# Patient Record
Sex: MALE | Race: WHITE | Employment: OTHER | URBAN - METROPOLITAN AREA
[De-identification: names, ages, dates, MRNs, and addresses within clinical notes are randomized per-mention and may not be internally consistent; named-entity substitution may affect disease eponyms.]

---

## 2017-02-08 PROBLEM — Z80.42 FAMILY HX OF PROSTATE CANCER: Status: ACTIVE | Noted: 2017-02-08

## 2017-02-20 ENCOUNTER — OFFICE VISIT (OUTPATIENT)
Dept: ORTHOPEDIC SURGERY | Age: 67
End: 2017-02-20

## 2017-02-20 VITALS
SYSTOLIC BLOOD PRESSURE: 162 MMHG | HEIGHT: 72 IN | TEMPERATURE: 96.5 F | DIASTOLIC BLOOD PRESSURE: 89 MMHG | WEIGHT: 205 LBS | HEART RATE: 88 BPM | BODY MASS INDEX: 27.77 KG/M2

## 2017-02-20 DIAGNOSIS — M17.12 PRIMARY OSTEOARTHRITIS OF LEFT KNEE: Primary | ICD-10-CM

## 2017-02-20 RX ORDER — HYALURONATE SODIUM 10 MG/ML
2 SYRINGE (ML) INTRAARTICULAR ONCE
Qty: 2 ML | Refills: 0
Start: 2017-02-20 | End: 2017-02-20

## 2017-02-20 RX ORDER — HYALURONATE SODIUM 10 MG/ML
2 SYRINGE (ML) INTRAARTICULAR ONCE
Qty: 2 ML | Refills: 0 | Status: CANCELLED
Start: 2017-02-20 | End: 2017-02-20

## 2017-02-20 NOTE — PROGRESS NOTES
Patient: Tip Moe                MRN: [id number]       SSN: [id number]  YOB: 1950        AGE: 77 y.o. SEX: male  There is no height or weight on file to calculate BMI. PCP: Karlos Strange MD  02/20/17    No chief complaint on file. HISTORY:  Tip Moe is a 77 y.o. male who is seen for his first Euflexxa injecton. PROCEDURE: After timeout and under sterile conditions, patients left knee was injected with 2 cc of Euflexxa. [facility] VIEW  OFFICE PROCEDURE PROGRESS NOTE        Chart reviewed for the following:   Sarah Liriano MD, have reviewed the History, Physical and updated the Allergic reactions for [address] performed immediately prior to start of procedure:   Sarah Liriano MD, have performed the following reviews on Idell Amble III prior to the start of the procedure:            * Patient was identified by name and date of birth   * Agreement on procedure being performed was verified  * Risks and Benefits explained to the patient  * Procedure site verified and marked as necessary  * Patient was positioned for comfort  * Consent was signed and verified     Time: 9:50 AM        Date of procedure: 2/20/2017    Procedure performed by: Savanna Bronson MD    Provider assisted by: None     Patient assisted by: self    How tolerated by patient: tolerated the procedure well with no complications    Comments: none    PHYSICAL EXAM: Knees appear normal. No swelling, redness, or increased warmth. Sensation, circulation, ROM, and motor strength are normal and equal for B/L knees. PLAN: Pt has previously x-ray documented arthritis of the left knee I will see him back in one week for his next second Euflexxa injection.       Scribed by Jason Ames, as dictated by Savanna Bronson MD.

## 2017-02-27 ENCOUNTER — OFFICE VISIT (OUTPATIENT)
Dept: ORTHOPEDIC SURGERY | Age: 67
End: 2017-02-27

## 2017-02-27 VITALS
SYSTOLIC BLOOD PRESSURE: 138 MMHG | BODY MASS INDEX: 27.8 KG/M2 | TEMPERATURE: 98 F | WEIGHT: 205 LBS | DIASTOLIC BLOOD PRESSURE: 90 MMHG | HEART RATE: 70 BPM

## 2017-02-27 DIAGNOSIS — M17.12 PRIMARY OSTEOARTHRITIS OF LEFT KNEE: Primary | ICD-10-CM

## 2017-02-27 RX ORDER — HYALURONATE SODIUM 10 MG/ML
2 SYRINGE (ML) INTRAARTICULAR ONCE
Qty: 2 ML | Refills: 0
Start: 2017-02-27 | End: 2017-02-27

## 2017-02-27 NOTE — PROGRESS NOTES
Patient: Mraek Rosas                MRN: [id number]       SSN: [id number]  YOB: 1950        AGE: 77 y.o. SEX: male  There is no height or weight on file to calculate BMI. PCP: Dyana Marinelli MD  02/27/17    No chief complaint on file. HISTORY:  Zeb Kayser III is a 77 y.o. male who is seen for a his second left knee Euflexxa injection. PROCEDURE: After timeout and under sterile conditions, patients left knee was injected with 2 cc of Euflexxa. [facility]  [facility] PROCEDURE PROGRESS NOTE        Chart reviewed for the following:   Shelbi Lawrence MD, have reviewed the History, Physical and updated the Allergic reactions for [address] performed immediately prior to start of procedure:   Shelbi Lawrence MD, have performed the following reviews on Zeb Kayser III prior to the start of the procedure:            * Patient was identified by name and date of birth   * Agreement on procedure being performed was verified  * Risks and Benefits explained to the patient  * Procedure site verified and marked as necessary  * Patient was positioned for comfort  * Consent was signed and verified     Time: 9:44 AM        Date of procedure: 2/27/2017    Procedure performed by: Buzzy Schilder, MD    Provider assisted by: None     Patient assisted by: self    How tolerated by patient: tolerated the procedure well with no complications    Comments: none    PHYSICAL EXAM: Knees appear normal. No swelling, redness, or increased warmth. Sensation, circulation, ROM, and motor strength are normal and equal for B/L knees. PLAN: Pt has previously x-ray documented arthritis of the left knee. I will see him back in one week for his next third Euflexxa injection.       Scribed by Viktoriya Sharma, as dictated by Buzzy Schilder, MD.

## 2017-03-06 ENCOUNTER — OFFICE VISIT (OUTPATIENT)
Dept: ORTHOPEDIC SURGERY | Age: 67
End: 2017-03-06

## 2017-03-06 VITALS
HEIGHT: 72 IN | WEIGHT: 205 LBS | SYSTOLIC BLOOD PRESSURE: 144 MMHG | HEART RATE: 100 BPM | TEMPERATURE: 97.8 F | DIASTOLIC BLOOD PRESSURE: 93 MMHG | BODY MASS INDEX: 27.77 KG/M2

## 2017-03-06 DIAGNOSIS — M17.12 PRIMARY OSTEOARTHRITIS OF LEFT KNEE: Primary | ICD-10-CM

## 2017-03-06 RX ORDER — HYALURONATE SODIUM 10 MG/ML
2 SYRINGE (ML) INTRAARTICULAR ONCE
Qty: 2 ML | Refills: 0
Start: 2017-03-06 | End: 2017-03-06

## 2017-03-06 NOTE — PROGRESS NOTES
Patient: Jenny Douglas                MRN: [id number]       SSN: [id number]  YOB: 1950        AGE: 77 y.o. SEX: male  There is no height or weight on file to calculate BMI. PCP: Callum Winters MD  03/06/17    Chief Complaint   Patient presents with    Knee Pain     Left, 3rd Euflexxa injection       HISTORY:  Jenny Douglas is a 77 y.o. male who is seen for his third and final left knee Euflexxa injection. PROCEDURE: After timeout and under sterile conditions, patients left knee was injected with 2 cc of Euflexxa. [facility] VIEW  OFFICE PROCEDURE PROGRESS NOTE        Chart reviewed for the following:   Mino Toledo MD, have reviewed the History, Physical and updated the Allergic reactions for [address] performed immediately prior to start of procedure:   Mino Toledo MD, have performed the following reviews on [facility] III prior to the start of the procedure:            * Patient was identified by name and date of birth   * Agreement on procedure being performed was verified  * Risks and Benefits explained to the patient  * Procedure site verified and marked as necessary  * Patient was positioned for comfort  * Consent was signed and verified     Time: 9:45 AM        Date of procedure: 3/6/2017    Procedure performed by: Nelda Jones MD    Provider assisted by: None     Patient assisted by: self    How tolerated by patient: tolerated the procedure well with no complications    Comments: none    PHYSICAL EXAM: Knees appear normal. No swelling, redness, or increased warmth. Sensation, circulation, ROM, and motor strength are normal and equal for B/L knees. PLAN: Pt has previously x-ray documented arthritis of the left knee. I will see him back prn.       Scribed by Candy Sierra, as dictated by Nelda Jones MD.

## 2017-03-20 ENCOUNTER — HOSPITAL ENCOUNTER (OUTPATIENT)
Dept: PREADMISSION TESTING | Age: 67
Discharge: HOME | End: 2017-03-20
Payer: MEDICARE

## 2017-03-20 DIAGNOSIS — Z01.818 PRE-OP TESTING: ICD-10-CM

## 2017-03-20 LAB
ANION GAP BLD CALC-SCNC: 6 MMOL/L (ref 3–18)
APPEARANCE UR: CLEAR
APTT PPP: 26.1 SEC (ref 23–36.4)
ATRIAL RATE: 77 BPM
BACTERIA URNS QL MICRO: NEGATIVE /HPF
BILIRUB UR QL: NEGATIVE
BUN SERPL-MCNC: 18 MG/DL (ref 7–18)
BUN/CREAT SERPL: 16 (ref 12–20)
CALCIUM SERPL-MCNC: 9 MG/DL (ref 8.5–10.1)
CALCULATED P AXIS, ECG09: 46 DEGREES
CALCULATED R AXIS, ECG10: 52 DEGREES
CALCULATED T AXIS, ECG11: 47 DEGREES
CHLORIDE SERPL-SCNC: 103 MMOL/L (ref 100–108)
CO2 SERPL-SCNC: 33 MMOL/L (ref 21–32)
COLOR UR: YELLOW
CREAT SERPL-MCNC: 1.16 MG/DL (ref 0.6–1.3)
DIAGNOSIS, 93000: NORMAL
EPITH CASTS URNS QL MICRO: NORMAL /LPF (ref 0–5)
ERYTHROCYTE [DISTWIDTH] IN BLOOD BY AUTOMATED COUNT: 13 % (ref 11.6–14.5)
GLUCOSE SERPL-MCNC: 95 MG/DL (ref 74–99)
GLUCOSE UR STRIP.AUTO-MCNC: NEGATIVE MG/DL
HCT VFR BLD AUTO: 45.7 % (ref 36–48)
HGB BLD-MCNC: 15.4 G/DL (ref 13–16)
HGB UR QL STRIP: NEGATIVE
INR PPP: 1 (ref 0.8–1.2)
KETONES UR QL STRIP.AUTO: NEGATIVE MG/DL
LEUKOCYTE ESTERASE UR QL STRIP.AUTO: ABNORMAL
MCH RBC QN AUTO: 32.3 PG (ref 24–34)
MCHC RBC AUTO-ENTMCNC: 33.7 G/DL (ref 31–37)
MCV RBC AUTO: 95.8 FL (ref 74–97)
NITRITE UR QL STRIP.AUTO: NEGATIVE
P-R INTERVAL, ECG05: 132 MS
PH UR STRIP: 7.5 [PH] (ref 5–8)
PLATELET # BLD AUTO: 184 K/UL (ref 135–420)
PMV BLD AUTO: 10.2 FL (ref 9.2–11.8)
POTASSIUM SERPL-SCNC: 4.5 MMOL/L (ref 3.5–5.5)
PROT UR STRIP-MCNC: NEGATIVE MG/DL
PROTHROMBIN TIME: 13 SEC (ref 11.5–15.2)
Q-T INTERVAL, ECG07: 380 MS
QRS DURATION, ECG06: 84 MS
QTC CALCULATION (BEZET), ECG08: 430 MS
RBC # BLD AUTO: 4.77 M/UL (ref 4.7–5.5)
RBC #/AREA URNS HPF: NORMAL /HPF (ref 0–5)
SODIUM SERPL-SCNC: 142 MMOL/L (ref 136–145)
SP GR UR REFRACTOMETRY: 1.01 (ref 1–1.03)
UROBILINOGEN UR QL STRIP.AUTO: 1 EU/DL (ref 0.2–1)
VENTRICULAR RATE, ECG03: 77 BPM
WBC # BLD AUTO: 5.4 K/UL (ref 4.6–13.2)
WBC URNS QL MICRO: NORMAL /HPF (ref 0–4)

## 2017-03-20 PROCEDURE — 85730 THROMBOPLASTIN TIME PARTIAL: CPT | Performed by: UROLOGY

## 2017-03-20 PROCEDURE — 81001 URINALYSIS AUTO W/SCOPE: CPT | Performed by: UROLOGY

## 2017-03-20 PROCEDURE — 80048 BASIC METABOLIC PNL TOTAL CA: CPT | Performed by: UROLOGY

## 2017-03-20 PROCEDURE — 87086 URINE CULTURE/COLONY COUNT: CPT | Performed by: UROLOGY

## 2017-03-20 PROCEDURE — 93005 ELECTROCARDIOGRAM TRACING: CPT

## 2017-03-20 PROCEDURE — 36415 COLL VENOUS BLD VENIPUNCTURE: CPT | Performed by: UROLOGY

## 2017-03-20 PROCEDURE — 85027 COMPLETE CBC AUTOMATED: CPT | Performed by: UROLOGY

## 2017-03-20 PROCEDURE — 85610 PROTHROMBIN TIME: CPT | Performed by: UROLOGY

## 2017-03-21 LAB
BACTERIA SPEC CULT: NORMAL
SERVICE CMNT-IMP: NORMAL

## 2017-04-05 ENCOUNTER — ANESTHESIA EVENT (OUTPATIENT)
Dept: SURGERY | Age: 67
End: 2017-04-05
Payer: MEDICARE

## 2017-04-06 ENCOUNTER — HOSPITAL ENCOUNTER (OUTPATIENT)
Age: 67
Setting detail: OBSERVATION
Discharge: HOME | End: 2017-04-07
Attending: UROLOGY | Admitting: UROLOGY
Payer: MEDICARE

## 2017-04-06 ENCOUNTER — ANESTHESIA (OUTPATIENT)
Dept: SURGERY | Age: 67
End: 2017-04-06
Payer: MEDICARE

## 2017-04-06 ENCOUNTER — SURGERY (OUTPATIENT)
Age: 67
End: 2017-04-06

## 2017-04-06 PROCEDURE — 74011000258 HC RX REV CODE- 258: Performed by: UROLOGY

## 2017-04-06 PROCEDURE — 77030018832 HC SOL IRR H20 ICUM -A: Performed by: UROLOGY

## 2017-04-06 PROCEDURE — 77030005538 HC CATH URETH FOL44 BARD -B: Performed by: UROLOGY

## 2017-04-06 PROCEDURE — 77030010509 HC AIRWY LMA MSK TELE -A: Performed by: NURSE ANESTHETIST, CERTIFIED REGISTERED

## 2017-04-06 PROCEDURE — 76010000162 HC OR TIME 1.5 TO 2 HR INTENSV-TIER 1: Performed by: UROLOGY

## 2017-04-06 PROCEDURE — 77030005520 HC CATH URETH FOL38 BARD -A: Performed by: UROLOGY

## 2017-04-06 PROCEDURE — 77030012863 HC BG URIN LEG HOLL -A: Performed by: UROLOGY

## 2017-04-06 PROCEDURE — 76060000034 HC ANESTHESIA 1.5 TO 2 HR: Performed by: UROLOGY

## 2017-04-06 PROCEDURE — 77030011942 HC CATH URETH FOL46 BARD -B: Performed by: UROLOGY

## 2017-04-06 PROCEDURE — 77030019927 HC TBNG IRR CYSTO BAXT -A: Performed by: UROLOGY

## 2017-04-06 PROCEDURE — 74011250636 HC RX REV CODE- 250/636

## 2017-04-06 PROCEDURE — 76210000006 HC OR PH I REC 0.5 TO 1 HR: Performed by: UROLOGY

## 2017-04-06 PROCEDURE — 74011250637 HC RX REV CODE- 250/637: Performed by: ANESTHESIOLOGY

## 2017-04-06 PROCEDURE — 74011250636 HC RX REV CODE- 250/636: Performed by: UROLOGY

## 2017-04-06 PROCEDURE — 77030032490 HC SLV COMPR SCD KNE COVD -B: Performed by: UROLOGY

## 2017-04-06 PROCEDURE — 77030012012 HC AIRWY OP SFT TELE -A: Performed by: NURSE ANESTHETIST, CERTIFIED REGISTERED

## 2017-04-06 PROCEDURE — 77030020269 HC MISC IMPL

## 2017-04-06 PROCEDURE — 99218 HC RM OBSERVATION: CPT

## 2017-04-06 PROCEDURE — 77030005537 HC CATH URETH BARD -A: Performed by: UROLOGY

## 2017-04-06 PROCEDURE — 77030018846 HC SOL IRR STRL H20 ICUM -A: Performed by: UROLOGY

## 2017-04-06 PROCEDURE — 77030012961 HC IRR KT CYSTO/TUR ICUM -A: Performed by: UROLOGY

## 2017-04-06 PROCEDURE — 74011250637 HC RX REV CODE- 250/637: Performed by: UROLOGY

## 2017-04-06 PROCEDURE — 77030020782 HC GWN BAIR PAWS FLX 3M -B: Performed by: UROLOGY

## 2017-04-06 PROCEDURE — 76210000028 HC REC RM PH II 4 TO 4.5 HR: Performed by: UROLOGY

## 2017-04-06 PROCEDURE — 74011000250 HC RX REV CODE- 250

## 2017-04-06 PROCEDURE — 74011250636 HC RX REV CODE- 250/636: Performed by: ANESTHESIOLOGY

## 2017-04-06 PROCEDURE — C1769 GUIDE WIRE: HCPCS | Performed by: UROLOGY

## 2017-04-06 PROCEDURE — C2618 PROBE/NEEDLE, CRYO: HCPCS

## 2017-04-06 RX ORDER — CIPROFLOXACIN 250 MG/1
250 TABLET, FILM COATED ORAL 2 TIMES DAILY
Qty: 14 TAB | Refills: 0 | Status: SHIPPED | OUTPATIENT
Start: 2017-04-06 | End: 2017-04-13

## 2017-04-06 RX ORDER — LIDOCAINE HYDROCHLORIDE 20 MG/ML
INJECTION, SOLUTION EPIDURAL; INFILTRATION; INTRACAUDAL; PERINEURAL AS NEEDED
Status: DISCONTINUED | OUTPATIENT
Start: 2017-04-06 | End: 2017-04-06 | Stop reason: HOSPADM

## 2017-04-06 RX ORDER — CIPROFLOXACIN 250 MG/1
250 TABLET, FILM COATED ORAL EVERY 12 HOURS
Status: DISCONTINUED | OUTPATIENT
Start: 2017-04-06 | End: 2017-04-07 | Stop reason: HOSPADM

## 2017-04-06 RX ORDER — FENTANYL CITRATE 50 UG/ML
INJECTION, SOLUTION INTRAMUSCULAR; INTRAVENOUS AS NEEDED
Status: DISCONTINUED | OUTPATIENT
Start: 2017-04-06 | End: 2017-04-06 | Stop reason: HOSPADM

## 2017-04-06 RX ORDER — OXYCODONE AND ACETAMINOPHEN 5; 325 MG/1; MG/1
1 TABLET ORAL
Status: DISCONTINUED | OUTPATIENT
Start: 2017-04-06 | End: 2017-04-06 | Stop reason: SDUPTHER

## 2017-04-06 RX ORDER — TAMSULOSIN HYDROCHLORIDE 0.4 MG/1
0.4 CAPSULE ORAL DAILY
Status: DISCONTINUED | OUTPATIENT
Start: 2017-04-07 | End: 2017-04-07 | Stop reason: HOSPADM

## 2017-04-06 RX ORDER — DEXTROSE MONOHYDRATE AND SODIUM CHLORIDE 5; .45 G/100ML; G/100ML
150 INJECTION, SOLUTION INTRAVENOUS CONTINUOUS
Status: DISCONTINUED | OUTPATIENT
Start: 2017-04-06 | End: 2017-04-07 | Stop reason: HOSPADM

## 2017-04-06 RX ORDER — ONDANSETRON 2 MG/ML
INJECTION INTRAMUSCULAR; INTRAVENOUS AS NEEDED
Status: DISCONTINUED | OUTPATIENT
Start: 2017-04-06 | End: 2017-04-06 | Stop reason: HOSPADM

## 2017-04-06 RX ORDER — DEXAMETHASONE SODIUM PHOSPHATE 4 MG/ML
INJECTION, SOLUTION INTRA-ARTICULAR; INTRALESIONAL; INTRAMUSCULAR; INTRAVENOUS; SOFT TISSUE AS NEEDED
Status: DISCONTINUED | OUTPATIENT
Start: 2017-04-06 | End: 2017-04-06 | Stop reason: HOSPADM

## 2017-04-06 RX ORDER — HYDROCODONE BITARTRATE AND ACETAMINOPHEN 5; 325 MG/1; MG/1
1 TABLET ORAL
Status: DISCONTINUED | OUTPATIENT
Start: 2017-04-06 | End: 2017-04-07 | Stop reason: HOSPADM

## 2017-04-06 RX ORDER — GLYCOPYRROLATE 0.2 MG/ML
INJECTION INTRAMUSCULAR; INTRAVENOUS AS NEEDED
Status: DISCONTINUED | OUTPATIENT
Start: 2017-04-06 | End: 2017-04-06 | Stop reason: HOSPADM

## 2017-04-06 RX ORDER — FENTANYL CITRATE 50 UG/ML
25 INJECTION, SOLUTION INTRAMUSCULAR; INTRAVENOUS
Status: DISCONTINUED | OUTPATIENT
Start: 2017-04-06 | End: 2017-04-07 | Stop reason: HOSPADM

## 2017-04-06 RX ORDER — SODIUM CHLORIDE, SODIUM LACTATE, POTASSIUM CHLORIDE, CALCIUM CHLORIDE 600; 310; 30; 20 MG/100ML; MG/100ML; MG/100ML; MG/100ML
75 INJECTION, SOLUTION INTRAVENOUS CONTINUOUS
Status: DISCONTINUED | OUTPATIENT
Start: 2017-04-06 | End: 2017-04-06 | Stop reason: HOSPADM

## 2017-04-06 RX ORDER — SODIUM CHLORIDE 0.9 % (FLUSH) 0.9 %
5-10 SYRINGE (ML) INJECTION AS NEEDED
Status: DISCONTINUED | OUTPATIENT
Start: 2017-04-06 | End: 2017-04-07 | Stop reason: HOSPADM

## 2017-04-06 RX ORDER — LIDOCAINE HYDROCHLORIDE 10 MG/ML
0.1 INJECTION, SOLUTION EPIDURAL; INFILTRATION; INTRACAUDAL; PERINEURAL AS NEEDED
Status: DISCONTINUED | OUTPATIENT
Start: 2017-04-06 | End: 2017-04-06 | Stop reason: HOSPADM

## 2017-04-06 RX ORDER — PROPOFOL 10 MG/ML
INJECTION, EMULSION INTRAVENOUS AS NEEDED
Status: DISCONTINUED | OUTPATIENT
Start: 2017-04-06 | End: 2017-04-06 | Stop reason: HOSPADM

## 2017-04-06 RX ORDER — SODIUM CHLORIDE 0.9 % (FLUSH) 0.9 %
5-10 SYRINGE (ML) INJECTION EVERY 8 HOURS
Status: DISCONTINUED | OUTPATIENT
Start: 2017-04-06 | End: 2017-04-06 | Stop reason: HOSPADM

## 2017-04-06 RX ORDER — FAMOTIDINE 20 MG/1
20 TABLET, FILM COATED ORAL ONCE
Status: COMPLETED | OUTPATIENT
Start: 2017-04-06 | End: 2017-04-06

## 2017-04-06 RX ORDER — HYDROCODONE BITARTRATE AND ACETAMINOPHEN 5; 325 MG/1; MG/1
1 TABLET ORAL
Qty: 30 TAB | Refills: 0 | Status: SHIPPED | OUTPATIENT
Start: 2017-04-06 | End: 2018-07-24

## 2017-04-06 RX ORDER — FUROSEMIDE 10 MG/ML
20 INJECTION INTRAMUSCULAR; INTRAVENOUS ONCE
Status: COMPLETED | OUTPATIENT
Start: 2017-04-06 | End: 2017-04-06

## 2017-04-06 RX ORDER — SODIUM CHLORIDE 0.9 % (FLUSH) 0.9 %
5-10 SYRINGE (ML) INJECTION AS NEEDED
Status: DISCONTINUED | OUTPATIENT
Start: 2017-04-06 | End: 2017-04-06 | Stop reason: HOSPADM

## 2017-04-06 RX ORDER — MIDAZOLAM HYDROCHLORIDE 1 MG/ML
INJECTION, SOLUTION INTRAMUSCULAR; INTRAVENOUS AS NEEDED
Status: DISCONTINUED | OUTPATIENT
Start: 2017-04-06 | End: 2017-04-06 | Stop reason: HOSPADM

## 2017-04-06 RX ORDER — LISINOPRIL 5 MG/1
5 TABLET ORAL DAILY
Status: DISCONTINUED | OUTPATIENT
Start: 2017-04-07 | End: 2017-04-07 | Stop reason: HOSPADM

## 2017-04-06 RX ORDER — MELATONIN
2000 DAILY
Status: DISCONTINUED | OUTPATIENT
Start: 2017-04-07 | End: 2017-04-07 | Stop reason: SDUPTHER

## 2017-04-06 RX ORDER — TAMSULOSIN HYDROCHLORIDE 0.4 MG/1
0.4 CAPSULE ORAL
Qty: 30 CAP | Refills: 0 | Status: SHIPPED | OUTPATIENT
Start: 2017-04-06 | End: 2017-10-10

## 2017-04-06 RX ADMIN — FENTANYL CITRATE 50 MCG: 50 INJECTION, SOLUTION INTRAMUSCULAR; INTRAVENOUS at 11:47

## 2017-04-06 RX ADMIN — SODIUM CHLORIDE, SODIUM LACTATE, POTASSIUM CHLORIDE, AND CALCIUM CHLORIDE 75 ML/HR: 600; 310; 30; 20 INJECTION, SOLUTION INTRAVENOUS at 11:10

## 2017-04-06 RX ADMIN — DEXAMETHASONE SODIUM PHOSPHATE 4 MG: 4 INJECTION, SOLUTION INTRA-ARTICULAR; INTRALESIONAL; INTRAMUSCULAR; INTRAVENOUS; SOFT TISSUE at 11:50

## 2017-04-06 RX ADMIN — PROPOFOL 170 MG: 10 INJECTION, EMULSION INTRAVENOUS at 11:47

## 2017-04-06 RX ADMIN — ONDANSETRON 4 MG: 2 INJECTION INTRAMUSCULAR; INTRAVENOUS at 13:02

## 2017-04-06 RX ADMIN — GENTAMICIN SULFATE 180 MG: 40 INJECTION, SOLUTION INTRAMUSCULAR; INTRAVENOUS at 11:43

## 2017-04-06 RX ADMIN — FAMOTIDINE 20 MG: 20 TABLET, FILM COATED ORAL at 10:56

## 2017-04-06 RX ADMIN — CIPROFLOXACIN HYDROCHLORIDE 250 MG: 250 TABLET, FILM COATED ORAL at 22:03

## 2017-04-06 RX ADMIN — GLYCOPYRROLATE 0.2 MG: 0.2 INJECTION INTRAMUSCULAR; INTRAVENOUS at 11:43

## 2017-04-06 RX ADMIN — FUROSEMIDE 20 MG: 10 INJECTION, SOLUTION INTRAVENOUS at 18:14

## 2017-04-06 RX ADMIN — MIDAZOLAM HYDROCHLORIDE 2 MG: 1 INJECTION, SOLUTION INTRAMUSCULAR; INTRAVENOUS at 11:43

## 2017-04-06 RX ADMIN — DEXTROSE MONOHYDRATE AND SODIUM CHLORIDE 150 ML/HR: 5; .45 INJECTION, SOLUTION INTRAVENOUS at 18:14

## 2017-04-06 RX ADMIN — FENTANYL CITRATE 50 MCG: 50 INJECTION, SOLUTION INTRAMUSCULAR; INTRAVENOUS at 12:18

## 2017-04-06 RX ADMIN — LIDOCAINE HYDROCHLORIDE 60 MG: 20 INJECTION, SOLUTION EPIDURAL; INFILTRATION; INTRACAUDAL; PERINEURAL at 11:47

## 2017-04-06 NOTE — ANESTHESIA POSTPROCEDURE EVALUATION
Post-Anesthesia Evaluation and Assessment    Patient: Haim Ortiz MRN: [id number]  SSN: [id number]    YOB: 1950  Age: 77 y.o. Sex: male       Cardiovascular Function/Vital Signs  Visit Vitals    /89    Pulse 76    Temp 36.3 °C (97.4 °F)    Resp 16    Ht 6' (1.829 m)    Wt 93 kg (205 lb)    SpO2 96%    BMI 27.8 kg/m2       Patient is status post general anesthesia for Procedure(s):  CRYOABLATION PROSTATE. Nausea/Vomiting: None    Postoperative hydration reviewed and adequate. Pain:  Pain Scale 1: Numeric (0 - 10) (04/06/17 1430)  Pain Intensity 1: 0 (04/06/17 1430)   Managed    Neurological Status:   Neuro (WDL): Within Defined Limits (04/06/17 1320)   At baseline    Mental Status and Level of Consciousness: Arousable    Pulmonary Status:   O2 Device: Room air (04/06/17 1430)   Adequate oxygenation and airway patent    Complications related to anesthesia: None    Post-anesthesia assessment completed.  No concerns      Signed By: Yodit Lua MD     April 6, 2017

## 2017-04-06 NOTE — PERIOP NOTES
Dr. Marylen Cheney in to see the patient. Manual irrigation of the patient's garvey catheter performed by Dr. Marylen Cheney. Multiple blood clots returned. Irrigation continued until no further clots noted by Dr. Marylen Cheney. Urine red. Order received from Dr. Marylen Cheney to admit patient for 23 hour observation.

## 2017-04-06 NOTE — ANESTHESIA PREPROCEDURE EVALUATION
Anesthetic History   No history of anesthetic complications            Review of Systems / Medical History  Patient summary reviewed and pertinent labs reviewed    Pulmonary  Within defined limits                 Neuro/Psych   Within defined limits           Cardiovascular    Hypertension              Exercise tolerance: >4 METS  Comments: Elevated PSA   GI/Hepatic/Renal                Endo/Other        Arthritis     Other Findings   Comments:   Risk Factors for Postoperative nausea/vomiting:       History of postoperative nausea/vomiting? NO       Female? NO       Motion sickness? NO       Intended opioid administration for postoperative analgesia?   NO           Physical Exam    Airway  Mallampati: III  TM Distance: 4 - 6 cm  Neck ROM: normal range of motion   Mouth opening: Normal     Cardiovascular  Regular rate and rhythm,  S1 and S2 normal,  no murmur, click, rub, or gallop             Dental  No notable dental hx       Pulmonary  Breath sounds clear to auscultation               Abdominal  GI exam deferred       Other Findings            Anesthetic Plan    ASA: 2  Anesthesia type: general          Induction: Intravenous  Anesthetic plan and risks discussed with: Patient

## 2017-04-06 NOTE — IP AVS SNAPSHOT
[address] Patient: Heather Leslie 
MRN: [id number] You are allergic to the following Allergen Reactions Penicillins Other (comments)  
 paralyzation Sulfa (Sulfonamide Antibiotics) Unknown (comments) Recent Documentation Height Weight BMI Smoking Status 1.829 m 93 kg 27.8 kg/m2 Former Smoker Emergency Contacts Name Discharge Info Relation Home Work Mobile Jostin 46 CAREGIVER [3] Daughter [21] [phone] SilvioshangloriaRadha fish DISCHARGE CAREGIVER [3] Life Partner [7] [phone] About your hospitalization You were admitted on:  April 6, 2017 You last received care in the:  [facility] You were discharged on:  April 7, 2017 Unit phone number:  [phone] Why you were hospitalized Your primary diagnosis was:  Not on File Providers Seen During Your Hospitalizations Provider Role Specialty Primary office phone Jean Bañuelos MD Attending Provider Urology [phone] Your Primary Care Physician (PCP) Primary Care Physician Office Phone Office Fax Alex Potts, [address] [place] [address] [phone] Follow-up Information Follow up With Details Comments Contact Info Bubba Cheng MD On 4/13/2017 Appointment at 11:30am [address] [address] 
[phone] Jean Bañuelos MD On 4/14/2017 Appointment at 8:30am [address] [place] [address] [facility] 
[phone] Your Appointments Friday April 14, 2017  8:30 AM EDT Nurse Visit with [facility] NURSE [facility] ([address]) [place] [address] [facility] [address] [address] [facility]  
[phone] Friday April 14, 2017  2:30 PM EDT Any with Jean Bañuelos MD  
[facility] ([address])  [place] 
 [address]  
[phone] Current Discharge Medication List  
  
START taking these medications Dose & Instructions Dispensing Information Comments Morning Noon Evening Bedtime * ciprofloxacin HCl 250 mg tablet Commonly known as:  CIPRO Your last dose was: Your next dose is:    
   
   
 Dose:  250 mg Take 1 Tab by mouth two (2) times a day for 7 days. Quantity:  14 Tab Refills:  0  
     
   
   
   
  
 * ciprofloxacin HCl 250 mg tablet Commonly known as:  CIPRO Your last dose was: Your next dose is:    
   
   
 Dose:  250 mg Take 1 Tab by mouth every twelve (12) hours. Indications: PREVENTION OF BACTERIAL URINARY TRACT INFECTION Quantity:  14 Tab Refills:  0 HYDROcodone-acetaminophen 5-325 mg per tablet Commonly known as:  Tanesha Wheatley Your last dose was: Your next dose is:    
   
   
 Dose:  1 Tab Take 1 Tab by mouth every six (6) hours as needed for Pain. Max Daily Amount: 4 Tabs. Quantity:  30 Tab Refills:  0  
     
   
   
   
  
 * tamsulosin 0.4 mg capsule Commonly known as:  FLOMAX Your last dose was: Your next dose is:    
   
   
 Dose:  0.4 mg Take 1 Cap by mouth daily (after dinner). Quantity:  30 Cap Refills:  0  
     
   
   
   
  
 * tamsulosin 0.4 mg capsule Commonly known as:  FLOMAX Your last dose was: Your next dose is:    
   
   
 Dose:  0.4 mg Take 1 Cap by mouth daily. Quantity:  30 Cap Refills:  0  
     
   
   
   
  
 * Notice: This list has 4 medication(s) that are the same as other medications prescribed for you. Read the directions carefully, and ask your doctor or other care provider to review them with you. CONTINUE these medications which have CHANGED Dose & Instructions Dispensing Information Comments Morning Noon Evening Bedtime * cholecalciferol (vitamin D3) 2,000 unit Tab What changed:  Another medication with the same name was added. Make sure you understand how and when to take each. Your last dose was: Your next dose is: Take  by mouth. Refills:  0  
     
   
   
   
  
 * cholecalciferol 1,000 unit tablet Commonly known as:  VITAMIN D3 What changed: You were already taking a medication with the same name, and this prescription was added. Make sure you understand how and when to take each. Your last dose was: Your next dose is:    
   
   
 Dose:  2000 Units Take 2 Tabs by mouth daily. Quantity:  30 Tab Refills:  0  
     
   
   
   
  
 * Notice: This list has 2 medication(s) that are the same as other medications prescribed for you. Read the directions carefully, and ask your doctor or other care provider to review them with you. CONTINUE these medications which have NOT CHANGED Dose & Instructions Dispensing Information Comments Morning Noon Evening Bedtime  
 ibuprofen 200 mg Cap Your last dose was: Your next dose is: Take  by mouth. Refills:  0  
     
   
   
   
  
 lisinopril 5 mg tablet Commonly known as:  Paulina Bolivar Your last dose was: Your next dose is:    
   
   
  Refills:  0  
     
   
   
   
  
 omeprazole 20 mg capsule Commonly known as:  PRILOSEC Your last dose was: Your next dose is:    
   
   
  Refills:  0 Where to Get Your Medications These medications were sent to [address], [facility] [address] [facility] Phone:  [phone]  
  cholecalciferol 1,000 unit tablet  
 ciprofloxacin HCl 250 mg tablet  
 ciprofloxacin HCl 250 mg tablet  
 tamsulosin 0.4 mg capsule  
 tamsulosin 0.4 mg capsule Information on where to get these meds will be given to you by the nurse or doctor. ! Ask your nurse or doctor about these medications HYDROcodone-acetaminophen 5-325 mg per tablet Discharge Instructions   
  
[email or web address]. [email or web address] Activation Thank you for requesting access to [employer]. Please follow the instructions below to securely access and download your online medical record. [employer] allows you to send messages to your doctor, view your test results, renew your prescriptions, schedule appointments, and more. How Do I Sign Up? 1. In your internet browser, go to [email or web address] 
2. Click on the First Time User? Click Here link in the Sign In box. You will be redirect to the New Member Sign Up page. 3. Enter your [employer] Access Code exactly as it appears below. You will not need to use this code after youve completed the sign-up process. If you do not sign up before the expiration date, you must request a new code. [employer] Access Code: Activation code not generated Current [employer] Status: Active (This is the date your [employer] access code will ) 4. Enter the last four digits of your Social Security Number (xxxx) and Date of Birth (mm/dd/yyyy) as indicated and click Submit. You will be taken to the next sign-up page. 5. Create a [employer] ID. This will be your [employer] login ID and cannot be changed, so think of one that is secure and easy to remember. 6. Create a [employer] password. You can change your password at any time. 7. Enter your Password Reset Question and Answer. This can be used at a later time if you forget your password. 8. Enter your e-mail address. You will receive e-mail notification when new information is available in [address] 9. Click Sign Up. You can now view and download portions of your medical record. 10. Click the Download Summary menu link to download a portable copy of your medical information. Additional Information If you have questions, please visit the Frequently Asked Questions section of the [employer] website at [email or web address] Remember, MyChart is NOT to be used for urgent needs. For medical emergencies, dial 911. Patient armband removed and shredded 
 
call for  appt at  0830 
remove bandage and shower in am 
 
Discharge Orders None Introducing \[facility]\"" & HEALTH SERVICES! Dear Zeny Lees: Thank you for requesting a [employer] account. Our records indicate that you already have an active [employer] account. You can access your account anytime at [email or web address] Did you know that you can access your hospital and ER discharge instructions at any time in [employer]? You can also review all of your test results from your hospital stay or ER visit. Additional Information If you have questions, please visit the Frequently Asked Questions section of the [employer] website at [email or web address] Remember, [employer] is NOT to be used for urgent needs. For medical emergencies, dial 911. Now available from your iPhone and Android! General Information Please provide this summary of care documentation to your next provider. Patient Signature:  ____________________________________________________________ Date:  ____________________________________________________________  
  
Sivan Dudley Provider Signature:  ____________________________________________________________ Date:  ____________________________________________________________

## 2017-04-06 NOTE — OP NOTES
[address]    Name:  Janis Swann  MR#:  [id number]  :  1950  Account #:  [de-identified]  Date of Adm:  2017  Date of Surgery:  2017      PREOPERATIVE DIAGNOSIS: Carcinoma of the prostate. POSTOPERATIVE DIAGNOSIS: Carcinoma of the prostate. PROCEDURES PERFORMED: Cryoablation of the prostate. SURGEON: Marley Recinos MD    ANESTHESIA: General.    ESTIMATED BLOOD LOSS: None. SPECIMENS REMOVED: None. DRAINAGE: A 16-Estonian Hammond catheter. DESCRIPTION OF PROCEDURE: The patient was taken to the  operating room. Under satisfactory general anesthesia, he was  prepped and draped in a lithotomy position. A 16-Estonian Hammond  catheter was placed. The Endocare probe was placed into the rectum. Longitudinal and coronal images showed the gland to be 26 mL in size. The Endocare probe shows 6 probes to be placed. One and 2 were  placed anteromedially, probes 5 and 6 posteromedially and probes 3  and 4 posterolaterally. Appropriate distances were maintained, probe  to probe, probe to capsule, probe to urethra, and probe to rectum. Next, the thermocouples were placed to the external sphincter  and [place]. Following this, the Hammond catheter was removed and  the urethra and bladder inspected and no perforation occurred. A  urethral warming catheter was placed over a stiff Glidewire. He then  underwent 2 freeze-thaw cycles. Probes 1 and 2 were run at  approximately 4 minutes at 100%, at which time they are decreased to  50% and probes 3 and 4 were run at 75%. After several minutes,  probes 1 and 2 were turned off, probes 3 and 4 were decreased to  40%, and probes 5 and 6 were run at 30%. After several minutes,  probes 3 and 4 were turned off, and the gland was seen to completely  be enveloped by the ice ball. A constant monitoring of the ice ball was  done real-time using ultrasound.  After the first complete freeze, he was  then actively thawed and the process repeated in an identical fashion. After the second freeze, he was then actively thawed again for  approximately 10 minutes. Following this, all the probes and  thermocouples were removed. A perineal compressive dressing was  placed with antibiotics. A 16-Turkmen Hammond catheter was placed to  straight drainage with clear urine effluxing. During the entire procedure, he had sequential TEDs for DVT  prophylaxis and he was given perioperative antibiotics. The catheter  was secured. He tolerated the procedure well.         MD RHONDA Frederick / BERENICE  D:  04/06/2017   12:37  T:  04/06/2017   14:54  Job #:  [id number]

## 2017-04-06 NOTE — H&P
Newly Dx T1c N0M0 Prostate Cancer on 10/21/2016. GS 4+3=7 in 6/12 cores. Involving 90 % of the specimen. Presenting PSA 4.21ng/ml. TRUS Volume: 19cc. ECO      Assessment:        Encounter Diagnoses   Name Primary?  Prostate cancer ([facility] Yes    Family hx of prostate cancer           Plan: 1. Reviewed pathology results from 10/21/2016, showing intermediate grade prostate cancer. 2. Will schedule for cryoablation of the prostate   Discussed procedure risks and benefits with Pt            Discussion:   We discussed his diagnosis in detail, reviewing the significance of vincenzo score, PSA, ANNA, and percentage of cores positive. We discussed the various management options for prostate cancer, including active surveillance, open and robotic radical prostatectomy, EBRT, brachytherapy, proton therapy, and cryotherapy. We discussed the generally indolent course of many prostate cancers, and the generally favorable oncologic control offered by all treatment strategies. However, I emphasized that all treatments offer only potential for cure and that in many cases multimodal therapy may ultimately be utilized for long term cancer control. We also discussed the impact of treatment on sexual and urinary function. I emphasized that each treatment has unique quality of life impact and recovery profiles, and we reviewed the possible effects of surgery, radiation, and cryotherapy on quality of life outcomes. All questions were answered.                     Chief Complaint   Patient presents with    Prostate Cancer         History of Present Illness: Karena Conn is a 77 y.o. male who presents today in follow up of his intermediate grade prostate cancer. Pt dx 10/21/2016.      He denies any changes in his recent medical hx. Reports that he has decided to pursue cryoablation of prostate. No new urinary complaints. No weight loss. No new bone or back pain.    Denies any urinary complaints.      Pt denies any ED. Not on any blood thinners. Strong family history of prostate cancer in father dx around age 76        PSA /TESTOSTERONE - BSHSI PSA PSA % Free PSA   Latest Ref Rng & Units 0.00 - 4.00 ng/mL   %   2/26/2016 4.21 (H)   5   12/22/2015   4.0     10/7/2015   4.2     9/29/2015         10/7/2014   3.660     1/9/2014         10/1/2013   3.050           STEVE 2/8/2017   How do you rate your confidence that you could get and keep an erection? 3   When you had erections with sexual stimulation, how often were your erections hard enough for penetration? 4   During sexual intercourse, how often were you able to maintain your erection after you had penetrated (entered) your partner? 4   During sexual intercourse, how easy was it to maintain your erection to completion of intercourse? 4   When you attempted sexual intercourse, how often was it satisfactory for you? 4   STEVE Score 19                  PSA PSA % Free PSA   Latest Ref Rng 0.00-4.00 ng/mL     2/26/2016 4.21 (H) 5   12/22/2015 4.0     10/7/2015 4.2     10/7/2014 3.660     10/1/2013 3.050     9/28/2012 2.240           TRUS Prostate Biopsy Pathology From 10/21/2016  DIAGNOSIS:   A. Right [place] Needle biopsy                  Benign prostatic tissue. B. Right Mid Needle biopsy                  Benign prostatic tissue. C. Right Base Needle biopsy                  ADENOCARCINOMA, OPAL SCORE 3 + 3 = 6 involving 2 % of the specimen (1 of 1 core(s) positive).              Grade Group 1. Ends not involved by the tumor. D. Right Lateral [place] Needle biopsy                  Benign prostatic tissue. E. Right Lateral Mid Needle biopsy                  Benign prostatic tissue. F. Right Lateral Base Needle biopsy                  ATYPICAL SMALL ACINAR PROLIFERATION. G. Left [place] Needle biopsy                  Benign prostatic tissue.    H. Left Mid Needle biopsy                  ADENOCARCINOMA, OPAL SCORE 3 + 4 = 7 involving 70 % of the specimen (1 of 1 core(s) positive).              [place] 4 comprises 40 percent of the cancer. Grade Group 2. Ends not involved by the tumor. I. Left Base Needle biopsy                  ADENOCARCINOMA, KRISTOPHER SCORE 4 + 3 = 7 involving 90 % of the specimen (1 of 1 core(s) positive).              [place] 4 comprises 70 percent of the cancer. Grade Group 3. Tumor involves one end. J. Left Lateral [place] Needle biopsy                  ADENOCARCINOMA, KRISTOPHER SCORE 4 + 3 = 7 involving 3 % of the specimen (1 of 1 core(s) positive).              [place] 4 comprises 70 percent of the cancer. Grade Group 3   K. Left Lateral Mid Needle biopsy                  ADENOCARCINOMA, KRISTOPHER SCORE 4 + 3 = 7 involving 20 % of the specimen (1 of 1 core(s) positive).              [place] 4 comprises 70 percent of the cancer. Grade Group 3. Ends not involved by the tumor. L. Left Lateral Base Needle biopsy                  ADENOCARCINOMA, KRISTOPHER SCORE 4 + 3 = 7 involving 90 % of the specimen (1 of 1 core(s) positive).              [place] 4 comprises 80 percent of the cancer. Grade Group 3. Tumor involves one end.         CT A/P 12/16/2016  IMPRESSION:  1. No evidence for metastatic disease to the chest, abdomen or pelvis. 2. Groundglass densities are seen in the left upper lobe centrally along the  bronchovascular tree. Inflammatory process is a consideration. Recommend 6  months chest CT follow-up to determine stability or resolution. This can be done  without IV contrast.  3 No evidence for local extension of prostate cancer. 4 Scattered diverticulosis of the descending and sigmoid colon without  diverticulitis.   5. Previous hernia repair.     NM Bone Scan 12/16/2016   Impression:  No evidence of osseous metastatic disease.             AUA Assessment Score:  AUA Score: 3;    AUA Bother Rating: Mixed-about equally satisfied        Review of Systems  Constitutional: Fever: No  Skin: Rash: No  HEENT: Hearing difficulty: Yes  Eyes: Blurred vision: No  Cardiovascular: Chest pain: No  Respiratory: Shortness of breath: No  Gastrointestinal: Nausea/vomiting: No  Musculoskeletal: Back pain: No  Neurological: Weakness: No  Psychological: Memory loss: No  Comments/additional findings:   All other systems reviewed and are negative              Past Medical History   Diagnosis Date    Arthritis      Elevated PSA      Hypertension      S/P rotator cuff repair 10/15/2015                Past Surgical History   Procedure Laterality Date    Hx shoulder arthroscopy Right 1/13/14       Dr. Ema Sullivan Hx urological   10/21/2016       Prostate Biopsy with Dr. Marianna Lomas                 Social History    Substance Use Topics     Smoking status: Former Smoker     Smokeless tobacco: Never Used          Comment: quit in 1976     Alcohol use 0.0 oz/week       0 Standard drinks or equivalent per week         Comment: weekly amount not provided                Allergies   Allergen Reactions    Penicillins Other (comments)       paralyzation    Sulfa (Sulfonamide Antibiotics) Unknown (comments)               Family History   Problem Relation Age of Onset    Diabetes Other      Hypertension Other      Heart Disease Other      Arthritis-osteo Other                  Current Outpatient Prescriptions   Medication Sig Dispense Refill    ibuprofen 200 mg cap Take by mouth.        cholecalciferol, vitamin D3, 2,000 unit tab Take by mouth.        lisinopril (PRINIVIL, ZESTRIL) 5 mg tablet     0    omeprazole (PRILOSEC) 20 mg capsule     0            Physical Exam:        Visit Vitals    /80    Ht 6' (1.829 m)    Wt 201 lb (91.2 kg)    BMI 27.26 kg/m2      Constitutional: WDWN, Pleasant and appropriate affect, No acute distress. CV: No peripheral swelling noted  Respiratory: No respiratory distress or difficulties  Abdomen: No abdominal masses or tenderness. No CVA tenderness. No inguinal hernias noted. Skin: No jaundice.  Normal color  Neuro/Psych: Alert and oriented x 3, affect appropriate  Lymphatic: No enlarged inguinal lymph nodes     Review of Labs and Imaging:            Results for orders placed or performed during the hospital encounter of 12/06/16   POC CREATININE   Result Value Ref Range     Creatinine (POC) 1.1 0.6 - 1.3 MG/DL     GFR-AA (POC) >60 >60 ml/min/1.73m2     GFR, non-AA (POC) >60 >60 ml/min/1.73m2         A copy of today's office visit with all pertinent imaging results and labs were sent to: Colby Hernandez MD     I have reviewed the family history and social history with the patient, there are no changes at this time.     Prosper Brown MD on 2/8/2017     Medical Documentation is provided with the assistance of Karina Michael, medical scribe for Prosper Brown MD

## 2017-04-06 NOTE — PERIOP NOTES
Assumed care of patient after report from Margaret Allred RN. Patient returned to PACU to wait for room assignment. Urine in leg bag continues to be red, no clots seen now. 1710 Indwelling catheter irrigated with 60ml normal saline with equal return. Hematuric with few clots. 1800 Dr. Mcfarland Session here. Present catheter removed by Dr. Mcfarland Session. Uro-jet instilled followed by a 3-way catheter inserted by him. 0.9% normal saline  irrigation started. Return hematuric. 1815 IVF changed to D51/2NS at 150 ml/hr for 3 litres. Lasix 20mg IV admin.

## 2017-04-06 NOTE — IP AVS SNAPSHOT
Current Discharge Medication List  
  
START taking these medications Dose & Instructions Dispensing Information Comments Morning Noon Evening Bedtime * ciprofloxacin HCl 250 mg tablet Commonly known as:  CIPRO Your last dose was: Your next dose is:    
   
   
 Dose:  250 mg Take 1 Tab by mouth two (2) times a day for 7 days. Quantity:  14 Tab Refills:  0  
     
   
   
   
  
 * ciprofloxacin HCl 250 mg tablet Commonly known as:  CIPRO Your last dose was: Your next dose is:    
   
   
 Dose:  250 mg Take 1 Tab by mouth every twelve (12) hours. Indications: PREVENTION OF BACTERIAL URINARY TRACT INFECTION Quantity:  14 Tab Refills:  0 HYDROcodone-acetaminophen 5-325 mg per tablet Commonly known as:  Jarome Annalise Your last dose was: Your next dose is:    
   
   
 Dose:  1 Tab Take 1 Tab by mouth every six (6) hours as needed for Pain. Max Daily Amount: 4 Tabs. Quantity:  30 Tab Refills:  0  
     
   
   
   
  
 * tamsulosin 0.4 mg capsule Commonly known as:  FLOMAX Your last dose was: Your next dose is:    
   
   
 Dose:  0.4 mg Take 1 Cap by mouth daily (after dinner). Quantity:  30 Cap Refills:  0  
     
   
   
   
  
 * tamsulosin 0.4 mg capsule Commonly known as:  FLOMAX Your last dose was: Your next dose is:    
   
   
 Dose:  0.4 mg Take 1 Cap by mouth daily. Quantity:  30 Cap Refills:  0  
     
   
   
   
  
 * Notice: This list has 4 medication(s) that are the same as other medications prescribed for you. Read the directions carefully, and ask your doctor or other care provider to review them with you. CONTINUE these medications which have CHANGED Dose & Instructions Dispensing Information Comments Morning Noon Evening Bedtime * cholecalciferol (vitamin D3) 2,000 unit Tab What changed:  Another medication with the same name was added. Make sure you understand how and when to take each. Your last dose was: Your next dose is: Take  by mouth. Refills:  0  
     
   
   
   
  
 * cholecalciferol 1,000 unit tablet Commonly known as:  VITAMIN D3 What changed: You were already taking a medication with the same name, and this prescription was added. Make sure you understand how and when to take each. Your last dose was: Your next dose is:    
   
   
 Dose:  2000 Units Take 2 Tabs by mouth daily. Quantity:  30 Tab Refills:  0  
     
   
   
   
  
 * Notice: This list has 2 medication(s) that are the same as other medications prescribed for you. Read the directions carefully, and ask your doctor or other care provider to review them with you. CONTINUE these medications which have NOT CHANGED Dose & Instructions Dispensing Information Comments Morning Noon Evening Bedtime  
 ibuprofen 200 mg Cap Your last dose was: Your next dose is: Take  by mouth. Refills:  0  
     
   
   
   
  
 lisinopril 5 mg tablet Commonly known as:  Pérez Mash Your last dose was: Your next dose is:    
   
   
  Refills:  0  
     
   
   
   
  
 omeprazole 20 mg capsule Commonly known as:  PRILOSEC Your last dose was: Your next dose is:    
   
   
  Refills:  0 Where to Get Your Medications [facility] [address] [facility] [address] [facility] [address]  [facility] Phone:  [phone]  
  cholecalciferol 1,000 unit tablet  
 ciprofloxacin HCl 250 mg tablet  
 ciprofloxacin HCl 250 mg tablet  
 tamsulosin 0.4 mg capsule  
 tamsulosin 0.4 mg capsule Information on where to get these meds will be given to you by the nurse or doctor. ! Ask your nurse or doctor about these medications HYDROcodone-acetaminophen 5-325 mg per tablet

## 2017-04-06 NOTE — BRIEF OP NOTE
BRIEF OPERATIVE NOTE    Date of Procedure: 4/6/2017   Preoperative Diagnosis: Prostate cancer ([facility] [C61]  Postoperative Diagnosis: Pro[facility] cancer ([facility] Eduard Conteh    Procedure(s):  CRYOABLATION PROSTATE  Surgeon(s) and Role:     * Seymour Valle MD - Primary     * Elsy West MD            Surgical Staff:  Circ-1: Debbie Almanza RN  Circ-Relief: Kayce Peck RN  Scrub Tech-1: Sonya Zhou  Scrub Tech-2: Brenda Turcios  Event Time In   Incision Start 11:59 AM   Incision Close  1:05 PM     Anesthesia: General   Estimated Blood Loss: none  Specimens: * No specimens in log *   Findings: n/c   Complications: none  Implants: * No implants in log *

## 2017-04-07 VITALS
HEART RATE: 73 BPM | RESPIRATION RATE: 16 BRPM | TEMPERATURE: 99 F | SYSTOLIC BLOOD PRESSURE: 158 MMHG | OXYGEN SATURATION: 92 % | WEIGHT: 205 LBS | HEIGHT: 72 IN | BODY MASS INDEX: 27.77 KG/M2 | DIASTOLIC BLOOD PRESSURE: 95 MMHG

## 2017-04-07 PROCEDURE — 99218 HC RM OBSERVATION: CPT

## 2017-04-07 PROCEDURE — 74011000258 HC RX REV CODE- 258: Performed by: UROLOGY

## 2017-04-07 PROCEDURE — 77030005538 HC CATH URETH FOL44 BARD -B

## 2017-04-07 PROCEDURE — 74011250637 HC RX REV CODE- 250/637: Performed by: UROLOGY

## 2017-04-07 RX ORDER — CIPROFLOXACIN 250 MG/1
250 TABLET, FILM COATED ORAL EVERY 12 HOURS
Qty: 14 TAB | Refills: 0 | Status: SHIPPED | OUTPATIENT
Start: 2017-04-07 | End: 2017-04-14

## 2017-04-07 RX ORDER — MELATONIN
2000 DAILY
Status: DISCONTINUED | OUTPATIENT
Start: 2017-04-07 | End: 2017-04-07 | Stop reason: HOSPADM

## 2017-04-07 RX ORDER — MELATONIN
2000 DAILY
Qty: 30 TAB | Refills: 0 | Status: SHIPPED | OUTPATIENT
Start: 2017-04-07 | End: 2018-09-17

## 2017-04-07 RX ORDER — TAMSULOSIN HYDROCHLORIDE 0.4 MG/1
0.4 CAPSULE ORAL DAILY
Qty: 30 CAP | Refills: 0 | Status: SHIPPED | OUTPATIENT
Start: 2017-04-07 | End: 2017-04-14

## 2017-04-07 RX ADMIN — LISINOPRIL 5 MG: 5 TABLET ORAL at 10:32

## 2017-04-07 RX ADMIN — DEXTROSE MONOHYDRATE AND SODIUM CHLORIDE 150 ML/HR: 5; .45 INJECTION, SOLUTION INTRAVENOUS at 00:33

## 2017-04-07 RX ADMIN — CIPROFLOXACIN HYDROCHLORIDE 250 MG: 250 TABLET, FILM COATED ORAL at 08:37

## 2017-04-07 RX ADMIN — TAMSULOSIN HYDROCHLORIDE 0.4 MG: 0.4 CAPSULE ORAL at 10:32

## 2017-04-07 NOTE — PROGRESS NOTES
conducted an initial consultation and Spiritual Assessment for Rupert Galeana ([facility]), who is a 77 y.o.,male. Patients Primary Language is: [place]. According to the patients EMR Protestant Affiliation is: No Confucianism. The reason the Patient came to the hospital is:   Patient Active Problem List    Diagnosis Date Noted    Family hx of prostate cancer 02/08/2017    Prostate cancer ([address] 12/01/2016    S/P rotator cuff repair 10/15/2015    Left knee pain 04/17/2014    Right shoulder pain 04/17/2014        The  provided the following Interventions:  Initiated a relationship of care and support. Explored issues of angus, belief, spirituality and Quaker/ritual needs while hospitalized. Listened empathically as patient shared. Provided chaplaincy education. Provided information about Spiritual Care Services. Offered prayer and assurance of continued prayers on patient's behalf. Chart reviewed. The following outcomes where achieved:  Patient shared limited information about his medical narrative.  confirmed that the patient had angus. Patient expressed gratitude for 's visit. Patient thankful that he is cancer free. Plan:  Chaplains will continue to follow and will provide pastoral care on an as needed/requested basis.  recommends bedside caregivers page  on duty if patient shows signs of acute spiritual or emotional distress.       Dionte Alvarado  [address] [place] [address]  [phone]

## 2017-04-07 NOTE — PROGRESS NOTES
0830  in to see patient Garvey cath discontinued and assisted with removal and 16 fr garvey cath inserted and connected to leg bag, small amt of bloody discharge draining around penis and site care done and 4x4 applied under penis, Dr Yanira Benz is aware. Discharge instructions noted. Discharge teaching of f/c and leg bag done, patient verbalizes uinderstanding.

## 2017-04-07 NOTE — PROGRESS NOTES
Progress Note    Patient: Angela Winn MRN: [id number]  SSN: [id number]    YOB: 1950  Age: 77 y.o. Sex: male      Admit Date: 4/6/2017    LOS: 0 days     Subjective:     s/p  cryo of prostate  urine souleymane    Objective:     Vitals:    04/06/17 1830 04/06/17 1909 04/07/17 0416 04/07/17 0801   BP: 133/88 138/90 114/78 (!) 158/95   Pulse: 78 90 67 73   Resp: 16 15 18 16   Temp:  97.5 °F (36.4 °C) 97.2 °F (36.2 °C) 99 °F (37.2 °C)   SpO2: 99% 95% 97% 92%   Weight:       Height:            Intake and Output:  Current Shift: 04/07 0701 - 04/07 1900  In: 120 [P.O.:120]  Out: 3600 [Urine:3600]  Last three shifts: 04/05 1901 - 04/07 0700  In: 04361.5 [P.O.:720; I.V.:2152.5]  Out: 62023 [Urine:03899]    Current Facility-Administered Medications   Medication Dose Route Frequency    sodium chloride (NS) flush 5-10 mL  5-10 mL IntraVENous PRN    fentaNYL citrate (PF) injection 25 mcg  25 mcg IntraVENous Multiple    ciprofloxacin HCl (CIPRO) tablet 250 mg  250 mg Oral Q12H    tamsulosin (FLOMAX) capsule 0.4 mg  0.4 mg Oral DAILY    HYDROcodone-acetaminophen (NORCO) 5-325 mg per tablet 1 Tab  1 Tab Oral Q6H PRN    cholecalciferol (VITAMIN D3) tablet 2,000 Units  2,000 Units Oral DAILY    lisinopril (PRINIVIL, ZESTRIL) tablet 5 mg  5 mg Oral DAILY    dextrose 5 % - 0.45% NaCl infusion  150 mL/hr IntraVENous CONTINUOUS         Physical Exam:   GENERAL: alert, cooperative, no distress, appears stated age  ABDOMEN: soft, non-tender.  Bowel sounds normal. No masses,  no organomegaly        Lab/Data Review:  BMP: No results found for: NA, K, CL, CO2, AGAP, GLU, BUN, CREA, GFRAA, GFRNA  CMP: No results found for: NA, K, CL, CO2, AGAP, GLU, BUN, CREA, GFRAA, GFRNA, CA, MG, PHOS, ALB, TBIL, TP, ALB, GLOB, AGRAT, SGOT, ALT, GPT  CBC: No results found for: WBC, HGB, HGBEXT, HCT, HCTEXT, PLT, PLTEXT, HGBEXT, HCTEXT, PLTEXT       CT Results:    Results from Hospital Encounter encounter on 12/06/16   CT CHEST ABD PELV W CONT   Narrative CT CHEST,ABDOMEN AND PELVIS WITH CONTRAST    TECHNIQUE: Standard helical images were obtained from the thoracic inlet through  the inferior pubic rami following the intravenous injection of a bolus of 100 cc  of Isovue 300  Images were reviewed on both soft tissue, lung, and bone window  settings. HISTORY: Prostate cancer. Rule out metastasis. COMPARISON: No priors. CT CHEST FINDINGS: Thyroid gland appear normal as visualized. There is no  mediastinal adenopathy or hilar adenopathy. Central airways appear unremarkable. Thoracic aorta, pulmonary arteries, SVC appear unremarkable. Heart is normal in  size. There is no pericardial effusion. Esophagus appear normal. There is a  small hiatal hernia. Chest wall soft tissues appear unremarkable. There is no  axillary adenopathy. Groundglass densities are seen in the left upper lobe along the bronchovascular  tree. There is no bronchiectasis. No pleural effusion was seen. CT ABDOMEN FINDINGS: Liver enhanced in a normal fashion without focal lesions. Gallbladder distended unremarkably. Pancreas has no inflammation. Spleen is  normal in size. Adrenal glands are normal. Kidneys enhance without  hydronephrosis. Stomach was filled with oral contrast and appear unremarkable. Duodenum and remainder of the small bowel appeared unremarkable. Appendix appear  normal. There is scattered diverticulosis of the descending colon without  diverticulitis. IVC appear normal. Abdominal aorta has normal caliber. Visceral arteries appear  patent. There is no retroperitoneal adenopathy. No mesenteric adenopathy was  seen. CT PELVIS FINDINGS: Small bowel appeared unremarkable. Scattered diverticulosis  of the colon was seen without diverticulitis. Bladder distended and normal  fashion. Prostate gland is mildly enlarged. There is no pelvic adenopathy. Surgical fasteners are seen in the right groin from hernia repair.     Bony structures appeared intact without lytic or blastic lesions. Impression IMPRESSION:  1. No evidence for metastatic disease to the chest, abdomen or pelvis. 2. Groundglass densities are seen in the left upper lobe centrally along the  bronchovascular tree. Inflammatory process is a consideration. Recommend 6  months chest CT follow-up to determine stability or resolution. This can be done  without IV contrast.  3 No evidence for local extension of prostate cancer. 4 Scattered diverticulosis of the descending and sigmoid colon without  diverticulitis. 5. Previous hernia repair. US Results:  No results found for this or any previous visit. Assessment:     Active Problems:    * No active hospital problems.  *    prostate cqancer  Plan:     garvey changed to  16  f  rto  1 wk to d/c    Signed By: Luis Toscano MD , FACS    April 7, 2017      PAGER:  [phone]  CELL:  [place]  OFFICE:  [address] [phone]

## 2017-04-07 NOTE — PROGRESS NOTES
0630-Pt CBI infusing briskly to keep urine light cherry color few clots. .  Urine has cleared overnight from dark red with many clots. No pain.

## 2017-04-07 NOTE — DISCHARGE INSTRUCTIONS
[facility].[employer] Activation    Thank you for requesting access to dscout. Please follow the instructions below to securely access and download your online medical record. dscout allows you to send messages to your doctor, view your test results, renew your prescriptions, schedule appointments, and more. How Do I Sign Up? 1. In your internet browser, go to [email or web address].[email or web address]  2. Click on the First Time User? Click Here link in the Sign In box. You will be redirect to the New Member Sign Up page. 3. Enter your dscout Access Code exactly as it appears below. You will not need to use this code after youve completed the sign-up process. If you do not sign up before the expiration date, you must request a new code. dscout Access Code: Activation code not generated  Current dscout Status: Active (This is the date your dscout access code will )    4. Enter the last four digits of your Social Security Number (xxxx) and Date of Birth (mm/dd/yyyy) as indicated and click Submit. You will be taken to the next sign-up page. 5. Create a dscout ID. This will be your dscout login ID and cannot be changed, so think of one that is secure and easy to remember. 6. Create a dscout password. You can change your password at any time. 7. Enter your Password Reset Question and Answer. This can be used at a later time if you forget your password. 8. Enter your e-mail address. You will receive e-mail notification when new information is available in [address] 9. Click Sign Up. You can now view and download portions of your medical record. 10. Click the Download Summary menu link to download a portable copy of your medical information. Additional Information    If you have questions, please visit the Frequently Asked Questions section of the dscout website at [email or web address] Remember, dscout is NOT to be used for urgent needs. For medical emergencies, dial 911.   Patient armband removed and shredded    call for  appt at  0830  remove bandage and shower in am

## 2017-04-07 NOTE — ROUTINE PROCESS
Bedside and Verbal shift change report given to Basilio (oncoming nurse) by Farooq Rowe RN (offgoing nurse). Report included the following information SBAR, Kardex, MAR and Recent Results. SITUATION:    Code Status: Full Code   Reason for Admission: Prostate cancer ([address] day: 0   Problem List:       Hospital Problems  Date Reviewed: 3/6/2017    None          BACKGROUND:    Past Medical History:   Past Medical History:   Diagnosis Date    Arthritis     Elevated PSA     Hypertension     S/P rotator cuff repair 10/15/2015         Patient taking anticoagulants no     ASSESSMENT:    Changes in Assessment Throughout Shift: CBI     Patient has Central Line: no Reasons if yes:    Patient has Hammond Cath: yes Reasons if yes: surg      Last Vitals:     Vitals:    04/06/17 1457 04/06/17 1830 04/06/17 1909 04/07/17 0416   BP: 136/89 133/88 138/90 114/78   Pulse: 76 78 90 67   Resp:  16 15 18   Temp:   97.5 °F (36.4 °C) 97.2 °F (36.2 °C)   SpO2:  99% 95% 97%   Weight:       Height:            IV and DRAINS (will only show if present)   Peripheral IV 04/06/17 Left Arm-Site Assessment: Clean, dry, & intact     WOUND (if present)   Wound Type:  none   Dressing present Dressing Present : No   Wound Concerns/Notes:  none     PAIN    Pain Assessment    Pain Intensity 1: 0 (04/06/17 2329)              Patient Stated Pain Goal: 0  o Interventions for Pain:  See mar  o Intervention effective: yes  o Time of last intervention: see mar   o Reassessment Completed: yes      Last 3 Weights:  Last 3 Recorded Weights in this Encounter    03/24/17 1531 04/06/17 1112   Weight: 93 kg (205 lb) 93 kg (205 lb)     Weight change:      INTAKE/OUPUT    Current Shift: 04/07 0701 - 04/07 1900  In: -   Out: 1600 [Urine:1600]    Last three shifts: 04/05 1901 - 04/07 0700  In: 19506.5 [P.O.:720;  I.V.:2152.5]  Out: 01605 [Urine:48028]     LAB RESULTS   No results for input(s): WBC, HGB, HCT, PLT, HGBEXT, HCTEXT, PLTEXT in the last 72 hours. No results for input(s): NA, K, GLU, BUN, CREA, CA, MG, INR in the last 72 hours. No lab exists for component: PT, PTT, INREXT    RECOMMENDATIONS AND DISCHARGE PLANNING     1. Pending tests/procedures/ Plan of Care or Other Needs: none     2. Discharge plan for patient and Needs/Barriers: home    3. Estimated Discharge Date: today Posted on Whiteboard in [facility]: yes      4. The patient's care plan was reviewed with the oncoming nurse. \"HEALS\" SAFETY CHECK      Fall Risk    Total Score: 3    Safety Measures: Safety Measures: Bed/Chair-Wheels locked, Bed in low position, Call light within reach, Side rails X 3    A safety check occurred in the patient's room between off going nurse and oncoming nurse listed above. The safety check included the below items  Area Items   H  High Alert Medications - Verify all high alert medication drips (heparin, PCA, etc.)   E  Equipment - Suction is set up for ALL patients (with seth)  - Red plugs utilized for all equipment (IV pumps, etc.)  - WOWs wiped down at end of shift.  - Room stocked with oxygen, suction, and other unit-specific supplies   A  Alarms - Bed alarm is set for fall risk patients  - Ensure chair alarm is in place and activated if patient is up in a chair   L  Lines - Check IV for any infiltration  - Hammond bag is empty if patient has a Hammond   - Tubing and IV bags are labeled   S  Safety   - Room is clean, patient is clean, and equipment is clean. - Hallways are clear from equipment besides carts. - Fall bracelet on for fall risk patients  - Ensure room is clear and free of clutter  - Suction is set up for ALL patients (with seth)  - Hallways are clear from equipment besides carts.    - Isolation precautions followed, supplies available outside room, sign posted     Sarah Dotson RN

## 2017-04-07 NOTE — PROGRESS NOTES
Care Management Interventions  PCP Verified by CM: Yes  Mode of Transport at Discharge: BLS  Transition of Care Consult (CM Consult): Discharge Planning  MyChart Signup: No  Discharge Durable Medical Equipment: No  Physical Therapy Consult: No  Occupational Therapy Consult: No  Speech Therapy Consult: No  Current Support Network: Other  Confirm Follow Up Transport: Family  Plan discussed with Pt/Family/Caregiver: Yes  Discharge Location  Discharge Placement: Home     Late Entry:  78 y/o male admitted with prostate cancer. Pt is on Observation Status. He signed the MOON and Observation letter and copy given to pt. Pt states he plans to go back home at discharge.

## 2017-04-08 ENCOUNTER — HOSPITAL ENCOUNTER (EMERGENCY)
Age: 67
Discharge: HOME | End: 2017-04-08
Attending: EMERGENCY MEDICINE
Payer: MEDICARE

## 2017-04-08 VITALS
WEIGHT: 205 LBS | RESPIRATION RATE: 16 BRPM | OXYGEN SATURATION: 96 % | HEART RATE: 88 BPM | HEIGHT: 72 IN | TEMPERATURE: 98 F | SYSTOLIC BLOOD PRESSURE: 118 MMHG | BODY MASS INDEX: 27.77 KG/M2 | DIASTOLIC BLOOD PRESSURE: 73 MMHG

## 2017-04-08 DIAGNOSIS — N13.9 URINARY OBSTRUCTION: Primary | ICD-10-CM

## 2017-04-08 LAB
ALBUMIN SERPL BCP-MCNC: 3.5 G/DL (ref 3.4–5)
ALBUMIN/GLOB SERPL: 1.1 {RATIO} (ref 0.8–1.7)
ALP SERPL-CCNC: 49 U/L (ref 45–117)
ALT SERPL-CCNC: 27 U/L (ref 16–61)
ANION GAP BLD CALC-SCNC: 9 MMOL/L (ref 3–18)
APPEARANCE UR: CLEAR
AST SERPL W P-5'-P-CCNC: 18 U/L (ref 15–37)
BACTERIA URNS QL MICRO: ABNORMAL /HPF
BASOPHILS # BLD AUTO: 0 K/UL (ref 0–0.06)
BASOPHILS # BLD: 0 % (ref 0–2)
BILIRUB SERPL-MCNC: 0.5 MG/DL (ref 0.2–1)
BILIRUB UR QL: NEGATIVE
BUN SERPL-MCNC: 24 MG/DL (ref 7–18)
BUN/CREAT SERPL: 17 (ref 12–20)
CALCIUM SERPL-MCNC: 9 MG/DL (ref 8.5–10.1)
CHLORIDE SERPL-SCNC: 96 MMOL/L (ref 100–108)
CO2 SERPL-SCNC: 27 MMOL/L (ref 21–32)
COLOR UR: YELLOW
CREAT SERPL-MCNC: 1.41 MG/DL (ref 0.6–1.3)
DIFFERENTIAL METHOD BLD: ABNORMAL
EOSINOPHIL # BLD: 0 K/UL (ref 0–0.4)
EOSINOPHIL NFR BLD: 0 % (ref 0–5)
EPITH CASTS URNS QL MICRO: ABNORMAL /LPF (ref 0–5)
ERYTHROCYTE [DISTWIDTH] IN BLOOD BY AUTOMATED COUNT: 12.7 % (ref 11.6–14.5)
GLOBULIN SER CALC-MCNC: 3.2 G/DL (ref 2–4)
GLUCOSE SERPL-MCNC: 128 MG/DL (ref 74–99)
GLUCOSE UR STRIP.AUTO-MCNC: NEGATIVE MG/DL
HCT VFR BLD AUTO: 39.2 % (ref 36–48)
HGB BLD-MCNC: 13.6 G/DL (ref 13–16)
HGB UR QL STRIP: ABNORMAL
KETONES UR QL STRIP.AUTO: NEGATIVE MG/DL
LEUKOCYTE ESTERASE UR QL STRIP.AUTO: ABNORMAL
LYMPHOCYTES # BLD AUTO: 10 % (ref 21–52)
LYMPHOCYTES # BLD: 1.2 K/UL (ref 0.9–3.6)
MCH RBC QN AUTO: 32.3 PG (ref 24–34)
MCHC RBC AUTO-ENTMCNC: 34.7 G/DL (ref 31–37)
MCV RBC AUTO: 93.1 FL (ref 74–97)
MONOCYTES # BLD: 0.8 K/UL (ref 0.05–1.2)
MONOCYTES NFR BLD AUTO: 7 % (ref 3–10)
MUCOUS THREADS URNS QL MICRO: ABNORMAL /LPF
NEUTS SEG # BLD: 9.8 K/UL (ref 1.8–8)
NEUTS SEG NFR BLD AUTO: 83 % (ref 40–73)
NITRITE UR QL STRIP.AUTO: NEGATIVE
PH UR STRIP: 5.5 [PH] (ref 5–8)
PLATELET # BLD AUTO: 142 K/UL (ref 135–420)
PMV BLD AUTO: 9.9 FL (ref 9.2–11.8)
POTASSIUM SERPL-SCNC: 3.9 MMOL/L (ref 3.5–5.5)
PROT SERPL-MCNC: 6.7 G/DL (ref 6.4–8.2)
PROT UR STRIP-MCNC: 100 MG/DL
RBC # BLD AUTO: 4.21 M/UL (ref 4.7–5.5)
RBC #/AREA URNS HPF: ABNORMAL /HPF (ref 0–5)
SODIUM SERPL-SCNC: 132 MMOL/L (ref 136–145)
SP GR UR REFRACTOMETRY: 1.02 (ref 1–1.03)
UROBILINOGEN UR QL STRIP.AUTO: 0.2 EU/DL (ref 0.2–1)
WBC # BLD AUTO: 11.8 K/UL (ref 4.6–13.2)
WBC URNS QL MICRO: ABNORMAL /HPF (ref 0–4)

## 2017-04-08 PROCEDURE — 51700 IRRIGATION OF BLADDER: CPT

## 2017-04-08 PROCEDURE — 77030018846 HC SOL IRR STRL H20 ICUM -A

## 2017-04-08 PROCEDURE — 80053 COMPREHEN METABOLIC PANEL: CPT | Performed by: NURSE PRACTITIONER

## 2017-04-08 PROCEDURE — 81001 URINALYSIS AUTO W/SCOPE: CPT | Performed by: NURSE PRACTITIONER

## 2017-04-08 PROCEDURE — 85025 COMPLETE CBC W/AUTO DIFF WBC: CPT | Performed by: NURSE PRACTITIONER

## 2017-04-08 PROCEDURE — 99283 EMERGENCY DEPT VISIT LOW MDM: CPT

## 2017-04-08 PROCEDURE — 74011250636 HC RX REV CODE- 250/636: Performed by: NURSE PRACTITIONER

## 2017-04-08 PROCEDURE — 96360 HYDRATION IV INFUSION INIT: CPT

## 2017-04-08 RX ORDER — OXYBUTYNIN CHLORIDE 5 MG/5ML
5 SYRUP ORAL
Qty: 75 ML | Refills: 0 | Status: SHIPPED | OUTPATIENT
Start: 2017-04-08 | End: 2018-07-24

## 2017-04-08 RX ADMIN — SODIUM CHLORIDE 1000 ML: 900 INJECTION, SOLUTION INTRAVENOUS at 16:49

## 2017-04-08 NOTE — ED NOTES
I performed a brief evaluation, including history and physical, of the patient here in triage and I have determined that pt will need further treatment and evaluation from the FT provider. I have placed initial orders to help in expediting patients care. April 08, 2017 at 4:21 PM - Lesly Walker. Luis Chester NP        Vital signs being obtained by triage nurse now.

## 2017-04-08 NOTE — ED NOTES
Hammond irrigated with 500ml of sterile water, several small to moderate clots returned with dark herson urine. Patient tolerated well. Hammond bag reattached and draining.

## 2017-04-08 NOTE — DISCHARGE INSTRUCTIONS
[employer] Activation    Thank you for requesting access to [employer]. Please follow the instructions below to securely access and download your online medical record. [employer] allows you to send messages to your doctor, view your test results, renew your prescriptions, schedule appointments, and more. How Do I Sign Up? 1. In your internet browser, go to [email or web address]  2. Click on the First Time User? Click Here link in the Sign In box. You will be redirect to the New Member Sign Up page. 3. Enter your [employer] Access Code exactly as it appears below. You will not need to use this code after youve completed the sign-up process. If you do not sign up before the expiration date, you must request a new code. [employer] Access Code: Activation code not generated  Current [employer] Status: Active (This is the date your [employer] access code will )    4. Enter the last four digits of your Social Security Number (xxxx) and Date of Birth (mm/dd/yyyy) as indicated and click Submit. You will be taken to the next sign-up page. 5. Create a [employer] ID. This will be your [employer] login ID and cannot be changed, so think of one that is secure and easy to remember. 6. Create a [employer] password. You can change your password at any time. 7. Enter your Password Reset Question and Answer. This can be used at a later time if you forget your password. 8. Enter your e-mail address. You will receive e-mail notification when new information is available in [address] 9. Click Sign Up. You can now view and download portions of your medical record. 10. Click the Download Summary menu link to download a portable copy of your medical information. Additional Information    If you have questions, please visit the Frequently Asked Questions section of the [employer] website at [email or web address] Remember, [employer] is NOT to be used for urgent needs. For medical emergencies, dial 911.

## 2017-04-08 NOTE — ED TRIAGE NOTES
Pt, states  He had  Prostate surgery  Thursday release  friday with  Hammond catheter,   States  Catheter  Not draining since 4 AM,  C/o lower abdominal pain

## 2017-04-14 PROBLEM — N52.33 ERECTILE DYSFUNCTION FOLLOWING URETHRAL SURGERY: Status: ACTIVE | Noted: 2017-04-14

## 2017-08-17 ENCOUNTER — OFFICE VISIT (OUTPATIENT)
Dept: ORTHOPEDIC SURGERY | Age: 67
End: 2017-08-17

## 2017-08-17 VITALS
SYSTOLIC BLOOD PRESSURE: 150 MMHG | HEART RATE: 78 BPM | TEMPERATURE: 97.1 F | HEIGHT: 72 IN | DIASTOLIC BLOOD PRESSURE: 98 MMHG | WEIGHT: 207 LBS | BODY MASS INDEX: 28.04 KG/M2

## 2017-08-17 DIAGNOSIS — M25.562 LEFT KNEE PAIN, UNSPECIFIED CHRONICITY: ICD-10-CM

## 2017-08-17 DIAGNOSIS — M17.0 PRIMARY OSTEOARTHRITIS OF BOTH KNEES: ICD-10-CM

## 2017-08-17 DIAGNOSIS — M17.12 PRIMARY OSTEOARTHRITIS OF LEFT KNEE: Primary | ICD-10-CM

## 2017-08-17 RX ORDER — SILDENAFIL CITRATE 100 MG/1
TABLET, FILM COATED ORAL
Refills: 0 | COMMUNITY
Start: 2017-08-05 | End: 2018-09-17

## 2017-08-17 RX ORDER — TRIAMCINOLONE ACETONIDE 40 MG/ML
40 INJECTION, SUSPENSION INTRA-ARTICULAR; INTRAMUSCULAR ONCE
Qty: 1 ML | Refills: 0
Start: 2017-08-17 | End: 2017-08-17

## 2017-08-17 NOTE — PROGRESS NOTES
HISTORY OF PRESENT ILLNESS:  Mr. January Roe returns for followup regarding his bilateral knees. He has been a patient of Dr. Hina Walker over the past several years and has benefitted with numerous viscosupplementation series averaging every three months to help his bilateral knee pain. He has severe osteoarthritis, end-staged, of the medial joint of the left knee, and there are also left knee tricompartmental findings, minimal in severity. He notes his right knee seems to be hurting more today since he is favoring his left knee. He believes that is an overuse syndrome of the right knee. When he sits in a computer chair, he has to flex his left knee for comfort. He has to extend his right knee while sitting in the computer chair for comfort. His pain at rest to the left knee is a 3-4/10 and with activity, his pain increases to upwards of a 7-8/10. The activities which increase the pain are extended standing and walking and climbing stairs. His right knee pain at rest is a 4-5/10 and with activity, again favoring the left knee, his pain increases to upwards of a 7-8/10. Activities to include extended standing and walking, as well as climbing stairs, worsens his right knee discomfort. REVIEW OF SYSTEMS:  No chest pain or shortness of breath. No fevers, chills, or night sweats. No rash and no itching. He is not allergic to Iodine. He does not eat shell fish, and that is a personal choice, he reports. PHYSICAL EXAM:  He is a 59-year-old, , obese male, atraumatic, normocephalic, alert and oriented times three sitting on the table comfortably. Examination to the left knee reveals a noted varus deformity. He has pain along the medial joint line. He stands with a varus deformity. His range of motion with crepitation and pain of the left knee noted at 95-5° today. There is no instability to varus and valgus stressing.   However, medial joint line discomfort does increase on varus stressing. There is no warmth, erythema, edema, ecchymosis, or effusion identified. The right knee reveals no warmth, erythema, edema, ecchymosis, or effusion identified. Right knee active range of motion is 115-0°. There is no instability on varus and valgus stressing. The patella does track midline with slight crepitation. There is no calf tenderness or evidence of DVT to the bilateral lower extremities. Capillary refill is brisk and less than 2 seconds to the lower extremities distally. RADIOGRAPHS:  X-rays today demonstrate severe end-staged osteoarthritis of the medial joint space of the left knee. Other tricompartmental findings are noted minimal in severity. The right knee reveals early osteoarthritic changes findings seen tricompartmentally. IMPRESSION:      1. Left knee end-staged, medial joint space osteoarthritis with further tricompartmental findings seen. 2. Right knee pain secondary to overuse favoring the left knee. 3. Bilateral knee range of motion decreased secondary to above. PROCEDURE:  Today, using sterile technique, after verbal and written consent obtained and appropriate time out performed, 2 cc of Kenalog at 40 mg per mL mixed with 6 mL of Marcaine 0.25% was injected in the bilateral knees using the anterolateral intra-articular approaches. There were no complications. The patient tolerated the procedures well. PLAN:  I am currently recommending a low dose cortisone injection for his bilateral knee pain. The patient is to follow up p.r.n. Consideration for viscosupplementation repeat with ARNOLDO 3.

## 2017-09-25 ENCOUNTER — OFFICE VISIT (OUTPATIENT)
Dept: ORTHOPEDIC SURGERY | Facility: CLINIC | Age: 67
End: 2017-09-25

## 2017-09-25 VITALS
BODY MASS INDEX: 27.2 KG/M2 | OXYGEN SATURATION: 97 % | WEIGHT: 200.8 LBS | RESPIRATION RATE: 18 BRPM | HEIGHT: 72 IN | SYSTOLIC BLOOD PRESSURE: 143 MMHG | TEMPERATURE: 96.4 F | HEART RATE: 104 BPM | DIASTOLIC BLOOD PRESSURE: 96 MMHG

## 2017-09-25 DIAGNOSIS — G89.29 CHRONIC PAIN OF LEFT KNEE: ICD-10-CM

## 2017-09-25 DIAGNOSIS — M25.562 CHRONIC PAIN OF LEFT KNEE: ICD-10-CM

## 2017-09-25 DIAGNOSIS — M17.12 PRIMARY OSTEOARTHRITIS OF LEFT KNEE: Primary | ICD-10-CM

## 2017-09-25 NOTE — PROGRESS NOTES
HISTORY OF PRESENT ILLNESS:  Dorys Tariq returns for initiation of GELSYN 3 to his left knee. He was given a cortisone injection at his last office visit, which worked about four days and then his pain started to return. He has pain when he walks distance, climbs stairs, and stands for an extended period. The left knee today reveals no fracture deformity, lesions, masses, or step-offs. There is no warmth, erythema, edema, ecchymosis, or effusion. He has pain over the medial joint line, which worsens on varus stressing. IMPRESSION:      1. Left knee pain. 2. Left knee osteoarthritis. 3. Decreased range of motion of the left knee secondary to above. PROCEDURE:  Today, using sterile technique, after verbal and written consent obtained and appropriate time out performed, 2 cc of GELSYN 3, lot number [id number] expiration date May 31, 2019, was administered through the anterolateral intra-articular approach. There were no complications. The patient tolerated the procedure well. PLAN:   I am currently recommending initiation of GELSYN 3 today. The patient is going to follow back in one week for continuation of care. All his questions were answered to his satisfaction.

## 2017-10-02 ENCOUNTER — OFFICE VISIT (OUTPATIENT)
Dept: ORTHOPEDIC SURGERY | Facility: CLINIC | Age: 67
End: 2017-10-02

## 2017-10-02 VITALS
SYSTOLIC BLOOD PRESSURE: 148 MMHG | RESPIRATION RATE: 18 BRPM | BODY MASS INDEX: 26.71 KG/M2 | OXYGEN SATURATION: 99 % | HEART RATE: 99 BPM | HEIGHT: 72 IN | WEIGHT: 197.2 LBS | TEMPERATURE: 96.5 F | DIASTOLIC BLOOD PRESSURE: 105 MMHG

## 2017-10-02 DIAGNOSIS — G89.29 CHRONIC PAIN OF LEFT KNEE: Primary | ICD-10-CM

## 2017-10-02 DIAGNOSIS — M25.562 CHRONIC PAIN OF LEFT KNEE: Primary | ICD-10-CM

## 2017-10-02 NOTE — MR AVS SNAPSHOT
Visit Information Date & Time Provider Department Dept. Phone Encounter #  
 10/2/2017 11:00 AM Jyothi Lares, [address] [facility] [phone] Your Appointments 10/9/2017 11:00 AM  
Follow Up with CHANTAL Riley [facility]) Appt Note: Gly-Syn3 1/3; Gly-Syn3 1/3  
 [address] [facility], [address] [facility] [address] [phone]  
  
   
 [address] [facility], [address] [place] [address] Road [facility] Upcoming Health Maintenance Date Due Hepatitis C Screening 1950 DTaP/Tdap/Td series (1 - Tdap) 5/12/1971 FOBT Q 1 YEAR AGE 50-75 5/12/2000 ZOSTER VACCINE AGE 60> 3/12/2010 GLAUCOMA SCREENING Q2Y 5/12/2015 Pneumococcal 65+ High/Highest Risk (1 of 2 - PCV13) 5/12/2015 MEDICARE YEARLY EXAM 5/12/2015 INFLUENZA AGE 9 TO ADULT 8/1/2017 Allergies as of 10/2/2017  Review Complete On: 10/2/2017 By: Semaj Milner Severity Noted Reaction Type Reactions Penicillins  01/16/2014    Other (comments)  
 paralyzation Sulfa (Sulfonamide Antibiotics)  01/16/2014    Unknown (comments) Current Immunizations  Never Reviewed No immunizations on file. Not reviewed this visit Vitals BP Pulse Temp Resp Height(growth percentile) Weight(growth percentile) (!) 148/105 99 96.5 °F (35.8 °C) (Oral) 18 6' (1.829 m) 197 lb 3.2 oz (89.4 kg) SpO2 BMI Smoking Status 99% 26.75 kg/m2 Former Smoker BMI and BSA Data Body Mass Index Body Surface Area  
 26.75 kg/m 2 2.13 m 2 Preferred Pharmacy Pharmacy Name Phone [facility] [address] [facility], [address] [phone] Your Updated Medication List  
  
   
This list is accurate as of: 10/2/17 11:42 AM.  Always use your most recent med list.  
  
  
  
  
 * cholecalciferol (vitamin D3) 2,000 unit Tab Take  by mouth. * cholecalciferol 1,000 unit tablet Commonly known as:  VITAMIN D3 Take 2 Tabs by mouth daily. HYDROcodone-acetaminophen 5-325 mg per tablet Commonly known as:  Lenon Gauze Take 1 Tab by mouth every six (6) hours as needed for Pain. Max Daily Amount: 4 Tabs. ibuprofen 200 mg Cap Take  by mouth.  
  
 lisinopril 5 mg tablet Commonly known as:  PRINIVIL, ZESTRIL  
  
 omeprazole 20 mg capsule Commonly known as:  PRILOSEC  
  
 oxybutynin 5 mg/5 mL syrup Commonly known as:  ZUJJUGYE Take 5 mL by mouth every six (6) hours as needed. sodium hyaluronate (viscosup) 16.8 mg/2 mL Syrg Commonly known as:  GELSYN-3  
2 mL by Intra artICUlar route every seven (7) days. tamsulosin 0.4 mg capsule Commonly known as:  FLOMAX Take 1 Cap by mouth daily (after dinner). VIAGRA 100 mg tablet Generic drug:  sildenafil citrate * Notice: This list has 2 medication(s) that are the same as other medications prescribed for you. Read the directions carefully, and ask your doctor or other care provider to review them with you. Introducing [facility]! Dear Sangeetha Ferraro: Thank you for requesting a [employer] account. Our records indicate that you already have an active [employer] account. You can access your account anytime at [email or web address] Did you know that you can access your hospital and ER discharge instructions at any time in [employer]? You can also review all of your test results from your hospital stay or ER visit. Additional Information If you have questions, please visit the Frequently Asked Questions section of the [employer] website at [email or web address] Remember, [employer] is NOT to be used for urgent needs. For medical emergencies, dial 911. Now available from your iPhone and Android! Please provide this summary of care documentation to your next provider. Your primary care clinician is listed as Mikie Waters. If you have any questions after today's visit, please call [phone].

## 2017-10-02 NOTE — PROGRESS NOTES
HISTORY OF PRESENT ILLNESS:  Leopoldo Jones returns for continuation of GELSYN to the left knee. He had the flu last week and did not really do a lot. He has noted really no appreciable improvement in his left knee pain since receiving. REVIEW OF SYSTEMS:  No chest pain or shortness of breath. No fevers, chills, or night sweats. No rash and no itching. No nausea and no vomiting. PHYSICAL EXAM:  He is a healthy-appearing, 40-year-old,  male, atraumatic, normocephalic, alert and oriented times three, sitting on the table comfortably the left knee reveals no warmth, erythema, edema, ecchymosis, or effusion. Active motion is 105°-5°. Distal sensation is intact fully to the left lower extremity. Capillary refill is brisk and less than 2 seconds to the left lower extremity. DIAGNOSES/IMPRESSION:     1. Left knee pain. 2. Decreased range of motion of the left knee secondary to above. 3. Left knee osteoarthritis. PROCEDURE:  Today, using sterile technique, after verbal and written consent obtained and appropriate time out performed, 2 cc of GELSYN 3, lot number [id number], expiration date May 31, 2019, was administered to left knee using the anterolateral intra-articular approach. There were no complications. The patient tolerated the procedure well. PLAN:   He will return in one week for continuation/completion.

## 2017-10-09 ENCOUNTER — OFFICE VISIT (OUTPATIENT)
Dept: ORTHOPEDIC SURGERY | Facility: CLINIC | Age: 67
End: 2017-10-09

## 2017-10-09 VITALS
SYSTOLIC BLOOD PRESSURE: 150 MMHG | RESPIRATION RATE: 16 BRPM | HEART RATE: 84 BPM | OXYGEN SATURATION: 97 % | DIASTOLIC BLOOD PRESSURE: 84 MMHG | HEIGHT: 72 IN | TEMPERATURE: 96.9 F | BODY MASS INDEX: 26.68 KG/M2 | WEIGHT: 197 LBS

## 2017-10-09 DIAGNOSIS — M17.12 PRIMARY OSTEOARTHRITIS OF LEFT KNEE: Primary | ICD-10-CM

## 2017-10-09 NOTE — PROGRESS NOTES
HISTORY OF PRESENT ILLNESS:  Binu Nicholas returns for continuation/completion of GELSYN 3 to the left knee. He has done fair from his prior two injections. He still has knee pain primarily when he stands for an extended period, walks distance, and climbs and descends stairs. He has suffered from knee pain for a number of years, and his [address] many years ago likely contributed to his current disease state. He is not a surgical candidate at this time. However, his pain is such that his activities of daily living have been significantly impaired. As mentioned, he has trouble standing for an extended period. The ability to safely perform his activities of daily living is important as he ages through is 67th year. With a general conservative failure of all treatments of care to include this ongoing course of viscosupplementation, he ultimately only has one option available and that is of a knee replacement. He is not willing to undergo such as been recommended due to his other health concerns. REVIEW OF SYSTEMS:  No chest pain or shortness of breath other than exertional type. No fevers, chills, or night sweats. No rash and no itching. No nausea and no vomiting. PHYSICAL EXAM:  He is a healthy-appearing, 51-year-old, obese,  male, atraumatic, normocephalic, alert and oriented times three, sitting on the table comfortably. Examination of the left knee reveals no fracture deformity, lesions, masses, or step-offs. His pain throughout range of motion with terminal range tested of 105-10°, crepitation is noted throughout. The patella does track midline. Varus and valgus stressing reveal no instability but do elicit severe pain, particularly associated with varus stressing along the medial joint line. There is no calf tenderness or evidence of DVT. Distal sensation is intact fully to the left lower extremity. IMPRESSION:      1. Left knee pain.   2. Left knee osteoarthritis, severe. 3. Decreased range of motion of the left knee secondary to above. 4. An inability to safely perform his necessary activities of daily living based on his current x-ray and clinical exam findings. PROCEDURE:  Today, using sterile technique, after verbal and written consent obtained and appropriate time out performed, 2 cc of GELSYN 3, lot number [id number], expiration date October 31, 2019, was injected in the left knee using the anteromedial, intra-articular approach. There were no complications. The patient tolerated the procedure well. PLAN:   I am currently recommending completion of his GELSYN today. We are going to plan on seeing him back in about six weeks. Today, all his questions were answered to his satisfaction.

## 2017-10-09 NOTE — MR AVS SNAPSHOT
Visit Information Date & Time Provider Department Dept. Phone Encounter #  
 10/9/2017 11:00 AM Connie Morton, 20 [facility] [id number]  
  
 10/10/2017  2:30 PM  
Any with Avila Demaroc MD  
[facility] ([facility]) Appt Note: RTC in 5 months with PSA done prior [address] [place] [id number]  
[address] [facility] [address] [facility] [address] Upcoming Health Maintenance Date Due Hepatitis C Screening 1950 DTaP/Tdap/Td series (1 - Tdap) 5/12/1971 FOBT Q 1 YEAR AGE 50-75 5/12/2000 ZOSTER VACCINE AGE 60> 3/12/2010 GLAUCOMA SCREENING Q2Y 5/12/2015 Pneumococcal 65+ High/Highest Risk (1 of 2 - PCV13) 5/12/2015 MEDICARE YEARLY EXAM 5/12/2015 INFLUENZA AGE 9 TO ADULT 8/1/2017 Allergies as of 10/9/2017  Review Complete On: 10/9/2017 By: Connie Morton PA-C Severity Noted Reaction Type Reactions Penicillins  01/16/2014    Other (comments)  
 paralyzation Sulfa (Sulfonamide Antibiotics)  01/16/2014    Unknown (comments) Current Immunizations  Never Reviewed No immunizations on file. Not reviewed this visit You Were Diagnosed With   
  
 Codes Comments Primary osteoarthritis of left knee    -  Primary ICD-10-CM: M17.12 
ICD-9-CM: 715.16 Vitals BP Pulse Temp Resp Height(growth percentile) Weight(growth percentile) 150/84 84 96.9 °F (36.1 °C) (Oral) 16 6' (1.829 m) 197 lb (89.4 kg) SpO2 BMI Smoking Status 97% 26.72 kg/m2 Former Smoker Vitals History BMI and BSA Data Body Mass Index Body Surface Area  
 26.72 kg/m 2 2.13 m 2 Preferred Pharmacy Pharmacy Name Phone [facility] [address] [facility], [facility] [phone] Your Updated Medication List  
  
   
 This list is accurate as of: 10/9/17 12:45 PM.  Always use your most recent med list.  
  
  
  
  
 * cholecalciferol (vitamin D3) 2,000 unit Tab Take  by mouth. * cholecalciferol 1,000 unit tablet Commonly known as:  VITAMIN D3 Take 2 Tabs by mouth daily. HYDROcodone-acetaminophen 5-325 mg per tablet Commonly known as:  [address] Take 1 Tab by mouth every six (6) hours as needed for Pain. Max Daily Amount: 4 Tabs. ibuprofen 200 mg Cap Take  by mouth.  
  
 lisinopril 5 mg tablet Commonly known as:  PRINIVIL, ZESTRIL  
  
 omeprazole 20 mg capsule Commonly known as:  PRILOSEC  
  
 oxybutynin 5 mg/5 mL syrup Commonly known as:  WSAZDLYW Take 5 mL by mouth every six (6) hours as needed. * sodium hyaluronate (viscosup) 16.8 mg/2 mL Syrg Commonly known as:  GELSYN-3  
2 mL by Intra artICUlar route every seven (7) days. * sodium hyaluronate (viscosup) 16.8 mg/2 mL Syrg Commonly known as:  GELSYN-3  
2 mL by Intra artICUlar route once for 1 dose. Indications: OSTEOARTHRITIS OF THE KNEE  
  
 tamsulosin 0.4 mg capsule Commonly known as:  FLOMAX Take 1 Cap by mouth daily (after dinner). VIAGRA 100 mg tablet Generic drug:  sildenafil citrate * Notice: This list has 4 medication(s) that are the same as other medications prescribed for you. Read the directions carefully, and ask your doctor or other care provider to review them with you. We Performed the Following DRAIN/INJECT LARGE JOINT/BURSA [id number] CPT(R)] Introducing [facility] [place] [facility]! Dear Elizabeth Guillory: Thank you for requesting a [employer] account. Our records indicate that you already have an active [employer] account. You can access your account anytime at [email or web address] [employer] Did you know that you can access your hospital and ER discharge instructions at any time in [employer]? You can also review all of your test results from your hospital stay or ER visit. Additional Information If you have questions, please visit the Frequently Asked Questions section of the [employer] website at [email or web address] Remember, [email or web address] is NOT to be used for urgent needs. For medical emergencies, dial 911. Now available from your iPhone and Android! Please provide this summary of care documentation to your next provider. Your primary care clinician is listed as Jose Hernández. If you have any questions after today's visit, please call [phone].

## 2018-04-06 ENCOUNTER — HOSPITAL ENCOUNTER (OUTPATIENT)
Dept: GENERAL RADIOLOGY | Age: 68
Discharge: HOME | End: 2018-04-06
Payer: MEDICARE

## 2018-04-06 DIAGNOSIS — R06.02 SOB (SHORTNESS OF BREATH): ICD-10-CM

## 2018-04-06 PROCEDURE — 71046 X-RAY EXAM CHEST 2 VIEWS: CPT

## 2018-05-08 ENCOUNTER — TELEPHONE (OUTPATIENT)
Dept: ORTHOPEDIC SURGERY | Facility: CLINIC | Age: 68
End: 2018-05-08

## 2018-05-08 DIAGNOSIS — M17.12 PRIMARY OSTEOARTHRITIS OF LEFT KNEE: Primary | ICD-10-CM

## 2018-05-08 NOTE — TELEPHONE ENCOUNTER
PATIENT CALLED FOR  FOR [place] [address]. PATIENT IS REQUESTING TO GET THE GELSYN 3 INJECTIONS AGAIN FOR HIS LEFT KNEE. PATIENT SAID THE LAST TIME HE WAS GIVEN THE INJECTIONS WAS ON HIS LAST APPOINTMENT ON 10/09/2017. PATIENT WOULD LIKE TO HAVE THEM DONE AT THE [facility] LOCATION. PATIENT TEL. [phone].

## 2018-05-08 NOTE — TELEPHONE ENCOUNTER
Pt return the call. Informed him that Troy Nett order has been placed and he should receive a call to schedule appointments once the medication has been authorized. Pt thanked writer for call and information.

## 2018-05-08 NOTE — TELEPHONE ENCOUNTER
Attempted to contact pt at his home number. Message left for pt to return call. Please inform him that order has been placed for [facility] authorization.

## 2018-05-29 ENCOUNTER — OFFICE VISIT (OUTPATIENT)
Dept: ORTHOPEDIC SURGERY | Facility: CLINIC | Age: 68
End: 2018-05-29

## 2018-05-29 VITALS
WEIGHT: 206.2 LBS | SYSTOLIC BLOOD PRESSURE: 133 MMHG | HEIGHT: 72 IN | RESPIRATION RATE: 18 BRPM | DIASTOLIC BLOOD PRESSURE: 84 MMHG | TEMPERATURE: 96.2 F | HEART RATE: 106 BPM | BODY MASS INDEX: 27.93 KG/M2 | OXYGEN SATURATION: 96 %

## 2018-05-29 DIAGNOSIS — M17.12 PRIMARY OSTEOARTHRITIS OF LEFT KNEE: Primary | ICD-10-CM

## 2018-05-29 NOTE — PROGRESS NOTES
HISTORY OF PRESENT ILLNESS:  Chelsea Herrera returns for initiation of viscosupplementation in the form of [address] [place] 3 to his left knee. He has a history of end-stage osteoarthritis of the left knee and has benefitted with prior viscosupplementation and cortisone treatments. His pain in the left knee is near constant. It is worse in the overnight hours, and it limits his ability to safely perform his activities of daily living. REVIEW OF SYSTEMS:  No chest pain. No shortness of breath. No fevers, chills, or night sweats. No rash and no itching. No nausea and no vomiting. Pain to the left knee is roughly dull and achy in characteristic with occasional sharp, stabbing sensations near 24/7, again, worse in the overnight hours carrying a pain rating averaging 7-8/10. PHYSICAL EXAM:  He is a healthy-appearing, 75-year-old  male, atraumatic, normocephalic, alert and oriented times three, sitting on the table comfortably. The left knee reveals extending with a valgus deformity. He has pain associated with both anterior medial and lateral joint lines, which worse to the medial side on varus stressing. He has crepitation with ranging. The patella tracks midline. There is no calf tenderness or evidence of DVT. He has a trace effusion. Range of motion is 110-5° today at bedside. IMPRESSION:      1. Left knee pain. 2. Left knee decreased range of motion secondary to above. 3. Left knee osteoarthritis tricompartmental.      PROCEDURE:  Using sterile technique, after informed verbal and written consent were obtained, 2 cc GELSYN 3, lot number [id number], expiration date December 31, 2019, was injected in the left knee using the anterolateral intraarticular approach. There were no complications. The patient tolerated the procedure well. PLAN:   I am currently recommending initiation of GELSYN 3 therapies to his left knee today.   The patient is to return to the office in one week for continuation of GELSYN therapies.

## 2018-06-05 ENCOUNTER — OFFICE VISIT (OUTPATIENT)
Dept: ORTHOPEDIC SURGERY | Facility: CLINIC | Age: 68
End: 2018-06-05

## 2018-06-05 VITALS
HEART RATE: 90 BPM | TEMPERATURE: 95.7 F | OXYGEN SATURATION: 97 % | SYSTOLIC BLOOD PRESSURE: 150 MMHG | RESPIRATION RATE: 18 BRPM | HEIGHT: 72 IN | WEIGHT: 207.8 LBS | DIASTOLIC BLOOD PRESSURE: 88 MMHG | BODY MASS INDEX: 28.15 KG/M2

## 2018-06-05 DIAGNOSIS — M17.12 PRIMARY OSTEOARTHRITIS OF LEFT KNEE: Primary | ICD-10-CM

## 2018-06-05 NOTE — PROGRESS NOTES
HISTORY OF PRESENT ILLNESS:  Debo Gore returns for continuation of viscosupplementation in the form of [address] 3 to his left knee. He has a history of end-stage osteoarthritis of the left knee and has benefitted with prior viscosupplementation and cortisone treatments. His pain in the left knee is near constant. It is worse in the overnight hours, and it limits his ability to safely perform his activities of daily living. REVIEW OF SYSTEMS:  No chest pain. No shortness of breath. No fevers, chills, or night sweats. No rash and no itching. No nausea and no vomiting. Pain to the left knee is roughly dull and achy in characteristic with occasional sharp, stabbing sensations near 24/7, again, worse in the overnight hours carrying a pain rating averaging 7-8/10. PHYSICAL EXAM:  He is a healthy-appearing, 71-year-old  male, atraumatic, normocephalic, alert and oriented times three, sitting on the table comfortably. The left knee reveals extending with a valgus deformity. He has pain associated with both anterior medial and lateral joint lines, which worse to the medial side on varus stressing. He has crepitation with ranging. The patella tracks midline. There is no calf tenderness or evidence of DVT. He has a trace effusion. Range of motion is 110-5° today at bedside. IMPRESSION:      1. Left knee pain. 2. Left knee decreased range of motion secondary to above. 3. Left knee osteoarthritis tricompartmental.      PROCEDURE:  Using sterile technique, after informed verbal and written consent were obtained, 2 cc GELSYN 3, lot number [id number], expiration date 4-, was injected in the left knee using the anterolateral intraarticular approach. There were no complications. The patient tolerated the procedure well. PLAN:   I am currently recommending continuation of GELSYN 3 therapies to his left knee today.   The patient is to return to the office in one week for continuation of GELSYN therapies.

## 2018-06-12 ENCOUNTER — OFFICE VISIT (OUTPATIENT)
Dept: ORTHOPEDIC SURGERY | Facility: CLINIC | Age: 68
End: 2018-06-12

## 2018-06-12 VITALS
WEIGHT: 207.2 LBS | SYSTOLIC BLOOD PRESSURE: 147 MMHG | OXYGEN SATURATION: 99 % | BODY MASS INDEX: 28.06 KG/M2 | DIASTOLIC BLOOD PRESSURE: 90 MMHG | HEART RATE: 79 BPM | HEIGHT: 72 IN | TEMPERATURE: 96.4 F | RESPIRATION RATE: 18 BRPM

## 2018-06-12 DIAGNOSIS — M25.562 LEFT KNEE PAIN, UNSPECIFIED CHRONICITY: Primary | ICD-10-CM

## 2018-06-12 DIAGNOSIS — M17.12 PRIMARY OSTEOARTHRITIS OF LEFT KNEE: Primary | ICD-10-CM

## 2018-06-12 DIAGNOSIS — M17.12 ARTHRITIS OF KNEE, LEFT: ICD-10-CM

## 2018-06-12 DIAGNOSIS — M25.562 LEFT KNEE PAIN, UNSPECIFIED CHRONICITY: ICD-10-CM

## 2018-06-12 NOTE — PROGRESS NOTES
HISTORY OF PRESENT ILLNESS:  Zuri Chowdhury returns for completion of viscosupplementation in the form of [address] [place] 3 to his left knee. He has a history of end-stage osteoarthritis of the left knee and has benefitted with prior viscosupplementation and cortisone treatments. His pain in the left knee is near constant. It is worse in the overnight hours, and it limits his ability to safely perform his activities of daily living. REVIEW OF SYSTEMS:  No chest pain. No shortness of breath. No fevers, chills, or night sweats. No rash and no itching. No nausea and no vomiting. Pain to the left knee is roughly dull and achy in characteristic with occasional sharp, stabbing sensations near 24/7, again, worse in the overnight hours carrying a pain rating averaging 7-8/10. PHYSICAL EXAM:  He is a healthy-appearing, 71-year-old  male, atraumatic, normocephalic, alert and oriented times three, sitting on the table comfortably. The left knee reveals extending with a valgus deformity. He has pain associated with both anterior medial and lateral joint lines, which worse to the medial side on varus stressing. He has crepitation with ranging. The patella tracks midline. There is no calf tenderness or evidence of DVT. He has a trace effusion. Range of motion is 110-5° today at bedside. IMPRESSION:      1. Left knee pain. 2. Left knee decreased range of motion secondary to above. 3. Left knee osteoarthritis tricompartmental.      PROCEDURE:  Using sterile technique, after informed verbal and written consent were obtained, 2 cc GELSYN 3, lot number [id number], expiration date 12-, was injected in the left knee using the anterolateral intraarticular approach. There were no complications. The patient tolerated the procedure well. PLAN:   I am currently recommending completion of GELSYN 3 therapies to his left knee today.   The patient is to return to the office in six   week for evaluation of GELSYN therapies.

## 2018-07-24 ENCOUNTER — OFFICE VISIT (OUTPATIENT)
Dept: ORTHOPEDIC SURGERY | Facility: CLINIC | Age: 68
End: 2018-07-24

## 2018-07-24 VITALS
BODY MASS INDEX: 27.5 KG/M2 | SYSTOLIC BLOOD PRESSURE: 153 MMHG | TEMPERATURE: 97 F | RESPIRATION RATE: 16 BRPM | DIASTOLIC BLOOD PRESSURE: 88 MMHG | WEIGHT: 203 LBS | OXYGEN SATURATION: 97 % | HEART RATE: 64 BPM | HEIGHT: 72 IN

## 2018-07-24 DIAGNOSIS — M17.12 PRIMARY OSTEOARTHRITIS OF LEFT KNEE: Primary | ICD-10-CM

## 2018-07-24 RX ORDER — PROPRANOLOL HYDROCHLORIDE 20 MG/1
60 TABLET ORAL DAILY
COMMUNITY
End: 2019-03-20 | Stop reason: HOSPADM

## 2018-07-24 NOTE — PROGRESS NOTES
HISTORY:  He returns following for left knee. He completed Gelisyn course and did experience some relief from his left knee pain. He does have severe medial joint space osteoarthritis, end-stage. He also has some patellofemoral changes which are mild in characteristic and a hint of lateral joint changes. He is \"tired\" of putting up with his left knee pain at this point and would like to advance to surgical intervention for some definitive care and pain relief. He is limited in his acres extension and secondary to, primarily, medial joint discomfort. Flexion causes pain as well associated with the medial joint line and limits his ability to stand for a limited period and walk distances. RADIOGRAPHS:  X-rays, 3 view, of the left knee include valgus stress today note mild patellofemoral changes seen. He has severe bone-on-bone contact of the medial joint space. There is minimal lateral joint findings and on valgus stress, he does open nicely associated with his medial joint space. PLAN:  At this point, we discussed his x-rays and the possibility of a hemiarthroplasty with [place] hardware versus a total replacement. He has seen Dr. Doylene Meigs in the past for left shoulder surgery, and we will get him back within the week to see Dr. Doylene Meigs for surgical decision making. He is ready to proceed with any intervention as deemed necessary by Dr. Doylene Meigs as soon as possible. Today, x-rays provided. All his questions answered to his satisfaction.

## 2018-07-30 ENCOUNTER — OFFICE VISIT (OUTPATIENT)
Dept: ORTHOPEDIC SURGERY | Facility: CLINIC | Age: 68
End: 2018-07-30

## 2018-07-30 VITALS
SYSTOLIC BLOOD PRESSURE: 146 MMHG | OXYGEN SATURATION: 99 % | DIASTOLIC BLOOD PRESSURE: 85 MMHG | RESPIRATION RATE: 18 BRPM | TEMPERATURE: 97.3 F | WEIGHT: 208 LBS | BODY MASS INDEX: 28.17 KG/M2 | HEIGHT: 72 IN | HEART RATE: 61 BPM

## 2018-07-30 DIAGNOSIS — G89.29 CHRONIC PAIN OF LEFT KNEE: ICD-10-CM

## 2018-07-30 DIAGNOSIS — M17.12 PRIMARY OSTEOARTHRITIS OF LEFT KNEE: Primary | ICD-10-CM

## 2018-07-30 DIAGNOSIS — M25.562 CHRONIC PAIN OF LEFT KNEE: ICD-10-CM

## 2018-07-30 RX ORDER — BETAMETHASONE SODIUM PHOSPHATE AND BETAMETHASONE ACETATE 3; 3 MG/ML; MG/ML
6 INJECTION, SUSPENSION INTRA-ARTICULAR; INTRALESIONAL; INTRAMUSCULAR; SOFT TISSUE ONCE
Qty: 0.5 ML | Refills: 0
Start: 2018-07-30 | End: 2018-07-30

## 2018-07-30 RX ORDER — BUPIVACAINE HYDROCHLORIDE 2.5 MG/ML
4 INJECTION, SOLUTION EPIDURAL; INFILTRATION; INTRACAUDAL ONCE
Qty: 4 ML | Refills: 0
Start: 2018-07-30 | End: 2018-07-30

## 2018-07-30 NOTE — PATIENT INSTRUCTIONS
Knee Arthritis: Exercises Your Care Instructions Here are some examples of exercises for knee arthritis. Start each exercise slowly. Ease off the exercise if you start to have pain. Your doctor or physical therapist will tell you when you can start these exercises and which ones will work best for you. How to do the exercises Knee flexion with heel slide 1. Lie on your back with your knees bent. 2. Slide your heel back by bending your affected knee as far as you can. Then hook your other foot around your ankle to help pull your heel even farther back. 3. Hold for about 6 seconds, then rest for up to 10 seconds. 4. Repeat 8 to 12 times. 5. Switch legs and repeat steps 1 through 4, even if only one knee is sore. [employer] 1. Sit with your affected leg straight and supported on the floor or a firm bed. Place a small, rolled-up towel under your knee. Your other leg should be bent, with that foot flat on the floor. 2. Tighten the thigh muscles of your affected leg by pressing the back of your knee down into the towel. 3. Hold for about 6 seconds, then rest for up to 10 seconds. 4. Repeat 8 to 12 times. 5. Switch legs and repeat steps 1 through 4, even if only one knee is sore. Straight-leg raises to the front 1. Lie on your back with your good knee bent so that your foot rests flat on the floor. Your affected leg should be straight. Make sure that your low back has a normal curve. You should be able to slip your hand in between the floor and the small of your back, with your palm touching the floor and your back touching the back of your hand. 2. Tighten the thigh muscles in your affected leg by pressing the back of your knee flat down to the floor. Hold your knee straight. 3. Keeping the thigh muscles tight and your leg straight, lift your affected leg up so that your heel is about 12 inches off the floor. Hold for about 6 seconds, then lower slowly.  
4. Relax for up to 10 seconds between repetitions. 5. Repeat 8 to 12 times. 6. Switch legs and repeat steps 1 through 5, even if only one knee is sore. Active knee flexion 1. Lie on your stomach with your knees straight. If your kneecap is uncomfortable, roll up a washcloth and put it under your leg just above your kneecap. 2. Lift the foot of your affected leg by bending the knee so that you bring the foot up toward your buttock. If this motion hurts, try it without bending your knee quite as far. This may help you avoid any painful motion. 3. Slowly move your leg up and down. 4. Repeat 8 to 12 times. 5. Switch legs and repeat steps 1 through 4, even if only one knee is sore. Quadriceps stretch (facedown) 1. Lie flat on your stomach, and rest your face on the floor. 2. Wrap a towel or belt strap around the lower part of your affected leg. Then use the towel or belt strap to slowly pull your heel toward your buttock until you feel a stretch. 3. Hold for about 15 to 30 seconds, then relax your leg against the towel or belt strap. 4. Repeat 2 to 4 times. 5. Switch legs and repeat steps 1 through 4, even if only one knee is sore. Stationary exercise bike 1. If you do not have a stationary exercise bike at home, you can find one to ride at your local health club or community center. 2. Adjust the height of the bike seat so that your knee is slightly bent when your leg is extended downward. If your knee hurts when the pedal reaches the top, you can raise the seat so that your knee does not bend as much. 3. Start slowly. At first, try to do 5 to 10 minutes of cycling with little to no resistance. Then increase your time and the resistance bit by bit until you can do 20 to 30 minutes without pain. 4. If you start to have pain, rest your knee until your pain gets back to the level that is normal for you. Or cycle for less time or with less effort. Follow-up care is a key part of your treatment and safety.  Be sure to make and go to all appointments, and call your doctor if you are having problems. It's also a good idea to know your test results and keep a list of the medicines you take. Where can you learn more? Go to http://cynthia-blanca.info/. Enter C159 in the search box to learn more about \"Knee Arthritis: Exercises. \" Current as of: November 29, 2017 Content Version: 11.7 © 20061498-8986 [employer]. Care instructions adapted under license by [employer] (which disclaims liability or warranty for this information). If you have questions about a medical condition or this instruction, always ask your healthcare professional. [place] [address] any warranty or liability for your use of this information. Partial Knee Replacement: Before Your Surgery What is partial knee replacement? Partial knee replacement is used when one side of the knee is damaged. It replaces only the damaged part of the knee. Your doctor will make a cut in your knee. This cut is called an incision. It will leave a scar that usually fades with time. You may be able to go home the same day. If you have partials on both knees at once, you may need to stay in the hospital for a day or more. Most people go back to normal activities or work in 6 to 12 weeks. This depends on your health. It also depends on how well your knee does in your rehab program. This may take longer if you have both knees done at the same time. Follow-up care is a key part of your treatment and safety. Be sure to make and go to all appointments, and call your doctor if you are having problems. It's also a good idea to know your test results and keep a list of the medicines you take. What happens before surgery? 
 Surgery can be stressful. This information will help you understand what you can expect.  And it will help you safely prepare for surgery. 
 Preparing for surgery 
  · Understand exactly what surgery is planned, along with the risks, benefits, and other options. · Tell your doctors ALL the medicines, vitamins, supplements, and herbal remedies you take. Some of these can increase the risk of bleeding or interact with anesthesia.  
  · If you take blood thinners, such as warfarin (Coumadin), clopidogrel (Plavix), or aspirin, be sure to talk to your doctor. He or she will tell you if you should stop taking these medicines before your surgery. Make sure that you understand exactly what your doctor wants you to do.  
  · Your doctor will tell you which medicines to take or stop before your surgery. You may need to stop taking certain medicines a week or more before surgery. So talk to your doctor as soon as you can.  
  · If you have an advance directive, let your doctor know. It may include a living will and a durable power of  for health care. Bring a copy to the hospital. If you don't have one, you may want to prepare one. It lets your doctor and loved ones know your health care wishes. Doctors advise that everyone prepare these papers before any type of surgery or procedure.  
  · You may need to shower or bathe with a special soap the night before and the morning of your surgery. The soap contains chlorhexidine. It reduces the amount of bacteria on your skin that could cause an infection after surgery. What happens on the day of surgery? · Follow the instructions exactly about when to stop eating and drinking. If you don't, your surgery may be canceled. If your doctor told you to take your medicines on the day of surgery, take them with only a sip of water.  
  · Take a bath or shower before you come in for your surgery. Do not apply lotions, perfumes, deodorants, or nail polish.  
  · Do not shave the surgical site yourself.  
  · Take off all jewelry and piercings. And take out contact lenses, if you wear them.  
 At the hospital or surgery center  · Bring a picture ID.  
  · The area for surgery is often marked to make sure there are no errors.  
  · You will be kept comfortable and safe by your anesthesia provider. The anesthesia may make you sleep. Or it may just numb the area being worked on.  
  · You may also get a shot of medicine into your spine. This will make your legs numb. You will not feel pain during the surgery.  
  · You also will get antibiotics through an IV tube before surgery. This lowers the risk of an infection of the incision.  
  · The surgery will take about 2 to 3 hours. Going home · Be sure you have someone to drive you home. Anesthesia and pain medicine make it unsafe for you to drive.  
  · You will be given more specific instructions about recovering from your surgery. They will cover things like diet, wound care, follow-up care, driving, and getting back to your normal routine. When should you call your doctor? · You have questions or concerns.  
  · You don't understand how to prepare for your surgery.  
  · You become ill before the surgery (such as fever, flu, or a cold).  
  · You need to reschedule or have changed your mind about having the surgery. Where can you learn more? Go to http://cynthia-blanca.info/. Enter [phone] in the search box to learn more about \"Partial Knee Replacement: Before Your Surgery. \" Current as of: November 29, 2017 Content Version: 11.7 © 2245-4184 [employer]. Care instructions adapted under license by [employer] (which disclaims liability or warranty for this information). If you have questions about a medical condition or this instruction, always ask your healthcare professional. [place] [address] any warranty or liability for your use of this information. Deciding About Knee Replacement Surgery Deciding About Knee Replacement Surgery What is knee replacement surgery?  
Knee replacement surgery replaces the worn ends of the thighbone (femur) and the lower leg bone (tibia) where they meet at the knee. Your doctor will take out the damaged bone and replace it with plastic and metal parts. These new parts may be attached to your bones with cement. You will need a lot of rehabilitation, or rehab, after surgery. This takes several weeks. But you should be able to start to walk, climb stairs, sit in and get up from chairs, and do other daily movements within a few days. What are little points about this decision? · The decision you and your doctor make depends on how much pain and disability you have. It also depends on your age, health, and activity level. · Most people have this surgery only when they can no longer control knee pain with other treatments and when the pain disrupts their lives. · Rehab after this surgery means doing daily exercises for several weeks. · Most knee replacements last for at least 15 years. You may need to have the knee replaced again. Why might you choose to replace your knee? · You are not able to do most of your activities without pain. · You have very bad arthritis pain. Other treatments have not helped. · You do not have other health problems that would make surgery too risky. · You are not worried that you may need to have another knee replacement later in life. · You aren't worried about side effects. These may include infections, blood clots, and loss of range of motion. · You are willing to do weeks of rehab. · You want to have the surgery before you lose too much of your ability to be active. If you are not able to stay active, strong, and flexible before the surgery, it can be harder to return to your normal activities after the surgery. Why might you choose not to replace your knee? · You can still do most of your activities. · You have other health problems that may make surgery too risky. · You want to try all other treatments first. 
· You want to avoid the side effects of surgery. · You can't do rehab after surgery.  
· You worry that you may need another knee replacement later in life. Your decision Thinking about the facts and your feelings can help you make a decision that is right for you. Be sure you understand the benefits and risks of your options, and think about what else you need to do before you make the decision. Where can you learn more? Go to http://cynthia-blanca.info/. Mendez Kendall in the search box to learn more about \"Deciding About Knee Replacement Surgery. \" Current as of: November 29, 2017 Content Version: 11.7 © 5118-0859 [employer]. Care instructions adapted under license by [employer] (which disclaims liability or warranty for this information). If you have questions about a medical condition or this instruction, always ask your healthcare professional. [address] any warranty or liability for your use of this information.

## 2018-07-30 NOTE — PROGRESS NOTES
Patient: Heath Conway                MRN: [id number]       SSN: [id number] YOB: 1950        AGE: 76 y.o. SEX: male PCP: Lai Almanza MD 
07/30/18 Chief Complaint Patient presents with  Knee Pain Left HISTORY:  Heath Conway is a 76 y.o. male who is seen for increased medial left knee pain. He has been experiencing pain for the past 30+ years. He had experienced an injury while in the [employer] in the 1970s. He states he originally injured his left knee while playing softball for the [employer] team. 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 The force of the impact torqued Mr. Cortez's left knee. He continued to play the game and eventally stepped into a pothole. He felt immediate pain and could no longer play. He was treated non-surgically in a cast for 6 weeks. He states he now has trouble walking across his yard. He has to drag his laundry basket bumping up and down his stairway. He can no longer go walking with his girlfriend. He has received previous cortisone and visco supplementation with relief. He states he had a left knee medial menisectomy and arthroscopy by Dr. Nirmala Jamison on 12/15/07. Lateral meniscus was intact, early degenerative changes observed at the time. He states the arthroscopy provided about one year of relief. He reports a sharp feeling when bending over. He reports chronic constant right knee pain as well. He notes pain with standing and walking. Pain Assessment  7/30/2018 Location of Pain Knee Location Modifiers Left Severity of Pain 0 Quality of Pain -  
Quality of Pain Comment - Duration of Pain - Frequency of Pain - Aggravating Factors - Limiting Behavior No  
Relieving Factors - Result of Injury - Work-Related Injury - Type of Injury - Type of Injury Comment -  
 
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  He lives in [facility] [place] with Black Borja and Nicki--his 23year old cats. He stays active with his yard work and working on his motorcycles. He was drafted into the [employer] and served in [employer]. He retired in [address] [place] [address]. He has a daughter who is a RN. Current weight is 208 pounds. He is 6' tall. He has a girlfriend. He has three daughters and a son, with only one daughter who is NOT in the area. He is not diabetic. No results found for: HBA1C, HGBE8, QQF5JPJU, QFU3DAWH, KQV2TOPD Weight Metrics 7/30/2018 7/24/2018 6/12/2018 6/5/2018 5/29/2018 4/2/2018 10/11/2017 Weight 208 lb 203 lb 207 lb 3.2 oz 207 lb 12.8 oz 206 lb 3.2 oz 198 lb 197 lb BMI 28.21 kg/m2 27.53 kg/m2 28.1 kg/m2 28.18 kg/m2 27.97 kg/m2 26.85 kg/m2 26.72 kg/m2 Patient Active Problem List  
Diagnosis Code  Left knee pain M25.562  Right shoulder pain M25.511  S/P rotator cuff repair Z98.890  
 Prostate cancer ([facility] [address] C61  Family hx of prostate cancer Z80.42  
 Erectile dysfunction following urethral surgery N52.33  
 
REVIEW OF SYSTEMS: All Below are Negative except: See HPI Constitutional: negative for fever, chills, and weight loss. Cardiovascular: negative for chest pain, claudication, leg swelling, SOB, GALVAN Gastrointestinal: Negative for pain, N/V/C/D, Blood in stool or urine, dysuria,  hematuria, incontinence, pelvic pain. Musculoskeletal: See HPI Neurological: Negative for dizziness and weakness. Negative for headaches, Visual changes, confusion, seizures Phychiatric/Behavioral: Negative for depression, memory loss, substance  abuse. Extremities: Negative for hair changes, rash, or skin lesion changes. Hematologic: Negative for bleeding problems, bruising, pallor or swollen lymph  nodes Peripheral Vascular: No calf pain, no circulation deficits. Social History Social History  Marital status: SINGLE Spouse name: N/A  
 Number of children: N/A  
 Years of education: N/A Occupational History  Not on file. Social History Main Topics  Smoking status: Former Smoker  Smokeless tobacco: Never Used Comment: quit in 1976  Alcohol use 0.0 oz/week  
  0 Standard drinks or equivalent per week Comment: occasionally  Drug use: No  
 Sexual activity: Not on file Other Topics Concern  Not on file Social History Narrative Allergies Allergen Reactions  Penicillins Other (comments)  
  paralyzation  Sulfa (Sulfonamide Antibiotics) Unknown (comments) Current Outpatient Prescriptions Medication Sig  propranolol (INDERAL) 20 mg tablet Take  by mouth three (3) times daily.  cholecalciferol (VITAMIN D3) 1,000 unit tablet Take 2 Tabs by mouth daily.  ibuprofen 200 mg cap Take  by mouth.  cholecalciferol, vitamin D3, 2,000 unit tab Take  by mouth.  omeprazole (PRILOSEC) 20 mg capsule  sodium hyaluronate, viscosup, (GELSYN-3) 16.8 mg/2 mL syrg 2 mL by Intra artICUlar route every seven (7) days.  VIAGRA 100 mg tablet No current facility-administered medications for this visit. PHYSICAL EXAMINATION: 
Visit Vitals  /85  Pulse 61  Temp 97.3 °F (36.3 °C) (Oral)  Resp 18  Ht 6' (1.829 m)  Wt 208 lb (94.3 kg)  SpO2 99%  BMI 28.21 kg/m2 PHYSICAL EXAM: 
Visit Vitals  /85  Pulse 61  Temp 97.3 °F (36.3 °C) (Oral)  Resp 18  Ht 6' (1.829 m)  Wt 208 lb (94.3 kg)  SpO2 99%  BMI 28.21 kg/m2 Appearance: Alert, well appearing and pleasant patient who is in no distress, oriented to person, place/time, and who follows commands. HEENT: Amrithectorrenuka Pennant III hears well, does not require hearing aids. His sclera of the eyes are non-icteric. He is breathing normally and no respiratory accessory muscle use is noted. No JVD present and Neck ROM within normal limits. Psychiatric: Affect and mood are appropriate. Oriented x3 Heart[de-identified]  S1, S2 without murmer, regular rhythm Lungs:  Breath sounds clear to auscultation Cardiovascular/Peripheral Vascular: Normal pulses to each foot. Integumentary: No rashes,  wounds, or abrasions. Warm and normal color. No drainage. Gait: antalgic Sensory Exam: Intact/Normal Sensation Lymphatic: No evidence of Lymphedema Vascular:      
Pulses: palpable Varicosities none Wounds/Abrasion: None Present Neuro: Negative, no tremors ORTHO EXAMINATION: 
Examination Right knee Left knee Skin Intact Healing abrasion with healing Range of motion 120-0 95-5 Effusion - 1+ Medial joint line tenderness + ++ Lateral joint line tenderness - - Popliteal tenderness - -  
Osteophytes palpable - Large medial  
Laverns - - Patella crepitus - + Anterior drawer - - Lateral laxity - - Medial laxity - - Varus deformity - + Valgus deformity - - Pretibial edema - - Calf tenderness - - Wearing a compression bandage on the left knee PROCEDURE:  After discussing treatment options, patient's left knee was injected with 4 cc Marcaine and 1/2 cc Celestone. Chart reviewed for the following: 
 Orquidea Barber MD, have reviewed the History, Physical and updated the Allergic reactions for [address] performed immediately prior to start of procedure: 
Orquidea Barber MD, have performed the following reviews on Mateo Rocher III prior to the start of the procedure: 
         
* Patient was identified by name and date of birth * Agreement on procedure being performed was verified * Risks and Benefits explained to the patient * Procedure site verified and marked as necessary * Patient was positioned for comfort * Consent was obtained Time: 1:55 PM  
 
Date of procedure: 7/30/2018 Procedure performed by:  Stephany Elam MD 
 
Mr. Betsey Byrne tolerated the procedure well with no complications. RADIOGRAPHS: 
XR LT KNEE PREOP 7/30/18 AP view of left femur and tibia: mechanical/femoral shaft valgus angle 6.3 degrees. Degenerative changes are present. XR LT KNEE 8/17/18 SUAD IMPRESSION:  Three views - No fractures, + effusion, severe medial and moderate lateral joint space narrowing, large medial osteophytes present. IKDC Grade D IMPRESSION:   
  ICD-10-CM ICD-9-CM 1. Primary osteoarthritis of left knee M17.12 715.16 MRI KNEE LT WO CONT  
   REFERRAL TO PHYSICAL THERAPY  
   betamethasone (CELESTONE SOLUSPAN) 6 mg/mL injection BETAMETHASONE ACETATE & SODIUM PHOSPHATE INJECTION 3 MG EA.  
   DRAIN/INJECT LARGE JOINT/BURSA  
   bupivacaine, PF, (MARCAINE, PF,) 0.25 % (2.5 mg/mL) injection 2. Chronic pain of left knee M25.562 719.46 MRI KNEE LT WO CONT  
 G89.29 338.29 REFERRAL TO PHYSICAL THERAPY  
   betamethasone (CELESTONE SOLUSPAN) 6 mg/mL injection BETAMETHASONE ACETATE & SODIUM PHOSPHATE INJECTION 3 MG EA.  
   DRAIN/INJECT LARGE JOINT/BURSA  
   bupivacaine, PF, (MARCAINE, PF,) 0.25 % (2.5 mg/mL) injection PLAN:  After discussing treatment options, patient's left knee was injected with 4 cc Marcaine and 1/2 cc Celestone. He will be scheduled for a left knee medial unicompartmental knee arthroplasty. He will follow up for H&P. He will start a brief course of aquatic outpatient physical therapy to his left knee.    
 
Scribed by Cheryl Waldron [facility]) as dictated by Jean Lowry MD

## 2018-08-08 DIAGNOSIS — M17.12 PRIMARY OSTEOARTHRITIS OF LEFT KNEE: Primary | ICD-10-CM

## 2018-08-08 DIAGNOSIS — Z01.812 BLOOD TESTS PRIOR TO TREATMENT OR PROCEDURE: ICD-10-CM

## 2018-08-08 DIAGNOSIS — Z01.810 PRE-OPERATIVE CARDIOVASCULAR EXAMINATION: ICD-10-CM

## 2018-08-09 ENCOUNTER — HOSPITAL ENCOUNTER (OUTPATIENT)
Dept: PHYSICAL THERAPY | Age: 68
Discharge: HOME | End: 2018-08-09
Payer: MEDICARE

## 2018-08-09 PROCEDURE — G8979 MOBILITY GOAL STATUS: HCPCS

## 2018-08-09 PROCEDURE — 97162 PT EVAL MOD COMPLEX 30 MIN: CPT

## 2018-08-09 PROCEDURE — G8978 MOBILITY CURRENT STATUS: HCPCS

## 2018-08-09 NOTE — PROGRESS NOTES
PT DAILY TREATMENT NOTE - [facility] 3-16    Patient Name: Shadia Sampson III  Date:2018  : 1950  [x]  Patient  Verified  [place]or: [place] MEDICARE / Plan: [place] MEDICARE PART A & B / Product Type: Medicare /    In time:4:05  Out time:4:35  Total Treatment Time (min): 30  Total Timed Codes (min): 0  1:1 Treatment Time ([facility] only): 30   Visit #: 1 of 10    Treatment Area: Pain in left knee [M25.562]  Unilateral primary osteoarthritis, left knee [M17.12]    SUBJECTIVE  Pain Level (0-10 scale): 5/10  Any medication changes, allergies to medications, adverse drug reactions, diagnosis change, or new procedure performed?: [x] No    [] Yes (see summary sheet for update)  Subjective functional status/changes:   [x] See paper initial evaluation form in patient chart      OBJECTIVE    30 min [x]Eval                  []Re-Eval           With   [x] TE   [] TA   [] neuro   [] other: Patient Education: [x] Review HEP    [] Progressed/Changed HEP based on:   [] positioning   [] body mechanics   [] transfers   [] heat/ice application    [] other:      Other Objective/Functional Measures: FOTO 29 pts     Pain Level (0-10 scale) post treatment: 5/10    ASSESSMENT/Changes in Function:     Patient will continue to benefit from skilled PT services to address functional mobility deficits, address ROM deficits, address strength deficits, analyze and address soft tissue restrictions, analyze and cue movement patterns, analyze and modify body mechanics/ergonomics, assess and modify postural abnormalities, address imbalance/dizziness and instruct in home and community integration to attain remaining goals.      [x]  See Plan of Care         PLAN  []  Upgrade activities as tolerated     [x]  Continue plan of care  []  Update interventions per flow sheet       []  Discharge due to:_  []  Other:_      Joel Berman, PT 2018  4:37 PM

## 2018-08-09 NOTE — PROGRESS NOTES
[facility] - [place] [address] [address]  [phone] fax  Plan of Care/ Statement of Necessity for Physical Therapy Services    Patient name: Zeb Kayser III Start of Care: 2018   Referral source: Alyssa Monsalve MD : 1950    Medical Diagnosis: Pain in left knee [M25.562]  Unilateral primary osteoarthritis, left knee [M17.12]   Onset Date:18    Treatment Diagnosis: Left Knee Pain   Prior Hospitalization: see medical history Provider#: [id number]   Medications: Verified on Patient summary List    Comorbidities: Arthritis; hx of CA; HTN; kidney/bladder problems; Osteoporosis   Prior Level of Function: Retired; lives in [facility] alone with cats; functionally independent      The Plan of Care and following information is based on the information from the initial evaluation. Assessment/ key information: Pt is a 76 y.o. male who presents with c/o left knee pain, limited mobility and strength that has persisted since injury to left knee in  while in [employer], but has recently worsened in the past two years. Pt has increased difficulty standing/amb for prolonged periods, performing sit to stand transfers, kneeling to work on cars, is unable to ride bike, or climb ladders. Pt is scheduled for partial knee replacement on 18. Upon exam, Pt exhibited hypomobility of left patella; limited left knee AROM of 0-102 deg compared to 0-126 deg right; 4/5 left hip/knee strength compared to 5/5 right; pain and increased right LE WB with squatting; and inability to perform left SLS. Pt would benefit from skilled PT in aquatic setting to address above deficits to improve Pt's function and strength in preparation for upcoming surgery.     Evaluation Complexity History MEDIUM  Complexity : 1-2 comorbidities / personal factors will impact the outcome/ POC ; Examination MEDIUM Complexity : 3 Standardized tests and measures addressing body structure, function, activity limitation and / or participation in recreation  ;Presentation MEDIUM Complexity : Evolving with changing characteristics  ; Clinical Decision Making MEDIUM Complexity : FOTO score of 26-74  Overall Complexity Rating: MEDIUM  Problem List: pain affecting function, decrease ROM, decrease strength, edema affecting function, impaired gait/ balance, decrease ADL/ functional abilitiies, decrease activity tolerance, decrease flexibility/ joint mobility and decrease transfer abilities   Treatment Plan may include any combination of the following: Therapeutic exercise, Therapeutic activities, Neuromuscular re-education, Physical agent/modality, Gait/balance training, Manual therapy, Aquatic therapy, Patient education and Stair training  Patient / Family readiness to learn indicated by: asking questions, trying to perform skills and interest  Persons(s) to be included in education: patient (P)  Barriers to Learning/Limitations: None  Patient Goal (s): Weight loss for pre-surgery rehab.   Patient Self Reported Health Status: good  Rehabilitation Potential: good    Short Term Goals: To be accomplished in 1 weeks:  Goal: Pt to initiate aquatics program without increased pain to aid in achievement of all LTGs. Status at last note/certification: N/A  Long Term Goals: To be accomplished in 5 weeks:  Goal: Pt to increase left knee flex AROM by 10 deg for increase ease with transfers and squatting. Status at last note/certification: 0-374 deg left knee AROM  Goal: Pt to increase left hip/knee strength to 4+/5 grossly to increase standing/amb tolerance. Status at last note/certification: 4/5 left hip/knee strength grossly  Goal: Pt to report < 5/10 pain at worst to increase ease with ADLs. Status at last note/certification: 34/53 pain at worst  Goal: Pt to report FOTO score of 58 pts to show improved function.   Status at last note/certification: FOTO 41 pts     Frequency / Duration: Patient to be seen 2 times per week for 5 weeks. Patient/ Caregiver education and instruction: Diagnosis, prognosis, exercises   [x]  Plan of care has been reviewed with TONI    G-Codes (GP)  Mobility   Current  CK= 40-59%   Goal  CK= 40-59%    The severity rating is based on clinical judgment and the FOTO score. Certification Period: 8/9/18 - 9/7/18  Aspen Berman, PT 8/9/2018 4:43 PM  _____________________________________________________________________  I certify that the above Therapy Services are being furnished while the patient is under my care. I agree with the treatment plan and certify that this therapy is necessary.     Physician's Signature:____________Date:_________TIME:________    ** Signature, Date and Time must be completed for valid certification **    Please sign and return to [facility] - [address]  [phone] fax

## 2018-08-13 ENCOUNTER — HOSPITAL ENCOUNTER (OUTPATIENT)
Dept: MRI IMAGING | Age: 68
Discharge: HOME | End: 2018-08-13
Attending: SPECIALIST
Payer: MEDICARE

## 2018-08-13 DIAGNOSIS — M25.562 CHRONIC PAIN OF LEFT KNEE: ICD-10-CM

## 2018-08-13 DIAGNOSIS — G89.29 CHRONIC PAIN OF LEFT KNEE: ICD-10-CM

## 2018-08-13 DIAGNOSIS — M17.12 PRIMARY OSTEOARTHRITIS OF LEFT KNEE: ICD-10-CM

## 2018-08-13 PROCEDURE — 73721 MRI JNT OF LWR EXTRE W/O DYE: CPT

## 2018-08-14 ENCOUNTER — HOSPITAL ENCOUNTER (OUTPATIENT)
Dept: PHYSICAL THERAPY | Age: 68
Discharge: HOME | End: 2018-08-14
Payer: MEDICARE

## 2018-08-14 PROCEDURE — 97113 AQUATIC THERAPY/EXERCISES: CPT

## 2018-08-16 ENCOUNTER — HOSPITAL ENCOUNTER (OUTPATIENT)
Dept: PHYSICAL THERAPY | Age: 68
Discharge: HOME | End: 2018-08-16
Payer: MEDICARE

## 2018-08-16 PROCEDURE — 97113 AQUATIC THERAPY/EXERCISES: CPT

## 2018-08-21 ENCOUNTER — HOSPITAL ENCOUNTER (OUTPATIENT)
Dept: PHYSICAL THERAPY | Age: 68
Discharge: HOME | End: 2018-08-21
Payer: MEDICARE

## 2018-08-21 PROCEDURE — 97113 AQUATIC THERAPY/EXERCISES: CPT

## 2018-08-23 ENCOUNTER — HOSPITAL ENCOUNTER (OUTPATIENT)
Dept: PHYSICAL THERAPY | Age: 68
Discharge: HOME | End: 2018-08-23
Payer: MEDICARE

## 2018-08-23 PROCEDURE — 97113 AQUATIC THERAPY/EXERCISES: CPT

## 2018-08-28 ENCOUNTER — HOSPITAL ENCOUNTER (OUTPATIENT)
Dept: PHYSICAL THERAPY | Age: 68
Discharge: HOME | End: 2018-08-28
Payer: MEDICARE

## 2018-08-28 PROCEDURE — 97113 AQUATIC THERAPY/EXERCISES: CPT

## 2018-08-30 ENCOUNTER — HOSPITAL ENCOUNTER (OUTPATIENT)
Dept: PHYSICAL THERAPY | Age: 68
Discharge: HOME | End: 2018-08-30
Payer: MEDICARE

## 2018-08-30 PROCEDURE — 97113 AQUATIC THERAPY/EXERCISES: CPT

## 2018-09-17 NOTE — PERIOP NOTES
Carren Seip III's PAT assessment was completed via phone. Health assessment was completed and instructions given regarding NPO status, medications, Hibiclens washes, and removal of all jewelry and/or body piercing. Instructed patient not to remove the red Blood Bank armband that was placed on their arm when the Type and Screen was drawn. Instructed to bring walker to the hospital on the day of surgery so it can be properly adjusted by the physical therapist.  Ambrose Rubio was given to ask questions and all questions were answered. Understanding of instructions was verbalized.  Mr Rich Ontiveros stated either Onalee Nissen, friend, or Sheyla Vasquez, friend would be available for discharge from hospital.

## 2018-09-18 ENCOUNTER — OFFICE VISIT (OUTPATIENT)
Dept: ORTHOPEDIC SURGERY | Facility: CLINIC | Age: 68
End: 2018-09-18

## 2018-09-18 ENCOUNTER — HOSPITAL ENCOUNTER (OUTPATIENT)
Dept: PREADMISSION TESTING | Age: 68
Discharge: HOME | End: 2018-09-18
Payer: MEDICARE

## 2018-09-18 VITALS
OXYGEN SATURATION: 97 % | SYSTOLIC BLOOD PRESSURE: 148 MMHG | TEMPERATURE: 95.6 F | WEIGHT: 218 LBS | DIASTOLIC BLOOD PRESSURE: 95 MMHG | BODY MASS INDEX: 29.53 KG/M2 | HEART RATE: 67 BPM | HEIGHT: 72 IN

## 2018-09-18 DIAGNOSIS — M17.12 PRIMARY OSTEOARTHRITIS OF LEFT KNEE: Primary | ICD-10-CM

## 2018-09-18 DIAGNOSIS — M17.12 PRIMARY OSTEOARTHRITIS OF LEFT KNEE: ICD-10-CM

## 2018-09-18 DIAGNOSIS — Z01.812 BLOOD TESTS PRIOR TO TREATMENT OR PROCEDURE: ICD-10-CM

## 2018-09-18 LAB
ABO + RH BLD: NORMAL
ANION GAP SERPL CALC-SCNC: 9 MMOL/L (ref 3–18)
APPEARANCE UR: CLEAR
APTT PPP: 25.6 SEC (ref 23–36.4)
BASOPHILS # BLD: 0.1 K/UL (ref 0–0.1)
BASOPHILS NFR BLD: 1 % (ref 0–2)
BILIRUB UR QL: NEGATIVE
BLOOD GROUP ANTIBODIES SERPL: NORMAL
BUN SERPL-MCNC: 19 MG/DL (ref 7–18)
BUN/CREAT SERPL: 16 (ref 12–20)
CALCIUM SERPL-MCNC: 8.4 MG/DL (ref 8.5–10.1)
CHLORIDE SERPL-SCNC: 107 MMOL/L (ref 100–108)
CO2 SERPL-SCNC: 28 MMOL/L (ref 21–32)
COLOR UR: YELLOW
CREAT SERPL-MCNC: 1.18 MG/DL (ref 0.6–1.3)
DIFFERENTIAL METHOD BLD: NORMAL
EOSINOPHIL # BLD: 0.2 K/UL (ref 0–0.4)
EOSINOPHIL NFR BLD: 3 % (ref 0–5)
ERYTHROCYTE [DISTWIDTH] IN BLOOD BY AUTOMATED COUNT: 13 % (ref 11.6–14.5)
GLUCOSE SERPL-MCNC: 103 MG/DL (ref 74–99)
GLUCOSE UR STRIP.AUTO-MCNC: NEGATIVE MG/DL
HBA1C MFR BLD: 5.1 % (ref 4.2–5.6)
HCT VFR BLD AUTO: 44.1 % (ref 36–48)
HGB BLD-MCNC: 15.4 G/DL (ref 13–16)
HGB UR QL STRIP: NEGATIVE
INR PPP: 1 (ref 0.8–1.2)
KETONES UR QL STRIP.AUTO: NEGATIVE MG/DL
LEUKOCYTE ESTERASE UR QL STRIP.AUTO: NEGATIVE
LYMPHOCYTES # BLD: 2.4 K/UL (ref 0.9–3.6)
LYMPHOCYTES NFR BLD: 39 % (ref 21–52)
MCH RBC QN AUTO: 32.6 PG (ref 24–34)
MCHC RBC AUTO-ENTMCNC: 34.9 G/DL (ref 31–37)
MCV RBC AUTO: 93.4 FL (ref 74–97)
MONOCYTES # BLD: 0.5 K/UL (ref 0.05–1.2)
MONOCYTES NFR BLD: 9 % (ref 3–10)
NEUTS SEG # BLD: 2.9 K/UL (ref 1.8–8)
NEUTS SEG NFR BLD: 48 % (ref 40–73)
NITRITE UR QL STRIP.AUTO: NEGATIVE
PH UR STRIP: 5.5 [PH] (ref 5–8)
PLATELET # BLD AUTO: 152 K/UL (ref 135–420)
PMV BLD AUTO: 10 FL (ref 9.2–11.8)
POTASSIUM SERPL-SCNC: 4.2 MMOL/L (ref 3.5–5.5)
PROT UR STRIP-MCNC: NEGATIVE MG/DL
PROTHROMBIN TIME: 12.7 SEC (ref 11.5–15.2)
RBC # BLD AUTO: 4.72 M/UL (ref 4.7–5.5)
SODIUM SERPL-SCNC: 144 MMOL/L (ref 136–145)
SP GR UR REFRACTOMETRY: 1.02 (ref 1–1.03)
SPECIMEN EXP DATE BLD: NORMAL
UROBILINOGEN UR QL STRIP.AUTO: 1 EU/DL (ref 0.2–1)
WBC # BLD AUTO: 6.1 K/UL (ref 4.6–13.2)

## 2018-09-18 PROCEDURE — 85610 PROTHROMBIN TIME: CPT | Performed by: PHYSICIAN ASSISTANT

## 2018-09-18 PROCEDURE — 85730 THROMBOPLASTIN TIME PARTIAL: CPT | Performed by: PHYSICIAN ASSISTANT

## 2018-09-18 PROCEDURE — 36415 COLL VENOUS BLD VENIPUNCTURE: CPT | Performed by: PHYSICIAN ASSISTANT

## 2018-09-18 PROCEDURE — 83036 HEMOGLOBIN GLYCOSYLATED A1C: CPT | Performed by: PHYSICIAN ASSISTANT

## 2018-09-18 PROCEDURE — 85025 COMPLETE CBC W/AUTO DIFF WBC: CPT | Performed by: PHYSICIAN ASSISTANT

## 2018-09-18 PROCEDURE — 80048 BASIC METABOLIC PNL TOTAL CA: CPT | Performed by: PHYSICIAN ASSISTANT

## 2018-09-18 PROCEDURE — 86900 BLOOD TYPING SEROLOGIC ABO: CPT | Performed by: PHYSICIAN ASSISTANT

## 2018-09-18 PROCEDURE — 81003 URINALYSIS AUTO W/O SCOPE: CPT | Performed by: PHYSICIAN ASSISTANT

## 2018-09-18 PROCEDURE — 87086 URINE CULTURE/COLONY COUNT: CPT | Performed by: PHYSICIAN ASSISTANT

## 2018-09-18 RX ORDER — ACETAMINOPHEN 325 MG/1
1000 TABLET ORAL
Status: CANCELLED | OUTPATIENT
Start: 2018-09-25 | End: 2018-09-26

## 2018-09-18 NOTE — PROGRESS NOTES
History and Physical Performed today and documented in chart    Ammon Abdi [place]  9/18/2018.  11:05 AM

## 2018-09-18 NOTE — H&P
[address] [facility], [place], operated by [facility], [address] [facility]  [phone]           HISTORY & PHYSICAL      Patient: Parnell Riedel                MRN: [id number]       SSN: [id number]  YOB: 1950        AGE: 76 y.o. SEX: male  Body mass index is 29.57 kg/(m^2). PCP: Saturnino Llamas MD  09/18/18      CC: Left  Knee medial compartment end stage OA  Problem List Items Addressed This Visit     None      Visit Diagnoses     Primary osteoarthritis of left knee    -  Primary            HPI:  The patient is a pleasant 76 y.o. whom has end stage OA of their Left knee and has failed conservative treatment including but not limited to NSAIDS, cortisone injections, viscosupplementation, PT, and pain medicine. Due to the current findings and affected activities of daily living, surgical intervention is indicated. The alternatives, risks, complications, as well as expected outcome were discussed. These include but are not limited to infection, blood loss, need for blood transfusion, neurovascular damage, DVT, PE,  post-op stiffness and pain, leg length discrepancy, dislocation, anesthetic complications, prothesis longevity, need for more surgery, MI, stroke, and even death. The patient understands and wishes to proceed with surgery. Past Medical History:   Diagnosis Date    Arthritis     Elevated PSA     Hypertension     Renal insufficiency     S/P rotator cuff repair 10/15/2015    Sleep apnea     patient states resolved after surgery         Current Outpatient Prescriptions:     propranolol (INDERAL) 20 mg tablet, Take 60 mg by mouth daily. , Disp: , Rfl:     cholecalciferol, vitamin D3, 2,000 unit tab, Take  by mouth., Disp: , Rfl:     omeprazole (PRILOSEC) 20 mg capsule, daily as needed. , Disp: , Rfl: 0    alprostadil (MUSE) 7,932 mcg supp, 1 application  Every day prn, Disp: 6 Suppository, Rfl: 12    sodium hyaluronate, viscosup, (GELSYN-3) 16.8 mg/2 mL syrg, 2 mL by Intra artICUlar route every seven (7) days. , Disp: 2 mL, Rfl: 0    ibuprofen 200 mg cap, Take  by mouth., Disp: , Rfl:     Allergies   Allergen Reactions    Penicillins Other (comments)     paralyzation    Sulfa (Sulfonamide Antibiotics) Unknown (comments)       Social History     Social History    Marital status: SINGLE     Spouse name: N/A    Number of children: N/A    Years of education: N/A     Occupational History    Not on file. Social History Main Topics    Smoking status: Former Smoker    Smokeless tobacco: Never Used      Comment: quit in 1976    Alcohol use 0.0 oz/week     0 Standard drinks or equivalent per week      Comment: occasionally    Drug use: No    Sexual activity: Not on file     Other Topics Concern    Not on file     Social History Narrative       Past Surgical History:   Procedure Laterality Date    HX HEENT      surgery for sleep apnea    HX HERNIA REPAIR      x2    HX SHOULDER ARTHROSCOPY Right 1/13/14    Dr. Jennifer Fierro ARTHROSCOPY Left 2016    HX UROLOGICAL  10/21/2016    Prostate Biopsy with Dr. Fely Daigle         Family History:  Non-contributory. PE:  Visit Vitals    BP (!) 148/95    Pulse 67    Temp 95.6 °F (35.3 °C) (Oral)    Ht 6' (1.829 m)    Wt 218 lb (98.9 kg)    SpO2 97%    BMI 29.57 kg/m2     A&O X3, NAD, well develop, well nourished  Heart: S1-S2, rrr  Lungs: CTA bilat  Abd: soft, nt, nt, + bs in all quadrants  Ext:  Pos distal pulses to DP, PT    X-ray: left knee medial compartment shows end stage OA    Labs: labs were reviewed and wnl. PCP cleared    A:  Left  Knee medial compartment end stage OA    P:  At this point we will move forward with surgery. Again, the alternatives, risks, complications, as well as expected outcome were discussed and the patient wishes to proceed with surgery.   Pt has been instructed to stop aspirin, nsaids, rheumatologic medications and blood thinners. They have also been instructed to continue on any heart and bp meds and to take them the morning of surgery with sips of water. The patient will require in patient admission due to above stated medical conditions as well the the surgical challenges given the anatomy and bone quality.          Mg Harper

## 2018-09-18 NOTE — PROGRESS NOTES
Mr Yen attended the Joint Replacement Pre-Operative class on  9/18/2018. Topics discussed included surgery preparation, what to expect the day of surgery, medications, physical and occupational therapy, and discharge planning. It was discussed that this is considered an elective surgery and that prior to the surgery he needs to make decisions such as arranging for help at home once he is discharged. Mr Yen was given a total knee patient education notebook to take home. Opportunity was given to ask questions and phone number of the Orthopaedic   was given for any questions or concerns that may arise later. He identified family as  through surgical/recovery process.

## 2018-09-19 LAB
BACTERIA SPEC CULT: NORMAL
SERVICE CMNT-IMP: NORMAL

## 2018-09-24 ENCOUNTER — ANESTHESIA EVENT (OUTPATIENT)
Dept: SURGERY | Age: 68
DRG: 470 | End: 2018-09-24
Payer: MEDICARE

## 2018-09-25 ENCOUNTER — HOSPITAL ENCOUNTER (INPATIENT)
Age: 68
LOS: 1 days | Discharge: HOME HEALTH | DRG: 470 | End: 2018-09-26
Attending: SPECIALIST | Admitting: SPECIALIST
Payer: MEDICARE

## 2018-09-25 ENCOUNTER — ANESTHESIA (OUTPATIENT)
Dept: SURGERY | Age: 68
DRG: 470 | End: 2018-09-25
Payer: MEDICARE

## 2018-09-25 DIAGNOSIS — G89.18 ACUTE POST-OPERATIVE PAIN: ICD-10-CM

## 2018-09-25 DIAGNOSIS — M25.562 ACUTE PAIN OF LEFT KNEE: Primary | ICD-10-CM

## 2018-09-25 PROBLEM — M17.12 LOCALIZED OSTEOARTHRITIS OF LEFT KNEE: Status: ACTIVE | Noted: 2018-09-25

## 2018-09-25 PROBLEM — Z98.890 S/P LEFT KNEE ARTHROSCOPY: Status: ACTIVE | Noted: 2018-09-25

## 2018-09-25 LAB
ATRIAL RATE: 71 BPM
CALCULATED P AXIS, ECG09: 11 DEGREES
CALCULATED R AXIS, ECG10: 8 DEGREES
CALCULATED T AXIS, ECG11: 8 DEGREES
DIAGNOSIS, 93000: NORMAL
HCT VFR BLD AUTO: 40.8 % (ref 36–48)
HGB BLD-MCNC: 14.3 G/DL (ref 13–16)
P-R INTERVAL, ECG05: 146 MS
Q-T INTERVAL, ECG07: 406 MS
QRS DURATION, ECG06: 82 MS
QTC CALCULATION (BEZET), ECG08: 441 MS
VENTRICULAR RATE, ECG03: 71 BPM

## 2018-09-25 PROCEDURE — 76210000016 HC OR PH I REC 1 TO 1.5 HR: Performed by: SPECIALIST

## 2018-09-25 PROCEDURE — 65270000029 HC RM PRIVATE

## 2018-09-25 PROCEDURE — 64447 NJX AA&/STRD FEMORAL NRV IMG: CPT | Performed by: ANESTHESIOLOGY

## 2018-09-25 PROCEDURE — 77030013708 HC HNDPC SUC IRR PULS STRY –B: Performed by: SPECIALIST

## 2018-09-25 PROCEDURE — C1713 ANCHOR/SCREW BN/BN,TIS/BN: HCPCS | Performed by: SPECIALIST

## 2018-09-25 PROCEDURE — 77030011265 HC ELECTRD BLD HEX COVD -A: Performed by: SPECIALIST

## 2018-09-25 PROCEDURE — 76060000035 HC ANESTHESIA 2 TO 2.5 HR: Performed by: SPECIALIST

## 2018-09-25 PROCEDURE — 77030031139 HC SUT VCRL2 J&J -A: Performed by: SPECIALIST

## 2018-09-25 PROCEDURE — 74011250636 HC RX REV CODE- 250/636

## 2018-09-25 PROCEDURE — 36415 COLL VENOUS BLD VENIPUNCTURE: CPT | Performed by: SPECIALIST

## 2018-09-25 PROCEDURE — 74011000258 HC RX REV CODE- 258: Performed by: ORTHOPAEDIC SURGERY

## 2018-09-25 PROCEDURE — 74011250636 HC RX REV CODE- 250/636: Performed by: NURSE ANESTHETIST, CERTIFIED REGISTERED

## 2018-09-25 PROCEDURE — 77030035086 HC GD CUT KNE SIGN PH3 BIOM -G: Performed by: SPECIALIST

## 2018-09-25 PROCEDURE — 74011000250 HC RX REV CODE- 250

## 2018-09-25 PROCEDURE — 97110 THERAPEUTIC EXERCISES: CPT

## 2018-09-25 PROCEDURE — 85018 HEMOGLOBIN: CPT | Performed by: SPECIALIST

## 2018-09-25 PROCEDURE — 74011000258 HC RX REV CODE- 258: Performed by: PHYSICIAN ASSISTANT

## 2018-09-25 PROCEDURE — 77030008683 HC TU ET CUF COVD -A: Performed by: ANESTHESIOLOGY

## 2018-09-25 PROCEDURE — 76942 ECHO GUIDE FOR BIOPSY: CPT | Performed by: ANESTHESIOLOGY

## 2018-09-25 PROCEDURE — 93005 ELECTROCARDIOGRAM TRACING: CPT

## 2018-09-25 PROCEDURE — 76010000171 HC OR TIME 2 TO 2.5 HR INTENSV-TIER 1: Performed by: SPECIALIST

## 2018-09-25 PROCEDURE — 77030020782 HC GWN BAIR PAWS FLX 3M -B: Performed by: SPECIALIST

## 2018-09-25 PROCEDURE — 0SRD0L9 REPLACEMENT OF LEFT KNEE JOINT WITH MEDIAL UNICONDYLAR SYNTHETIC SUBSTITUTE, CEMENTED, OPEN APPROACH: ICD-10-PCS | Performed by: SPECIALIST

## 2018-09-25 PROCEDURE — 74011000250 HC RX REV CODE- 250: Performed by: ORTHOPAEDIC SURGERY

## 2018-09-25 PROCEDURE — 74011000258 HC RX REV CODE- 258

## 2018-09-25 PROCEDURE — 77030032490 HC SLV COMPR SCD KNE COVD -B: Performed by: SPECIALIST

## 2018-09-25 PROCEDURE — C1776 JOINT DEVICE (IMPLANTABLE): HCPCS | Performed by: SPECIALIST

## 2018-09-25 PROCEDURE — 77030006835 HC BLD SAW SAG STRY -B: Performed by: SPECIALIST

## 2018-09-25 PROCEDURE — 77030029494 HC CLD THER UNIT ICMN DJOR -B: Performed by: SPECIALIST

## 2018-09-25 PROCEDURE — 77030021370 HC WRP CLD THER ICMN DJOR -B: Performed by: SPECIALIST

## 2018-09-25 PROCEDURE — 74011250637 HC RX REV CODE- 250/637: Performed by: SPECIALIST

## 2018-09-25 PROCEDURE — 74011000250 HC RX REV CODE- 250: Performed by: PHYSICIAN ASSISTANT

## 2018-09-25 PROCEDURE — 77030026438 HC STYL ET INTUB CARD -A: Performed by: ANESTHESIOLOGY

## 2018-09-25 PROCEDURE — 74011258636 HC RX REV CODE- 258/636: Performed by: SPECIALIST

## 2018-09-25 PROCEDURE — 74011250637 HC RX REV CODE- 250/637: Performed by: PHYSICIAN ASSISTANT

## 2018-09-25 PROCEDURE — 77030003029 HC SUT VCRL J&J -B: Performed by: SPECIALIST

## 2018-09-25 PROCEDURE — 74011250636 HC RX REV CODE- 250/636: Performed by: SPECIALIST

## 2018-09-25 PROCEDURE — 77030003666 HC NDL SPINAL BD -A: Performed by: SPECIALIST

## 2018-09-25 PROCEDURE — 77030018836 HC SOL IRR NACL ICUM -A: Performed by: SPECIALIST

## 2018-09-25 PROCEDURE — 74011250637 HC RX REV CODE- 250/637: Performed by: NURSE ANESTHETIST, CERTIFIED REGISTERED

## 2018-09-25 PROCEDURE — 97161 PT EVAL LOW COMPLEX 20 MIN: CPT

## 2018-09-25 PROCEDURE — 77030037664 HC BLD OXFRD RESECT BIOM -C: Performed by: SPECIALIST

## 2018-09-25 PROCEDURE — 77030008467 HC STPLR SKN COVD -B: Performed by: SPECIALIST

## 2018-09-25 PROCEDURE — 97530 THERAPEUTIC ACTIVITIES: CPT

## 2018-09-25 PROCEDURE — 74011250636 HC RX REV CODE- 250/636: Performed by: PHYSICIAN ASSISTANT

## 2018-09-25 PROCEDURE — 77030019605: Performed by: SPECIALIST

## 2018-09-25 PROCEDURE — 74011250636 HC RX REV CODE- 250/636: Performed by: ANESTHESIOLOGY

## 2018-09-25 PROCEDURE — 77030013079 HC BLNKT BAIR HGGR 3M -A: Performed by: SPECIALIST

## 2018-09-25 DEVICE — IMPLANTABLE DEVICE: Type: IMPLANTABLE DEVICE | Site: KNEE | Status: FUNCTIONAL

## 2018-09-25 DEVICE — OXF CEM FEM/MON TIB: Type: IMPLANTABLE DEVICE | Site: KNEE | Status: FUNCTIONAL

## 2018-09-25 DEVICE — COMPNT FEM TWIN PEG MED --: Type: IMPLANTABLE DEVICE | Site: KNEE | Status: FUNCTIONAL

## 2018-09-25 DEVICE — CEMENT BNE GENTAMC HV R+G 40GM -- PALACOS R+G 5036964: Type: IMPLANTABLE DEVICE | Status: FUNCTIONAL

## 2018-09-25 RX ORDER — PROPOFOL 10 MG/ML
INJECTION, EMULSION INTRAVENOUS AS NEEDED
Status: DISCONTINUED | OUTPATIENT
Start: 2018-09-25 | End: 2018-09-25 | Stop reason: HOSPADM

## 2018-09-25 RX ORDER — FENTANYL CITRATE 50 UG/ML
100 INJECTION, SOLUTION INTRAMUSCULAR; INTRAVENOUS ONCE
Status: COMPLETED | OUTPATIENT
Start: 2018-09-25 | End: 2018-09-25

## 2018-09-25 RX ORDER — ACETAMINOPHEN 500 MG
1000 TABLET ORAL
Status: COMPLETED | OUTPATIENT
Start: 2018-09-25 | End: 2018-09-25

## 2018-09-25 RX ORDER — HYDROMORPHONE HYDROCHLORIDE 2 MG/ML
INJECTION, SOLUTION INTRAMUSCULAR; INTRAVENOUS; SUBCUTANEOUS
Status: DISPENSED
Start: 2018-09-25 | End: 2018-09-25

## 2018-09-25 RX ORDER — SUCCINYLCHOLINE CHLORIDE 20 MG/ML
INJECTION INTRAMUSCULAR; INTRAVENOUS AS NEEDED
Status: DISCONTINUED | OUTPATIENT
Start: 2018-09-25 | End: 2018-09-25 | Stop reason: HOSPADM

## 2018-09-25 RX ORDER — DEXTROSE, SODIUM CHLORIDE, SODIUM LACTATE, POTASSIUM CHLORIDE, AND CALCIUM CHLORIDE 5; .6; .31; .03; .02 G/100ML; G/100ML; G/100ML; G/100ML; G/100ML
75 INJECTION, SOLUTION INTRAVENOUS CONTINUOUS
Status: DISPENSED | OUTPATIENT
Start: 2018-09-25 | End: 2018-09-26

## 2018-09-25 RX ORDER — LIDOCAINE HYDROCHLORIDE 20 MG/ML
INJECTION, SOLUTION EPIDURAL; INFILTRATION; INTRACAUDAL; PERINEURAL AS NEEDED
Status: DISCONTINUED | OUTPATIENT
Start: 2018-09-25 | End: 2018-09-25 | Stop reason: HOSPADM

## 2018-09-25 RX ORDER — PANTOPRAZOLE SODIUM 40 MG/1
40 TABLET, DELAYED RELEASE ORAL
Status: DISCONTINUED | OUTPATIENT
Start: 2018-09-26 | End: 2018-09-26 | Stop reason: HOSPADM

## 2018-09-25 RX ORDER — SODIUM CHLORIDE 0.9 % (FLUSH) 0.9 %
5-10 SYRINGE (ML) INJECTION AS NEEDED
Status: DISCONTINUED | OUTPATIENT
Start: 2018-09-25 | End: 2018-09-25 | Stop reason: HOSPADM

## 2018-09-25 RX ORDER — DEXAMETHASONE SODIUM PHOSPHATE 4 MG/ML
INJECTION, SOLUTION INTRA-ARTICULAR; INTRALESIONAL; INTRAMUSCULAR; INTRAVENOUS; SOFT TISSUE AS NEEDED
Status: DISCONTINUED | OUTPATIENT
Start: 2018-09-25 | End: 2018-09-25 | Stop reason: HOSPADM

## 2018-09-25 RX ORDER — FAMOTIDINE 20 MG/1
20 TABLET, FILM COATED ORAL ONCE
Status: COMPLETED | OUTPATIENT
Start: 2018-09-25 | End: 2018-09-25

## 2018-09-25 RX ORDER — OXYCODONE HYDROCHLORIDE 5 MG/1
5 TABLET ORAL
Status: DISCONTINUED | OUTPATIENT
Start: 2018-09-25 | End: 2018-09-26 | Stop reason: HOSPADM

## 2018-09-25 RX ORDER — SODIUM CHLORIDE 0.9 % (FLUSH) 0.9 %
5-10 SYRINGE (ML) INJECTION EVERY 8 HOURS
Status: DISCONTINUED | OUTPATIENT
Start: 2018-09-25 | End: 2018-09-26 | Stop reason: HOSPADM

## 2018-09-25 RX ORDER — ACETAMINOPHEN 325 MG/1
650 TABLET ORAL EVERY 6 HOURS
Status: DISCONTINUED | OUTPATIENT
Start: 2018-09-25 | End: 2018-09-26 | Stop reason: HOSPADM

## 2018-09-25 RX ORDER — SODIUM CHLORIDE, SODIUM LACTATE, POTASSIUM CHLORIDE, CALCIUM CHLORIDE 600; 310; 30; 20 MG/100ML; MG/100ML; MG/100ML; MG/100ML
75 INJECTION, SOLUTION INTRAVENOUS CONTINUOUS
Status: DISCONTINUED | OUTPATIENT
Start: 2018-09-25 | End: 2018-09-25 | Stop reason: HOSPADM

## 2018-09-25 RX ORDER — INSULIN LISPRO 100 [IU]/ML
INJECTION, SOLUTION INTRAVENOUS; SUBCUTANEOUS ONCE
Status: DISCONTINUED | OUTPATIENT
Start: 2018-09-25 | End: 2018-09-25 | Stop reason: HOSPADM

## 2018-09-25 RX ORDER — ROCURONIUM BROMIDE 10 MG/ML
INJECTION, SOLUTION INTRAVENOUS AS NEEDED
Status: DISCONTINUED | OUTPATIENT
Start: 2018-09-25 | End: 2018-09-25 | Stop reason: HOSPADM

## 2018-09-25 RX ORDER — FENTANYL CITRATE 50 UG/ML
INJECTION, SOLUTION INTRAMUSCULAR; INTRAVENOUS AS NEEDED
Status: DISCONTINUED | OUTPATIENT
Start: 2018-09-25 | End: 2018-09-25 | Stop reason: HOSPADM

## 2018-09-25 RX ORDER — SODIUM CHLORIDE 0.9 % (FLUSH) 0.9 %
5-10 SYRINGE (ML) INJECTION AS NEEDED
Status: DISCONTINUED | OUTPATIENT
Start: 2018-09-25 | End: 2018-09-26 | Stop reason: HOSPADM

## 2018-09-25 RX ORDER — PROPRANOLOL HYDROCHLORIDE 20 MG/1
60 TABLET ORAL DAILY
Status: DISCONTINUED | OUTPATIENT
Start: 2018-09-25 | End: 2018-09-26 | Stop reason: HOSPADM

## 2018-09-25 RX ORDER — DOCUSATE SODIUM 100 MG/1
100 CAPSULE, LIQUID FILLED ORAL 2 TIMES DAILY
Status: DISCONTINUED | OUTPATIENT
Start: 2018-09-25 | End: 2018-09-26 | Stop reason: HOSPADM

## 2018-09-25 RX ORDER — HYDROMORPHONE HYDROCHLORIDE 2 MG/ML
0.4 INJECTION, SOLUTION INTRAMUSCULAR; INTRAVENOUS; SUBCUTANEOUS
Status: DISCONTINUED | OUTPATIENT
Start: 2018-09-25 | End: 2018-09-25 | Stop reason: HOSPADM

## 2018-09-25 RX ORDER — SODIUM CHLORIDE 0.9 % (FLUSH) 0.9 %
5-10 SYRINGE (ML) INJECTION EVERY 8 HOURS
Status: DISCONTINUED | OUTPATIENT
Start: 2018-09-25 | End: 2018-09-25 | Stop reason: HOSPADM

## 2018-09-25 RX ORDER — ONDANSETRON 2 MG/ML
INJECTION INTRAMUSCULAR; INTRAVENOUS AS NEEDED
Status: DISCONTINUED | OUTPATIENT
Start: 2018-09-25 | End: 2018-09-25 | Stop reason: HOSPADM

## 2018-09-25 RX ORDER — NEOSTIGMINE METHYLSULFATE 1 MG/ML
INJECTION INTRAVENOUS AS NEEDED
Status: DISCONTINUED | OUTPATIENT
Start: 2018-09-25 | End: 2018-09-25 | Stop reason: HOSPADM

## 2018-09-25 RX ORDER — GLYCOPYRROLATE 0.2 MG/ML
INJECTION INTRAMUSCULAR; INTRAVENOUS AS NEEDED
Status: DISCONTINUED | OUTPATIENT
Start: 2018-09-25 | End: 2018-09-25 | Stop reason: HOSPADM

## 2018-09-25 RX ORDER — ROPIVACAINE HYDROCHLORIDE 2 MG/ML
30 INJECTION, SOLUTION EPIDURAL; INFILTRATION; PERINEURAL
Status: COMPLETED | OUTPATIENT
Start: 2018-09-25 | End: 2018-09-25

## 2018-09-25 RX ORDER — ONDANSETRON 2 MG/ML
4 INJECTION INTRAMUSCULAR; INTRAVENOUS ONCE
Status: DISCONTINUED | OUTPATIENT
Start: 2018-09-25 | End: 2018-09-25 | Stop reason: HOSPADM

## 2018-09-25 RX ORDER — LIDOCAINE HYDROCHLORIDE 10 MG/ML
0.1 INJECTION, SOLUTION EPIDURAL; INFILTRATION; INTRACAUDAL; PERINEURAL AS NEEDED
Status: DISCONTINUED | OUTPATIENT
Start: 2018-09-25 | End: 2018-09-25 | Stop reason: HOSPADM

## 2018-09-25 RX ORDER — EPHEDRINE SULFATE 50 MG/ML
INJECTION, SOLUTION INTRAVENOUS AS NEEDED
Status: DISCONTINUED | OUTPATIENT
Start: 2018-09-25 | End: 2018-09-25 | Stop reason: HOSPADM

## 2018-09-25 RX ORDER — MIDAZOLAM HYDROCHLORIDE 1 MG/ML
2 INJECTION, SOLUTION INTRAMUSCULAR; INTRAVENOUS ONCE
Status: COMPLETED | OUTPATIENT
Start: 2018-09-25 | End: 2018-09-25

## 2018-09-25 RX ORDER — ENOXAPARIN SODIUM 100 MG/ML
30 INJECTION SUBCUTANEOUS EVERY 24 HOURS
Status: DISCONTINUED | OUTPATIENT
Start: 2018-09-26 | End: 2018-09-26 | Stop reason: HOSPADM

## 2018-09-25 RX ADMIN — SODIUM CHLORIDE, SODIUM LACTATE, POTASSIUM CHLORIDE, AND CALCIUM CHLORIDE: 600; 310; 30; 20 INJECTION, SOLUTION INTRAVENOUS at 08:52

## 2018-09-25 RX ADMIN — FAMOTIDINE 20 MG: 20 TABLET, FILM COATED ORAL at 06:52

## 2018-09-25 RX ADMIN — TRANEXAMIC ACID 1 G: 100 INJECTION, SOLUTION INTRAVENOUS at 09:33

## 2018-09-25 RX ADMIN — HYDROMORPHONE HYDROCHLORIDE 0.4 MG: 2 INJECTION, SOLUTION INTRAMUSCULAR; INTRAVENOUS; SUBCUTANEOUS at 10:48

## 2018-09-25 RX ADMIN — SODIUM CHLORIDE, SODIUM LACTATE, POTASSIUM CHLORIDE, CALCIUM CHLORIDE, AND DEXTROSE MONOHYDRATE 75 ML/HR: 600; 310; 30; 20; 5 INJECTION, SOLUTION INTRAVENOUS at 15:44

## 2018-09-25 RX ADMIN — ROPIVACAINE HYDROCHLORIDE 60 MG: 2 INJECTION, SOLUTION EPIDURAL; INFILTRATION at 07:10

## 2018-09-25 RX ADMIN — MIDAZOLAM HYDROCHLORIDE 2 MG: 1 INJECTION, SOLUTION INTRAMUSCULAR; INTRAVENOUS at 07:09

## 2018-09-25 RX ADMIN — TRANEXAMIC ACID 1 G: 100 INJECTION, SOLUTION INTRAVENOUS at 07:52

## 2018-09-25 RX ADMIN — HYDROMORPHONE HYDROCHLORIDE 0.4 MG: 2 INJECTION, SOLUTION INTRAMUSCULAR; INTRAVENOUS; SUBCUTANEOUS at 10:36

## 2018-09-25 RX ADMIN — DOCUSATE SODIUM 100 MG: 100 CAPSULE, LIQUID FILLED ORAL at 19:19

## 2018-09-25 RX ADMIN — NEOSTIGMINE METHYLSULFATE 3 MG: 1 INJECTION INTRAVENOUS at 09:36

## 2018-09-25 RX ADMIN — ACETAMINOPHEN 1000 MG: 500 TABLET, FILM COATED ORAL at 06:52

## 2018-09-25 RX ADMIN — PROPOFOL 150 MG: 10 INJECTION, EMULSION INTRAVENOUS at 07:35

## 2018-09-25 RX ADMIN — SODIUM CHLORIDE 1000 MG: 900 INJECTION, SOLUTION INTRAVENOUS at 19:22

## 2018-09-25 RX ADMIN — DEXAMETHASONE SODIUM PHOSPHATE 4 MG: 4 INJECTION, SOLUTION INTRA-ARTICULAR; INTRALESIONAL; INTRAMUSCULAR; INTRAVENOUS; SOFT TISSUE at 07:58

## 2018-09-25 RX ADMIN — PROPOFOL 50 MG: 10 INJECTION, EMULSION INTRAVENOUS at 08:22

## 2018-09-25 RX ADMIN — LIDOCAINE HYDROCHLORIDE 80 MG: 20 INJECTION, SOLUTION EPIDURAL; INFILTRATION; INTRACAUDAL; PERINEURAL at 07:35

## 2018-09-25 RX ADMIN — GLYCOPYRROLATE 0.4 MG: 0.2 INJECTION INTRAMUSCULAR; INTRAVENOUS at 09:36

## 2018-09-25 RX ADMIN — OXYCODONE HYDROCHLORIDE 5 MG: 5 TABLET ORAL at 12:23

## 2018-09-25 RX ADMIN — SODIUM CHLORIDE 1000 MG: 900 INJECTION, SOLUTION INTRAVENOUS at 06:53

## 2018-09-25 RX ADMIN — SODIUM CHLORIDE, SODIUM LACTATE, POTASSIUM CHLORIDE, AND CALCIUM CHLORIDE 75 ML/HR: 600; 310; 30; 20 INJECTION, SOLUTION INTRAVENOUS at 06:53

## 2018-09-25 RX ADMIN — SUCCINYLCHOLINE CHLORIDE 120 MG: 20 INJECTION INTRAMUSCULAR; INTRAVENOUS at 07:35

## 2018-09-25 RX ADMIN — SODIUM CHLORIDE, SODIUM LACTATE, POTASSIUM CHLORIDE, CALCIUM CHLORIDE, AND DEXTROSE MONOHYDRATE 75 ML/HR: 600; 310; 30; 20; 5 INJECTION, SOLUTION INTRAVENOUS at 23:41

## 2018-09-25 RX ADMIN — ROCURONIUM BROMIDE 40 MG: 10 INJECTION, SOLUTION INTRAVENOUS at 07:56

## 2018-09-25 RX ADMIN — FENTANYL CITRATE 50 MCG: 50 INJECTION, SOLUTION INTRAMUSCULAR; INTRAVENOUS at 09:09

## 2018-09-25 RX ADMIN — ACETAMINOPHEN 650 MG: 325 TABLET ORAL at 23:41

## 2018-09-25 RX ADMIN — ONDANSETRON 4 MG: 2 INJECTION INTRAMUSCULAR; INTRAVENOUS at 09:33

## 2018-09-25 RX ADMIN — EPHEDRINE SULFATE 10 MG: 50 INJECTION, SOLUTION INTRAVENOUS at 07:52

## 2018-09-25 RX ADMIN — FENTANYL CITRATE 100 MCG: 50 INJECTION, SOLUTION INTRAMUSCULAR; INTRAVENOUS at 07:35

## 2018-09-25 RX ADMIN — SODIUM CHLORIDE 600 MG: 900 INJECTION, SOLUTION INTRAVENOUS at 07:43

## 2018-09-25 RX ADMIN — FENTANYL CITRATE 50 MCG: 50 INJECTION, SOLUTION INTRAMUSCULAR; INTRAVENOUS at 08:22

## 2018-09-25 RX ADMIN — Medication 10 ML: at 22:15

## 2018-09-25 RX ADMIN — LIDOCAINE HYDROCHLORIDE 20 MG: 20 INJECTION, SOLUTION EPIDURAL; INFILTRATION; INTRACAUDAL; PERINEURAL at 08:22

## 2018-09-25 RX ADMIN — ACETAMINOPHEN 650 MG: 325 TABLET ORAL at 19:19

## 2018-09-25 RX ADMIN — ONDANSETRON 4 MG: 2 INJECTION INTRAMUSCULAR; INTRAVENOUS at 07:35

## 2018-09-25 RX ADMIN — FENTANYL CITRATE 100 MCG: 50 INJECTION INTRAMUSCULAR; INTRAVENOUS at 07:09

## 2018-09-25 NOTE — PROGRESS NOTES
Problem: Mobility Impaired (Adult and Pediatric) Goal: *Acute Goals and Plan of Care (Insert Text) Physical Therapy Goals Initiated 9/25/2018 and to be accomplished within 7 day(s) 1. Patient will move from supine to sit and sit to supine  in bed with modified independence. 2.  Patient will transfer from bed to chair and chair to bed with modified independence using the least restrictive device. 3.  Patient will perform sit to stand with modified independence. 4.  Patient will ambulate with modified independence for 250 feet with the least restrictive device. 5.  Patient will ascend/descend 10 stairs with 1 handrail(s) with supervision/set-up. physical Therapy EVALUATION Patient: Shirley Rubin (76 y.o. male) Date: 9/25/2018 Primary Diagnosis: Osteoarthritis of left knee, unspecified osteoarthritis type [M17.12] Procedure(s) (LRB): LEFT UNICOMPARTMENTAL KNEE ARTHROPLASTY/BIOMET OXFORD/2 SA'S/FEMORAL NERVE BLOCK (Left) Day of Surgery Precautions: fall ASSESSMENT : 
Based on the objective data described below, the patient presents with decreased strength, left knee ROM and balance resulting in decreased independence with functional mobility. Pt performed supine LE exercises x5 prior to initiating mobility. Pt transferred supine to sit with supervision. Pt stood from the bed to the RW with CGA. Pt ambulation 100 feet with RW and CGA. Initially patient demonstrated step to gait pattern but with verbal cues was able to progress to step through. Pt was left sitting in the recliner with needs in reach and family present. Patient will benefit from skilled intervention to address the above impairments. Patients rehabilitation potential is considered to be Good Factors which may influence rehabilitation potential include:  
[x]         None noted 
[]         Mental ability/status []         Medical condition 
[]         Home/family situation and support systems []         Safety awareness 
[]         Pain tolerance/management 
[]         Other: PLAN : 
Recommendations and Planned Interventions: 
[x]           Bed Mobility Training             [x]    Neuromuscular Re-Education 
[x]           Transfer Training                   []    Orthotic/Prosthetic Training 
[x]           Gait Training                          [x]    Modalities [x]           Therapeutic Exercises          [x]    Edema Management/Control 
[x]           Therapeutic Activities            [x]    Patient and Family Training/Education 
[]           Other (comment): Frequency/Duration: Patient will be followed by physical therapy 1-2 times per day/4-7 days per week to address goals. Discharge Recommendations: Home Health Further Equipment Recommendations for Discharge: rolling walker G-CODES Mobility [id number] Current  CJ= 20-39%   Goal  CI= 1-19%. The severity rating is based on the Level of Assistance required for Functional Mobility and ADLs. Eval Complexity: History: MEDIUM  Complexity : 1-2 comorbidities / personal factors will impact the outcome/ POC Exam:MEDIUM Complexity : 3 Standardized tests and measures addressing body structure, function, activity limitation and / or participation in recreation  Presentation: LOW Complexity : Stable, uncomplicated  Clinical Decision Making:Low Complexity , Overall Complexity:LOW SUBJECTIVE:  
Patient stated I feel like I just had surgery.  OBJECTIVE DATA SUMMARY:  
 
Past Medical History:  
Diagnosis Date  Arthritis  Elevated PSA  Hypertension  Renal insufficiency  S/P rotator cuff repair 10/15/2015  Sleep apnea   
 patient states resolved after surgery Past Surgical History:  
Procedure Laterality Date  HX HEENT    
 surgery for sleep apnea  HX HERNIA REPAIR    
 x2  
 HX SHOULDER ARTHROSCOPY Right 1/13/14 Dr. Sukhi Bahena  HX SHOULDER ARTHROSCOPY Left 2016  HX UROLOGICAL  10/21/2016 Prostate Biopsy with Dr. Katharine Diallo  [place] ANESTH,SURGERY OF SHOULDER Prior Level of Function/Home Situation: independent with mobility without an assistive device, bedroom on second floor; children live locally but work; has rolling walker Home Situation Home Environment: Private residence # Steps to Enter: 1 One/Two Story Residence: Two story # of Interior Steps: 10 Height of Each Step (in): 0 inches Interior Rails: Right Lift Chair Available: No 
Living Alone: Yes Support Systems: Friends \ neighbors Patient Expects to be Discharged to[de-identified] Private residence Current DME Used/Available at Home: dana Chauhan Critical Behavior: 
 calm and cooperative, motivated Strength:   
Strength:  (able to perform SLR on right without knee lag) Tone & Sensation:  
Tone: Normal 
    
Range Of Motion: 
AROM:  (left knee 0 to 60 degrees (limited by icepack under ace wrap) Functional Mobility: 
Bed Mobility: 
Supine to Sit: Supervision Transfers: 
Sit to Stand: Contact guard assistance Stand to Sit: Contact guard assistance Bed to Chair: Contact guard assistance Balance:  
Sitting: Intact Standing: Intact; With support Ambulation/Gait Training: 
Distance (ft): 100 Feet (ft) Assistive Device: Walker, rolling Ambulation - Level of Assistance: Contact guard assistance Gait Abnormalities: Antalgic Left Side Weight Bearing: As tolerated Therapeutic Exercises: Ankle pumps, heel slides, SLR Pain: 
Pt reports 7/10 pain or discomfort prior to treatment.   
Pt reports 5/10 pain or discomfort post treatment. Activity Tolerance:  
fair Please refer to the flowsheet for vital signs taken during this treatment. After treatment:  
[x]         Patient left in no apparent distress sitting up in chair 
[]         Patient left in no apparent distress in bed 
[x]         Call bell left within reach [x]         Nursing notified 
[x]         Caregiver present 
[]         Bed alarm activated COMMUNICATION/EDUCATION:  
[x]         Fall prevention education was provided and the patient/caregiver indicated understanding. [x]         Patient/family have participated as able in goal setting and plan of care. [x]         Patient/family agree to work toward stated goals and plan of care. []         Patient understands intent and goals of therapy, but is neutral about his/her participation. []         Patient is unable to participate in goal setting and plan of care. Educated patient on performing exercises and calling for assistance with mobility Thank you for this referral. 
Ila Samaniego, PT Time Calculation: 30 mins

## 2018-09-25 NOTE — IP AVS SNAPSHOT
[address] Patient: Gabrielle Correa 
MRN: [id number] JEFFERSON:[id number] A check salvador indicates which time of day the medication should be taken. My Medications START taking these medications Instructions Each Dose to Equal  
 Morning Noon Evening Bedtime  
 docusate sodium 100 mg capsule Commonly known as:  Vermell Nones Your last dose was: Today 0800 Take 1 Cap by mouth two (2) times a day for 90 days. Indications: constipation 100 mg  
    
   
   
   
  
 enoxaparin 30 mg/0.3 mL injection Commonly known as:  LOVENOX Your last dose was: Today 0900  
   
 0.3 mL by SubCUTAneous route every twenty-four (24) hours for 14 doses. Indications: DEEP VEIN THROMBOSIS PREVENTION, PULMONARY THROMBOEMBOLISM PREVENTION 30 mg  
    
   
   
   
  
 oxyCODONE IR 5 mg immediate release tablet Commonly known as:  Yi Fang Take 1-2 Tabs by mouth every four (4) hours as needed. Max Daily Amount: 60 mg.  
 5-10 mg  
    
   
   
   
  
 polyethylene glycol 17 gram packet Commonly known as:  Leata Sans Take 1 Packet by mouth daily. Indications: constipation 17 g CONTINUE taking these medications Instructions Each Dose to Equal  
 Morning Noon Evening Bedtime  
 alprostadil 1,000 mcg Supp Commonly known as:  MUSE  
   
 1 application  Every day prn  
     
   
   
   
  
 cholecalciferol (vitamin D3) 2,000 unit Tab Take  by mouth. omeprazole 20 mg capsule Commonly known as:  PRILOSEC  
   
 daily as needed. propranolol 20 mg tablet Commonly known as:  INDERAL Your last dose was: Today 0800 Take 60 mg by mouth daily. 60 mg  
    
   
   
   
  
  
STOP taking these medications   
 ibuprofen 200 mg Cap  
   
  
 sodium hyaluronate (viscosup) 16.8 mg/2 mL Syrg injection Commonly known as:  GELSYN-3 Where to Get Your Medications Information on where to get these meds will be given to you by the nurse or doctor. ! Ask your nurse or doctor about these medications  
  docusate sodium 100 mg capsule  
 enoxaparin 30 mg/0.3 mL injection  
 oxyCODONE IR 5 mg immediate release tablet  
 polyethylene glycol 17 gram packet

## 2018-09-25 NOTE — PHYSICIAN ADVISORY
Letter of admission status determination Keri Billet Age: 76 y.o. MRN: [id number] Admitting physician:  
Insurance: Payor: [place] / Plan: [place] MEDICARE PART A & B / Product Type: Medicare /  
 
Date of admission:  9/25/2018 I have reviewed this case as it involves a Medicare patient not meeting criteria for Inpatient status. The patient underwent elective unicompartmental knee arthroplasty today. The procedure is not on the Medicare Inpatient Only List. The patient tolerated the procedure well, without any documented complications. There were no unexpected complications to warrant Observation services or Inpatient status. Observation is restricted to situations where a patient exhibits an uncommon or unusual reaction to a surgical procedure, and the condition requires monitoring and treatment beyond the treatment customarily provided in the immediate postoperative period. Routine postoperative recovery in outpatient status can be overnight. Therefore, Outpatient status without Observation services is appropriate. The final decision regarding the patient's hospitalization status depends on the attending physician's judgment. Gil Rome MD, BAYLEE, FACP, [facility] Physician Advisor [address] [place] [address]. 
[phone] September 25, 2018   2:41 PM

## 2018-09-25 NOTE — ANESTHESIA PREPROCEDURE EVALUATION
Anesthetic History No history of anesthetic complications Review of Systems / Medical History Patient summary reviewed and pertinent labs reviewed Pulmonary Sleep apnea Comments: OTIS resolved after UP3? Neuro/Psych Within defined limits Cardiovascular Hypertension Exercise tolerance: >4 METS 
  
GI/Hepatic/Renal 
  
 
 
Renal disease: CRI Endo/Other Arthritis and cancer Comments: Prostate ca s/p cryoablation Other Findings Physical Exam 
 
Airway Mallampati: IV 
TM Distance: 4 - 6 cm Neck ROM: normal range of motion, short neck Mouth opening: Diminished (comment) Cardiovascular Regular rate and rhythm,  S1 and S2 normal,  no murmur, click, rub, or gallop Rhythm: regular Rate: normal 
 
 
 
 Dental 
 
Dentition: Lower dentition intact, Upper dentition intact and Caps/crowns Pulmonary Breath sounds clear to auscultation Abdominal 
GI exam deferred Other Findings Anesthetic Plan ASA: 3 Anesthesia type: general and regional - femoral single shot Induction: Intravenous Anesthetic plan and risks discussed with: Patient

## 2018-09-25 NOTE — BRIEF OP NOTE
BRIEF OPERATIVE NOTE Date of Procedure: 9/25/2018 Preoperative Diagnosis: Medial compartment left knee osteoarthritis Postoperative Diagnosis: Same Procedure(s): LEFT UNICOMPARTMENTAL KNEE ARTHROPLASTY/BIOMET OXFORD/2 SA'S/FEMORAL NERVE BLOCK Surgeon(s) and Role: Starr Tran MD 
 
Surgical Staff: 
Circ-1: Emmanuel Lei Circ-Relief: Eugene Jamil RN Scrub Tech-1: Sharona Garza Surg Asst-1: Dolph Risser Surg Asst-2: Clance Given Event Time In Incision Start 8480 Incision Close Anesthesia: General  
Estimated Blood Loss: min Specimens: * No specimens in log * Findings: above Complications: none Implants:  
Implant Name Type Inv. Item Serial No.  Lot No. LRB No. Used Action  
bone cement     [id number] Left 1 Implanted BASEPLT TIB UNI SZ D LM/RL -- [employer] - [id number]  BASEPLT TIB UNI SZ D LM/RL -- [facility]  [employer] [id number] Left 1 Implanted COMPNT FEM TWIN PEG MED --  - [id number]  COMPNT FEM TWIN PEG MED --   [employer] [id number] Left 1 Implanted BEARING TIB MENIS LT MD DAS 4 -- UNI PABLITO [employer]XFORD - [id number]   BEARING TIB MENIS LT MD DAS 4 -- [employer]ABL[employer]   [employer] [id number] Left 1 Implanted

## 2018-09-25 NOTE — PROGRESS NOTES
Reason for Admission:   Osteoarthritis of the left knee RRAT Score:     8 Plan for utilizing home health: [facility] Likelihood of Readmission:  Low Transition of Care Plan:      Patient lives alone, however, he has family and friends close by who assist with care and transportation as needed. Patient will also have home health services. FOC done and he elected to have [facility]. Care Management Interventions PCP Verified by CM: Yes (Dr. Faina Oliveros) Mode of Transport at Discharge: Other (see comment) (Family to transport.) Transition of Care Consult (CM Consult): Home Health [facility] - INPATIENT: Yes Physical Therapy Consult: Yes Occupational Therapy Consult: Yes Current Support Network: Lives Alone, Family Lives [place] Confirm Follow Up Transport: Family Plan discussed with Pt/Family/Caregiver: Yes Freedom of Choice Offered: Yes Discharge Location Discharge Placement: Home with family assistance This writer met with patient to assess and discuss discharge planning. Patient lives alone, but have family and friends who live close by. His support system offers assistance as needed. It is also being recommended that patient get home health services. FOC completed with patient. He has elected to have [facility]. Patient's PCP is Dr. Faina Oliveros. Patient reports to having 2 walkers, canes and a shower chair at home. Patient is insured through Medicare and he also has [place]. Patient's family will provide transport at the time of discharge from [facility]. This writer will continue to closely monitor for discharge planning to ensure a safe discharge home from [facility].

## 2018-09-25 NOTE — PROGRESS NOTES
Per Del (ANA) placed home health referral for [facility] in [facility] Informed Rina Lam (liaison) referral was placed in [facility]. Informed Del referral was sent.

## 2018-09-25 NOTE — OP NOTES
Operative Note      Patient: Dolly Alegre III     Date of Surgery: 9/25/2018         YOB: 1950      Age:  76 y.o.        LOS:  LOS: 0 days       Preoperative Diagnosis:  Medial compartment left knee osteoarthritis    Postoperative Diagnosis: Same      Surgeon:  Florence Robertson MD    Assistant:  None    Anesthesia:  general anesthesia and adductor block     Procedure:  Procedure(s):  LEFT UNICOMPARTMENTAL KNEE ARTHROPLASTY/BIOMET OXFORD/2 SA'S/FEMORAL NERVE BLOCK    Time out performed: YES    Estimated Blood Loss:  min           Implants:    Implant Name Type Inv. Item Serial No.  Lot No. LRB No. Used Action   bone cement     [id number] Left 1 Implanted   BASEPLT TIB UNI SZ D LM/RL -- OXF[employer] - [id number]  BASEPLT TIB UNI SZ D LM/RL -- [employer]  [employer] [id number] Left 1 Implanted   COMPNT FEM TWIN PEG MED --  - [id number]  COMPNT FEM TWIN PEG MED --   [employer] [id number] Left 1 Implanted   BEARING TIB MENIS LT MD DAS 4 -- UNI PABLITO OXFORD - [id number]   BEARING TIB MENIS LT MD DAS 4 -- UNI PABLITO OXFORD   [employer] [id number] Left 1 Implanted       Specimens: * No specimens in log *            Complications:  None    DESCRIPTION OF PROCEDURE: Under satisfactory general and adductor block anesthesia with Mr. Arpita Everett in the supine position, the patient's lower extremity was prepped with Betacept and ChloraPrep solution and draped in the usual fashion for knee arthroplasty. A Manriquez leg griffin was used for positioning. The tourniquet was inflated to 350 mmHg after exsanguination with elevation. A longitudinal anteromedial incision was made and was carried down through the subcutaneous tissue to the underlying medial capsule. A medial parapatellar arthrotomy was made carried up proximally to the vastus medialis. Severe cartilage loss was noted in the medial compartment and osteophytes were present.  The anterior cruciate ligament was well visualized and noted to be intact. The lateral compartment was intact. The Signature femoral guide was applied to the medial femoral condyle with a good fit noted. Two drill holes were made in the medial femoral condyle using the guide. The noncaptured posterior femoral cutting guide was used to resect the posterior femoral condyle. The initial femoral preparation was performed using the #0 spigot and a bone mill. The Signature tibial guide was used to make the vertical and horizontal tibial cuts. Femoral and tibial osteophytes were removed. With the trial medium femoral component and size D tibial tray in place, the appropriate spacer was chosen for soft tissue balancing. The femoral preparation was then completed using the #3 spigot. The anterior chamfer mill was used to finish the femoral preparation. The posterior joint space was checked for osteophytes. Several drill holes were placed in the medial femoral condyle to aid to cement penetration. The tibial fin slot was created using the tibial guide pinned in place and the toothbrush saw. The slot created was cleared of bone debris with an angled pick. Trial components were inserted and were found to be a good fit with excellent soft tissue balancing. The trial components were then removed, and the knee was irrigated thoroughly. The femoral and tibial tray components were then cemented into place with Gentamycin bone cement. All excess cement was carefully removed. The knee was held at 45 degrees flexion with compression applied across the joint as cement hardened. Once the cement fully hardened, the knee was again checked for excess cement. The 4 mm bearing was then snapped into place and was found to be a good fit. The knee was irrigated thoroughly including with a Pulsavac and a dilute Betadine lavage. Closure was obtained using #2 vicryl for the capsular closure, 2-0 vicryl for the subcutaneous tissue, and staples for the skin.  Mr. Betsey Byrne was transported back to the recovery room in good condition. Sponge and needle count was correct.

## 2018-09-25 NOTE — ANESTHESIA PROCEDURE NOTES
Peripheral Block Start time: 9/25/2018 7:09 AM 
End time: 9/25/2018 7:19 AM 
Performed by: Asa Hood Authorized by: Asa Hodo Pre-procedure: Indications: at surgeon's request, post-op pain management and procedure for pain Preanesthetic Checklist: patient identified, risks and benefits discussed, site marked, timeout performed, anesthesia consent given and patient being monitored Timeout Time: 07:08 Block Type:  
Block Type: Adductor canal 
Laterality:  Left Monitoring:  Standard ASA monitoring, continuous pulse ox, frequent vital sign checks, oxygen, heart rate and responsive to questions Injection Technique:  Single shot Procedures: ultrasound guided Patient Position: supine Prep: chlorhexidine Location:  Mid thigh Needle Type:  Stimuplex Needle Gauge:  21 G Needle Localization:  Ultrasound guidance and nerve stimulator Medication Injected:  0.2% 
ropivacaine Volume (mL):  30 
 
Assessment: 
 
Injection Assessment:  No paresthesia, incremental injection every 5 mL, ultrasound image on chart, no intravascular symptoms, negative aspiration for blood and local visualized surrounding nerve on ultrasound Patient tolerance:  Patient tolerated the procedure well with no immediate complications Location:  PREOP HOLDING Patient given 2 mg IV Versed and 100 mcg IV Fentanyl for sedation.  
 
9/25/2018     7:23 AM     Rasheed Benson MD

## 2018-09-25 NOTE — PERIOP NOTES
TRANSFER - OUT REPORT: 
 
Verbal report given to [address] [place] [address] on Sandeep Michael III  being transferred to  for routine post - op Report consisted of patients Situation, Background, Assessment and  
Recommendations(SBAR). Information from the following report(s) SBAR and MAR was reviewed with the receiving nurse. Lines:  
Peripheral IV 09/25/18 Right; Anterior Forearm (Active) Site Assessment Clean, dry, & intact 9/25/2018  9:52 AM  
Phlebitis Assessment 0 9/25/2018  9:52 AM  
Infiltration Assessment 0 9/25/2018  9:52 AM  
Dressing Status Clean, dry, & intact 9/25/2018  9:52 AM  
Dressing Type Tape;Transparent 9/25/2018  9:52 AM  
Hub Color/Line Status Pink; Infusing 9/25/2018  9:52 AM  
Alcohol Cap Used No 9/25/2018  6:33 AM  
  
 
Opportunity for questions and clarification was provided. Patient transported with: 
 [employer]

## 2018-09-25 NOTE — ANESTHESIA POSTPROCEDURE EVALUATION
Post-Anesthesia Evaluation and Assessment Patient: Shirley Rubin MRN: [id number]  SSN: [id number] YOB: 1950  Age: 76 y.o. Sex: male Cardiovascular Function/Vital Signs Visit Vitals  /89 (BP 1 Location: Left arm, BP Patient Position: At rest)  Pulse 64  Temp 36.9 °C (98.5 °F)  Resp 18  Ht 6' (1.829 m)  Wt 93.9 kg (207 lb)  SpO2 96%  BMI 28.07 kg/m2 Patient is status post general, regional anesthesia for Procedure(s): LEFT UNICOMPARTMENTAL KNEE ARTHROPLASTY/BIOMET OXFORD/2 SA'S/FEMORAL NERVE BLOCK. Nausea/Vomiting: None Postoperative hydration reviewed and adequate. Pain: 
Pain Scale 1: Numeric (0 - 10) (09/25/18 1223) Pain Intensity 1: 9 (09/25/18 1223) Managed Neurological Status:  
Neuro (WDL): Within Defined Limits (09/25/18 6633) At baseline Mental Status and Level of Consciousness: Arousable Pulmonary Status:  
O2 Device: Room air (09/25/18 1007) Adequate oxygenation and airway patent Complications related to anesthesia: None Post-anesthesia assessment completed. No concerns Signed By: Chely Joya MD   
 September 25, 2018

## 2018-09-25 NOTE — INTERVAL H&P NOTE
H&P Update: 
Jenna Zaldivar III was seen and examined. History and physical has been reviewed. The patient has been examined. There have been no significant clinical changes since the completion of the originally dated History and Physical. 
Patient identified by surgeon; surgical site was confirmed by patient and surgeon.  
 
Signed By: Ledy Emery MD   
 September 25, 2018 7:10 AM

## 2018-09-25 NOTE — PROGRESS NOTES
Patient is not available to be assessed at this time. Christian Lean Board Certified [employer] [facility]  
[phone]

## 2018-09-25 NOTE — IP AVS SNAPSHOT
[address] Patient: Sherman Baltazar 
MRN: [id number] About your hospitalization You were admitted on:  September 25, 2018 You last received care in the:  [facility] [address] You were discharged on:  September 26, 2018 Why you were hospitalized Your primary diagnosis was:  Not on File Your diagnoses also included:  Localized Osteoarthritis Of Left Knee, S/P Left Knee Arthroscopy Follow-up Information Follow up With Details Comments Contact Info Shelli Philip MD On 10/18/2018 Appointment at 10:30am [address] [facility] [address] 
[phone] Ledy Emery MD On 10/5/2018 Appointment at 10:00am with COLE Rausch [address] 
[address] [facility] [place] [address] 
[phone] Your Scheduled Appointments Friday October 05, 2018 10:00 AM EDT  
POST OP with Keysha Nogueira PA-C  
[facility] ([facility]) [address] [facility] 1 [facility] [id number] [phone] Discharge Orders None A check salvador indicates which time of day the medication should be taken. My Medications START taking these medications Instructions Each Dose to Equal  
 Morning Noon Evening Bedtime  
 docusate sodium 100 mg capsule Commonly known as:  [place] Lecher Your last dose was: Today 0800 Take 1 Cap by mouth two (2) times a day for 90 days. Indications: constipation 100 mg  
    
   
   
   
  
 enoxaparin 30 mg/0.3 mL injection Commonly known as:  LOVENOX Your last dose was: Today 0900  
   
 0.3 mL by SubCUTAneous route every twenty-four (24) hours for 14 doses. Indications: DEEP VEIN THROMBOSIS PREVENTION, PULMONARY THROMBOEMBOLISM PREVENTION  30 mg  
    
   
   
   
  
 oxyCODONE IR 5 mg immediate release tablet Commonly known as:  Sohan Duyen Take 1-2 Tabs by mouth every four (4) hours as needed. Max Daily Amount: 60 mg.  
 5-10 mg  
    
   
   
   
  
 polyethylene glycol 17 gram packet Commonly known as:  Aidee Renee Take 1 Packet by mouth daily. Indications: constipation 17 g CONTINUE taking these medications Instructions Each Dose to Equal  
 Morning Noon Evening Bedtime  
 alprostadil 1,000 mcg Supp Commonly known as:  MUSE  
   
 1 application  Every day prn  
     
   
   
   
  
 cholecalciferol (vitamin D3) 2,000 unit Tab Take  by mouth. omeprazole 20 mg capsule Commonly known as:  PRILOSEC  
   
 daily as needed. propranolol 20 mg tablet Commonly known as:  INDERAL Your last dose was: Today 0800 Take 60 mg by mouth daily. 60 mg  
    
   
   
   
  
  
STOP taking these medications   
 ibuprofen 200 mg Cap  
   
  
 sodium hyaluronate (viscosup) 16.8 mg/2 mL Syrg injection Commonly known as:  GELSYN-3 Where to Get Your Medications Information on where to get these meds will be given to you by the nurse or doctor. ! Ask your nurse or doctor about these medications  
  docusate sodium 100 mg capsule  
 enoxaparin 30 mg/0.3 mL injection  
 oxyCODONE IR 5 mg immediate release tablet  
 polyethylene glycol 17 gram packet Opioid Education Prescription Opioids: What You Need to Know: 
 
 
 
F-face looks uneven A-arms unable to move or move unevenly S-speech slurred or non-existent T-time-call 911 as soon as signs and symptoms begin-DO NOT go Back to bed or wait to see if you get better-TIME IS BRAIN. Warning Signs of HEART ATTACK Call 911 if you have these symptoms: 
? Chest discomfort. Most heart attacks involve discomfort in the center of the chest that lasts more than a few minutes, or that goes away and comes back. It can feel like uncomfortable pressure, squeezing, fullness, or pain. ? Discomfort in other areas of the upper body. Symptoms can include pain or discomfort in one or both arms, the back, neck, jaw, or stomach. ? Shortness of breath with or without chest discomfort. ? Other signs may include breaking out in a cold sweat, nausea, or lightheadedness. Don't wait more than five minutes to call [address]! Fast action can save your life. Calling 911 is almost always the fastest way to get lifesaving treatment. Emergency Medical Services staff can begin treatment when they arrive  up to an hour sooner than if someone gets to the hospital by car. The discharge information has been reviewed with the patient. The patient verbalized understanding. Discharge medications reviewed with the patient and appropriate educational materials and side effects teaching were provided. ___________________________________________________________________________________________________________________________________ Mebelramahart Activation Thank you for requesting access to [employer]. Please follow the instructions below to securely access and download your online medical record.  [employer] allows you to send messages to your doctor, view your test results, renew your prescriptions, schedule appointments, and more. How Do I Sign Up? 1. In your internet browser, go to [email or web address] 
2. Click on the First Time User? Click Here link in the Sign In box. You will be redirect to the New Member Sign Up page. 3. Enter your Professional [employer] Access Code exactly as it appears below. You will not need to use this code after youve completed the sign-up process. If you do not sign up before the expiration date, you must request a new code. Adaptlyt Access Code: Activation code not generated Current Professional [employer] Status: Active (This is the date your Adaptlyt access code will ) 4. Enter the last four digits of your Social Security Number (xxxx) and Date of Birth (mm/dd/yyyy) as indicated and click Submit. You will be taken to the next sign-up page. 5. Create a Professional [employer] ID. This will be your Professional [employer] login ID and cannot be changed, so think of one that is secure and easy to remember. 6. Create a [employer] password. You can change your password at any time. 7. Enter your Password Reset Question and Answer. This can be used at a later time if you forget your password. 8. Enter your e-mail address. You will receive e-mail notification when new information is available in [address] 9. Click Sign Up. You can now view and download portions of your medical record. 10. Click the Download Summary menu link to download a portable copy of your medical information. Additional Information If you have questions, please visit the Frequently Asked Questions section of the Professional [employer] Council website at [email or web address] Remember, Professional [employer] is NOT to be used for urgent needs. For medical emergencies, dial 911. Armband removed and shredded, IV removed, pressure dressing applied. Knee Arthroscopy: What to Expect at [facility] Your Recovery Arthroscopy is a way to find problems and do surgery inside a joint without making a large cut (incision). Your doctor put a lighted tube with a tiny camera-called an arthroscope, or scope-and surgical tools through small incisions in your knee. You will feel tired for several days. Your knee will be swollen, and you may notice that your skin is a different color near the cuts (incisions). The swelling is normal and will start to go away in a few days. Keeping your leg higher than your heart will help with swelling and pain. You will probably need about 6 weeks to recover. If your doctor repaired damaged tissue, recovery will take longer. You may have to limit your activity until your knee strength and movement return to normal. You may also be in a physical rehabilitation (rehab) program. 
You may be able to return to a desk job or your normal routine in a few days. But if you do physical labor, it may be as long as 2 months before you can return to work. This care sheet gives you a general idea about how long it will take for you to recover. But each person recovers at a different pace. Follow the steps below to get better as quickly as possible. How can you care for yourself at home? Activity 
  · Rest when you feel tired. Getting enough sleep will help you recover. Use pillows to raise your ankle and leg above the level of your heart.  
  · Try to walk each day, after your doctor has said you can. Start by walking a little more than you did the day before. Bit by bit, increase the amount you walk. Walking boosts blood flow and helps prevent pneumonia and constipation.  
  · You may have a brace or crutches or both.  
  · Your doctor will tell you how often and how much you can move your leg and knee.  
  · If you have a desk job, you may be able to return to work a few days after the surgery.  If you lift things or stand or walk a lot at work, it may be as long as 2 months before you can return.  
  · You can take a shower 48 to 72 hours after surgery and clean the incisions with regular soap and water. Do not take a bath or soak your knee until your doctor says it is okay.  
  · Ask your doctor when you can drive again.  
  · If you had a repair of torn tissue, follow your doctor's instructions for lifting things or moving your knee. Diet 
  · You can eat your normal diet. If your stomach is upset, try bland, low-fat foods like plain rice, broiled chicken, toast, and yogurt.  
  · Drink plenty of fluids, unless your doctor tells you not to.  
  · You may notice that your bowel movements are not regular right after your surgery. This is common. Try to avoid constipation and straining with bowel movements. You may want to take a fiber supplement every day. If you have not had a bowel movement after a couple of days, ask your doctor about taking a mild laxative. Medicines 
  · Your doctor will tell you if and when you can restart your medicines. He or she will also give you instructions about taking any new medicines.  
  · If you take blood thinners, such as warfarin (Coumadin), clopidogrel (Plavix), or aspirin, be sure to talk to your doctor. He or she will tell you if and when to start taking those medicines again. Make sure that you understand exactly what your doctor wants you to do.  
  · Be safe with medicines. Take pain medicines exactly as directed. ¨ If the doctor gave you a prescription medicine for pain, take it as prescribed. ¨ If you are not taking a prescription pain medicine, ask your doctor if you can take an over-the-counter medicine.  
  · If you think your pain medicine is making you sick to your stomach: 
¨ Take your medicine after meals (unless your doctor has told you not to). ¨ Ask your doctor for a different pain medicine.  
  · If your doctor prescribed antibiotics, take them as directed. Do not stop taking them just because you feel better. You need to take the full course of antibiotics. Incision care   · If you have a dressing over your cuts (incisions), keep it clean and dry. You may remove it 48 to 72 hours after the surgery.  
  · If your incisions are open to the air, keep the area clean and dry.  
  · If you have strips of tape on the incisions, leave the tape on for a week or until it falls off. Exercise 
  · Move your toes and ankle as much as your bandages will allow.  
  · Bend and straighten your knee slowly several times during the day.  
  · Depending on why you had the surgery, you may have to do ankle and leg exercises. Your doctor or physical therapist will give you exercises as part of a rehabilitation program.  
  · Stop any activity that causes sharp pain. Talk to your doctor or physical therapist about what sports or other exercise you can do. Ice and elevation 
  · To reduce swelling and pain, put ice or a cold pack on your knee for 10 to 20 minutes at a time. Do this every 1 to 2 hours. Put a thin cloth between the ice and your skin. Follow-up care is a key part of your treatment and safety. Be sure to make and go to all appointments, and call your doctor if you are having problems. It's also a good idea to know your test results and keep a list of the medicines you take. When should you call for help? Call 911 anytime you think you may need emergency care. For example, call if: 
  · You passed out (lost consciousness).  
  · You have severe trouble breathing.  
  · You have sudden chest pain and shortness of breath, or you cough up blood.  
 Call your doctor now or seek immediate medical care if: 
  · Your foot or toes are numb or tingling.  
  · Your foot is cool or pale, or it changes color.  
  · You have signs of a blood clot, such as: 
¨ Pain in your calf, back of the knee, thigh, or groin. ¨ Redness and swelling in your leg or groin.  
  · You are sick to your stomach or cannot keep fluids down.  
  · You have pain that does not get better after you take pain medicine.   · You have loose stitches, or your incision comes open.  
  · Bright red blood has soaked through the bandage over your incision.  
  · You have signs of infection, such as: 
¨ Increased pain, swelling, warmth, or redness. ¨ Red streaks leading from the incisions. ¨ Pus draining from the incisions. ¨ A fever.  
 Watch closely for any changes in your health, and be sure to contact your doctor if: 
  · You do not have a bowel movement after taking a laxative. Where can you learn more? Go to http://cynthia-blanca.info/. Enter I322 in the search box to learn more about \"Knee Arthroscopy: What to Expect at Home. \" Current as of: November 29, 2017 Content Version: 11.7 © 5216-0224 CapLinked. Care instructions adapted under license by Acompli (which disclaims liability or warranty for this information). If you have questions about a medical condition or this instruction, always ask your healthcare professional. [place] [address] any warranty or liability for your use of this information. Acute Pain After Surgery: Care Instructions Your Care Instructions It's common to have some pain after surgery. Pain doesn't mean that something is wrong or that the surgery didn't go well. But when the pain is severe, it's important to work with your doctor to manage it. It's also important to be aware of a few facts about pain and pain medicine. · You are the only person who knows what your pain feels like. So be sure to tell your doctor when you are in pain or when the pain changes. Then he or she will know how to adjust your medicines. · Pain is often easier to control right after it starts. So it may be better to take regular doses of pain medicine and not wait until the pain gets bad. · Medicine can help control pain. But this doesn't mean you'll have no pain. Medicine works to keep the pain at a level you can live with.  With time, you will feel better. Follow-up care is a key part of your treatment and safety. Be sure to make and go to all appointments, and call your doctor if you are having problems. It's also a good idea to know your test results and keep a list of the medicines you take. How can you care for yourself at home? · Be safe with medicines. Read and follow all instructions on the label. ¨ If the doctor gave you a prescription medicine for pain, take it as prescribed. ¨ If you are not taking a prescription pain medicine, ask your doctor if you can take an over-the-counter medicine. · If you take an over-the-counter pain medicine, such as acetaminophen (Tylenol), ibuprofen (Advil, Motrin), or naproxen (Aleve), read and follow all instructions on the label. · Do not take two or more pain medicines at the same time unless the doctor told you to. · Do not drink alcohol while you are taking pain medicines. · Try to walk each day if your doctor recommends it. Start by walking a little more than you did the day before. Bit by bit, increase the amount you walk. Walking increases blood flow. It also helps prevent pneumonia and constipation. · To prevent constipation from opioid pain medicines: ¨ Talk to your doctor about a laxative. ¨ Include fruits, vegetables, beans, and whole grains in your diet each day. These foods are high in fiber. ¨ Drink plenty of fluids, enough so that your urine is light yellow or clear like water. Drink water, fruit juice, or other drinks that do not contain caffeine or alcohol. If you have kidney, heart, or liver disease and have to limit fluids, talk with your doctor before you increase the amount of fluids you drink. ¨ Take a fiber supplement, such as Citrucel or Metamucil, every day if needed. Read and follow all instructions on the label. If you take pain medicine for more than a few days, talk to your doctor before you take fiber. When should you call for help? Call your doctor now or seek immediate medical care if: 
  · Your pain gets worse.  
  · Your pain is not controlled by medicine.  
 Watch closely for changes in your health, and be sure to contact your doctor if you have any problems. Where can you learn more? Go to http://cynthia-blanca.info/. Enter ([phone] 202-606 in the search box to learn more about \"Acute Pain After Surgery: Care Instructions. \" Current as of: November 20, 2017 Content Version: 11.7 © 3045-0934 mydeco. Care instructions adapted under license by Anyone Home (which disclaims liability or warranty for this information). If you have questions about a medical condition or this instruction, always ask your healthcare professional. [address] any warranty or liability for your use of this information. Introducing [facility] & HEALTH SERVICES! Dear Modesta Cook: Thank you for requesting a Certain [employer] account. Our records indicate that you already have an active [employer] account. You can access your account anytime at [email or web address] Did you know that you can access your hospital and ER discharge instructions at any time in Certain Communications? You can also review all of your test results from your hospital stay or ER visit. Additional Information If you have questions, please visit the Frequently Asked Questions section of the [employer] website at [email or web address] Remember, [employer] is NOT to be used for urgent needs. For medical emergencies, dial 911. Now available from your iPhone and Android! Introducing Jarrett Boles As a [employer] patient, I wanted to make you aware of our electronic visit tool called Jarrett Boles. [employer] 24/7 allows you to connect within minutes with a medical provider 24 hours a day, seven days a week via a mobile device or tablet or logging into a secure website from your computer. You can access Edictive from anywhere in the [place]. A virtual visit might be right for you when you have a simple condition and feel like you just dont want to get out of bed, or cant get away from work for an appointment, when your regular Wes Shove provider is not available (evenings, weekends or holidays), or when youre out of town and need minor care. Electronic visits cost only $49 and if the Wes Emerging Threats 24/Meijob provider determines a prescription is needed to treat your condition, one can be electronically transmitted to a nearby pharmacy*. Please take a moment to enroll today if you have not already done so. The enrollment process is free and takes just a few minutes. To enroll, please download the YupiCall/Meijob tess to your tablet or phone, or visit [email or web address] to enroll on your computer. And, as an [address] [place] [address] patient with a Careem account, the results of your visits will be scanned into your electronic medical record and your primary care provider will be able to view the scanned results. We urge you to continue to see your regular Wes Shove provider for your ongoing medical care. And while your primary care provider may not be the one available when you seek a Jarrett Nachoshanfin virtual visit, the peace of mind you get from getting a real diagnosis real time can be priceless. For more information on Jarrett Project Playlistshanfin, view our Frequently Asked Questions (FAQs) at [email or web address]. Sincerely, 
 
Britton Stein MD 
Chief Medical Officer [employer] *:  certain medications cannot be prescribed via Jarrett Boles Providers Seen During Your Hospitalization Provider Specialty Primary office phone Any Montoya MD Orthopedic Surgery [phone] Your Primary Care Physician (PCP) Primary Care Physician Office Phone Office Fax Massiel Arm, [address] [facility] [phone] You are allergic to the following Allergen Reactions Penicillins Other (comments)  
 paralyzation Sulfa (Sulfonamide Antibiotics) Unknown (comments) Recent Documentation Height Weight BMI Smoking Status 1.829 m 93.9 kg 28.07 kg/m2 Former Smoker Emergency Contacts Name Discharge Info Relation Home Work Mobile Jostin Petty CAREGIVER [3] Daughter [21] [phone] [address] CAREGIVER [3] Life Partner [7] [phone] Patient Belongings The following personal items are in your possession at time of discharge: 
  Dental Appliances: None  Visual Aid: Glasses      Home Medications: None   Jewelry: None  Clothing: None    Other Valuables: Cell Phone  Personal Items Sent to Safe: n/a Please provide this summary of care documentation to your next provider. Signatures-by signing, you are acknowledging that this After Visit Summary has been reviewed with you and you have received a copy. Patient Signature:  ____________________________________________________________ Date:  ____________________________________________________________  
  
Cherelle Sandoval Provider Signature:  ____________________________________________________________ Date:  ____________________________________________________________

## 2018-09-25 NOTE — CONSULTS
Hospitalist Consult Note     Patient: Kristen Hinkle MRN: [id number]  CSN: [id number]    YOB: 1950  Age: 76 y.o. Sex: male    DOA: 9/25/2018 LOS:  LOS: 0 days        Requesting Physician:  Dr Angelic Thompson for Consultation:  Medical Management    Chief Complaint:  S/p Left Unicompartmental Knee arthroplasty - Hospitalist consulted for medical management of HTN       Assessment/Plan     Patient Active Problem List   Diagnosis Code    Left knee pain M25.562    Right shoulder pain M25.511    S/P rotator cuff repair Z98.890    Prostate cancer ([facility] [address] C61    Family hx of prostate cancer Z80.42    Erectile dysfunction following urethral surgery N52.33    Localized osteoarthritis of left knee M17.12    S/P left knee arthroscopy Z98.890         A/P:  1. Left Unicompartmental Knee Arthroplasty  - Management per ortho   2.  HTN - continue propranolol - monitor BP   DVT Px - Lovenox   FC     HPI:     Jori Salazar III is a 76 y.o. male with a hx of HTN - who underwent Left Unicompartmental Knee Arthroplasty by Ortho - Hospitalist consulted for medical management of HTN   Will follow     Past Medical History:   Diagnosis Date    Arthritis     Elevated PSA     Hypertension     Renal insufficiency     S/P rotator cuff repair 10/15/2015    Sleep apnea     patient states resolved after surgery       Past Surgical History:   Procedure Laterality Date    HX HEENT      surgery for sleep apnea    HX HERNIA REPAIR      x2    HX SHOULDER ARTHROSCOPY Right 1/13/14    Dr. Jonathon Bradley ARTHROSCOPY Left 2016    HX UROLOGICAL  10/21/2016    Prostate Biopsy with Dr. Lujean Cheadle         Family History   Problem Relation Age of Onset    Diabetes Other     Hypertension Other     Heart Disease Other     Arthritis-osteo Other        Social History     Social History    Marital status: SINGLE     Spouse name: N/A    Number of children: N/A    Years of education: N/A     Social History Main Topics    Smoking status: Former Smoker    Smokeless tobacco: Never Used      Comment: quit in 1976    Alcohol use 0.0 oz/week     0 Standard drinks or equivalent per week      Comment: occasionally    Drug use: No    Sexual activity: Not Asked     Other Topics Concern    None     Social History Narrative       Prior to Admission medications    Medication Sig Start Date End Date Taking? Authorizing Provider   propranolol (INDERAL) 20 mg tablet Take 60 mg by mouth daily. Yes Historical Provider   ibuprofen 200 mg cap Take  by mouth. Yes Historical Provider   cholecalciferol, vitamin D3, 2,000 unit tab Take  by mouth. Yes Historical Provider   omeprazole (PRILOSEC) 20 mg capsule daily as needed. 10/3/15  Yes Historical Provider   alprostadil (MUSE) 6,585 mcg supp 1 application  Every day prn 9/12/18   Elizabeth Rivera MD   sodium hyaluronate, viscosup, (GELSYN-3) 16.8 mg/2 mL syrg 2 mL by Intra artICUlar route every seven (7) days. 9/25/17   Connie Morton PA-C       Allergies   Allergen Reactions    Penicillins Other (comments)     paralyzation    Sulfa (Sulfonamide Antibiotics) Unknown (comments)       Review of Systems  A comprehensive review of systems was negative except for that written in the History of Present Illness. Physical Exam:      Visit Vitals    /89 (BP 1 Location: Left arm, BP Patient Position: At rest)    Pulse 64    Temp 98.5 °F (36.9 °C)    Resp 18    Ht 6' (1.829 m)    Wt 93.9 kg (207 lb)    SpO2 96%    BMI 28.07 kg/m2       Physical Exam:  Gen: In general, this is a well nourished male in no acute distress on  HEENT: Sclerae nonicteric. Oral mucous membranes moist.    Neck: Supple with midline trachea. CV: RRR without murmur or rub appreciated. Resp:Respirations are unlabored without use of accessory muscles. Lung fields bilaterally without wheezes or rhonchi.    Abd: Soft, nontender, nondistended. Extrem: Extremities are warm, without cyanosis or clubbing. No pitting pretibial edema R leg,  Palpable distal pulses X 4.   Skin: Warm, no visible rashes. Neuro: Patient is alert, oriented, and cooperative. No obvious focal defects. Moves all 4 extremities.     Labs Reviewed:    Recent Results (from the past 24 hour(s))   EKG, 12 LEAD, INITIAL    Collection Time: 09/25/18  6:10 AM   Result Value Ref Range    Ventricular Rate 71 BPM    Atrial Rate 71 BPM    P-R Interval 146 ms    QRS Duration 82 ms    Q-T Interval 406 ms    QTC Calculation (Bezet) 441 ms    Calculated P Axis 11 degrees    Calculated R Axis 8 degrees    Calculated T Axis 8 degrees    Diagnosis       Normal sinus rhythm  Normal ECG  When compared with ECG of 20-MAR-2017 14:02,  T wave inversion now evident in Inferior leads  Confirmed by Kylie Hammond MD, [facility] ([id number]) on 9/25/2018 5:18:27 PM     HGB & HCT    Collection Time: 09/25/18  1:47 PM   Result Value Ref Range    HGB 14.3 13.0 - 16.0 g/dL    HCT 40.8 36.0 - 48.0 %       Imaging Reviewed:      Geovanna Mackenzie MD  9/25/2018  7:58 PM

## 2018-09-25 NOTE — PROGRESS NOTES
Per Del (ANA) sent home health referral to [facility] in [facility] Left v/m with liaison referral has been placed in que. Informed Del referral has been sent.

## 2018-09-25 NOTE — H&P (VIEW-ONLY)
[address], [address] [facility] [address] [phone] HISTORY & PHYSICAL Patient: Lorna Tejeda                MRN: [id number]       SSN: [id number] YOB: 1950        AGE: 76 y.o. SEX: male Body mass index is 29.57 kg/(m^2). PCP: Magalis Berger MD 
09/18/18 CC: Left  Knee medial compartment end stage OA Problem List Items Addressed This Visit None Visit Diagnoses Primary osteoarthritis of left knee    -  Primary HPI:  The patient is a pleasant 76 y.o. whom has end stage OA of their Left knee and has failed conservative treatment including but not limited to NSAIDS, cortisone injections, viscosupplementation, PT, and pain medicine. Due to the current findings and affected activities of daily living, surgical intervention is indicated. The alternatives, risks, complications, as well as expected outcome were discussed. These include but are not limited to infection, blood loss, need for blood transfusion, neurovascular damage, DVT, PE,  post-op stiffness and pain, leg length discrepancy, dislocation, anesthetic complications, prothesis longevity, need for more surgery, MI, stroke, and even death. The patient understands and wishes to proceed with surgery. Past Medical History:  
Diagnosis Date  Arthritis  Elevated PSA  Hypertension  Renal insufficiency  S/P rotator cuff repair 10/15/2015  Sleep apnea   
 patient states resolved after surgery Current Outpatient Prescriptions:  
  propranolol (INDERAL) 20 mg tablet, Take 60 mg by mouth daily. , Disp: , Rfl:  
  cholecalciferol, vitamin D3, 2,000 unit tab, Take  by mouth., Disp: , Rfl:  
  omeprazole (PRILOSEC) 20 mg capsule, daily as needed. , Disp: , Rfl: 0 
  alprostadil (MUSE) 4,742 mcg supp, 1 application  Every day prn, Disp: 6 Suppository, Rfl: 12   sodium hyaluronate, viscosup, (GELSYN-3) 16.8 mg/2 mL syrg, 2 mL by Intra artICUlar route every seven (7) days. , Disp: 2 mL, Rfl: 0 
  ibuprofen 200 mg cap, Take  by mouth., Disp: , Rfl:  
 
Allergies Allergen Reactions  Penicillins Other (comments)  
  paralyzation  Sulfa (Sulfonamide Antibiotics) Unknown (comments) Social History Social History  Marital status: SINGLE Spouse name: N/A  
 Number of children: N/A  
 Years of education: N/A Occupational History  Not on file. Social History Main Topics  Smoking status: Former Smoker  Smokeless tobacco: Never Used Comment: quit in 1976  Alcohol use 0.0 oz/week  
  0 Standard drinks or equivalent per week Comment: occasionally  Drug use: No  
 Sexual activity: Not on file Other Topics Concern  Not on file Social History Narrative Past Surgical History:  
Procedure Laterality Date  HX HEENT    
 surgery for sleep apnea  HX HERNIA REPAIR    
 x2  
 HX SHOULDER ARTHROSCOPY Right 1/13/14 Dr. Peña Fisher  HX SHOULDER ARTHROSCOPY Left 2016  HX UROLOGICAL  10/21/2016 Prostate Biopsy with Dr. Gisele Paz  [place] ANESTH,SURGERY OF SHOULDER Family History:  Non-contributory. PE: 
Visit Vitals  BP (!) 148/95  Pulse 67  Temp 95.6 °F (35.3 °C) (Oral)  Ht 6' (1.829 m)  Wt 218 lb (98.9 kg)  SpO2 97%  BMI 29.57 kg/m2 A&O X3, NAD, well develop, well nourished Heart: S1-S2, rrr 
Lungs: CTA bilat Abd: soft, nt, nt, + bs in all quadrants Ext:  Pos distal pulses to DP, PT 
 
X-ray: left knee medial compartment shows end stage OA Labs: labs were reviewed and wnl. PCP cleared A:  Left  Knee medial compartment end stage OA 
 
P:  At this point we will move forward with surgery. Again, the alternatives, risks, complications, as well as expected outcome were discussed and the patient wishes to proceed with surgery.   Pt has been instructed to stop aspirin, nsaids, rheumatologic medications and blood thinners. They have also been instructed to continue on any heart and bp meds and to take them the morning of surgery with sips of water. The patient will require in patient admission due to above stated medical conditions as well the the surgical challenges given the anatomy and bone quality. Deja Mckay

## 2018-09-26 ENCOUNTER — HOME HEALTH ADMISSION (OUTPATIENT)
Dept: HOME HEALTH SERVICES | Facility: HOME HEALTH | Age: 68
End: 2018-09-26
Payer: MEDICARE

## 2018-09-26 VITALS
BODY MASS INDEX: 28.04 KG/M2 | HEIGHT: 72 IN | SYSTOLIC BLOOD PRESSURE: 149 MMHG | RESPIRATION RATE: 18 BRPM | OXYGEN SATURATION: 94 % | WEIGHT: 207 LBS | DIASTOLIC BLOOD PRESSURE: 84 MMHG | TEMPERATURE: 97.5 F | HEART RATE: 72 BPM

## 2018-09-26 PROCEDURE — 74011250637 HC RX REV CODE- 250/637: Performed by: HOSPITALIST

## 2018-09-26 PROCEDURE — 97165 OT EVAL LOW COMPLEX 30 MIN: CPT

## 2018-09-26 PROCEDURE — 74011250636 HC RX REV CODE- 250/636: Performed by: SPECIALIST

## 2018-09-26 PROCEDURE — 74011250637 HC RX REV CODE- 250/637: Performed by: SPECIALIST

## 2018-09-26 PROCEDURE — 77030038269 HC DRN EXT URIN PURWCK BARD -A

## 2018-09-26 PROCEDURE — 97116 GAIT TRAINING THERAPY: CPT

## 2018-09-26 RX ORDER — OXYCODONE HYDROCHLORIDE 5 MG/1
5-10 TABLET ORAL
Qty: 22 TAB | Refills: 0 | Status: SHIPPED | OUTPATIENT
Start: 2018-09-26 | End: 2019-03-20

## 2018-09-26 RX ORDER — POLYETHYLENE GLYCOL 3350 17 G/17G
17 POWDER, FOR SOLUTION ORAL DAILY
Qty: 30 PACKET | Refills: 0 | Status: SHIPPED | OUTPATIENT
Start: 2018-09-26 | End: 2020-04-29

## 2018-09-26 RX ORDER — ENOXAPARIN SODIUM 100 MG/ML
30 INJECTION SUBCUTANEOUS EVERY 24 HOURS
Qty: 4.2 ML | Refills: 0 | Status: SHIPPED | OUTPATIENT
Start: 2018-09-26 | End: 2018-10-10

## 2018-09-26 RX ORDER — DOCUSATE SODIUM 100 MG/1
100 CAPSULE, LIQUID FILLED ORAL 2 TIMES DAILY
Qty: 60 CAP | Refills: 2 | Status: SHIPPED | OUTPATIENT
Start: 2018-09-26 | End: 2018-12-25

## 2018-09-26 RX ADMIN — ENOXAPARIN SODIUM 30 MG: 30 INJECTION SUBCUTANEOUS at 10:13

## 2018-09-26 RX ADMIN — SODIUM CHLORIDE 1000 MG: 900 INJECTION, SOLUTION INTRAVENOUS at 06:38

## 2018-09-26 RX ADMIN — Medication 10 ML: at 06:38

## 2018-09-26 RX ADMIN — NALOXEGOL OXALATE 12.5 MG: 12.5 TABLET, FILM COATED ORAL at 06:37

## 2018-09-26 RX ADMIN — ACETAMINOPHEN 650 MG: 325 TABLET ORAL at 05:22

## 2018-09-26 RX ADMIN — PROPRANOLOL HYDROCHLORIDE 60 MG: 20 TABLET ORAL at 10:11

## 2018-09-26 RX ADMIN — OXYCODONE HYDROCHLORIDE 5 MG: 5 TABLET ORAL at 05:22

## 2018-09-26 RX ADMIN — ACETAMINOPHEN 650 MG: 325 TABLET ORAL at 13:04

## 2018-09-26 RX ADMIN — DOCUSATE SODIUM 100 MG: 100 CAPSULE, LIQUID FILLED ORAL at 10:11

## 2018-09-26 RX ADMIN — PANTOPRAZOLE SODIUM 40 MG: 40 TABLET, DELAYED RELEASE ORAL at 06:37

## 2018-09-26 NOTE — ROUTINE PROCESS
Discharge medications reviewed with patient and appropriate educational materials and side effects teaching were provided. Patient verbalized understanding. IV removed, pressure dressing applied. Armband removed and shredded. Vitals within normal limits, patient alert and oriented. Patient left unit via wheelchair.

## 2018-09-26 NOTE — PROGRESS NOTES
Problem: Mobility Impaired (Adult and Pediatric) Goal: *Acute Goals and Plan of Care (Insert Text) Physical Therapy Goals Initiated 9/25/2018 and to be accomplished within 7 day(s) 1. Patient will move from supine to sit and sit to supine  in bed with modified independence. 2.  Patient will transfer from bed to chair and chair to bed with modified independence using the least restrictive device. 3.  Patient will perform sit to stand with modified independence. 4.  Patient will ambulate with modified independence for 250 feet with the least restrictive device. 5.  Patient will ascend/descend 10 stairs with 1 handrail(s) with supervision/set-up. Outcome: Progressing Towards Goal 
physical Therapy TREATMENT Patient: Sherman Baltazar (76 y.o. male) Date: 9/26/2018 Diagnosis: Osteoarthritis of left knee, unspecified osteoarthritis type [M17.12] <principal problem not specified> Procedure(s) (LRB): LEFT UNICOMPARTMENTAL KNEE ARTHROPLASTY/BIOMET [place]/2 SA'S/FEMORAL NERVE BLOCK (Left) 1 Day Post-Op Precautions:    
Chart, physical therapy assessment, plan of care and goals were reviewed. OBJECTIVE/ ASSESSMENT: 
Patient found seated in recliner w/ ice machine and SCDs donned willing to work with PT. Pt provided education on WBing precautions and encouragment of equal WBing by increasing ROM knee knee flexion on left knee prior to standing. Pt performed sit <> stand from chair w/o RW supervision req cueing and application for use of RW prior to standing for safety. Pt amb in granger to stairs, trained on 11 steps CGA using 1 hand rail to mimic home envionment step to ascending and descending req cueing for sequencing w/ good carry over. Pt displayed no safety concerns w/ negotiation of stairs and returned to granger w/ RW.  W/ increased distance, pt able to improve gait quality from step to antalgic pattern to step through improving overall quality of gait, however cont display an antalgic pattern as pain was persistent. Pt returned to room to chair, performed there-ex, and left in room w/ all needs in reach. Main concern during tx for pt is safety awareness. During tx, req mult cues for transfers and turning inside the RW as he would attempt to reach and transfer outside of TRISTIAN resulting in greater fall risk. Pt displayed good carry over w/ cues at end of treat. At this time, pt is safe to return home w/ recommended further therapy services. Will cont to follow up as needed in hospital.  
 
Education:safety, importance of staying w/ in TRISTIAN during transfers, importance of maintaining/improving ROM in knee during rest to maximize results Progression toward goals: 
[x]      Improving appropriately and progressing toward goals 
[]      Improving slowly and progressing toward goals 
[]      Not making progress toward goals and plan of care will be adjusted PLAN: 
Patient continues to benefit from skilled intervention to address the above impairments. Continue treatment per established plan of care. Discharge Recommendations:  Home Health Further Equipment Recommendations for Discharge:  rolling walker SUBJECTIVE:  
Patient stated I live by myself w/ my kids (2 cats).  OBJECTIVE DATA SUMMARY:  
Functional Mobility Training: 
Transfers: 
Sit to Stand: Supervision Stand to Sit: Supervision Balance: 
Sitting: Intact Standing: Intact; With support Ambulation/Gait Training: 
Distance (ft): 200 Feet (ft) Assistive Device: Walker, rolling Ambulation - Level of Assistance: Stand-by assistance;Supervision Gait Abnormalities: Antalgic Left Side Weight Bearing: As tolerated Therapeutic Exercises:  
 
 
EXERCISE Sets Reps Active Active Assist  
Passive Self- assited ROM Comments Ankle Pumps 1 10  [x] [] [] [] Quad Sets 1 10 [x] [] [] []   
 
Pain: 2/10 constant surgical pain Activity Tolerance:  
req no rest during tx Please refer to the flowsheet for vital signs taken during this treatment. After treatment:  
[x] Patient left in no apparent distress sitting up in chair 
[] Patient left in no apparent distress in bed 
[x] Call bell left within reach [x] Nursing notified 
[] Caregiver present 
[] Bed alarm activated 
[x] SCDs applied 
[x] Ice applied Jess Chiu PTA Time Calculation: 26 mins Mobility [id number] Current  CI= 1-19%. The severity rating is based on the Level of Assistance required for Functional Mobility and ADLs. Mobility   Goal  CI= 1-19%. The severity rating is based on the Level of Assistance required for Functional Mobility and ADLs.

## 2018-09-26 NOTE — HOME CARE
Discharge orders noted - referral called to Pravin Deutsch in central intake for completion and visit scheduling - RAMON Lin LPN

## 2018-09-26 NOTE — PROGRESS NOTES
Rounded on patient post uni knee replacement. Activity: Reinforced importance of getting OOB for all meals, going to bathroom to help prevent blood clots. VTE prophylaxis: Instructed patient to use their SCD's when not up and walking. To use while in bed and in the chair. Educated re: ankle pumps to assist with circulation when in hospital and at home. Medications: Reviewed pain medications patient is taking and the importance of keeping pain under control to help with getting OOB and therapy. Reminded the patient to always eat a snack with their pain medication to help to prevent nausea. Encouraged patient to monitor for constipation and to take a stool softner/laxative while recovering and on pain medication. Discussed protein consumption to promote healing Constipation: Educated patient to take a stool softener and/or mild laxative to prevent constipation while on a narcotic. Patient educated that narcotics and decrease mobility can increase constipation so patient educated to continue to take stool softener and or laxative while on narcotics along with drinking 8 glasses of water a day (unless on fluid restriction). Incentive Spirometry: Reinforced use of incentive spirometer with return demonstration by patient. 1750 ml x 3 Wound Care: Dressing to surgical site covered with dry acewrap. Patient instructed not to take dressing off at home. Patient given CHG wash to use in hospital and at home. Stressed importance of using a clean towel and washcloth daily. Reminded to put on clean clothes and night clothes daily. Ice Protocol: Ice in place per protocol. Patient Safety: Call light and personal belongings in reach. in reach. Patient and spouse reminded to call for help toget OOB or when leaving bathroom for safety.  Phone and other items also within reach per patient's request.    
Diet: Educated patient on the importance of eating three well balanced meals a day with protein to promote bone/muscle healing. Reminded patient to drink lots of fluids to protect kidneys from all the medications being taken currently with recovery. Patient given educational material to remind them to continue doing everything at home to prevent complications and have a successful recovery. Patient and spouse verbalized understand of all information and education discussed. Patient and spouse given the opportunity for asking questions. Mobility Intervention:  
 
  [] Pt dangled at edge of bed 
  [] Pt assisted OOB to bedside commode 
  [] Pt assisted OOB to chair 
  [] Pt ambulated to bathroom 
  [] Patient was ambulated in room/hallway Assistive Device Utilized:  
  
 [] Rolling walker 
 [] Crutches 
 [] Straight Cane 
 [] Knee immobilizer [] IV pole After Mobilization:  
 
[x] Patient left in no apparent distress sitting up in chair 
[] Patient left in no apparent distress in bed 
[x] Call bell left within reach [x] SCDs on & machine turned on 
[x] Ice applied 
[] RN notified 
[] Caregiver present 
[] Bed alarm activated Reason patient not mobilized:  
 
 [] Patient refused 
 [] Nausea/vomiting 
 [] Low blood pressure 
 [] Drowsy/lethargic Pain Rating:  
 
 
Left patient with call light, cell phone and personal belongings in reach for safety.

## 2018-09-26 NOTE — ROUTINE PROCESS
Bedside and Verbal shift change report given to Selene Pineda RN (oncoming nurse) by Anabell Dsouza RN (offgoing nurse). Report included the following information SBAR, Kardex, MAR and Recent Results. SITUATION:  
? Code Status: Prior ? Reason for Admission: Osteoarthritis of left knee, unspecified osteoarthritis type [M17.12] 
 
? Hospital day: 1 ? Problem List:  
   
Hospital Problems  Date Reviewed: 9/18/2018 Codes Class Noted POA Localized osteoarthritis of left knee ICD-10-CM: M17.12 
ICD-9-CM: 715.36  9/25/2018 Unknown S/P left knee arthroscopy ICD-10-CM: Z98.890 ICD-9-CM: V45.89  9/25/2018 Unknown BACKGROUND:  
 Past Medical History:  
Past Medical History:  
Diagnosis Date  Arthritis  Elevated PSA  Hypertension  Renal insufficiency  S/P rotator cuff repair 10/15/2015  Sleep apnea   
 patient states resolved after surgery Patient taking anticoagulants yes ASSESSMENT:  
? Changes in Assessment Throughout Shift: post-op ? Patient has Central Line: no Reasons if yes:  
? Patient has Hammond Cath: no Reasons if yes:   
 
? Last Vitals: 
  
Vitals:  
 09/25/18 1548 09/25/18 2033 09/26/18 0011 09/26/18 9592 BP: 139/89 (!) 146/92 146/79 164/84 Pulse: 64 70 66 68 Resp: 18 18 18 18 Temp: 98.5 °F (36.9 °C) 97.4 °F (36.3 °C) 98.7 °F (37.1 °C) 98.7 °F (37.1 °C) SpO2: 96% 94% 95% 95% Weight:      
Height:      
 
 
? IV and DRAINS (will only show if present) Peripheral IV 09/25/18 Right; Anterior Forearm-Site Assessment: Clean, dry, & intact ? WOUND (if present) Wound Type:  knee Dressing present yes Wound Concerns/Notes:  none ? PAIN Pain Assessment Pain Intensity 1: 2 (09/26/18 0606) Pain Location 1: Knee Pain Intervention(s) 1: Medication (see MAR) Patient Stated Pain Goal: 2 
o Interventions for Pain:  See mar 
o Intervention effective: yes 
o Time of last intervention: see mar o Reassessment Completed: yes ? Last 3 Weights: 
Last 3 Recorded Weights in this Encounter 18 9949 18 5078 Weight: 93.9 kg (207 lb) 93.9 kg (207 lb) Weight change: ? INTAKE/OUPUT Current Shift: 1901 -  0700 In: 2151.3 [P.O.:960; I.V.:1191.3] Out: 1900 [VDKK] Last three shifts: 701 - 1900 In: 2040 [P.O.:1040; I.V.:1000] Out: 875 Shani Arn ? LAB RESULTS Recent Labs  
   18 
 1347 HGB  14.3 HCT  40.8 No results for input(s): NA, K, GLU, BUN, CREA, CA, MG, INR in the last 72 hours. No lab exists for component: PT, PTT, INREXT 
 
RECOMMENDATIONS AND DISCHARGE PLANNING 1. Pending tests/procedures/ Plan of Care or Other Needs: none 2. Discharge plan for patient and Needs/Barriers: home 3. Estimated Discharge Date: today Posted on Whiteboard in [address] [place] [address] Room: yes 4. The patient's care plan was reviewed with the oncoming nurse. \"HEALS\" SAFETY CHECK Fall Risk Total Score: 4 Safety Measures: Safety Measures: Bed/Chair-Wheels locked, Bed in low position, Call light within reach, Gripper socks, Side rails X 3 A safety check occurred in the patient's room between off going nurse and oncoming nurse listed above. The safety check included the below items Area Items H High Alert Medications ? Verify all high alert medication drips (heparin, PCA, etc.) E Equipment ? Suction is set up for ALL patients (with yanker) ? Red plugs utilized for all equipment (IV pumps, etc.) ? WOWs wiped down at end of shift. ? Room stocked with oxygen, suction, and other unit-specific supplies A Alarms ? Bed alarm is set for fall risk patients ? Ensure chair alarm is in place and activated if patient is up in a chair L Lines ? Check IV for any infiltration ? Hammond bag is empty if patient has a Hammond ? Tubing and IV bags are labeled Etheleen Christine Safety ? Room is clean, patient is clean, and equipment is clean. ? Hallways are clear from equipment besides carts. ? Fall bracelet on for fall risk patients ? Ensure room is clear and free of clutter ? Suction is set up for ALL patients (with seth) ? Hallways are clear from equipment besides carts. ? Isolation precautions followed, supplies available outside room, sign posted Irish Narvaez RN

## 2018-09-26 NOTE — DISCHARGE INSTRUCTIONS
DISCHARGE SUMMARY from Nurse    PATIENT INSTRUCTIONS:    After general anesthesia or intravenous sedation, for 24 hours or while taking prescription Narcotics:  · Limit your activities  · Do not drive and operate hazardous machinery  · Do not make important personal or business decisions  · Do  not drink alcoholic beverages  · If you have not urinated within 8 hours after discharge, please contact your surgeon on call. Report the following to your surgeon:  · Excessive pain, swelling, redness or odor of or around the surgical area  · Temperature over 100.5  · Nausea and vomiting lasting longer than 4 hours or if unable to take medications  · Any signs of decreased circulation or nerve impairment to extremity: change in color, persistent  numbness, tingling, coldness or increase pain  · Any questions    What to do at Home:  Recommended activity: Activity as tolerated. If you experience any of the following symptoms fever, chills, nausea, vomiting, increased pain, swelling, bleeding from incision site, please follow up with Dr. Ayala Kee. *  Please give a list of your current medications to your Primary Care Provider. *  Please update this list whenever your medications are discontinued, doses are      changed, or new medications (including over-the-counter products) are added. *  Please carry medication information at all times in case of emergency situations. These are general instructions for a healthy lifestyle:    No smoking/ No tobacco products/ Avoid exposure to second hand smoke  Surgeon General's Warning:  Quitting smoking now greatly reduces serious risk to your health.     Obesity, smoking, and sedentary lifestyle greatly increases your risk for illness    A healthy diet, regular physical exercise & weight monitoring are important for maintaining a healthy lifestyle    You may be retaining fluid if you have a history of heart failure or if you experience any of the following symptoms: Weight gain of 3 pounds or more overnight or 5 pounds in a week, increased swelling in our hands or feet or shortness of breath while lying flat in bed. Please call your doctor as soon as you notice any of these symptoms; do not wait until your next office visit. Recognize signs and symptoms of STROKE:    F-face looks uneven    A-arms unable to move or move unevenly    S-speech slurred or non-existent    T-time-call 911 as soon as signs and symptoms begin-DO NOT go       Back to bed or wait to see if you get better-TIME IS BRAIN. Warning Signs of HEART ATTACK     Call 911 if you have these symptoms:   Chest discomfort. Most heart attacks involve discomfort in the center of the chest that lasts more than a few minutes, or that goes away and comes back. It can feel like uncomfortable pressure, squeezing, fullness, or pain.  Discomfort in other areas of the upper body. Symptoms can include pain or discomfort in one or both arms, the back, neck, jaw, or stomach.  Shortness of breath with or without chest discomfort.  Other signs may include breaking out in a cold sweat, nausea, or lightheadedness. Don't wait more than five minutes to call 911 - MINUTES MATTER! Fast action can save your life. Calling 911 is almost always the fastest way to get lifesaving treatment. Emergency Medical Services staff can begin treatment when they arrive -- up to an hour sooner than if someone gets to the hospital by car. The discharge information has been reviewed with the patient. The patient verbalized understanding. Discharge medications reviewed with the patient and appropriate educational materials and side effects teaching were provided. ___________________________________________________________________________________________________________________________________    [employer] Activation    Thank you for requesting access to [email or web address].  Please follow the instructions below to securely access and download your online medical record. mySugr allows you to send messages to your doctor, view your test results, renew your prescriptions, schedule appointments, and more. How Do I Sign Up? 1. In your internet browser, go to [email or web address]  2. Click on the First Time User? Click Here link in the Sign In box. You will be redirect to the New Member Sign Up page. 3. Enter your mySugr Access Code exactly as it appears below. You will not need to use this code after youve completed the sign-up process. If you do not sign up before the expiration date, you must request a new code. mySugr Access Code: Activation code not generated  Current mySugr Status: Active (This is the date your mySugr access code will )    4. Enter the last four digits of your Social Security Number (xxxx) and Date of Birth (mm/dd/yyyy) as indicated and click Submit. You will be taken to the next sign-up page. 5. Create a mySugr ID. This will be your mySugr login ID and cannot be changed, so think of one that is secure and easy to remember. 6. Create a mySugr password. You can change your password at any time. 7. Enter your Password Reset Question and Answer. This can be used at a later time if you forget your password. 8. Enter your e-mail address. You will receive e-mail notification when new information is available in [address] 9. Click Sign Up. You can now view and download portions of your medical record. 10. Click the Download Summary menu link to download a portable copy of your medical information. Additional Information    If you have questions, please visit the Frequently Asked Questions section of the mySugr website at [email or web address] Remember, mySugr is NOT to be used for urgent needs. For medical emergencies, dial 911. Armband removed and shredded, IV removed, pressure dressing applied.         Knee Arthroscopy: What to Expect at Home  Your Recovery    Arthroscopy is a way to find problems and do surgery inside a joint without making a large cut (incision). Your doctor put a lighted tube with a tiny camera-called an arthroscope, or scope-and surgical tools through small incisions in your knee. You will feel tired for several days. Your knee will be swollen, and you may notice that your skin is a different color near the cuts (incisions). The swelling is normal and will start to go away in a few days. Keeping your leg higher than your heart will help with swelling and pain. You will probably need about 6 weeks to recover. If your doctor repaired damaged tissue, recovery will take longer. You may have to limit your activity until your knee strength and movement return to normal. You may also be in a physical rehabilitation (rehab) program.  You may be able to return to a desk job or your normal routine in a few days. But if you do physical labor, it may be as long as 2 months before you can return to work. This care sheet gives you a general idea about how long it will take for you to recover. But each person recovers at a different pace. Follow the steps below to get better as quickly as possible. How can you care for yourself at home? Activity    · Rest when you feel tired. Getting enough sleep will help you recover. Use pillows to raise your ankle and leg above the level of your heart.     · Try to walk each day, after your doctor has said you can. Start by walking a little more than you did the day before. Bit by bit, increase the amount you walk. Walking boosts blood flow and helps prevent pneumonia and constipation.     · You may have a brace or crutches or both.     · Your doctor will tell you how often and how much you can move your leg and knee.     · If you have a desk job, you may be able to return to work a few days after the surgery.  If you lift things or stand or walk a lot at work, it may be as long as 2 months before you can return.     · You can take a shower 48 to 72 hours after surgery and clean the incisions with regular soap and water. Do not take a bath or soak your knee until your doctor says it is okay.     · Ask your doctor when you can drive again.     · If you had a repair of torn tissue, follow your doctor's instructions for lifting things or moving your knee. Diet    · You can eat your normal diet. If your stomach is upset, try bland, low-fat foods like plain rice, broiled chicken, toast, and yogurt.     · Drink plenty of fluids, unless your doctor tells you not to.     · You may notice that your bowel movements are not regular right after your surgery. This is common. Try to avoid constipation and straining with bowel movements. You may want to take a fiber supplement every day. If you have not had a bowel movement after a couple of days, ask your doctor about taking a mild laxative. Medicines    · Your doctor will tell you if and when you can restart your medicines. He or she will also give you instructions about taking any new medicines.     · If you take blood thinners, such as warfarin (Coumadin), clopidogrel (Plavix), or aspirin, be sure to talk to your doctor. He or she will tell you if and when to start taking those medicines again. Make sure that you understand exactly what your doctor wants you to do.     · Be safe with medicines. Take pain medicines exactly as directed. ¨ If the doctor gave you a prescription medicine for pain, take it as prescribed. ¨ If you are not taking a prescription pain medicine, ask your doctor if you can take an over-the-counter medicine.     · If you think your pain medicine is making you sick to your stomach:  ¨ Take your medicine after meals (unless your doctor has told you not to). ¨ Ask your doctor for a different pain medicine.     · If your doctor prescribed antibiotics, take them as directed. Do not stop taking them just because you feel better. You need to take the full course of antibiotics.    Incision care    · If you have a dressing over your cuts (incisions), keep it clean and dry. You may remove it 48 to 72 hours after the surgery.     · If your incisions are open to the air, keep the area clean and dry.     · If you have strips of tape on the incisions, leave the tape on for a week or until it falls off. Exercise    · Move your toes and ankle as much as your bandages will allow.     · Bend and straighten your knee slowly several times during the day.     · Depending on why you had the surgery, you may have to do ankle and leg exercises. Your doctor or physical therapist will give you exercises as part of a rehabilitation program.     · Stop any activity that causes sharp pain. Talk to your doctor or physical therapist about what sports or other exercise you can do. Ice and elevation    · To reduce swelling and pain, put ice or a cold pack on your knee for 10 to 20 minutes at a time. Do this every 1 to 2 hours. Put a thin cloth between the ice and your skin. Follow-up care is a key part of your treatment and safety. Be sure to make and go to all appointments, and call your doctor if you are having problems. It's also a good idea to know your test results and keep a list of the medicines you take. When should you call for help? Call 911 anytime you think you may need emergency care. For example, call if:    · You passed out (lost consciousness).     · You have severe trouble breathing.     · You have sudden chest pain and shortness of breath, or you cough up blood.    Call your doctor now or seek immediate medical care if:    · Your foot or toes are numb or tingling.     · Your foot is cool or pale, or it changes color.     · You have signs of a blood clot, such as:  ¨ Pain in your calf, back of the knee, thigh, or groin.   ¨ Redness and swelling in your leg or groin.     · You are sick to your stomach or cannot keep fluids down.     · You have pain that does not get better after you take pain medicine.     · You have loose stitches, or your incision comes open.     · Bright red blood has soaked through the bandage over your incision.     · You have signs of infection, such as:  ¨ Increased pain, swelling, warmth, or redness. ¨ Red streaks leading from the incisions. ¨ Pus draining from the incisions. ¨ A fever.    Watch closely for any changes in your health, and be sure to contact your doctor if:    · You do not have a bowel movement after taking a laxative. Where can you learn more? Go to http://cynthia-blanca.info/. Enter I616 in the search box to learn more about \"Knee Arthroscopy: What to Expect at Home. \"  Current as of: November 29, 2017  Content Version: 11.7  © 6387-7933 [employer]. Care instructions adapted under license by [employer] (which disclaims liability or warranty for this information). If you have questions about a medical condition or this instruction, always ask your healthcare professional. [place] [address] any warranty or liability for your use of this information. Acute Pain After Surgery: Care Instructions  Your Care Instructions    It's common to have some pain after surgery. Pain doesn't mean that something is wrong or that the surgery didn't go well. But when the pain is severe, it's important to work with your doctor to manage it. It's also important to be aware of a few facts about pain and pain medicine. · You are the only person who knows what your pain feels like. So be sure to tell your doctor when you are in pain or when the pain changes. Then he or she will know how to adjust your medicines. · Pain is often easier to control right after it starts. So it may be better to take regular doses of pain medicine and not wait until the pain gets bad. · Medicine can help control pain. But this doesn't mean you'll have no pain. Medicine works to keep the pain at a level you can live with.  With time, you will feel better. Follow-up care is a key part of your treatment and safety. Be sure to make and go to all appointments, and call your doctor if you are having problems. It's also a good idea to know your test results and keep a list of the medicines you take. How can you care for yourself at home? · Be safe with medicines. Read and follow all instructions on the label. ¨ If the doctor gave you a prescription medicine for pain, take it as prescribed. ¨ If you are not taking a prescription pain medicine, ask your doctor if you can take an over-the-counter medicine. · If you take an over-the-counter pain medicine, such as acetaminophen (Tylenol), ibuprofen (Advil, Motrin), or naproxen (Aleve), read and follow all instructions on the label. · Do not take two or more pain medicines at the same time unless the doctor told you to. · Do not drink alcohol while you are taking pain medicines. · Try to walk each day if your doctor recommends it. Start by walking a little more than you did the day before. Bit by bit, increase the amount you walk. Walking increases blood flow. It also helps prevent pneumonia and constipation. · To prevent constipation from opioid pain medicines:  ¨ Talk to your doctor about a laxative. ¨ Include fruits, vegetables, beans, and whole grains in your diet each day. These foods are high in fiber. ¨ Drink plenty of fluids, enough so that your urine is light yellow or clear like water. Drink water, fruit juice, or other drinks that do not contain caffeine or alcohol. If you have kidney, heart, or liver disease and have to limit fluids, talk with your doctor before you increase the amount of fluids you drink. ¨ Take a fiber supplement, such as Citrucel or Metamucil, every day if needed. Read and follow all instructions on the label. If you take pain medicine for more than a few days, talk to your doctor before you take fiber. When should you call for help?   Call your doctor now or seek immediate medical care if:    · Your pain gets worse.     · Your pain is not controlled by medicine.    Watch closely for changes in your health, and be sure to contact your doctor if you have any problems. Where can you learn more? Go to http://cynthia-blanca.info/. Enter (00) 222-119 in the search box to learn more about \"Acute Pain After Surgery: Care Instructions. \"  Current as of: November 20, 2017  Content Version: 11.7  © 9785-0862 [employer]. Care instructions adapted under license by [employer] (which disclaims liability or warranty for this information). If you have questions about a medical condition or this instruction, always ask your healthcare professional. [address] any warranty or liability for your use of this information.

## 2018-09-26 NOTE — DISCHARGE SUMMARY
Subjective: 76 y.o., male, 1 Day Post-Op, Procedure(s):  LEFT UNICOMPARTMENTAL KNEE ARTHROPLASTY/BIOMET [place]/2 SA'S/FEMORAL NERVE BLOCK     Admitting date: 25 Sept 18    Date of Discharge/Transfer: 26 Sept 18    Physical Exam (day of transfer / discharge):26 Sept 18            History:  Past Medical History:   Diagnosis Date    Arthritis     Elevated PSA     Hypertension     Renal insufficiency     S/P rotator cuff repair 10/15/2015    Sleep apnea     patient states resolved after surgery       Allergies: Allergies   Allergen Reactions    Penicillins Other (comments)     paralyzation    Sulfa (Sulfonamide Antibiotics) Unknown (comments)         History of Present Illness:     Mr. Zeb Kayser III is a pleasant male who suffered a well documented radiographic history of left knee . Zeb Kayser III had failed all conservative measures as directed by Dr. Pranav Chand, and in light of Zeb Kayser III progressive pain and difficultly performing his  necessary Activities of Daily Living, Dr. Elizabeth Ames recommended a left knee medial rosa maria arthroplasty joint replacement. Social History     Occupational History    Not on file. Social History Main Topics    Smoking status: Former Smoker    Smokeless tobacco: Never Used      Comment: quit in [address] [facility] Alcohol use 0.0 oz/week     0 Standard drinks or equivalent per week      Comment: occasionally    Drug use: No    Sexual activity: Not on file       Hospital Course:     Mr. Zeb Kayser III was admitted to [facility] on the morning of 26 Sept 18 under the care of Dr. Pranav Chand. he was taken to the operating theater under Dr. Nato Scruggs direction, first assisted by trained surgical assistant's where he underwent a left knee medial hemiarthroplasty joint replacement. he tolerated the procedure well, and there were no intra operative complications.  Post operatively he was transferred medically stable to post op holding. After all safety criteria had been met during his immediate post op recovery phase he was transferred medical stable to [address] [facility] to begin comprehensive physical and occupational therapies, restorative nursing and thrombo embolism (DVT/PE) prophylaxis. Mr. Pooja Arizmendi III post operative course progressed slow but directed. The focus throughout the post op period focused on range of motion and pain control. An acute post operative blood loss anemia was noted post operatively and there was no need to transfuse packed red blood cells. Medically he  remained stable through the remainder of the hospital stay. In light of the slow gains attained by Mr. Pooja Arizmendi III post operatively he is being recommended for a directed course of comprehensive PT / OT at a home. DISCHARGE PLAN:      The patient will be discharged to home. DIET:  Low Calorie High Protein Diet    NUTRITION: OTC Nutritional supplements, multivitamins      ACTIVITY: No lifting, Driving, or Strenuous exercise and No heavy lifting, pushing, pulling. Weight Bearing/Range of Motion Restrictions: Per Protocol    WOUND / INCISION CARE: Keep wound clean and dry, Reinforce dressing PRN and Ice to area for comfort Keep the current dressings on and in place. There is no need to change these current dressings. Dressings to please be changed by home nurse or nursing home staff each day. Keep all pets away from any wound present in order to prevent infection. PAIN CONTROL: Prescriptions written: On chart    PRECAUTIONS: Weight Bearing/Range of Motion per Restrictions:     VTE PROPHYLAXIS: with Lovenox 30mg SQ x 14 days. Miguel Angel Hose Stockings :Continue use at home. ANTIBIOTICS: None at this time.                         Physical and Occupational therapies:    will continue with attention to standard postop precautions / restrictions and below:    [ ] Britt Paez [ ] RLE  [ ] LUE  [XX] LLE    [XX] Full WBAT   [ ] Partial WBAT   [ ] Toetouch WB   [ ] Non Weight Bearing: Follow up:    [ ] 1 week  [XX] 10 days  [ ] Specific Date:       Per [facility] Protocol this  case was discussed with attending surgeon and reflects their orders as confirmed by their co signature.      Very Respectfully,        Memo Keys, APA, APC, MPAS  Surigical Physician Assistant-C  Department of [facility]   Contact Cell [phone]

## 2018-09-26 NOTE — PROGRESS NOTES
Rounded on patient post total knee replacement. Activity: Reinforced importance of getting OOB for all meals, going to bathroom to help prevent blood clots. VTE prophylaxis: Instructed patient to use their SCD's when not up and walking. To use while in bed and in the chair. Educated re: ankle pumps to assist with circulation when in hospital and at home. Medications: Reviewed pain medications patient is taking and the importance of keeping pain under control to help with getting OOB and therapy. Reminded the patient to always eat a snack with their pain medication to help to prevent nausea. Encouraged patient to monitor for constipation and to take a stool softner/laxative while recovering and on pain medication. Incentive Spirometry: Reinforced use of incentive spirometer with return demonstration by patient. 2250 ml x 3 Wound Care: Dressing to surgical site changed due to dried old blood. Staples intact with scant amount of oozing blood. . Patient instructed not to take dressing off at home. Patient given CHG wash to use in hospital and at home. Stressed importance of using a clean towel and washcloth daily. Reminded to put on clean clothes and night clothes daily. Ice Protocol: Ice man clear in place per protocol. Patient Safety: Call light and personal belongings in reach. Patient and friend reminded to call for help toget OOB or when leaving bathroom for safety. Phone and other items also within reach per patient's request.    
Diet: Educated patient on the importance of eating three well balanced meals a day with protein to promote bone/muscle healing. Reminded patient to drink lots of fluids to protect kidneys from all the medications being taken currently with recovery. Patient given educational material to remind them to continue doing everything at home to prevent complications and have a successful recovery.    
 
Patient and friend verbalized understand of all information and education discussed. Patient and friend given the opportunity for asking questions. OOB in chair. Educated on how to give self shot on Lovenox. Also educated on discharge medication and how to use the Ice Man clear. Patient able to repeat back understanding of education.

## 2018-09-26 NOTE — PROGRESS NOTES
Problem: Falls - Risk of 
Goal: *Absence of Falls Document Charlene Edgar Fall Risk and appropriate interventions in the flowsheet. Outcome: Progressing Towards Goal 
Fall Risk Interventions: 
Mobility Interventions: Bed/chair exit alarm Medication Interventions: Patient to call before getting OOB History of Falls Interventions: Bed/chair exit alarm

## 2018-09-26 NOTE — PROGRESS NOTES
Discharge planning: 
 
Patient will likely be discharged home today (9/26/2018). Patient already has home health services set up via [facility] health. Patient's family will provide transportation at the time of discharge. This writer will continue to closely monitor for discharge planning to ensure a safe discharge home from [facility].

## 2018-09-26 NOTE — ROUTINE PROCESS
Assumed care of patient. Bedside verbal report received from [address], including SBAR, MAR, and Kardex. Vitals within normal limits. No complaints of pain or discomfort. No apparent distress.

## 2018-09-26 NOTE — PROGRESS NOTES
Problem: Self Care Deficits Care Plan (Adult) Goal: *Acute Goals and Plan of Care (Insert Text) Occupational Therapy EVALUATION/discharge Patient: Heath Conway (76 y.o. male) Date: 9/26/2018 Primary Diagnosis: Osteoarthritis of left knee, unspecified osteoarthritis type [M17.12] Procedure(s) (LRB): LEFT UNICOMPARTMENTAL KNEE ARTHROPLASTY/BIOMET [place]/2 SA'S/FEMORAL NERVE BLOCK (Left) 1 Day Post-Op Precautions:   Fall, WBAT 
 
ASSESSMENT AND RECOMMENDATIONS: 
Based on the objective data described below, the patient presents with s/p L TKA and WBAT. Pt able to sit up with MI and Good balance in sitting. Pt demonstrates intact BUE strength and AROM. Pt able to doff and surinder socks with MI without AE. Pt does has sock aide and reacher at home but does not use them. Pt performed sit>stand from arm chair with SUP and able to take 3 steps forward and backward with SUP and RW. Pt has intact static standing balance. Pt educated on having assist at home when practicing transfer to seat in shower for safety. Pt has daughter who is able to assist as needed. Skilled occupational therapy is not indicated at this time. Discharge Recommendations: None Further Equipment Recommendations for Discharge: N/A Barriers to Learning/Limitations: None Compensate with: visual, verbal, tactile, kinesthetic cues/model COMPLEXITY Eval Complexity: History: LOW Complexity : Brief history review ; Examination: LOW Complexity : 1-3 performance deficits relating to physical, cognitive , or psychosocial skils that result in activity limitations and / or participation restrictions ; Decision Making:MEDIUM Complexity : Patient may present with comorbidities that affect occupational performnce. Miniml to moderate modification of tasks or assistance (eg, physical or verbal ) with assesment(s) is necessary to enable patient to complete evaluation  Assessment: low Complexity G-CODES:  
 
 Self Care  Current  CI= 1-19%  Goal  CI= 1-19%  D/C  CI= 1-19%. The severity rating is based on the Level of Assistance required for Functional Mobility and ADLs. SUBJECTIVE:  
Patient stated I have a daughter who is a nurse that can help.  OBJECTIVE DATA SUMMARY:  
 
Past Medical History:  
Diagnosis Date  Arthritis  Elevated PSA  Hypertension  Renal insufficiency  S/P rotator cuff repair 10/15/2015  Sleep apnea   
 patient states resolved after surgery Past Surgical History:  
Procedure Laterality Date  HX HEENT    
 surgery for sleep apnea  HX HERNIA REPAIR    
 x2  
 HX SHOULDER ARTHROSCOPY Right 1/13/14 Dr. Rachelle Handley  HX SHOULDER ARTHROSCOPY Left 2016  HX UROLOGICAL  10/21/2016 Prostate Biopsy with Dr. Jenny Coelho  ID ANESTH,SURGERY OF SHOULDER Prior Level of Function/Home Situation: I in mobility, self care and IADLs prior Home Situation Home Environment: Private residence # Steps to Enter: 1 One/Two Story Residence: Two story # of Interior Steps: 10 Height of Each Step (in): 0 inches Interior Rails: Right Lift Chair Available: No 
Living Alone: Yes Support Systems: Friends \ neighbors Patient Expects to be Discharged to[de-identified] Private residence Current DME Used/Available at Home: Adaptive dressing aides, Dimas Fred, [place], Shower chair, Dimas [place] Tub or Shower Type: Shower [x]     Right hand dominant   []     Left hand dominant Cognitive/Behavioral Status: 
Neurologic State: Alert Orientation Level: Oriented X4 Cognition: Appropriate decision making; Appropriate for age attention/concentration Safety/Judgement: Fall prevention;Home safety Skin: intact BUEs Edema: none noted BUEs Coordination: 
Coordination: Within functional limits (BUES) Fine Motor Skills-Upper: Right Intact; Left Intact Gross Motor Skills-Upper: Left Intact; Right Intact Balance: 
Sitting: Intact Standing: Intact; With support Strength: 
 Strength: Within functional limits (BUES) Tone & Sensation: 
Tone: Normal (BUES) Range of Motion: 
AROM: Within functional limits (BUES) Functional Mobility and Transfers for ADLs: 
Transfers: 
Sit to Stand: Supervision ADL Assessment: 
Feeding: Modified independent Oral Facial Hygiene/Grooming: Modified Independent Bathing: Supervision Upper Body Dressing: Modified independent Lower Body Dressing: Supervision Toileting: Supervision ADL Intervention: 
 Cognitive Retraining Safety/Judgement: Fall prevention;Home safety Pain: 
Pt reports 0/10 pain or discomfort prior to treatment.   
Pt reports 0/10 pain or discomfort post treatment. Activity Tolerance:  
Good Please refer to the flowsheet for vital signs taken during this treatment. After treatment:  
[x]  Patient left in no apparent distress sitting up in chair 
[]  Patient left in no apparent distress in bed 
[x]  Call bell left within reach 
[]  Nursing notified 
[]  Caregiver present 
[]  Bed alarm activated COMMUNICATION/EDUCATION:  
Communication/Collaboration: 
[x]      Home safety education was provided and the patient/caregiver indicated understanding. [x]      Patient/family have participated as able and agree with findings and recommendations. []      Patient is unable to participate in plan of care at this time. Ulises Champagne OT Time Calculation: 14 mins

## 2018-09-26 NOTE — ROUTINE PROCESS
1715- Assessment completed. Patient sitting in recliner chair. No complaints of pain at present time. Urinal at bedside for patient to use. SCD and foot pump on. IS at bedside. Patient up to 1500 with IS. Encouraged patient to perform every hour. Patient verbalized understanding. Polar ice to left knee. Knee immobilizer on left leg. Call bell in reach. Family at bedside. 1815- Polar ice refilled. 2215- medications given. Patient sitting in recliner. No complaints of excess pain. Patient instructed about pain medications. Patient reports that he does not need anything at the present time.

## 2018-09-26 NOTE — PROGRESS NOTES
Problem: Falls - Risk of 
Goal: *Absence of Falls Document Magui Latin Fall Risk and appropriate interventions in the flowsheet. Outcome: Progressing Towards Goal 
Fall Risk Interventions: 
Mobility Interventions: Bed/chair exit alarm Medication Interventions: Patient to call before getting OOB History of Falls Interventions: Bed/chair exit alarm

## 2018-09-26 NOTE — ROUTINE PROCESS
Mobility Intervention:  
 
  [] Pt dangled at edge of bed 
  [] Pt assisted OOB to bedside commode 
  [] Pt assisted OOB to chair 
  [] Pt ambulated to bathroom [x] Patient was ambulated in room/hallway Assistive Device Utilized:  
  
 [x] Rolling walker 
 [] Crutches 
 [] Straight Cane 
 [] Knee immobilizer [] IV pole After Mobilization:  
 
[x] Patient left in no apparent distress sitting up in chair 
[] Patient left in no apparent distress in bed 
[x] Call bell left within reach [x] SCDs on & machine turned on 
[x] Ice applied 
[] RN notified 
[] Caregiver present 
[] Bed alarm activated Reason patient not mobilized:  
 
 [] Patient refused 
 [] Nausea/vomiting 
 [] Low blood pressure 
 [] Drowsy/lethargic Pain Rating:  
 
[] 0 [] 1 Assistive Device:       
[] 2 [x] 3 [] 4 
[] 5 
[] 6 Assistive Device:       
[] 7 
[] 8 
[] 9 [] 10 Comments:

## 2018-09-26 NOTE — PROGRESS NOTES
Important Message from [facility]" reviewed and explained with the patient and/or representative at bedside and signature was obtained. A signed copy provided to patient/representative. Original signed document placed in patient's chart.

## 2018-09-27 ENCOUNTER — HOME CARE VISIT (OUTPATIENT)
Dept: SCHEDULING | Facility: HOME HEALTH | Age: 68
End: 2018-09-27
Payer: MEDICARE

## 2018-09-27 ENCOUNTER — HOME CARE VISIT (OUTPATIENT)
Dept: HOME HEALTH SERVICES | Facility: HOME HEALTH | Age: 68
End: 2018-09-27
Payer: MEDICARE

## 2018-09-27 VITALS
RESPIRATION RATE: 20 BRPM | OXYGEN SATURATION: 97 % | DIASTOLIC BLOOD PRESSURE: 89 MMHG | HEART RATE: 60 BPM | SYSTOLIC BLOOD PRESSURE: 149 MMHG | TEMPERATURE: 97.8 F

## 2018-09-27 PROCEDURE — 400013 HH SOC

## 2018-09-27 PROCEDURE — G0151 HHCP-SERV OF PT,EA 15 MIN: HCPCS

## 2018-09-27 PROCEDURE — 3331090002 HH PPS REVENUE DEBIT

## 2018-09-27 PROCEDURE — G0299 HHS/HOSPICE OF RN EA 15 MIN: HCPCS

## 2018-09-27 PROCEDURE — 3331090001 HH PPS REVENUE CREDIT

## 2018-09-27 NOTE — PROGRESS NOTES
[facility] - [facility], [address] [place] [address]  [phone] fax    Discharge Summary    Patient name: Faustino Leal III Start of Care: 2018   Referral source: Anmol Bonilla MD : 1950                         Medical Diagnosis: Pain in left knee [M25.562]  Unilateral primary osteoarthritis, left knee [M17.12] Onset Date:18                         Treatment Diagnosis: Left Knee Pain   Prior Hospitalization: see medical history Provider#: [id number]   Medications: Verified on Patient summary List    Comorbidities: Arthritis; hx of CA; HTN; kidney/bladder problems; Osteoporosis   Prior Level of Function: Retired; lives in [facility] alone with cats; functionally independent    Visits from [place] of Care: 7    Missed Visits: 0    Reporting Period : 18 to 18    Short Term Goals: To be accomplished in 1 weeks:  Goal: Pt to initiate aquatics program without increased pain to aid in achievement of all LTGs. Status at last note/certification: N/A  Current status: met  Long Term Goals: To be accomplished in 5 weeks:  Goal: Pt to increase left knee flex AROM by 10 deg for increase ease with transfers and squatting. Status at last note/certification: 5-164 deg left knee AROM  Current status: met - left knee AROM 0-115 deg  Goal: Pt to increase left hip/knee strength to 4+/5 grossly to increase standing/amb tolerance. Status at last note/certification: 4/5 left hip/knee strength grossly  Current status: met - 4+/5 left LE strength  Goal: Pt to report < 5/10 pain at worst to increase ease with ADLs. Status at last note/certification: 52/23 pain at worst  Current status: progressing - 7/10 pain at worst  Goal: Pt to report FOTO score of 58 pts to show improved function.   Status at last note/certification: FOTO 41 pts   Current status: not met - FOTO 38 pts    Assessment/ Summary of Care: Pt made good progress with therapy, exhibiting increased ROM, strength of left LE. Pt continues to have pain but was scheduled to have left knee surgery in September. Pt is appropriate for D/C at this time.   Thank you for this referral.    RECOMMENDATIONS:  [x]Discontinue therapy: [x]Patient has reached or is progressing toward set goals      []Patient is non-compliant or has abdicated      []Due to lack of appreciable progress towards set goals    Lis Berman, PT 9/27/2018 12:37 PM

## 2018-09-28 ENCOUNTER — HOME CARE VISIT (OUTPATIENT)
Dept: SCHEDULING | Facility: HOME HEALTH | Age: 68
End: 2018-09-28
Payer: MEDICARE

## 2018-09-28 ENCOUNTER — HOME CARE VISIT (OUTPATIENT)
Dept: HOME HEALTH SERVICES | Facility: HOME HEALTH | Age: 68
End: 2018-09-28
Payer: MEDICARE

## 2018-09-28 VITALS
OXYGEN SATURATION: 97 % | HEART RATE: 77 BPM | TEMPERATURE: 98.1 F | DIASTOLIC BLOOD PRESSURE: 78 MMHG | SYSTOLIC BLOOD PRESSURE: 132 MMHG

## 2018-09-28 PROCEDURE — 3331090001 HH PPS REVENUE CREDIT

## 2018-09-28 PROCEDURE — G0157 HHC PT ASSISTANT EA 15: HCPCS

## 2018-09-28 PROCEDURE — 3331090002 HH PPS REVENUE DEBIT

## 2018-09-29 ENCOUNTER — HOME CARE VISIT (OUTPATIENT)
Dept: SCHEDULING | Facility: HOME HEALTH | Age: 68
End: 2018-09-29
Payer: MEDICARE

## 2018-09-29 VITALS
TEMPERATURE: 98.8 F | SYSTOLIC BLOOD PRESSURE: 130 MMHG | HEART RATE: 81 BPM | DIASTOLIC BLOOD PRESSURE: 80 MMHG | OXYGEN SATURATION: 95 %

## 2018-09-29 PROCEDURE — 3331090002 HH PPS REVENUE DEBIT

## 2018-09-29 PROCEDURE — G0157 HHC PT ASSISTANT EA 15: HCPCS

## 2018-09-29 PROCEDURE — 3331090001 HH PPS REVENUE CREDIT

## 2018-09-30 ENCOUNTER — HOME CARE VISIT (OUTPATIENT)
Dept: SCHEDULING | Facility: HOME HEALTH | Age: 68
End: 2018-09-30
Payer: MEDICARE

## 2018-09-30 VITALS
TEMPERATURE: 98.2 F | SYSTOLIC BLOOD PRESSURE: 130 MMHG | DIASTOLIC BLOOD PRESSURE: 82 MMHG | HEART RATE: 62 BPM | OXYGEN SATURATION: 98 %

## 2018-09-30 PROCEDURE — 3331090001 HH PPS REVENUE CREDIT

## 2018-09-30 PROCEDURE — 3331090002 HH PPS REVENUE DEBIT

## 2018-09-30 PROCEDURE — G0157 HHC PT ASSISTANT EA 15: HCPCS

## 2018-10-01 ENCOUNTER — HOME CARE VISIT (OUTPATIENT)
Dept: SCHEDULING | Facility: HOME HEALTH | Age: 68
End: 2018-10-01
Payer: MEDICARE

## 2018-10-01 PROCEDURE — 3331090001 HH PPS REVENUE CREDIT

## 2018-10-01 PROCEDURE — 3331090002 HH PPS REVENUE DEBIT

## 2018-10-01 PROCEDURE — G0157 HHC PT ASSISTANT EA 15: HCPCS

## 2018-10-02 ENCOUNTER — HOME CARE VISIT (OUTPATIENT)
Dept: SCHEDULING | Facility: HOME HEALTH | Age: 68
End: 2018-10-02
Payer: MEDICARE

## 2018-10-02 VITALS
SYSTOLIC BLOOD PRESSURE: 136 MMHG | OXYGEN SATURATION: 98 % | TEMPERATURE: 98.2 F | DIASTOLIC BLOOD PRESSURE: 82 MMHG | HEART RATE: 66 BPM

## 2018-10-02 VITALS
DIASTOLIC BLOOD PRESSURE: 78 MMHG | SYSTOLIC BLOOD PRESSURE: 120 MMHG | TEMPERATURE: 98 F | HEART RATE: 64 BPM | OXYGEN SATURATION: 97 %

## 2018-10-02 PROCEDURE — A6255 ABSORPT DRG >16<=48 IN W/BDR: HCPCS

## 2018-10-02 PROCEDURE — G0299 HHS/HOSPICE OF RN EA 15 MIN: HCPCS

## 2018-10-02 PROCEDURE — G0157 HHC PT ASSISTANT EA 15: HCPCS

## 2018-10-02 PROCEDURE — 3331090001 HH PPS REVENUE CREDIT

## 2018-10-02 PROCEDURE — 3331090002 HH PPS REVENUE DEBIT

## 2018-10-03 ENCOUNTER — HOME CARE VISIT (OUTPATIENT)
Dept: SCHEDULING | Facility: HOME HEALTH | Age: 68
End: 2018-10-03
Payer: MEDICARE

## 2018-10-03 VITALS
DIASTOLIC BLOOD PRESSURE: 83 MMHG | TEMPERATURE: 98.3 F | OXYGEN SATURATION: 97 % | SYSTOLIC BLOOD PRESSURE: 139 MMHG | RESPIRATION RATE: 18 BRPM | HEART RATE: 82 BPM

## 2018-10-03 PROCEDURE — 3331090001 HH PPS REVENUE CREDIT

## 2018-10-03 PROCEDURE — 3331090002 HH PPS REVENUE DEBIT

## 2018-10-03 PROCEDURE — G0157 HHC PT ASSISTANT EA 15: HCPCS

## 2018-10-04 VITALS
DIASTOLIC BLOOD PRESSURE: 84 MMHG | OXYGEN SATURATION: 96 % | SYSTOLIC BLOOD PRESSURE: 122 MMHG | HEART RATE: 63 BPM | TEMPERATURE: 98 F

## 2018-10-04 PROCEDURE — 3331090002 HH PPS REVENUE DEBIT

## 2018-10-04 PROCEDURE — 3331090001 HH PPS REVENUE CREDIT

## 2018-10-05 ENCOUNTER — OFFICE VISIT (OUTPATIENT)
Dept: ORTHOPEDIC SURGERY | Facility: CLINIC | Age: 68
End: 2018-10-05

## 2018-10-05 ENCOUNTER — TELEPHONE (OUTPATIENT)
Dept: ORTHOPEDIC SURGERY | Facility: CLINIC | Age: 68
End: 2018-10-05

## 2018-10-05 ENCOUNTER — HOME CARE VISIT (OUTPATIENT)
Dept: SCHEDULING | Facility: HOME HEALTH | Age: 68
End: 2018-10-05
Payer: MEDICARE

## 2018-10-05 VITALS
DIASTOLIC BLOOD PRESSURE: 83 MMHG | WEIGHT: 204 LBS | SYSTOLIC BLOOD PRESSURE: 128 MMHG | HEIGHT: 72 IN | TEMPERATURE: 95.8 F | RESPIRATION RATE: 16 BRPM | OXYGEN SATURATION: 99 % | HEART RATE: 80 BPM | BODY MASS INDEX: 27.63 KG/M2

## 2018-10-05 DIAGNOSIS — Z47.1 AFTERCARE FOLLOWING KNEE JOINT REPLACEMENT SURGERY, UNSPECIFIED LATERALITY: Primary | ICD-10-CM

## 2018-10-05 DIAGNOSIS — Z48.02: ICD-10-CM

## 2018-10-05 DIAGNOSIS — Z96.659 AFTERCARE FOLLOWING KNEE JOINT REPLACEMENT SURGERY, UNSPECIFIED LATERALITY: Primary | ICD-10-CM

## 2018-10-05 DIAGNOSIS — Z96.652 S/P TOTAL KNEE REPLACEMENT, LEFT: Primary | ICD-10-CM

## 2018-10-05 PROCEDURE — 3331090002 HH PPS REVENUE DEBIT

## 2018-10-05 PROCEDURE — G0151 HHCP-SERV OF PT,EA 15 MIN: HCPCS

## 2018-10-05 PROCEDURE — 3331090001 HH PPS REVENUE CREDIT

## 2018-10-05 RX ORDER — PROPRANOLOL HYDROCHLORIDE 60 MG/1
CAPSULE, EXTENDED RELEASE ORAL
Refills: 0 | COMMUNITY
Start: 2018-09-12 | End: 2019-03-20 | Stop reason: HOSPADM

## 2018-10-05 NOTE — PROGRESS NOTES
HISTORY OF PRESENT ILLNESS:  Hali Cedeno, returns 10 days status post left knee hemiarthroplasty with [employer]. He is at home currently. The surgical site has been maintained appropriately with surgical staples still present. PHYSICAL EXAM:  Today, the wound is examined, and measures cranial/caudal 13-centimeters. The patient has no calf tenderness or evidence of DVT. His motion is excellent passively. He is able to achieve 105° of flexion and is lacking about 4° of extension. Over the outer aspect of the left knee, there is an area measuring roughly 2.5 centimeters consistent with interruption of the infrapatellar branch of the saphenous nerve. This is consistent with findings just following surgery. PLAN:   All surgical staples were removed with no complications. Steri-Strips were placed. Sterile dressing was also placed. He is continuing his Lovenox, which he will discontinue, and instead replace with 81 mg of Aspirin orally on a daily basis. We are going to continue outpatient physical therapy. We will see him back in about two to three weeks. We will obtain x-rays at his next office visit. He may get the surgical site wet but avoid any prolonged soaking.

## 2018-10-05 NOTE — TELEPHONE ENCOUNTER
Leticia Fritz from [address] called for . Sydell Signs said the patient is being discharged today and will need Out Patient Physical Therapy. That the patient is interested in going to the [facility] at the [place] location fax # [phone]. Leticia Fritz tel [phone].

## 2018-10-06 PROCEDURE — 3331090002 HH PPS REVENUE DEBIT

## 2018-10-06 PROCEDURE — 3331090001 HH PPS REVENUE CREDIT

## 2018-10-07 PROCEDURE — 3331090002 HH PPS REVENUE DEBIT

## 2018-10-07 PROCEDURE — 3331090001 HH PPS REVENUE CREDIT

## 2018-10-08 PROCEDURE — 3331090002 HH PPS REVENUE DEBIT

## 2018-10-08 PROCEDURE — 3331090001 HH PPS REVENUE CREDIT

## 2018-10-09 PROCEDURE — 3331090002 HH PPS REVENUE DEBIT

## 2018-10-09 PROCEDURE — 3331090001 HH PPS REVENUE CREDIT

## 2018-10-10 PROCEDURE — 3331090002 HH PPS REVENUE DEBIT

## 2018-10-10 PROCEDURE — 3331090001 HH PPS REVENUE CREDIT

## 2018-10-16 ENCOUNTER — HOSPITAL ENCOUNTER (OUTPATIENT)
Dept: PHYSICAL THERAPY | Age: 68
Discharge: HOME | End: 2018-10-16
Payer: MEDICARE

## 2018-10-16 PROCEDURE — 97140 MANUAL THERAPY 1/> REGIONS: CPT

## 2018-10-16 PROCEDURE — 97162 PT EVAL MOD COMPLEX 30 MIN: CPT

## 2018-10-16 PROCEDURE — G8978 MOBILITY CURRENT STATUS: HCPCS

## 2018-10-16 PROCEDURE — 97110 THERAPEUTIC EXERCISES: CPT

## 2018-10-16 PROCEDURE — G8979 MOBILITY GOAL STATUS: HCPCS

## 2018-10-16 NOTE — PROGRESS NOTES
[facility] - [facility]  Loyda Arechiga [address]  [phone] fax  Plan of Care/ Statement of Necessity for Physical Therapy Services    Patient name: Mina Garza III Start of Care: 10/16/2018   Referral source: Raquel Duncan MD : 1950    Medical Diagnosis: Pain in left knee [M25.562]  S/P total knee replacement, left [Z96.652]   Onset Date:18    Treatment Diagnosis: Left knee pain   Prior Hospitalization: see medical history Provider#: [id number]   Medications: Verified on Patient summary List    Comorbidities: arthritis, cancer, Left RCR   Prior Level of Function: Functionally independent, retired from [employer], lives alone with two cats with bedroom on second floor, enjoys riding motorcycle [address] and following information is based on the information from the initial evaluation. Assessment/ key information: Patient is a pleasant 76year old male who presents following Left TKA on 18 due to history of Left knee pain/wear and tear. Patient was discharged home the day after surgery, and underwent 10 sessions of HHPT. Currently he ambulates with SPC and reports step to pattern with stair negotiation. Pain has been minimal, and he is independent with daily tasks. At evaluation he demonstrates grossly full bilateral LE strength, and Left knee AROM 0-109 deg compared to Right 0-138 deg. Some swelling noted at Left knee, with top bandage intact over incision. Tender in the distal knee musculature. Unable to maintain SLS Left on even surface without UE support. Overall Patient is a good rehab candidate based on premorbid status, and will benefit from skilled physical therapy in order to normalize gait and restore Left knee function.      Evaluation Complexity History MEDIUM  Complexity : 1-2 comorbidities / personal factors will impact the outcome/ POC ; Examination LOW Complexity : 1-2 Standardized tests and measures addressing body structure, function, activity limitation and / or participation in recreation  ;Presentation LOW Complexity : Stable, uncomplicated  ;Clinical Decision Making MEDIUM Complexity : FOTO score of 26-74  Overall Complexity Rating: MEDIUM  Problem List: pain affecting function, decrease ROM, decrease strength, edema affecting function, impaired gait/ balance, decrease ADL/ functional abilitiies, decrease activity tolerance, decrease flexibility/ joint mobility and decrease transfer abilities   Treatment Plan may include any combination of the following: Therapeutic exercise, Therapeutic activities, Neuromuscular re-education, Physical agent/modality, Gait/balance training, Manual therapy, Aquatic therapy, Patient education, Self Care training, Functional mobility training, Home safety training and Stair training  Patient / Family readiness to learn indicated by: asking questions, trying to perform skills and interest  Persons(s) to be included in education: patient (P)  Barriers to Learning/Limitations: None  Patient Goal (s): movement of knee  Patient Self Reported Health Status: excellent  Rehabilitation Potential: good    Short Term Goals: To be accomplished in 1 weeks:  Goal: Patient will be independent and compliant with HEP in order to progress toward long term goals. Status at last note/certification: Issued and reviewed  Long Term Goals: To be accomplished in 12 treatments:  Goal: Patient will improve FOTO assessment score to 64 pts in order to indicate improved functional abilities. Status at last note/certification: 51  Goal: Patient will improve Left knee flexion AROM to at least 120 degrees in order to normalize gait. Status at last note/certification: 903 degrees  Goal: Patient will be able to confidently negotiate stairs with reciprocal step pattern in order to improve ease of mobility in the home.   Status at last note/certification: step to  Goal: Patient will be able to maintain Left SLS on even surface with eyes ope without UE support for at least 7 seconds in order to indicate improved LE stability. Status at last note/certification: less than 1 second    Frequency / Duration: Patient to be seen 3 times per week for 4 weeks. Patient/ Caregiver education and instruction: Diagnosis, prognosis, exercises   [x]  Plan of care has been reviewed with PTA    G-Codes (GP)  Mobility   Current  CK= 40-59%   Goal  CJ= 20-39%    The severity rating is based on clinical judgment and the FOTO score. Certification Period: 10/16/18 to 11/14/18  Jaden Metzger, PT 10/16/2018 7:47 AM  _____________________________________________________________________  I certify that the above Therapy Services are being furnished while the patient is under my care. I agree with the treatment plan and certify that this therapy is necessary.     Physician's Signature:____________Date:_________TIME:________    ** Signature, Date and Time must be completed for valid certification **    Please sign and return to [facility] - [address]  [phone] fax

## 2018-10-16 NOTE — PROGRESS NOTES
PT DAILY TREATMENT NOTE 10-18    Patient Name: Ralf Amezcua III  Date:10/16/2018  : 1950  [x]  Patient  Verified  [place]or: [place] MED[place]ARE / Plan: [place] MEDICARE PART A & B / Product Type: Medicare /    In time:808  Out time:840  Total Treatment Time (min): 32  Visit #: 1 of 12    Medicare/BCBS Only   Total Timed Codes (min):  23 1:1 Treatment Time:  32       Treatment Area: Pain in left knee [M25.562]  S/P total knee replacement, left [Z96.652]    SUBJECTIVE  Pain Level (0-10 scale): 0  Any medication changes, allergies to medications, adverse drug reactions, diagnosis change, or new procedure performed?: [x] No    [] Yes (see summary sheet for update)  Subjective functional status/changes:   [] No changes reported      OBJECTIVE    Modality rationale:    Min Type Additional Details    [] Estim:  []Unatt       []IFC  []Premod                        []Other:  []w/ice   []w/heat  Position:  Location:    [] Estim: []Att    []TENS instruct  []NMES                    []Other:  []w/US   []w/ice   []w/heat  Position:  Location:    []  Traction: [] Cervical       []Lumbar                       [] Prone          []Supine                       []Intermittent   []Continuous Lbs:  [] before manual  [] after manual    []  Ultrasound: []Continuous   [] Pulsed                           []1MHz   []3MHz W/cm2:  Location:    []  Iontophoresis with dexamethasone         Location: [] Take home patch   [] In clinic    []  Ice     []  heat  []  Ice massage  []  Laser   []  Anodyne Position:  Location:    []  Laser with stim  []  Other:  Position:  Location:    []  Vasopneumatic Device Pressure:       [] lo [] med [] hi   Temperature: [] lo [] med [] hi   [] Skin assessment post-treatment:  []intact []redness- no adverse reaction    []redness - adverse reaction:     9 min [x]Eval                  []Re-Eval       15 min Therapeutic Exercise:  [] See flow sheet :HEP; review of previously issued HEP from HHPT   Rationale: increase ROM and increase strength to improve the patients ability to normalize gait    8 min Manual Therapy:  Left knee manual stretching, STM distal Left quads/hamstrings, gentle tibiofemoral joint mobs for flexion    Rationale: decrease pain, increase ROM, increase tissue extensibility and decrease trigger points to improve ease of mobility       With   [] TE   [] TA   [] neuro   [] other: Patient Education: [x] Review HEP    [] Progressed/Changed HEP based on:   [] positioning   [] body mechanics   [] transfers   [] heat/ice application    [] other:      Other Objective/Functional Measures:      Pain Level (0-10 scale) post treatment: 0    ASSESSMENT/Changes in Function:     Patient will continue to benefit from skilled PT services to modify and progress therapeutic interventions, address functional mobility deficits, address ROM deficits, address strength deficits, analyze and address soft tissue restrictions, analyze and cue movement patterns, analyze and modify body mechanics/ergonomics, assess and modify postural abnormalities, address imbalance/dizziness and instruct in home and community integration to attain remaining goals.      [x]  See Plan of Care  []  See progress note/recertification  []  See Discharge Summary         Progress towards goals / Updated goals:  See POC    PLAN  []  Upgrade activities as tolerated     [x]  Continue plan of care  []  Update interventions per flow sheet       []  Discharge due to:_  []  Other:_      Kinsey Gill PT 10/16/2018  7:46 AM    Future Appointments  Date Time Provider Mary Mccall   10/16/2018 8:15 AM Kinsey Gill PT [facility]   10/19/2018 9:45 AM Fara Swann PA-C [facility] ECHO SCHED   3/6/2019 1:20 PM [facility] NURSE [facility] ECHO SCHED   3/20/2019 1:15 PM MD Mahnaz Guardado

## 2018-10-17 ENCOUNTER — HOSPITAL ENCOUNTER (OUTPATIENT)
Dept: PHYSICAL THERAPY | Age: 68
Discharge: HOME | End: 2018-10-17
Payer: MEDICARE

## 2018-10-17 PROCEDURE — 97112 NEUROMUSCULAR REEDUCATION: CPT

## 2018-10-17 PROCEDURE — 97140 MANUAL THERAPY 1/> REGIONS: CPT

## 2018-10-17 NOTE — PROGRESS NOTES
PT DAILY TREATMENT NOTE 10-18    Patient Name: Dorothea Moreau III  Date:10/17/2018  : 1950  [x]  Patient  Verified  Payor: [place] MEDICARE / Plan: [place] MEDICARE PART A & B / Product Type: Medicare /    In time:855  Out time:935  Total Treatment Time (min): 40  Visit #: 2 of 12    Medicare/BCBS Only   Total Timed Codes (min):  40 1:1 Treatment Time:  40       Treatment Area: Pain in left knee [M25.562]  S/P total knee replacement, left [Z96.652]    SUBJECTIVE  Pain Level (0-10 scale): 0  Any medication changes, allergies to medications, adverse drug reactions, diagnosis change, or new procedure performed?: [x] No    [] Yes (see summary sheet for update)  Subjective functional status/changes:   [] No changes reported  No pain.      OBJECTIVE    Modality rationale: Patient declined   Type Additional Details   [] Estim:  []Unatt       []IFC  []Premod                        []Other:  []w/ice   []w/heat  Position:  Location:   [] Estim: []Att    []TENS instruct  []NMES                    []Other:  []w/US   []w/ice   []w/heat  Position:  Location:   []  Traction: [] Cervical       []Lumbar                       [] Prone          []Supine                       []Intermittent   []Continuous Lbs:  [] before manual  [] after manual   []  Ultrasound: []Continuous   [] Pulsed                           []1MHz   []3MHz W/cm2:  Location:   []  Iontophoresis with dexamethasone         Location: [] Take home patch   [] In clinic   []  Ice     []  heat  []  Ice massage  []  Laser   []  Anodyne Position:  Location:   []  Laser with stim  []  Other:  Position:  Location:   []  Vasopneumatic Device Pressure:       [] lo [] med [] hi   Temperature: [] lo [] med [] hi   [] Skin assessment post-treatment:  []intact []redness- no adverse reaction    []redness - adverse reaction:     30 min Neuromuscular Re-education:  [x]  See flow sheet :   Rationale: increase strength, improve coordination, improve balance and increase proprioception  to improve the patients ability to improve LE stability in order to normalize gait    10 min Manual Therapy:  STM distal Left quads/hamstrings, Left knee manual stretching, gentle tibiofemoral joint mobs for flexion    Rationale: decrease pain, increase ROM, increase tissue extensibility and decrease trigger points to improve ease of mobility        With   [] TE   [] TA   [] neuro   [] other: Patient Education: [x] Review HEP    [] Progressed/Changed HEP based on:   [] positioning   [] body mechanics   [] transfers   [] heat/ice application    [] other:      Other Objective/Functional Measures: initiated treatment per flow sheet     Pain Level (0-10 scale) post treatment: 0.5    ASSESSMENT/Changes in Function: Patient reports biggest challenge today was stool scoots forward, but overall tolerated first follow up well with very limited increase in soreness. Patient will continue to benefit from skilled PT services to modify and progress therapeutic interventions, address functional mobility deficits, address ROM deficits, address strength deficits, analyze and address soft tissue restrictions, analyze and cue movement patterns, analyze and modify body mechanics/ergonomics, assess and modify postural abnormalities, address imbalance/dizziness and instruct in home and community integration to attain remaining goals. []  See Plan of Care  []  See progress note/recertification  []  See Discharge Summary         Progress towards goals / Updated goals:  Short Term Goals: To be accomplished in 1 weeks:  Goal: Patient will be independent and compliant with HEP in order to progress toward long term goals. Status at last note/certification: Issued and reviewed  Current status:   Long Term Goals: To be accomplished in 12 treatments:  Goal: Patient will improve FOTO assessment score to 64 pts in order to indicate improved functional abilities.   Status at last note/certification: 51  Current status: Goal: Patient will improve Left knee flexion AROM to at least 120 degrees in order to normalize gait. Status at last note/certification: 149 degrees  Current status:   Goal: Patient will be able to confidently negotiate stairs with reciprocal step pattern in order to improve ease of mobility in the home. Status at last note/certification: step to  Current status:   Goal: Patient will be able to maintain Left SLS on even surface with eyes ope without UE support for at least 7 seconds in order to indicate improved LE stability.   Status at last note/certification: less than 1 second  Current status:         PLAN  [x]  Upgrade activities as tolerated     [x]  Continue plan of care  []  Update interventions per flow sheet       []  Discharge due to:_  []  Other:_      Abi Samaniego PT 10/17/2018  7:11 AM    Future Appointments   Date Time Provider Mary Mccall   10/17/2018  9:00 AM Lori Dominguez PT [facility]   10/19/2018  9:45 AM Pantera Jones PA-C [facility] ECHO SCHED   10/22/2018  9:00 AM Lori Dominguez PT [facility]   10/24/2018  9:00 AM Aquiles [facility]   10/25/2018  9:30 AM Aquiles [facility]   10/29/2018  3:30 PM Dave, [address]   10/31/2018  9:00 AM [facility]   3/6/2019  1:20 PM [facility] NURSE [facility] ECHO SCHED   3/20/2019  1:15 PM Jackelyn Alas MD [address] [facility]

## 2018-10-19 ENCOUNTER — OFFICE VISIT (OUTPATIENT)
Dept: ORTHOPEDIC SURGERY | Facility: CLINIC | Age: 68
End: 2018-10-19

## 2018-10-19 VITALS
SYSTOLIC BLOOD PRESSURE: 141 MMHG | HEIGHT: 72 IN | DIASTOLIC BLOOD PRESSURE: 88 MMHG | OXYGEN SATURATION: 99 % | WEIGHT: 199.8 LBS | BODY MASS INDEX: 27.06 KG/M2 | HEART RATE: 76 BPM | RESPIRATION RATE: 18 BRPM | TEMPERATURE: 95.3 F

## 2018-10-19 DIAGNOSIS — Z96.652 S/P LEFT UNICOMPARTMENTAL KNEE REPLACEMENT: ICD-10-CM

## 2018-10-19 DIAGNOSIS — Z96.659 AFTERCARE FOLLOWING KNEE JOINT REPLACEMENT SURGERY, UNSPECIFIED LATERALITY: Primary | ICD-10-CM

## 2018-10-19 DIAGNOSIS — Z47.1 AFTERCARE FOLLOWING KNEE JOINT REPLACEMENT SURGERY, UNSPECIFIED LATERALITY: Primary | ICD-10-CM

## 2018-10-19 NOTE — PROGRESS NOTES
HISTORY:  Mr. Alexandrea Barrett returns. He is 3 weeks status post his left knee hemiarthroplasty with [place] hardware. He is doing well. He is in outpatient therapy currently. EXAMINATION:  He has been able to achieve over 104° of active flexion. His extension is full at bedside today. His wound has matured nicely. He has Steri-Strips present; all removed to reveal wound borders well approximated. Scattered, dry bloody drainage noted throughout no evidence of wound dehiscence or infection. No calf tenderness or evidence of DVT. He is using Tylenol for pain prior to physical therapy but has discontinued use of all of his postoperative-provided narcotic medication. He is using 81 mg for thromboembolism prophylaxis. PLAN:  We are going to plan on thing him back in about 3 weeks. X-ray at next OV. Continue cane-assisted ambulation. All of his questions answered to his satisfaction today.

## 2018-10-22 ENCOUNTER — HOSPITAL ENCOUNTER (OUTPATIENT)
Dept: PHYSICAL THERAPY | Age: 68
Discharge: HOME | End: 2018-10-22
Payer: MEDICARE

## 2018-10-22 PROCEDURE — 97112 NEUROMUSCULAR REEDUCATION: CPT

## 2018-10-22 PROCEDURE — 97140 MANUAL THERAPY 1/> REGIONS: CPT

## 2018-10-22 NOTE — PROGRESS NOTES
856PT DAILY TREATMENT NOTE 10-18    Patient Name: Taco Santos III  Date:10/22/2018  : 1950  [x]  Patient  Verified  Payor: VA MEDICARE / Plan: VA MEDICARE PART A & B / Product Type: Medicare /    In time:856  Out time:935  Total Treatment Time (min): 39  Visit #: 3 of 12    Medicare/BCBS Only   Total Timed Codes (min):  39 1:1 Treatment Time:  39       Treatment Area: Pain in left knee [M25.562]  S/P total knee replacement, left [Z96.652]    SUBJECTIVE  Pain Level (0-10 scale): 0  Any medication changes, allergies to medications, adverse drug reactions, diagnosis change, or new procedure performed?: [x] No    [] Yes (see summary sheet for update)  Subjective functional status/changes:   [] No changes reported  It might be just a little sore from the cold.      OBJECTIVE    Modality rationale: Patient declined   Type Additional Details   [] Estim:  []Unatt       []IFC  []Premod                        []Other:  []w/ice   []w/heat  Position:  Location:   [] Estim: []Att    []TENS instruct  []NMES                    []Other:  []w/US   []w/ice   []w/heat  Position:  Location:   []  Traction: [] Cervical       []Lumbar                       [] Prone          []Supine                       []Intermittent   []Continuous Lbs:  [] before manual  [] after manual   []  Ultrasound: []Continuous   [] Pulsed                           []1MHz   []3MHz W/cm2:  Location:   []  Iontophoresis with dexamethasone         Location: [] Take home patch   [] In clinic   []  Ice     []  heat  []  Ice massage  []  Laser   []  Anodyne Position:  Location:   []  Laser with stim  []  Other:  Position:  Location:   []  Vasopneumatic Device Pressure:       [] lo [] med [] hi   Temperature: [] lo [] med [] hi   [] Skin assessment post-treatment:  []intact []redness- no adverse reaction    []redness - adverse reaction:     31 min Neuromuscular Re-education:  [x]  See flow sheet :   Rationale: increase strength, improve coordination, improve balance and increase proprioception  to improve the patients ability to improve LE stability in order to normalize gait    8 min Manual Therapy:  STM distal Left quads/hamstrings, Left knee manual stretching, gentle tibiofemoral joint mobs for flexion    Rationale: decrease pain, increase ROM, increase tissue extensibility and decrease trigger points to improve ease of mobility         With   [] TE   [] TA   [] neuro   [] other: Patient Education: [x] Review HEP    [] Progressed/Changed HEP based on:   [] positioning   [] body mechanics   [] transfers   [] heat/ice application    [] other:      Other Objective/Functional Measures: increased resistance/repetitions per flow sheet     Pain Level (0-10 scale) post treatment: 0    ASSESSMENT/Changes in Function: Improved static standing balance with half stance eyes closed. Patient will continue to benefit from skilled PT services to modify and progress therapeutic interventions, address functional mobility deficits, address ROM deficits, address strength deficits, analyze and address soft tissue restrictions, analyze and cue movement patterns, analyze and modify body mechanics/ergonomics, assess and modify postural abnormalities, address imbalance/dizziness and instruct in home and community integration to attain remaining goals. []  See Plan of Care  []  See progress note/recertification  []  See Discharge Summary         Progress towards goals / Updated goals:  Short Term Goals: To be accomplished in 1 weeks:  Goal: Patient will be independent and compliant with HEP in order to progress toward long term goals. Status at last note/certification: Issued and reviewed  Current status: Met  Long Term Goals: To be accomplished in 12 treatments:  Goal: Patient will improve FOTO assessment score to 64 pts in order to indicate improved functional abilities.   Status at last note/certification: 51  Current status:   Goal: Patient will improve Left knee flexion AROM to at least 120 degrees in order to normalize gait. Status at last note/certification: 119 degrees  Current status:   Goal: Patient will be able to confidently negotiate stairs with reciprocal step pattern in order to improve ease of mobility in the home. Status at last note/certification: step to  Current status:   Goal: Patient will be able to maintain Left SLS on even surface with eyes ope without UE support for at least 7 seconds in order to indicate improved LE stability.   Status at last note/certification: less than 1 second  Current status:         PLAN  [x]  Upgrade activities as tolerated     [x]  Continue plan of care  []  Update interventions per flow sheet       []  Discharge due to:_  []  Other:_      Jeff Ram PT 10/22/2018  8:40 AM    Future Appointments   Date Time Provider Mary Mccall   10/22/2018  9:00 AM Michelle Jose, [facility]   10/24/2018  9:00 AM [facility]   10/25/2018  9:30 AM Dave, [address]   10/29/2018  3:30 PM Mita [facility]   10/31/2018  9:00 AM Michelle Jose [facility]   11/9/2018 10:00 AM Mckenna Jones PA-C [facility] ECHO SCHED   3/6/2019  1:20 PM [facility] NURSE [facility] ECHO SCHED   3/20/2019  1:15 PM MD Mahnaz Moeller

## 2018-10-24 ENCOUNTER — HOSPITAL ENCOUNTER (OUTPATIENT)
Dept: PHYSICAL THERAPY | Age: 68
Discharge: HOME | End: 2018-10-24
Payer: MEDICARE

## 2018-10-24 PROCEDURE — 97112 NEUROMUSCULAR REEDUCATION: CPT

## 2018-10-24 PROCEDURE — 97140 MANUAL THERAPY 1/> REGIONS: CPT

## 2018-10-24 NOTE — PROGRESS NOTES
PT DAILY TREATMENT NOTE 10-18    Patient Name: Pastor Jones III  Date:10/24/2018  : 1950  [x]  Patient  Verified  Payor: VA MEDICARE / Plan: VA MEDICARE PART A & B / Product Type: Medicare /    In time:9;00  Out time:40  Total Treatment Time (min): 40  Visit #: 4 of 12    Medicare/BCBS Only   Total Timed Codes (min):  40 1:1 Treatment Time:  40       Treatment Area: Pain in left knee [M25.562]  S/P total knee replacement, left [Z96.652]    SUBJECTIVE  Pain Level (0-10 scale): 0  Any medication changes, allergies to medications, adverse drug reactions, diagnosis change, or new procedure performed?: [x] No    [] Yes (see summary sheet for update)  Subjective functional status/changes:   [] No changes reported  I even mowed the lawn yesterday, riding mower. OBJECTIVE    30 min Neuromuscular Re-education:  []  See flow sheet :   Rationale: increase strength, improve coordination and improve balance  to improve the patients ability to increase stability, function    10 min Manual Therapy:  TF mobs. manual stretching, STM to distal quad, ITB   Rationale: decrease pain and increase tissue extensibility to improve ease of mobility, stairs. With   [] TE   [] TA   [] neuro   [] other: Patient Education: [x] Review HEP    [] Progressed/Changed HEP based on:   [] positioning   [] body mechanics   [] transfers   [] heat/ice application    [] other:      Other Objective/Functional Measures:      Pain Level (0-10 scale) post treatment:  0    ASSESSMENT/Changes in Function: pt is progressing well with increased AROM flexion, and improved ease of stair negotiation.     Patient will continue to benefit from skilled PT services to modify and progress therapeutic interventions, address functional mobility deficits, address ROM deficits, address strength deficits, analyze and address soft tissue restrictions, analyze and cue movement patterns, analyze and modify body mechanics/ergonomics, assess and modify postural abnormalities, address imbalance/dizziness and instruct in home and community integration to attain remaining goals. []  See Plan of Care  []  See progress note/recertification  []  See Discharge Summary         Progress towards goals / Updated goals:  Goal: Patient will be independent and compliant with HEP in order to progress toward long term goals. Status at last note/certification: Issued and reviewed  Current status: Met  Long Term Goals: To be accomplished in 12 treatments:  Goal: Patient will improve FOTO assessment score to 64 pts in order to indicate improved functional abilities. Status at last note/certification: 51  Current status:   Goal: Patient will improve Left knee flexion AROM to at least 120 degrees in order to normalize gait. Status at last note/certification: 681 degrees  Current status: AROM 0 to 121  met  Goal: Patient will be able to confidently negotiate stairs with reciprocal step pattern in order to improve ease of mobility in the home. Status at last note/certification: step to  Current status: reciprocally on ascent and descent with improved confidence  Goal: Patient will be able to maintain Left SLS on even surface with eyes ope without UE support for at least 7 seconds in order to indicate improved LE stability.   Status at last note/certification: less than 1 second  Current status:        PLAN  [x]  Upgrade activities as tolerated     []  Continue plan of care  []  Update interventions per flow sheet       []  Discharge due to:_  []  Other:_      Lonell , PTA 10/24/2018  9:15 AM    Future Appointments   Date Time Provider Mary Mccall   10/25/2018  9:30 AM [facility]   10/29/2018  3:30 PM [facility]   10/31/2018  9:00 AM Andra Zaragoza PT [facility]   11/9/2018 10:00 AM Jennifer Jones PA-C [facility] ECHO HYATT   3/6/2019  1:20 PM [facility] NURSE Mahnaz   3/20/2019  1:15 PM Genie Miller MD Mahnaz

## 2018-10-25 ENCOUNTER — HOSPITAL ENCOUNTER (OUTPATIENT)
Dept: PHYSICAL THERAPY | Age: 68
Discharge: HOME | End: 2018-10-25
Payer: MEDICARE

## 2018-10-25 PROCEDURE — 97140 MANUAL THERAPY 1/> REGIONS: CPT

## 2018-10-25 PROCEDURE — 97112 NEUROMUSCULAR REEDUCATION: CPT

## 2018-10-25 PROCEDURE — 97110 THERAPEUTIC EXERCISES: CPT

## 2018-10-25 NOTE — PROGRESS NOTES
PT DAILY TREATMENT NOTE 10-18    Patient Name: Elly Yanez III  Date:10/25/2018  : 1950  [x]  Patient  Verified  Payor: VA MEDICARE / Plan: VA MEDICARE PART A & B / Product Type: Medicare /    In time:  Out time:10;00  Total Treatment Time (min): 40  Visit #: 5 of 12    Medicare/BCBS Only   Total Timed Codes (min):  40 1:1 Treatment Time:  40       Treatment Area: Pain in left knee [M25.562]  S/P total knee replacement, left [Z96.652]    SUBJECTIVE  Pain Level (0-10 scale):  0  Any medication changes, allergies to medications, adverse drug reactions, diagnosis change, or new procedure performed?: [x] No    [] Yes (see summary sheet for update)  Subjective functional status/changes:   [] No changes reported  I tried kneeling yesterday to check on my sick cat and realized that there is an area of numbness on my knee (just bellow the joint)      OBJECTIVE    20 min Therapeutic Exercise:  [] See flow sheet :   Rationale: increase strength to improve the patients ability to improve activity tolerance, walking      10 min Neuromuscular Re-education:  []  See flow sheet :   Rationale: increase strength and improve coordination  to improve the patients ability to increase stability    10 min Manual Therapy:  TF mobs. STM to distal quad, and ITB   Rationale: increase ROM and increase tissue extensibility to improve fluidity of gait            With   [] TE   [] TA   [] neuro   [] other: Patient Education: [x] Review HEP    [] Progressed/Changed HEP based on:   [] positioning   [] body mechanics   [] transfers   [] heat/ice application    [] other:      Other Objective/Functional Measures:   Assisted squats 10 x     Pain Level (0-10 scale) post treatment:  0    ASSESSMENT/Changes in Function:  Pt denies any pain, mild stiffness only.   Has not tried walking for 2 blocks (as asked on FOTO), but does have fatigue  In knee with `prolonged walking    Patient will continue to benefit from skilled PT services to modify and progress therapeutic interventions, address functional mobility deficits, address ROM deficits, address strength deficits, analyze and address soft tissue restrictions, analyze and cue movement patterns, analyze and modify body mechanics/ergonomics, assess and modify postural abnormalities, address imbalance/dizziness and instruct in home and community integration to attain remaining goals. []  See Plan of Care  []  See progress note/recertification  []  See Discharge Summary         Progress towards goals / Updated goals:  Goal: Patient will be independent and compliant with HEP in order to progress toward long term goals. Status at last note/certification: Issued and reviewed  Current status: Met  Long Term Goals: To be accomplished in 12 treatments:  Goal: Patient will improve FOTO assessment score to 64 pts in order to indicate improved functional abilities. Status at last note/certification: 51  Current status: FOTO 51, no change  Goal: Patient will improve Left knee flexion AROM to at least 120 degrees in order to normalize gait. Status at last note/certification: 496 degrees  Current status: AROM 0 to 121  met  Goal: Patient will be able to confidently negotiate stairs with reciprocal step pattern in order to improve ease of mobility in the home. Status at last note/certification: step to  Current status: reciprocally on ascent and descent with improved confidence  Goal: Patient will be able to maintain Left SLS on even surface with eyes ope without UE support for at least 7 seconds in order to indicate improved LE stability.   Status at last note/certification: less than 1 second  Current status:  left SLS with light UE touch, 30 seconds      PLAN  [x]  Upgrade activities as tolerated     []  Continue plan of care  []  Update interventions per flow sheet       []  Discharge due to:_  []  Other:_      Karen Palacios PTA 10/25/2018  9:33 AM    Future Appointments   Date Time Provider Mary Mccall   10/31/2018  9:00 AM Emmanuelle Navarrete, PT [facility]   11/2/2018  9:30 AM Jason Caldwell, PT [facility]   11/9/2018 10:00 AM Leopoldo Jones PA-C [facility] ECHO SCHED   3/6/2019  1:20 PM [facility] NURSE [facility] SCHED   3/20/2019  1:15 PM [facility]

## 2018-10-29 ENCOUNTER — APPOINTMENT (OUTPATIENT)
Dept: PHYSICAL THERAPY | Age: 68
End: 2018-10-29
Payer: MEDICARE

## 2018-10-31 ENCOUNTER — HOSPITAL ENCOUNTER (OUTPATIENT)
Dept: PHYSICAL THERAPY | Age: 68
Discharge: HOME | End: 2018-10-31
Payer: MEDICARE

## 2018-10-31 PROCEDURE — 97112 NEUROMUSCULAR REEDUCATION: CPT

## 2018-10-31 PROCEDURE — 97140 MANUAL THERAPY 1/> REGIONS: CPT

## 2018-10-31 NOTE — PROGRESS NOTES
PT DAILY TREATMENT NOTE 10-18    Patient Name: Luis Ruiz III  Date:10/31/2018  : 1950  [x]  Patient  Verified  Payor: [place] MEDICARE / Plan: [place] MEDICARE PART A & B / Product Type: Medicare /    In time:853  Out time:935  Total Treatment Time (min): 42  Visit #: 6 of 12    Medicare/BCBS Only   Total Timed Codes (min):  42 1:1 Treatment Time:  30       Treatment Area: Pain in left knee [M25.562]  S/P total knee replacement, left [Z96.652]    SUBJECTIVE  Pain Level (0-10 scale): 0  Any medication changes, allergies to medications, adverse drug reactions, diagnosis change, or new procedure performed?: [x] No    [] Yes (see summary sheet for update)  Subjective functional status/changes:   [] No changes reported  No pain right now but I did have a lot of discomfort last night.      OBJECTIVE    Modality rationale: Patient declined   Type Additional Details   [] Estim:  []Unatt       []IFC  []Premod                        []Other:  []w/ice   []w/heat  Position:  Location:   [] Estim: []Att    []TENS instruct  []NMES                    []Other:  []w/US   []w/ice   []w/heat  Position:  Location:   []  Traction: [] Cervical       []Lumbar                       [] Prone          []Supine                       []Intermittent   []Continuous Lbs:  [] before manual  [] after manual   []  Ultrasound: []Continuous   [] Pulsed                           []1MHz   []3MHz W/cm2:  Location:   []  Iontophoresis with dexamethasone         Location: [] Take home patch   [] In clinic   []  Ice     []  heat  []  Ice massage  []  Laser   []  Anodyne Position:  Location:   []  Laser with stim  []  Other:  Position:  Location:   []  Vasopneumatic Device Pressure:       [] lo [] med [] hi   Temperature: [] lo [] med [] hi   [] Skin assessment post-treatment:  []intact []redness- no adverse reaction    []redness - adverse reaction:     32 min Neuromuscular Re-education:  [x]  See flow sheet :   Rationale: increase strength, improve coordination, improve balance and increase proprioception  to improve the patients ability to improve LE stability     10 min Manual Therapy:  Left knee manual stretching, tibiofemoral joint/patellar mobs, STM distal quads/hamstrings    Rationale: decrease pain, increase ROM, increase tissue extensibility and decrease trigger points to normalize gait      With   [] TE   [] TA   [] neuro   [] other: Patient Education: [x] Review HEP    [] Progressed/Changed HEP based on:   [] positioning   [] body mechanics   [] transfers   [] heat/ice application    [] other:      Other Objective/Functional Measures: increased repetitions per flow sheet     Pain Level (0-10 scale) post treatment: 0    ASSESSMENT/Changes in Function: Patient reports that his knee pain is typically medial; no palpable tenderness in the region of the popliteus. Overall progressing very well. Patient will continue to benefit from skilled PT services to modify and progress therapeutic interventions, address functional mobility deficits, address ROM deficits, address strength deficits, analyze and address soft tissue restrictions, analyze and cue movement patterns, analyze and modify body mechanics/ergonomics, assess and modify postural abnormalities, address imbalance/dizziness and instruct in home and community integration to attain remaining goals. []  See Plan of Care  []  See progress note/recertification  []  See Discharge Summary         Progress towards goals / Updated goals:  Goal: Patient will be independent and compliant with HEP in order to progress toward long term goals. Status at last note/certification: Issued and reviewed  Current status: Met  Long Term Goals: To be accomplished in 12 treatments:  Goal: Patient will improve FOTO assessment score to 64 pts in order to indicate improved functional abilities.   Status at last note/certification: 51  Current status: FOTO 51, no change  Goal: Patient will improve Left knee flexion AROM to at least 120 degrees in order to normalize gait. Status at last note/certification: 876 degrees  Current status: AROM 0 to 121  met  Goal: Patient will be able to confidently negotiate stairs with reciprocal step pattern in order to improve ease of mobility in the home. Status at last note/certification: step to  Current status: reciprocally on ascent and descent with improved confidence  Goal: Patient will be able to maintain Left SLS on even surface with eyes ope without UE support for at least 7 seconds in order to indicate improved LE stability.   Status at last note/certification: less than 1 second  Current status:  left SLS with light UE touch, 30 seconds    PLAN  [x]  Upgrade activities as tolerated     [x]  Continue plan of care  []  Update interventions per flow sheet       []  Discharge due to:_  []  Other:_      Ino Summers, PT 10/31/2018  7:10 AM    Future Appointments   Date Time Provider Mary Mccall   10/31/2018  9:00 AM Cristian Nice, [facility]   11/2/2018  9:30 AM Makayla Zarate, PT [facility]   11/9/2018 10:00 AM Pavan Jones PA-C [facility] ECHO SCHED   3/6/2019  1:20 PM [facility]   3/20/2019  1:15 PM Edwige Adamson MD [address] [facility]

## 2018-11-02 ENCOUNTER — HOSPITAL ENCOUNTER (OUTPATIENT)
Dept: PHYSICAL THERAPY | Age: 68
Discharge: HOME | End: 2018-11-02
Payer: MEDICARE

## 2018-11-02 PROCEDURE — 97140 MANUAL THERAPY 1/> REGIONS: CPT

## 2018-11-02 PROCEDURE — 97112 NEUROMUSCULAR REEDUCATION: CPT

## 2018-11-02 NOTE — PROGRESS NOTES
PT DAILY TREATMENT NOTE 10-18    Patient Name: Dorothy Bryant  Date:2018  : 1950  [x]  Patient  Verified  Payor: [place] MEDICARE / Plan: [place] MEDICARE PART A & B / Product Type: Medicare /    In time:930  Out time:1013  Total Treatment Time (min): 43  Visit #: 7 of 12    Medicare/BCBS Only   Total Timed Codes (min):  43 1:1 Treatment Time:  40       Treatment Area: Pain in left knee [M25.562]  S/P total knee replacement, left [Z96.652]    SUBJECTIVE  Pain Level (0-10 scale): 0  Any medication changes, allergies to medications, adverse drug reactions, diagnosis change, or new procedure performed?: [x] No    [] Yes (see summary sheet for update)  Subjective functional status/changes:   [] No changes reported  \" just some stiffness and aches. \"    OBJECTIVE      35 min Neuromuscular Re-education:  [x]  See flow sheet :   Rationale: increase ROM, increase strength, improve coordination and improve balance  to improve the patients ability to improve LE stability for increased ease of ADLs    8 min Manual Therapy:  STM hamstring and manual stretch   Rationale: decrease pain, increase ROM, increase tissue extensibility and decrease trigger points to improve ease of mobility. With   [] TE   [] TA   [] neuro   [] other: Patient Education: [x] Review HEP    [] Progressed/Changed HEP based on:   [] positioning   [] body mechanics   [] transfers   [] heat/ice application    [] other:      Other Objective/Functional Measures:   9.5/10 improvement   Cues for proper form during lunges  Patient ambulates with mild left lateral lean during stance on left LE  SLS 15 seconds on left LE  Added hamstring stretch to HEP    Pain Level (0-10 scale) post treatment: 0    ASSESSMENT/Changes in Function: Patients balance improving, he is able to hold single leg stance on left leg for 15 seconds. He presents with moderately tight hamstring; educated on standing stretch on stairs for HEP.  Will continue to benefit from strengthening/balance program in order to normalize gait pattern. Patient will continue to benefit from skilled PT services to modify and progress therapeutic interventions, address functional mobility deficits, address ROM deficits, address strength deficits, analyze and address soft tissue restrictions, analyze and cue movement patterns, analyze and modify body mechanics/ergonomics, assess and modify postural abnormalities, address imbalance/dizziness and instruct in home and community integration to attain remaining goals. []  See Plan of Care  []  See progress note/recertification  []  See Discharge Summary         Progress towards goals / Updated goals:  Goal: Patient will be independent and compliant with HEP in order to progress toward long term goals. Status at last note/certification: Issued and reviewed  Current status: Met  Long Term Goals: To be accomplished in 12 treatments:  Goal: Patient will improve FOTO assessment score to 64 pts in order to indicate improved functional abilities. Status at last note/certification: 51  Current status: FOTO 51, no change  Goal: Patient will improve Left knee flexion AROM to at least 120 degrees in order to normalize gait. Status at last note/certification: 060 degrees  Current status: AROM 0 to 121  met  Goal: Patient will be able to confidently negotiate stairs with reciprocal step pattern in order to improve ease of mobility in the home. Status at last note/certification: step to  Current status: reciprocally on ascent and descent with improved confidence  Goal: Patient will be able to maintain Left SLS on even surface with eyes ope without UE support for at least 7 seconds in order to indicate improved LE stability.   Status at last note/certification: less than 1 second  Current status:  left SLS 15 seconds        PLAN  []  Upgrade activities as tolerated     [x]  Continue plan of care  []  Update interventions per flow sheet       []  Discharge due to:_  []  Other:_      Jacob Campos, PT 11/2/2018  9:41 AM    Future Appointments   Date Time Provider Mary Mccall   11/6/2018  8:15 AM Deanne Babinski, PT [facility]   11/8/2018  8:15 AM Nelson Rubio [employer] [facility]   11/9/2018 10:00 AM Nadine Jones PA-C [facility] ECHO SCHED   11/13/2018 10:30 AM Deanne Babinski, PT [facility]   3/6/2019  1:20 PM [facility] NURSE [facility] ECHO SCHED   3/20/2019  1:15 PM MD Mahnaz Terrell

## 2018-11-06 ENCOUNTER — HOSPITAL ENCOUNTER (OUTPATIENT)
Dept: PHYSICAL THERAPY | Age: 68
Discharge: HOME | End: 2018-11-06
Payer: MEDICARE

## 2018-11-06 PROCEDURE — 97140 MANUAL THERAPY 1/> REGIONS: CPT

## 2018-11-06 PROCEDURE — 97112 NEUROMUSCULAR REEDUCATION: CPT

## 2018-11-06 NOTE — PROGRESS NOTES
PT DAILY TREATMENT NOTE 10-18    Patient Name: Kate Palacios III  Date:2018  : 1950  [x]  Patient  Verified  Payor: [place] MEDICARE / Plan: [place] MEDICARE PART A & B / Product Type: Medicare /    In time:813  Out time:856  Total Treatment Time (min): 43  Visit #: 8 of 12    Medicare/BCBS Only   Total Timed Codes (min):  43 1:1 Treatment Time:  39       Treatment Area: Pain in left knee [M25.562]  S/P total knee replacement, left [Z96.652]    SUBJECTIVE  Pain Level (0-10 scale): 0  Any medication changes, allergies to medications, adverse drug reactions, diagnosis change, or new procedure performed?: [x] No    [] Yes (see summary sheet for update)  Subjective functional status/changes:   [] No changes reported  The knees are fine, the tendons are sore. I walked a lot yesterday.     OBJECTIVE    Modality rationale:    Type Additional Details   [] Estim:  []Unatt       []IFC  []Premod                        []Other:  []w/ice   []w/heat  Position:  Location:   [] Estim: []Att    []TENS instruct  []NMES                    []Other:  []w/US   []w/ice   []w/heat  Position:  Location:   []  Traction: [] Cervical       []Lumbar                       [] Prone          []Supine                       []Intermittent   []Continuous Lbs:  [] before manual  [] after manual   []  Ultrasound: []Continuous   [] Pulsed                           []1MHz   []3MHz W/cm2:  Location:   []  Iontophoresis with dexamethasone         Location: [] Take home patch   [] In clinic   []  Ice     []  heat  []  Ice massage  []  Laser   []  Anodyne Position:  Location:   []  Laser with stim  []  Other:  Position:  Location:   []  Vasopneumatic Device Pressure:       [] lo [] med [] hi   Temperature: [] lo [] med [] hi   [] Skin assessment post-treatment:  []intact []redness- no adverse reaction    []redness - adverse reaction:     33 min Neuromuscular Re-education:  [x]  See flow sheet :   Rationale: increase strength, improve coordination, improve balance and increase proprioception  to improve the patients ability to improve knee stability with ambulation, transfers    10 min Manual Therapy:  Manual stretching Left knee, tibiofemoral joint/patellar mobs, STM distal quads/hamstrings   Rationale: decrease pain, increase ROM, increase tissue extensibility and decrease trigger points to normalize gait    With   [] TE   [] TA   [] neuro   [] other: Patient Education: [x] Review HEP    [] Progressed/Changed HEP based on:   [] positioning   [] body mechanics   [] transfers   [] heat/ice application    [] other:      Other Objective/Functional Measures: added standing leg pumps at Chair     Pain Level (0-10 scale) post treatment: 0    ASSESSMENT/Changes in Function: Increased Right hip soreness today during exercises. Patient will continue to benefit from skilled PT services to modify and progress therapeutic interventions, address functional mobility deficits, address ROM deficits, address strength deficits, analyze and address soft tissue restrictions, analyze and cue movement patterns, analyze and modify body mechanics/ergonomics, assess and modify postural abnormalities, address imbalance/dizziness and instruct in home and community integration to attain remaining goals. []  See Plan of Care  []  See progress note/recertification  []  See Discharge Summary         Progress towards goals / Updated goals:  Goal: Patient will be independent and compliant with HEP in order to progress toward long term goals. Status at last note/certification: Issued and reviewed  Current status: Met  Long Term Goals: To be accomplished in 12 treatments:  Goal: Patient will improve FOTO assessment score to 64 pts in order to indicate improved functional abilities.   Status at last note/certification: 51  Current status: Not met, FOTO 51, no change  Goal: Patient will improve Left knee flexion AROM to at least 120 degrees in order to normalize gait.  Status at last note/certification: 922 degrees  Current status: AROM 0 to 121  met  Goal: Patient will be able to confidently negotiate stairs with reciprocal step pattern in order to improve ease of mobility in the home. Status at last note/certification: step to  Current status: Progressing, reciprocally on ascent and descent with improved confidence  Goal: Patient will be able to maintain Left SLS on even surface with eyes ope without UE support for at least 7 seconds in order to indicate improved LE stability.   Status at last note/certification: less than 1 second  Current status: Met left SLS 15 seconds    PLAN  [x]  Upgrade activities as tolerated     [x]  Continue plan of care  []  Update interventions per flow sheet       []  Discharge due to:_  []  Other:_      Carla Griggs PT 11/6/2018  7:06 AM    Future Appointments   Date Time Provider Mary Mccall   11/6/2018  8:15 AM Antonia Carrasco PT [facility]   11/8/2018  8:15 AM Emery Jameson [employer] [facility]   11/9/2018 10:00 AM Javier Jones Che, PA-C [facility] ECHO SCHED   11/13/2018 10:30 AM Antonia Carrasco PT [id number]   3/6/2019  1:20 PM [facility] NURSE [facility] ECHO SCHED   3/20/2019  1:15 PM MD Mahnaz Camilo

## 2018-11-08 ENCOUNTER — HOSPITAL ENCOUNTER (OUTPATIENT)
Dept: PHYSICAL THERAPY | Age: 68
Discharge: HOME | End: 2018-11-08
Payer: MEDICARE

## 2018-11-08 PROCEDURE — 97112 NEUROMUSCULAR REEDUCATION: CPT

## 2018-11-08 PROCEDURE — 97140 MANUAL THERAPY 1/> REGIONS: CPT

## 2018-11-08 NOTE — PROGRESS NOTES
PT DAILY TREATMENT NOTE 10-18    Patient Name: Slick Dubon III  Date:2018  : 1950  [x]  Patient  Verified  Payor: [place] MEDICARE / Plan: [place] MEDICARE PART A & B / Product Type: Medicare /    In time:8;15  Out time:8;55  Total Treatment Time (min): 40  Visit #: 9 of 12    Medicare/BCBS Only   Total Timed Codes (min):  40 1:1 Treatment Time:  40       Treatment Area: Pain in left knee [M25.562]  S/P total knee replacement, left [Z96.652]    SUBJECTIVE  Pain Level (0-10 scale): 0  Any medication changes, allergies to medications, adverse drug reactions, diagnosis change, or new procedure performed?: [x] No    [] Yes (see summary sheet for update)  Subjective functional status/changes:   [] No changes reported  I feel great. I can do everything, just cautious    OBJECTIVE       30 min Neuromuscular Re-education:  []  See flow sheet :   Rationale: increase ROM, increase strength and improve coordination  to improve the patients ability to increase stability for ease of walking, function    10 min Manual Therapy:  STM to distal quad, patellar mobs. Manual stretching   Rationale: increase ROM and increase tissue extensibility to improve fluidity of gait            With   [] TE   [] TA   [] neuro   [] other: Patient Education: [x] Review HEP    [] Progressed/Changed HEP based on:   [] positioning   [] body mechanics   [] transfers   [] heat/ice application    [] other:      Other Objective/Functional Measures:   Gains: no longer using AD. Increased confidence and more trust in knee for stairs, walking.    100% improved   No functional limitations, just cautious with his activity    Pain Level (0-10 scale) post treatment:  0    ASSESSMENT/Changes in Function:  See goals    Patient will continue to benefit from skilled PT services to modify and progress therapeutic interventions, address functional mobility deficits, address ROM deficits, address strength deficits, analyze and address soft tissue restrictions, analyze and cue movement patterns, analyze and modify body mechanics/ergonomics, assess and modify postural abnormalities, address imbalance/dizziness and instruct in home and community integration to attain remaining goals. []  See Plan of Care  []  See progress note/recertification  []  See Discharge Summary         Progress towards goals / Updated goals:  Goal: Patient will be independent and compliant with HEP in order to progress toward long term goals. Status at last note/certification: Issued and reviewed  Current status: Met  Long Term Goals: To be accomplished in 12 treatments:  Goal: Patient will improve FOTO assessment score to 64 pts in order to indicate improved functional abilities. Status at last note/certification: 51  Current status: Not met, FOTO 51, no change  Goal: Patient will improve Left knee flexion AROM to at least 120 degrees in order to normalize gait. Status at last note/certification: 532 degrees  Current status: AROM 0 to 121  met  Goal: Patient will be able to confidently negotiate stairs with reciprocal step pattern in order to improve ease of mobility in the home. Status at last note/certification: step to  Current status: Progressing, reciprocally on ascent and descent with improved confidence  Goal: Patient will be able to maintain Left SLS on even surface with eyes ope without UE support for at least 7 seconds in order to indicate improved LE stability.   Status at last note/certification: less than 1 second  Current status: Met left SLS 15 seconds    PLAN  [x]  Upgrade activities as tolerated     []  Continue plan of care  []  Update interventions per flow sheet       []  Discharge due to:_  []  Other:_  DC next session     Prachi Pate, TONI 11/8/2018  8:19 AM    Future Appointments   Date Time Provider Mary Mccall   11/9/2018 10:00 AM [facility]-C [facility]   11/13/2018 10:30 AM Nayeli Falcon PT [facility]   3/6/2019  1:20 PM [facility] [address]   3/20/2019  1:15 PM Genie Miller MD [address] [facility]

## 2018-11-09 ENCOUNTER — OFFICE VISIT (OUTPATIENT)
Dept: ORTHOPEDIC SURGERY | Facility: CLINIC | Age: 68
End: 2018-11-09

## 2018-11-09 DIAGNOSIS — Z96.659 AFTERCARE FOLLOWING KNEE JOINT REPLACEMENT SURGERY, UNSPECIFIED LATERALITY: Primary | ICD-10-CM

## 2018-11-09 DIAGNOSIS — Z96.652 S/P LEFT UNICOMPARTMENTAL KNEE REPLACEMENT: ICD-10-CM

## 2018-11-09 DIAGNOSIS — Z47.1 AFTERCARE FOLLOWING KNEE JOINT REPLACEMENT SURGERY, UNSPECIFIED LATERALITY: Primary | ICD-10-CM

## 2018-11-13 ENCOUNTER — HOSPITAL ENCOUNTER (OUTPATIENT)
Dept: PHYSICAL THERAPY | Age: 68
Discharge: HOME | End: 2018-11-13
Payer: MEDICARE

## 2018-11-13 PROCEDURE — 97112 NEUROMUSCULAR REEDUCATION: CPT

## 2018-11-13 PROCEDURE — G8979 MOBILITY GOAL STATUS: HCPCS

## 2018-11-13 PROCEDURE — G8980 MOBILITY D/C STATUS: HCPCS

## 2018-11-13 PROCEDURE — 97140 MANUAL THERAPY 1/> REGIONS: CPT

## 2018-11-13 NOTE — PROGRESS NOTES
PT DISCHARGE DAILY NOTE AND [id number]    Date:2018  Patient name: Michel Ruano III Start of Care: 10/16/2018   Referral source: Jesse Garcia MD : 1950               Medical Diagnosis: Pain in left knee [M25.562]  S/P total knee replacement, left [Z96.652]    Onset Date:18               Treatment Diagnosis: Left knee pain   Prior Hospitalization: see medical history Provider#: [id number]   Medications: Verified on Patient summary List    Comorbidities: arthritis, cancer, Left RCR   Prior Level of Function: Functionally independent, retired from [employer], lives alone with two cats with bedroom on second floor, enjoys riding motorcycle trike    Visits from [facility] of Care: 10    Missed Visits: 0    Reporting Period : 10/16/18 to 18    [x]  Patient  Verified  [place]or: [place] MEDICARE / Plan: [place] MEDICARE PART A & B / Product Type: Medicare /    In time:1030  Out time:1113  Total Treatment Time (min): 43  Total Timed Codes (min): 43  1:1 Treatment Time ( [address] only): 37   Visit #: 10 of 12    SUBJECTIVE  Pain Level (0-10 scale): 0  Any medication changes, allergies to medications, adverse drug reactions, diagnosis change, or new procedure performed?: [x] No    [] Yes (see summary sheet for update)  Subjective functional status/changes:   [] No changes reported  It's just a little tight. It was sore last night from the cold.      OBJECTIVE    Modality rationale: Patient declined   Type Additional Details   [] Estim:  []Unatt       []IFC  []Premod                        []Other:  []w/ice   []w/heat  Position:  Location:   [] Estim: []Att    []TENS instruct  []NMES                    []Other:  []w/US   []w/ice   []w/heat  Position:  Location:   []  Traction: [] Cervical       []Lumbar                       [] Prone          []Supine                       []Intermittent   []Continuous Lbs:  [] before manual  [] after manual   []  Ultrasound: []Continuous   [] Pulsed []1MHz   []3MHz W/cm2:  Location:   []  Iontophoresis with dexamethasone         Location: [] Take home patch   [] In clinic   []  Ice     []  heat  []  Ice massage  []  Laser   []  Anodyne Position:  Location:   []  Laser with stim  []  Other:  Position:  Location:   []  Vasopneumatic Device Pressure:       [] lo [] med [] hi   Temperature: [] lo [] med [] hi   [] Skin assessment post-treatment:  []intact []redness- no adverse reaction    []redness - adverse reaction:     35 min Neuromuscular Re-education:  [x]  See flow sheet :   Rationale: increase strength, improve coordination, improve balance and increase proprioception  to improve the patients ability to improve ease of recreational activities, improve knee stability     8 min Manual Therapy:  Left knee manual stretching, STM distal left quads   Rationale: decrease pain, increase ROM, increase tissue extensibility and decrease trigger points to normalize gait          With   [] TE   [] TA   [] neuro   [] other: Patient Education: [x] Review HEP    [] Progressed/Changed HEP based on:   [] positioning   [] body mechanics   [] transfers   [] heat/ice application    [] other:      Other Objective/Functional Measures: reviewed HEP     Pain Level (0-10 scale) post treatment: 0    Summary of Care:  Goal: Patient will be independent and compliant with HEP in order to progress toward long term goals. Status at last note/certification: Issued and reviewed  Current status: Met  Long Term Goals: To be accomplished in 12 treatments:  Goal: Patient will improve FOTO assessment score to 64 pts in order to indicate improved functional abilities. Status at last note/certification: 51  Current status: Met, 66  Goal: Patient will improve Left knee flexion AROM to at least 120 degrees in order to normalize gait.   Status at last note/certification: 089 degrees  Current status: Met, 120  Goal: Patient will be able to confidently negotiate stairs with reciprocal step pattern in order to improve ease of mobility in the home. Status at last note/certification: step to  Current status: Met  Goal: Patient will be able to maintain Left SLS on even surface with eyes ope without UE support for at least 7 seconds in order to indicate improved LE stability. Status at last note/certification: less than 1 second  Current status: Met, 15 seconds    ASSESSMENT/Changes in Function: Patient has consistently attended therapy following Left TKA. He demonstrates improved ROM and strength, and reports limited pain. Good dynamic stability. Due to good gains we will discharge to independent management at this time. G-Codes (GP)  Mobility   [id number] Goal  CJ= 20-39%  D/C  CJ= 20-39%    The severity rating is based on clinical judgment and the FOTO score.       Thank you for this referral!     PLAN  [x]Discontinue therapy: [x]Patient has reached or is progressing toward set goals      []Patient is non-compliant or has abdicated      []Due to lack of appreciable progress towards set goals    Dorys Whitten, PT 11/13/2018  7:12 AM

## 2018-12-12 ENCOUNTER — OFFICE VISIT (OUTPATIENT)
Dept: ORTHOPEDIC SURGERY | Age: 68
End: 2018-12-12

## 2018-12-12 VITALS
HEART RATE: 75 BPM | HEIGHT: 72 IN | OXYGEN SATURATION: 98 % | DIASTOLIC BLOOD PRESSURE: 101 MMHG | RESPIRATION RATE: 16 BRPM | TEMPERATURE: 97.7 F | SYSTOLIC BLOOD PRESSURE: 161 MMHG | BODY MASS INDEX: 28.53 KG/M2 | WEIGHT: 210.6 LBS

## 2018-12-12 DIAGNOSIS — M79.671 RIGHT FOOT PAIN: ICD-10-CM

## 2018-12-12 DIAGNOSIS — M25.571 ACUTE RIGHT ANKLE PAIN: ICD-10-CM

## 2018-12-12 DIAGNOSIS — M76.61 ACHILLES TENDINITIS OF RIGHT LOWER EXTREMITY: Primary | ICD-10-CM

## 2018-12-12 RX ORDER — MELOXICAM 15 MG/1
15 TABLET ORAL
Qty: 30 TAB | Refills: 0 | Status: SHIPPED | OUTPATIENT
Start: 2018-12-12 | End: 2019-03-20

## 2018-12-12 NOTE — PROGRESS NOTES
AMBULATORY PROGRESS NOTE      Patient: Michel Ruano III             MRN: [id number]     SSN: [id number] Body mass index is 28.56 kg/m². YOB: 1950     AGE: 76 y.o. EX: male    PCP: Baldomero Major MD     IMPRESSION/DIAGNOSIS AND TREATMENT PLAN     DIAGNOSES  1. Achilles tendinitis of right lower extremity    2. Acute right ankle pain    3. Right foot pain        Orders Placed This Encounter    [76188] Ankle 2V    [30208] Foot Min 3V      Michel Ruano III understands his diagnoses and the proposed plan. Plan:    1) DME Order: Short right CAM walker boot. 2) Mobic 15 m PO every day; 30 tablets, 0 refills. 3) Take GI medication as directed. 4) HEP with Achilles tendon specific exercises and stretches. 5) Continue activity modification as directed. RTO - 1 month     HPI AND EXAMINATION     Michel Ruano III IS A 76 y.o. male who presents to my outpatient office complaining of right ankle pain. Mr. Luis Falcon states that he woke up  () to find his right lateral ankle swollen and painful. He states that the pain started near his posterolateral fibula, but now is in his Achilles tendon. He notes that his pain has worsened every day since . The patient denies any recent falls, twists, or trauma. He denies any changes in his activities. He denies h/o gout or gout like symptoms. The patient denies any fever, shakes, or chills. He reports that he did have the stomach flu a few days ago. He presents to the office today with a cane, which he does not regularly use. He had a partial knee replacement  by Dr. Dante Tavera and has been using the cane since. XR imagine has been reviewed with the patient. The patient denies any kidney, lung, or heart diseases. He denies nerve or muscle issues. The patient has h/o hypertension and GERD. He takes Prilosec for his GERD, and has not been instructed by his GI doctor to not take NSAIDs.      Visit Vitals  BP (!) 161/101   Pulse 75   Temp 97.7 °F (36.5 °C) (Oral)   Resp 16   Ht 6' (1.829 m)   Wt 210 lb 9.6 oz (95.5 kg)   SpO2 98%   BMI 28.56 kg/m²     ANKLE/FOOT right    Psychiatry: Alert, Oriented x 3; Speech normal in context and clarity,            Memory intact grossly, no involuntary movements - tremors, no dementia  Gait: Slow, using a cane  Tenderness: Exquisite tenderness to the insertional portion of the Achilles tendon. NT to the posterolateral fibula. Cutaneous: Reddened hue along the Achilles tendon, warm to the touch  Joint Motion: Painful DF in the heel  Joint / Tendon Stability: No Ankle or Subtalar instability or joint laxity. No peroneal sublux ability or dislocation  Alignment: Forefoot, Midfoot, Hindfoot WNL. Neuro Motor/Sensory: NL/NL. Vascular: NL foot/ankle pulses. Lymphatics: No extremity lymphedema, No calf swelling, no tenderness to calf muscles. CHART REVIEW     Past Medical History:   Diagnosis Date    Arthritis     Elevated PSA     Hypertension     Renal insufficiency     S/P rotator cuff repair 10/15/2015    Sleep apnea     patient states resolved after surgery     Current Outpatient Medications   Medication Sig    propranolol LA (INDERAL LA) 60 mg SR capsule take 1 capsule by mouth once daily    cholecalciferol, vitamin D3, 2,000 unit tab Take 2,000 Units by mouth daily.  omeprazole (PRILOSEC) 20 mg capsule Take 20 mg by mouth daily as needed (indigestion).  docusate sodium (COLACE) 100 mg capsule Take 1 Cap by mouth two (2) times a day for 90 days. Indications: constipation    polyethylene glycol (MIRALAX) 17 gram packet Take 1 Packet by mouth daily. Indications: constipation    alprostadil (MUSE) 8,035 mcg supp 1 application  Every day prn    propranolol (INDERAL) 20 mg tablet Take 60 mg by mouth daily.  oxyCODONE IR (ROXICODONE) 5 mg immediate release tablet Take 1-2 Tabs by mouth every four (4) hours as needed.  Max Daily Amount: 60 mg. (Patient taking differently: Take 1-2 Tabs by mouth every four (4) hours as needed for Pain.)     No current facility-administered medications for this visit. Allergies   Allergen Reactions    Penicillins Other (comments)     paralyzation    Sulfa (Sulfonamide Antibiotics) Unknown (comments)     Past Surgical History:   Procedure Laterality Date    HX HEENT      surgery for sleep apnea    HX HERNIA REPAIR      x2    HX SHOULDER ARTHROSCOPY Right 1/13/14    Dr. Linda Valdez ARTHROSCOPY Left 2016    HX UROLOGICAL  10/21/2016    Prostate Biopsy with Dr. Mona Hartmann History     Occupational History    Not on file   Tobacco Use    Smoking status: Former Smoker    Smokeless tobacco: Never Used    Tobacco comment: quit in 1976   Substance and Sexual Activity    Alcohol use: Yes     Alcohol/week: 0.0 oz     Comment: occasionally    Drug use: No    Sexual activity: Not on file     Family History   Problem Relation Age of Onset    Diabetes Other     Hypertension Other     Heart Disease Other     Arthritis-osteo Other         REVIEW OF SYSTEMS : 12/12/2018  ALL BELOW ARE Negative except : SEE HPI     Constitutional: Negative for fever, chills and weight loss. Neg Weight Loss  Cardiovascular: Negative for chest pain, claudication and leg swelling. SOB, GALVAN   Gastrointestinal/Urological: Negative for  pain, N/V/D/C, Blood in stool or urine,dysuria                         Hematuria, Incontinence, pelvic pain  Musculoskeletal: see HPI. Neurological: Negative for dizziness and weakness, headaches,Visual Changes             Confusion,  Or Seizures,   Psychiatric/Behavioral: Negative for depression, memory loss and substance abuse. Extremities:  Negative for hair changes, rash or skin lesion changes. Hematologic: Negative for Bleeding problems, bruising, pallor or swollen lymph nodes.   Peripheral Vascular: No calf pain, vascular vein tenderness to calf pain              No calf throbbing, posterior knee throbbing pain     DIAGNOSTIC IMAGING     No notes on file    Please see above section of this report. I have personally reviewed the results of the above study. The interpretation of this study is my professional opinion. Written by Soraida Alvarez, as dictated by Dr. Lyndsey Cormier. I, Dr. Lyndsey Cormier, confirm that all documentation is accurate.

## 2018-12-12 NOTE — PATIENT INSTRUCTIONS
Please follow up in 1 month. You are advised to contact us if your condition worsens. Achilles Tendon: Exercises  Your Care Instructions  Here are some examples of exercises for your achilles tendon. Start each exercise slowly. Ease off the exercise if you start to have pain. Your doctor or physical therapist will tell you when you can start these exercises and which ones will work best for you. Toe stretch  Toe stretch    1. Sit in a chair, and extend your affected leg so that your heel is on the floor. 2. With your hand, reach down and pull your big toe up and back. Pull toward your ankle and away from the floor. 3. Hold the position for at least 15 to 30 seconds. 4. Repeat 2 to 4 times a session, several times a day. Calf-plantar fascia stretch    1. Sit with your legs extended and knees straight. 2. Place a towel around your foot just under the toes. 3. Hold each end of the towel in each hand, with your hands above your knees. 4. Pull back with the towel so that your foot stretches toward you. 5. Hold the position for at least 15 to 30 seconds. 6. Repeat 2 to 4 times a session, up to 5 sessions a day. Floor stretch    1. Stand about 2 feet from a wall, and place your hands on the wall at about shoulder height. Or you can stand behind a chair, placing your hands on the back of it for balance. 2. Step back with the leg you want to stretch. Keep the leg straight, and press your heel into the floor with your toe turned slightly in.  3. Lean forward, and bend your other leg slightly. Feel the stretch in the Achilles tendon of your back leg. Hold for at least 15 to 30 seconds. 4. Repeat 2 to 4 times a session, up to 5 sessions a day. Stair stretch    1. Stand with the balls of both feet on the edge of a step or curb (or a medium-sized phone book). With at least one hand, hold onto something solid for balance, such as a banister or handrail.   2. Keeping your affected leg straight, slowly let that heel hang down off of the step or curb until you feel a stretch in the back of your calf and/or Achilles area. Some of your weight should still be on the other leg. 3. Hold this position for at least 15 to 30 seconds. 4. Repeat 2 to 4 times a session, up to 5 times a day or whenever your Achilles tendon starts to feel tight. This stretch can also be done with your knee slightly bent. Strength exercise    1. This exercise will get you started on building strength after an Achilles tendon injury. Your doctor or physical therapist can help you move on to more challenging exercises as you heal and get stronger. 2. Stand on a step with your heel off the edge of the step. Hold on to a handrail or wall for balance. 3. Push up on your toes, then slowly count to 10 as you lower yourself back down until your heel is below the step. If it hurts to push up on your toes, try putting most of your weight on your other foot as you push up, or try using your arms to help you. If you can't do this exercise without causing pain, stop the exercise and talk to your doctor. 4. Repeat the exercise 8 to 12 times, half with the knee straight and half with the knee bent. Follow-up care is a key part of your treatment and safety. Be sure to make and go to all appointments, and call your doctor if you are having problems. It's also a good idea to know your test results and keep a list of the medicines you take. Where can you learn more? Go to http://cynthia-blanca.info/. Enter H095 in the search box to learn more about \"Achilles Tendon: Exercises. \"  Current as of: November 29, 2017  Content Version: 11.8  © 6579-4639 [employer]. Care instructions adapted under license by [employer] (which disclaims liability or warranty for this information).  If you have questions about a medical condition or this instruction, always ask your healthcare professional. Jose Rome disclaims any warranty or liability for your use of this information.

## 2018-12-12 NOTE — PROGRESS NOTES
1. Have you been to the ER, urgent care clinic since your last visit? Hospitalized since your last visit? No    2. Have you seen or consulted any other health care providers outside of the [address] [place] [address] since your last visit? Include any pap smears or colon screening.  No

## 2018-12-27 ENCOUNTER — TELEPHONE (OUTPATIENT)
Dept: ORTHOPEDIC SURGERY | Facility: CLINIC | Age: 68
End: 2018-12-27

## 2018-12-27 NOTE — TELEPHONE ENCOUNTER
Pt states he has a Dentist appt for a routine cleaning but he had a partial knee replacement back in September of this year. Does he need an antibiotic? If so, what? The dentist gave him an rx for Clindamyacin 300mg -take  2 tablets an hour prior to the appt. He wants to know if this is correct? Pt can be reached at [phone].

## 2019-05-06 ENCOUNTER — OFFICE VISIT (OUTPATIENT)
Dept: ORTHOPEDIC SURGERY | Facility: CLINIC | Age: 69
End: 2019-05-06

## 2019-05-06 VITALS
WEIGHT: 203 LBS | TEMPERATURE: 95.4 F | OXYGEN SATURATION: 95 % | SYSTOLIC BLOOD PRESSURE: 140 MMHG | HEART RATE: 65 BPM | HEIGHT: 72 IN | DIASTOLIC BLOOD PRESSURE: 86 MMHG | RESPIRATION RATE: 16 BRPM | BODY MASS INDEX: 27.5 KG/M2

## 2019-05-06 DIAGNOSIS — Z96.659 AFTERCARE FOLLOWING KNEE JOINT REPLACEMENT SURGERY, UNSPECIFIED LATERALITY: Primary | ICD-10-CM

## 2019-05-06 DIAGNOSIS — Z47.1 AFTERCARE FOLLOWING KNEE JOINT REPLACEMENT SURGERY, UNSPECIFIED LATERALITY: Primary | ICD-10-CM

## 2019-05-06 NOTE — PROGRESS NOTES
HISTORY OF PRESENT ILLNESS:  Blossom Garcia returns six months status post his left knee hemiarthroplasty with [employer]. He is doing very well today. He is pleased with his surgical outcome to date. He indicates the knee is not feeling as \"alien\" as it was immediately following surgery. He has completed all of his outpatient physical therapy. He is full weightbearing of his left lower extremity. He has not done any kneeling for fear of damaging the internal prosthesis. He does no crawling in his daily life activities. PHYSICAL EXAM:  He has excellent motion today in excess of 115ø of flexion against resistance and his extension is full. There is no instability on varus or valgus stressing. The patella does track midline. There is no calf tenderness or evidence of DVT. RADIOGRAPHS:  X-rays, two views of the left knee, reveal intact, well-seated, anatomically aligned, left hemiarthroplasty with [employer]. PLAN:   The patient is going to follow back in one year. He does have some subtle changes on his AP imaging of the right knee associated with the medial joint space, and he tells me that \"he feels the arthritis beginning. \"  If he needs care for the right knee, certainly, we will see him back as soon as possible. Otherwise, today, all his questions were answered to his satisfaction. A copy of his x-rays was reviewed and provided.

## 2019-07-12 ENCOUNTER — OFFICE VISIT (OUTPATIENT)
Dept: ORTHOPEDIC SURGERY | Age: 69
End: 2019-07-12

## 2019-07-12 VITALS
HEIGHT: 72 IN | OXYGEN SATURATION: 97 % | BODY MASS INDEX: 28.58 KG/M2 | DIASTOLIC BLOOD PRESSURE: 104 MMHG | SYSTOLIC BLOOD PRESSURE: 164 MMHG | WEIGHT: 211 LBS | HEART RATE: 67 BPM | TEMPERATURE: 99.6 F

## 2019-07-12 DIAGNOSIS — M19.032 ARTHRITIS OF WRIST, LEFT: Primary | ICD-10-CM

## 2019-07-12 NOTE — PATIENT INSTRUCTIONS
Osteoarthritis: Care Instructions  Your Care Instructions    Arthritis is a common health problem in which the joints are inflamed. There are several kinds of arthritis. Osteoarthritis is caused by a breakdown of cartilage, the hard, thick tissue that cushions the joints. It causes pain, stiffness, and swelling, often in the spine, fingers, hips, and knees. Osteoarthritis can happen at any age, but it is most common in older people. Osteoarthritis never goes away completely, but it can be controlled. Medicine and home treatment can reduce the pain and prevent the arthritis from getting worse. Follow-up care is a key part of your treatment and safety. Be sure to make and go to all appointments, and call your doctor if you are having problems. It's also a good idea to know your test results and keep a list of the medicines you take. How can you care for yourself at home? · Take a warm shower or bath in the morning to relieve stiffness. Avoid sitting still afterwards. · If the joint is not swollen, use moist heat, like a warm, damp towel, for 20 to 30 minutes, 2 or 3 times a day. Do not use heat on a swollen joint. · If the joint is swollen, use ice or cold packs for 10 to 20 minutes, once an hour. Cold will help relieve pain and reduce inflammation. Put a thin cloth between the ice and your skin. · To prevent stiffness, gently move the joint through its full range of motion several times a day. · If the joint hurts, avoid activities that put a strain on it for a few days. Take rest breaks throughout the day. · Get regular exercise. Walking, swimming, yoga, biking, tyson chi, and water aerobics are good exercises that are gentle on the joints. · Reach and stay at a healthy weight. If you need to lose or maintain weight, regular exercise and a healthy diet will help. Extra weight can strain the joints, especially the knees and hips, and make the pain worse. Losing even a few pounds may help.   · Take pain medicines exactly as directed. ? If the doctor gave you a prescription medicine for pain, take it as prescribed. ? If you are not taking a prescription pain medicine, ask your doctor if you can take an over-the-counter medicine. When should you call for help? Call your doctor now or seek immediate medical care if:    · The pain is so bad that you cannot use the joint.     · You have sudden back pain with weakness in your legs or loss of bowel or bladder control.     · Your stools are black and tarlike or have streaks of blood.     · You have severe pain and swelling in more than one joint.    Watch closely for changes in your health, and be sure to contact your doctor if:    · You have side effects from the medicines, like belly pain, ongoing heartburn, or nausea.     · Joint pain continues for more than 6 weeks, and home treatment is not helping. Where can you learn more? Go to http://cynthia-blanca.info/. Enter N305 in the search box to learn more about \"Osteoarthritis: Care Instructions. \"  Current as of: Sammie 10, 2018  Content Version: 11.9  © 1750-7627 [employer]. Care instructions adapted under license by [employer] (which disclaims liability or warranty for this information). If you have questions about a medical condition or this instruction, always ask your healthcare professional. [address] any warranty or liability for your use of this information.

## 2019-07-12 NOTE — PROGRESS NOTES
Bridget Chaudhry is a 71 y.o. male right handed retiree. Worker's Compensation and legal considerations: none filed. Vitals:    07/12/19 1541   BP: (!) 164/104   Pulse: 67   Temp: 99.6 °F (37.6 °C)   TempSrc: Oral   SpO2: 97%   Weight: 211 lb (95.7 kg)   Height: 6' (1.829 m)   PainSc:   5           No chief complaint on file. HPI: Patient comes in today with complaints of left wrist pain. He reports been off and on for quite some time but over the past week it is increased when he was getting up from a chair and felt a pop. He denies any other specific injuries. Date of onset: 7/7/2019    Injury: No    Prior Treatment:  No    Numbness/ Tingling: No    ROS: Review of Systems - General ROS: negative  Respiratory ROS: no cough, shortness of breath, or wheezing  Cardiovascular ROS: no chest pain or dyspnea on exertion  Musculoskeletal ROS: positive for - pain in wrist - left  Neurological ROS: negative  Dermatological ROS: negative    Past Medical History:   Diagnosis Date    Arthritis     Elevated PSA     Hypertension     Renal insufficiency     S/P rotator cuff repair 10/15/2015    Sleep apnea     patient states resolved after surgery       Past Surgical History:   Procedure Laterality Date    HX HEENT      surgery for sleep apnea    HX HERNIA REPAIR      x2    HX SHOULDER ARTHROSCOPY Right 1/13/14    Dr. Ignacio Living ARTHROSCOPY Left 2016    HX UROLOGICAL  10/21/2016    Prostate Biopsy with Dr. Emanuel Euceda OF SHOULDER         Current Outpatient Medications   Medication Sig Dispense Refill    triamcinolone acetonide (KENALOG) 10 mg/mL injection 1 mL by Intra artICUlar route once for 1 dose. 1 Vial 0    propranolol LA (INDERAL LA) 120 mg SR capsule take 1 capsule by mouth once daily  0    cholecalciferol, vitamin D3, 2,000 unit tab Take 2,000 Units by mouth daily.       omeprazole (PRILOSEC) 20 mg capsule Take 20 mg by mouth daily as needed (indigestion). 0    polyethylene glycol (MIRALAX) 17 gram packet Take 1 Packet by mouth daily. Indications: constipation 30 Packet 0    alprostadil (MUSE) 0,610 mcg supp 1 application  Every day prn 6 Suppository 12       Allergies   Allergen Reactions    Penicillins Other (comments)     paralyzation    Sulfa (Sulfonamide Antibiotics) Unknown (comments)         PE:     Left Wrist:  There is tenderness to palpation about the radiocarpal joint especially about the radius scaphoid joint. Sensation is intact distally and cap refill is brisk. There is some pain and soreness with range of motion however minimal.    Imaging:     Plain films about the left wrist are positive for degenerative changes of the radiocarpal joint. His wrist appears to be ulnar negative with some osteoarthritic changes at the DRUJ as well. ICD-10-CM ICD-9-CM    1.  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 performed immediately prior to start of procedure:   Aki DIXON DO, have performed the following reviews on [facility] prior to the start of the procedure:            * Patient was identified by name and date of birth   * Agreement on procedure being performed was verified  * Risks and Benefits explained to the patient  * Procedure site verified and marked as necessary  * Patient was positioned for comfort  * Consent was signed and verified     Time: 16:00      Date of procedure: 7/12/2019    Procedure performed by: Rosita Mckeon DO    Provider assisted by: [place] LPN    Patient assisted by: self    How tolerated by patient: tolerated the procedure well with no complications    Post Procedural Pain Scale: 0 - No Hurt    Comments: none    Procedure:  After consent was obtained, using sterile technique the joint was prepped. Local anesthetic used: 1% lidocaine. Kenalog 5 mg and was then injected and the needle withdrawn. The procedure was well tolerated. The patient is asked to continue to rest the area for a few more days before resuming regular activities. It may be more painful for the first 1-2 days. Watch for fever, or increased swelling or persistent pain in the joint. Call or return to clinic prn if such symptoms occur or there is failure to improve as anticipated.

## 2019-09-03 ENCOUNTER — HOSPITAL ENCOUNTER (OUTPATIENT)
Dept: NON INVASIVE DIAGNOSTICS | Age: 69
Discharge: HOME | End: 2019-09-03
Attending: FAMILY MEDICINE
Payer: MEDICARE

## 2019-09-03 VITALS
DIASTOLIC BLOOD PRESSURE: 90 MMHG | HEIGHT: 72 IN | SYSTOLIC BLOOD PRESSURE: 153 MMHG | BODY MASS INDEX: 27.5 KG/M2 | WEIGHT: 203 LBS

## 2019-09-03 DIAGNOSIS — R94.31: ICD-10-CM

## 2019-09-03 LAB
STRESS BASELINE HR: 61 BPM
STRESS ESTIMATED WORKLOAD: 1 METS
STRESS EXERCISE DUR MIN: NORMAL
STRESS PEAK DIAS BP: 91 MMHG
STRESS PEAK SYS BP: 155 MMHG
STRESS PERCENT HR ACHIEVED: 62 %
STRESS POST PEAK HR: 93 BPM
STRESS RATE PRESSURE PRODUCT: NORMAL BPM*MMHG
STRESS ST DEPRESSION: 0 MM
STRESS ST ELEVATION: 0 MM
STRESS TARGET HR: 151 BPM

## 2019-09-03 PROCEDURE — 74011250636 HC RX REV CODE- 250/636

## 2019-09-03 PROCEDURE — 93017 CV STRESS TEST TRACING ONLY: CPT

## 2019-09-03 RX ORDER — SODIUM CHLORIDE 9 MG/ML
250 INJECTION, SOLUTION INTRAVENOUS ONCE
Status: COMPLETED | OUTPATIENT
Start: 2019-09-03 | End: 2019-09-03

## 2019-09-03 RX ADMIN — SODIUM CHLORIDE 250 ML: 900 INJECTION, SOLUTION INTRAVENOUS at 10:05

## 2019-09-03 RX ADMIN — REGADENOSON 0.4 MG: 0.08 INJECTION, SOLUTION INTRAVENOUS at 10:05

## 2019-09-03 NOTE — PROGRESS NOTES
Patient was given 10.7 milliCuries of 99mTc-Sestamibi for the resting images. Patient was also given 33.0 milliCuries of 99mTc-Sestamibi for the stress images. Injected with 0.4mg Lexiscan. Patient's armband was discarded and shredded.

## 2019-09-18 ENCOUNTER — OFFICE VISIT (OUTPATIENT)
Dept: ORTHOPEDIC SURGERY | Facility: CLINIC | Age: 69
End: 2019-09-18

## 2019-09-18 VITALS
SYSTOLIC BLOOD PRESSURE: 145 MMHG | BODY MASS INDEX: 27.9 KG/M2 | DIASTOLIC BLOOD PRESSURE: 96 MMHG | HEIGHT: 72 IN | RESPIRATION RATE: 16 BRPM | HEART RATE: 70 BPM | OXYGEN SATURATION: 97 % | WEIGHT: 206 LBS | TEMPERATURE: 95.6 F

## 2019-09-18 DIAGNOSIS — Z96.659 AFTERCARE FOLLOWING KNEE JOINT REPLACEMENT SURGERY, UNSPECIFIED LATERALITY: Primary | ICD-10-CM

## 2019-09-18 DIAGNOSIS — Z47.1 AFTERCARE FOLLOWING KNEE JOINT REPLACEMENT SURGERY, UNSPECIFIED LATERALITY: Primary | ICD-10-CM

## 2019-09-18 DIAGNOSIS — M19.032 ARTHRITIS OF WRIST, LEFT: ICD-10-CM

## 2019-09-18 RX ORDER — DICLOFENAC SODIUM 10 MG/G
GEL TOPICAL
Qty: 200 G | Refills: 3 | Status: SHIPPED | OUTPATIENT
Start: 2019-09-18 | End: 2021-08-11

## 2019-09-18 NOTE — PROGRESS NOTES
HISTORY OF PRESENT ILLNESS:  Perla Gonzalez returns to the office following for left knee concerns, as well as left wrist pain. He was seen under the care of Dr. Mere Roa for left wrist osteoarthritis and provided a cortisone injection in the radial carpal space. The injection did not help his symptoms for more than about four days. He was not pleased with Dr. Marita Mustafa encounter because he felt like he did not give him time to fully assess his complaints. Regarding his left knee, he is status post left knee hemiarthroplasty and noted several weeks ago some clicking starting to the left knee. His date of surgery was September 25, 2018. He has done well without pain associated to the knee but is worried the clicking may be a sign of something failing inside. REVIEW OF SYSTEMS:  No chest pain. Chronic exertional dyspnea, heavy in nature. No fever, chills, or night sweats. No rash, no itching. No nausea and no vomiting. He is not a diabetic. ALLERGIES:  PENICILLIN, SULFA. PHYSICAL EXAM:  He is a healthy-appearing, well-developed, well-nourished, pleasant, 70-year-old, obese,  male, atraumatic, normocephalic, alert, and oriented times three, sitting in the chair comfortably. Examination of the left upper extremity reveals the volar splint is intact and is removed to reveal negative Tinel's sign. He has a negative Finkelstein test.  He, on passive extension, is noted at 60ø and flexion is 40ø. He does have thumb and index finger side radiocarpal pain. His  strength is intact but a bit weaker on the left hand when compared to the right. He does have osteoarthritic changes noted in the second, third, fourth, and metacarpophalangeal joints. Distal sensation is intact fully to the left upper extremity. Capillary refill is brisk and less than 2 seconds to the left upper extremity. The left knee reveals no warmth, erythema, edema, ecchymosis, or effusion.   Just medial to the parasagittal line of the left knee anteriorly, there is a well-healed cranial/caudal oriented, surgical incision nontender. He does have excellent flexion today noted at 110ø but is lacking 3ø to full extension actively. The patella does track midline. However, there is some crepitation through ranging. Distal sensation is intact fully to the left lower extremity. Capillary refill is brisk and less than 2 seconds to the left lower extremity. RADIOGRAPHS:  X-rays of the knee today reveal intact left hemiarthroplasty with no evidence of periprosthetic fracturing or dislocation. No hardware failure. Anatomical alignment is noted. There is, on lateral view, osteoarthritic spurring seen at the superior and inferior poles of the patella. This is generally unchanged from his previous imaging from just postoperative last year. IMPRESSION:      1. 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 2. Left hand wrist pain with a history of radiocarpal osteoarthritis, failure of conservative measures directed under the care of Dr. Rachel Mistry. The patient is to discontinue his volar splint and instead try 2 gm of Voltaren gel topically to the left wrist three times a day. PLAN:   We are going to see him back on a prn basis. I encouraged him to not worry about the popping and cracking of the left knee associated with likely the chondromalacia of the patella since the pain only comes and goes and the popping and cracking also is intermittent. With the wrist is mind, he did not wish to proceed with any further aggressive treatments, and we will discontinue his splint as needed. Again, we will see him back on a prn basis. Today, all his questions were answered to his satisfaction. A copy of his x-rays was reviewed and provided.

## 2019-09-18 NOTE — PROGRESS NOTES
1. Have you been to the ER, urgent care clinic since your last visit? Hospitalized since your last visit? NO    2. Have you seen or consulted any other health care providers outside of the [address] [place] [address] since your last visit? Include any pap smears or colon screening.  NO

## 2020-05-27 ENCOUNTER — HOSPITAL ENCOUNTER (OUTPATIENT)
Dept: NUCLEAR MEDICINE | Age: 70
Discharge: HOME | End: 2020-05-27
Attending: NURSE PRACTITIONER
Payer: MEDICARE

## 2020-05-27 ENCOUNTER — HOSPITAL ENCOUNTER (OUTPATIENT)
Dept: CT IMAGING | Age: 70
Discharge: HOME | End: 2020-05-27
Attending: NURSE PRACTITIONER
Payer: MEDICARE

## 2020-05-27 DIAGNOSIS — C61 PROSTATE CANCER (HCC): ICD-10-CM

## 2020-05-27 DIAGNOSIS — R97.21 RISING PSA FOLLOWING TREATMENT FOR MALIGNANT NEOPLASM OF PROSTATE: ICD-10-CM

## 2020-05-27 LAB — CREAT UR-MCNC: 0.9 MG/DL (ref 0.6–1.3)

## 2020-05-27 PROCEDURE — 82565 ASSAY OF CREATININE: CPT

## 2020-05-27 PROCEDURE — 78306 BONE IMAGING WHOLE BODY: CPT

## 2020-05-27 PROCEDURE — 74011636320 HC RX REV CODE- 636/320: Performed by: NURSE PRACTITIONER

## 2020-05-27 PROCEDURE — 74177 CT ABD & PELVIS W/CONTRAST: CPT

## 2020-05-27 RX ADMIN — IOPAMIDOL 90 ML: 612 INJECTION, SOLUTION INTRAVENOUS at 07:30

## 2020-07-17 ENCOUNTER — HOSPITAL ENCOUNTER (OUTPATIENT)
Age: 70
Discharge: HOME | End: 2020-07-17
Attending: FAMILY MEDICINE
Payer: MEDICARE

## 2020-07-17 DIAGNOSIS — E22.1 IDIOPATHIC HYPERPROLACTINEMIA (HCC): ICD-10-CM

## 2020-07-17 PROCEDURE — A9577 INJ MULTIHANCE: HCPCS

## 2020-07-17 PROCEDURE — 74011250636 HC RX REV CODE- 250/636

## 2020-07-17 PROCEDURE — 70553 MRI BRAIN STEM W/O & W/DYE: CPT

## 2020-07-17 RX ADMIN — GADOBENATE DIMEGLUMINE 8.5 ML: 529 INJECTION, SOLUTION INTRAVENOUS at 16:00

## 2020-08-07 NOTE — ED PROVIDER NOTES
CC:  Ronak Walters is here today for a physical.    Medications: medications verified and updated  Refills needed today?  NO  Denies known Latex allergy or symptoms of Latex sensitivity.  Patient would like communication of their results via:    Cell Phone:   Telephone Information:   Mobile [phone]     Okay to leave a message containing results? Yes  Tobacco history: verified    Health Maintenance Due   Topic Date Due   • Shingles Vaccine (1 of 2) 01/26/2016     Patient is due for topics as listed above but is not proceeding with them. [facility] status addressed. Patient Active.          HPI Comments: Scott Ma III. Corrinne Maine is a 77year old male with a PMHX Elevated PSA, Renal Insufficiency, Arthritis, and Hypertension arrived to ER c/o bladder spasms and hematuria in garvey catheter bag. Patient reports Urologist (Dr. Paulina Lopez) performed cryoablation of prostate on 04/06/2017 and noticed blood/clots in his catheter bag yesterday. Patient reports the last time he had urinary output was 0300 this am.  Patient also reports he has not had much fluid intake today but has been eating well. Denies penile pain, scrotal/testical pain/swelling, abdominal pain, nausea, or vomiting. Denies fever, chills, headache, dizziness, Cp,SOB, diarrhea, flank pain, back pain, or any other concerns. Patient is a 77 y.o. male presenting with abdominal pain and urinary catheter problem. The history is provided by the patient. Abdominal Pain    Pertinent negatives include no diarrhea, no nausea, no vomiting and no constipation. Urinary Catheter Problem   Pertinent negatives include no abdominal pain. Past Medical History:   Diagnosis Date    Arthritis     Elevated PSA     Hypertension     Renal insufficiency     S/P rotator cuff repair 10/15/2015       Past Surgical History:   Procedure Laterality Date    HX SHOULDER ARTHROSCOPY Right 1/13/14    Dr. Ori Macias HX SHOULDER ARTHROSCOPY Left 2016    HX UROLOGICAL  10/21/2016    Prostate Biopsy with Dr. Hill Saavedra         Family History:   Problem Relation Age of Onset    Diabetes Other     Hypertension Other     Heart Disease Other     Arthritis-osteo Other        Social History     Social History    Marital status: LEGALLY      Spouse name: N/A    Number of children: N/A    Years of education: N/A     Occupational History    Not on file.      Social History Main Topics    Smoking status: Former Smoker    Smokeless tobacco: Never Used      Comment: quit in 1976    Alcohol use 0.0 oz/week     0 Standard drinks or equivalent per week Comment: occasionally    Drug use: No    Sexual activity: Not on file     Other Topics Concern    Not on file     Social History Narrative         ALLERGIES: Penicillins and Sulfa (sulfonamide antibiotics)    Review of Systems   Constitutional: Negative. HENT: Negative. Eyes: Negative. Respiratory: Negative. Cardiovascular: Negative. Gastrointestinal: Negative. Negative for abdominal distention, abdominal pain, blood in stool, constipation, diarrhea, nausea and vomiting. Genitourinary:        C/O bladder spasm and hematuria in garvey catheter   Musculoskeletal: Negative. Skin: Negative. Neurological: Negative. Vitals:    04/08/17 1623 04/08/17 1644   BP: 118/73    Pulse: (!) 114 88   Resp: 16 16   Temp: 98 °F (36.7 °C)    SpO2: 96%    Weight: 93 kg (205 lb)    Height: 6' (1.829 m)             Physical Exam   Constitutional: He is oriented to person, place, and time. He appears well-developed and well-nourished. No distress. HENT:   Right Ear: Hearing, tympanic membrane, external ear and ear canal normal.   Left Ear: Hearing, tympanic membrane, external ear and ear canal normal.   Nose: Nose normal.   Mouth/Throat: Uvula is midline. Mucous membranes are dry. No oropharyngeal exudate, posterior oropharyngeal edema, posterior oropharyngeal erythema or tonsillar abscesses. Cardiovascular: Normal rate, regular rhythm and normal heart sounds. Exam reveals no gallop and no friction rub. No murmur heard. Pulmonary/Chest: Effort normal and breath sounds normal. No respiratory distress. He has no wheezes. He has no rales. He exhibits no tenderness. Abdominal: Soft. Normal appearance and bowel sounds are normal. He exhibits no distension and no mass. There is no tenderness. There is no rigidity, no rebound, no guarding, no CVA tenderness, no tenderness at McBurney's point and negative Ho's sign. Genitourinary: Testes normal and penis normal. Cremasteric reflex is present. Right testis shows no mass, no swelling and no tenderness. Right testis is descended. Cremasteric reflex is not absent on the right side. Left testis shows no mass, no swelling and no tenderness. Left testis is descended. Cremasteric reflex is not absent on the left side. Genitourinary Comments: Bert CORRAL at bedside for genital examination. No urine in bag   Musculoskeletal: Normal range of motion. Neurological: He is alert and oriented to person, place, and time. Skin: Skin is warm and dry. He is not diaphoretic. Psychiatric: He has a normal mood and affect. His behavior is normal. Judgment and thought content normal.   Nursing note and vitals reviewed. MDM  Number of Diagnoses or Management Options  Urinary obstruction:   Diagnosis management comments: DDX:  UTI  Urinary Obstruction    Clinical Impression/Plan:    16:50 PM:  Consulted and discussed case with Urologist on call (Dr. Jack Dudley). Standard discussion included reason for patient ER, PMHX, V/S, ROS, and PE. Dr. Jack Dudley requesting to irrigate garvey catheter and d/c patient home with ditropan 5mg po one tablet Q6 hrs prn for bladder spasms. Dr. Jack Dudley requesting medication discontinued 12 hours before garvey is removed next Friday. Lab ordered. 16:57 PM:  Francy Litten RN reports she bladder scan patient and found 14 ml of urine. 17:35 PM:  Francy Litten RN irrigated 500 ml sterile normal saline. She reports irrigated small clots and hematuria. Urine is now yellow in color post irrigation. 18:50 PM:  Patient stable condition. He reports feeling much better with no bladder spasms. Reviewed lab results with patient. Answered questions. Follow up with PCP and Urologist in 2-4 days. Patient verbalizes understanding of discontinuing ditropan 5mg 12 hours before removal of garvey catheter next Friday. If symptoms worsen or have any other concerns, return to ER. Patient verbalizes d/c instructions.               Amount and/or Complexity of Data Reviewed  Clinical lab tests: ordered and reviewed  Review and summarize past medical records: yes  Discuss the patient with other providers: yes    Risk of Complications, Morbidity, and/or Mortality  Presenting problems: moderate  Diagnostic procedures: moderate  Management options: moderate      ED Course       Procedures

## 2020-08-25 ENCOUNTER — OFFICE VISIT (OUTPATIENT)
Dept: ORTHOPEDIC SURGERY | Facility: CLINIC | Age: 70
End: 2020-08-25

## 2020-08-25 VITALS
SYSTOLIC BLOOD PRESSURE: 114 MMHG | HEIGHT: 72 IN | DIASTOLIC BLOOD PRESSURE: 78 MMHG | OXYGEN SATURATION: 97 % | WEIGHT: 191 LBS | TEMPERATURE: 96.9 F | BODY MASS INDEX: 25.87 KG/M2 | HEART RATE: 67 BPM

## 2020-08-25 DIAGNOSIS — M19.032 ARTHRITIS OF WRIST, LEFT: Primary | ICD-10-CM

## 2020-08-25 NOTE — PROGRESS NOTES
Mr. Sarahi Gaston returns to the office complaining of worsening left wrist pain. He was seen under the care of Dr. Jordana Garcia for similar discomfort. He was placed in a brace which was discontinued by COLE Jones secondary to increased pain. Pain is at the base of the left wrist and the patient reports when he saw Dr. Roxy Dudley he felt like Dr. Roxy Dudley was a bit rushed and did not appreciate his symptoms. Today we discussed opportunities for care to include a referral to a alternative hand surgeon and/or an opportunity for Dr. Royx Dudley to discuss with Mr. Sarahi Gaston in detail his symptoms and develop a plan of care. Mr. Sarahi Gaston indicated that he would be happy to give Dr. Roxy Dudley an additional opportunity to recommend continuity of care    X-rays: Fleurette [address] 8/25/2020 left wrist reveals radiocarpal narrowing as well as ulnocarpal narrowing. There is severe first CMC joint osteoarthritis. No soft tissue calcifications noted. No other osseous deformities lesions or masses noted. Plan: Patient to follow-up with Dr. Jordana Garcia within a week of today's visit.

## 2020-09-02 ENCOUNTER — OFFICE VISIT (OUTPATIENT)
Dept: ORTHOPEDIC SURGERY | Facility: CLINIC | Age: 70
End: 2020-09-02

## 2020-09-02 VITALS
SYSTOLIC BLOOD PRESSURE: 140 MMHG | WEIGHT: 190 LBS | DIASTOLIC BLOOD PRESSURE: 85 MMHG | HEIGHT: 72 IN | TEMPERATURE: 97.5 F | HEART RATE: 60 BPM | BODY MASS INDEX: 25.73 KG/M2

## 2020-09-02 DIAGNOSIS — M18.12 PRIMARY OSTEOARTHRITIS OF FIRST CARPOMETACARPAL JOINT OF LEFT HAND: ICD-10-CM

## 2020-09-02 DIAGNOSIS — M65.832 EXTENSOR TENOSYNOVITIS OF LEFT WRIST: Primary | ICD-10-CM

## 2020-09-02 NOTE — PROGRESS NOTES
Jeffy Montes III is a 79 y.o. male right handed retiree. Worker's Compensation and legal considerations: none filed. Vitals:    09/02/20 1436   BP: 140/85   Pulse: 60   Temp: 97.5 °F (36.4 °C)   Weight: 190 lb (86.2 kg)   Height: 6' (1.829 m)   PainSc:   3   PainLoc: Wrist           Chief Complaint   Patient presents with    Wrist Pain     Lt       HPI: Patient comes in today for follow-up approximately 1 year after receiving a left wrist joint injection. He says this only helped for about a day. He reports his pain today to be localized to the wrist joint as well as the base of the thumb. He localizes the pain to the ulnar aspect of the wrist joint. Initial HPI: Patient comes in today with complaints of left wrist pain. He reports been off and on for quite some time but over the past week it is increased when he was getting up from a chair and felt a pop. He denies any other specific injuries.     Date of onset: 7/7/2019    Injury: No    Prior Treatment:  Yes: Comment: Left wrist DRUJ injection    Numbness/ Tingling: No    ROS: Review of Systems - General ROS: negative  Respiratory ROS: no cough, shortness of breath, or wheezing  Cardiovascular ROS: no chest pain or dyspnea on exertion  Musculoskeletal ROS: positive for - pain in wrist - left  Neurological ROS: negative  Dermatological ROS: negative    Past Medical History:   Diagnosis Date    Arthritis     Elevated PSA     Hypertension     Hypothyroid     Personal history of prostate cancer     Pituitary tumor     Renal insufficiency     S/P rotator cuff repair 10/15/2015    Sleep apnea     patient states resolved after surgery       Past Surgical History:   Procedure Laterality Date    HX HEENT      surgery for sleep apnea    HX HERNIA REPAIR      x2    HX SHOULDER ARTHROSCOPY Right 1/13/14    Dr. Yaritza Harvey ARTHROSCOPY Left 2016    HX UROLOGICAL  10/21/2016    Prostate Biopsy with Dr. Mayra Barrett ANESTH,SURGERY OF SHOULDER         Current Outpatient Medications   Medication Sig Dispense Refill    triamcinolone acetonide (KENALOG) 10 mg/mL injection 1 mL by Intra artICUlar route once for 1 dose. 1 Vial 0    famotidine (PEPCID) 40 mg tablet       alprostadil (MUSE) 1,000 mcg supp UNWRAP AND INSERT 1 SUPPOSITORY PER URETHRA EVERY DAY AS NEEDED 6 Suppository 12    sildenafil citrate (VIAGRA) 100 mg tablet   0    levothyroxine (SYNTHROID) 25 mcg tablet take 1 tablet by mouth once daily  0    diclofenac (VOLTAREN) 1 % gel Apply topically to left wrist 2 grams 3 x daily 200 g 3    propranolol LA (INDERAL LA) 120 mg SR capsule take 1 capsule by mouth once daily  0    cholecalciferol, vitamin D3, 2,000 unit tab Take 2,000 Units by mouth daily. Allergies   Allergen Reactions    Penicillins Other (comments)     paralyzation    Sulfa (Sulfonamide Antibiotics) Unknown (comments)         PE:     Physical Exam  Vitals signs and nursing note reviewed. Constitutional:       General: He is not in acute distress. Appearance: Normal appearance. He is not ill-appearing. Eyes:      Pupils: Pupils are equal, round, and reactive to light. Neck:      Musculoskeletal: Normal range of motion. Cardiovascular:      Pulses: Normal pulses. Pulmonary:      Effort: Pulmonary effort is normal. No respiratory distress. Abdominal:      General: Abdomen is flat. Musculoskeletal: Normal range of motion. General: Tenderness present. No swelling, deformity or signs of injury. Right lower leg: No edema. Left lower leg: No edema. Skin:     General: Skin is warm and dry. Capillary Refill: Capillary refill takes less than 2 seconds. Findings: No bruising or erythema. Neurological:      General: No focal deficit present. Mental Status: He is alert and oriented to person, place, and time.    Psychiatric:         Mood and Affect: Mood normal.         Behavior: Behavior normal. Left wrist there is tenderness to palpation about the ulnar aspect of the wrist specifically the ECU tendon sheath. There is minimal tenderness to palpation about the DRUJ or the dorsal radiocarpal joint. Hand:    Examination L Digit(s) R Digit(s)   1st CMC Tenderness +  -    1st CMC Grind +  -    Rachelle Nodes -  -    Heberden Nodes -  -    A1 Pulley Tenderness -  -    Triggering -  -    UCL Instability -  -    RCL Instability -  -    Lateral Stress Pain -  -    Palmar Cords -  -    Tabletop test -  -    Garrod's Pads -  -     Strength       Pinch Strength         ROM: Full      Imagin2020 plain films about the left wrist are positive for degenerative changes of the radiocarpal joint. His wrist appears to be ulnar negative with some osteoarthritic changes at the DRUJ as well. ICD-10-CM ICD-9-CM    1. Extensor tenosynovitis of left wrist  M65.832 727.05 TRIAMCINOLONE ACETONIDE INJ      triamcinolone acetonide (KENALOG) 10 mg/mL injection      INJECT TENDON SHEATH/LIGAMENT   2. Primary osteoarthritis of first carpometacarpal joint of left hand  M18.12 715.14 TRIAMCINOLONE ACETONIDE INJ      triamcinolone acetonide (KENALOG) 10 mg/mL injection      DRAIN/INJECT SMALL JOINT/BURSA       Plan:     Left wrist tendon sheath injection and left thumb CMC joint injection    Left wrist brace and left cool comfort brace    Follow-up and Dispositions    · Return if symptoms worsen or fail to improve.          Plan was reviewed with patient, who verbalized agreement and understanding of the plan    [facility] NOTE        Chart reviewed for the following:   IAki DO, have reviewed the History, Physical and updated the Allergic reactions for [address] performed immediately prior to start of procedure:   Shannon DIXON DO, have performed the following reviews on [employer] prior to the start of the procedure:            * Patient was identified by name and date of birth   * Agreement on procedure being performed was verified  * Risks and Benefits explained to the patient  * Procedure site verified and marked as necessary  * Patient was positioned for comfort  * Consent was signed and verified     Time: 14:55      Date of procedure: 9/2/2020    Procedure performed by: Judit Kaur DO    Provider assisted by: Nikkie Romero LPN    Patient assisted by: self    How tolerated by patient: tolerated the procedure well with no complications    Post Procedural Pain Scale: 0 - No Hurt    Comments: none    Procedure:  After consent was obtained, using sterile technique the joint was prepped. Local anesthetic used: 1% lidocaine. Kenalog 5 mg X2 and was then injected and the needle withdrawn. The procedure was well tolerated. The patient is asked to continue to rest the area for a few more days before resuming regular activities. It may be more painful for the first 1-2 days. Watch for fever, or increased swelling or persistent pain in the joint. Call or return to clinic prn if such symptoms occur or there is failure to improve as anticipated.

## 2020-09-14 ENCOUNTER — TELEPHONE (OUTPATIENT)
Dept: ORTHOPEDIC SURGERY | Age: 70
End: 2020-09-14

## 2020-09-14 DIAGNOSIS — M65.832 EXTENSOR TENOSYNOVITIS OF LEFT WRIST: Primary | ICD-10-CM

## 2020-09-14 DIAGNOSIS — M18.12 PRIMARY OSTEOARTHRITIS OF FIRST CARPOMETACARPAL JOINT OF LEFT HAND: ICD-10-CM

## 2020-09-14 NOTE — TELEPHONE ENCOUNTER
Patient called to report that injections at 9/2 office visit were not effective for left wrist/hand. Patient thought that we were to schedule some sort of test but he hasn't heard anything from us yet. Please advise patient what next steps will be for him, [phone].

## 2020-10-26 ENCOUNTER — HOSPITAL ENCOUNTER (OUTPATIENT)
Age: 70
Discharge: HOME | End: 2020-10-26
Attending: ORTHOPAEDIC SURGERY
Payer: MEDICARE

## 2020-10-26 DIAGNOSIS — M65.832 EXTENSOR TENOSYNOVITIS OF LEFT WRIST: ICD-10-CM

## 2020-10-26 DIAGNOSIS — M18.12 PRIMARY OSTEOARTHRITIS OF FIRST CARPOMETACARPAL JOINT OF LEFT HAND: ICD-10-CM

## 2020-10-26 PROCEDURE — 73221 MRI JOINT UPR EXTREM W/O DYE: CPT

## 2020-11-06 NOTE — ROUTINE PROCESS
TRANSFER - OUT REPORT:    Verbal report given to Janace Scheuermann on Viet Kinnier III  being transferred to room 505 for routine progression of care       Report consisted of patients Situation, Background, Assessment and   Recommendations(SBAR). Information from the following report(s) SBAR, OR Summary, Intake/Output and MAR was reviewed with the receiving nurse. Opportunity for questions and clarification was provided.       Patient transported with:  [place]

## 2020-11-10 ENCOUNTER — OFFICE VISIT (OUTPATIENT)
Dept: ORTHOPEDIC SURGERY | Age: 70
End: 2020-11-10
Payer: MEDICARE

## 2020-11-10 VITALS
BODY MASS INDEX: 26.06 KG/M2 | WEIGHT: 192.4 LBS | SYSTOLIC BLOOD PRESSURE: 137 MMHG | HEIGHT: 72 IN | OXYGEN SATURATION: 100 % | DIASTOLIC BLOOD PRESSURE: 79 MMHG | HEART RATE: 61 BPM | RESPIRATION RATE: 16 BRPM | TEMPERATURE: 97.3 F

## 2020-11-10 DIAGNOSIS — M17.11 ARTHRITIS OF RIGHT KNEE: ICD-10-CM

## 2020-11-10 DIAGNOSIS — M25.561 RIGHT KNEE PAIN, UNSPECIFIED CHRONICITY: Primary | ICD-10-CM

## 2020-11-10 PROCEDURE — 3017F COLORECTAL CA SCREEN DOC REV: CPT | Performed by: PHYSICIAN ASSISTANT

## 2020-11-10 PROCEDURE — 20610 DRAIN/INJ JOINT/BURSA W/O US: CPT | Performed by: PHYSICIAN ASSISTANT

## 2020-11-10 PROCEDURE — 99213 OFFICE O/P EST LOW 20 MIN: CPT | Performed by: PHYSICIAN ASSISTANT

## 2020-11-10 PROCEDURE — G8427 DOCREV CUR MEDS BY ELIG CLIN: HCPCS | Performed by: PHYSICIAN ASSISTANT

## 2020-11-10 PROCEDURE — G8536 NO DOC ELDER MAL SCRN: HCPCS | Performed by: PHYSICIAN ASSISTANT

## 2020-11-10 PROCEDURE — G8432 DEP SCR NOT DOC, RNG: HCPCS | Performed by: PHYSICIAN ASSISTANT

## 2020-11-10 PROCEDURE — 1101F PT FALLS ASSESS-DOCD LE1/YR: CPT | Performed by: PHYSICIAN ASSISTANT

## 2020-11-10 PROCEDURE — 73562 X-RAY EXAM OF KNEE 3: CPT | Performed by: PHYSICIAN ASSISTANT

## 2020-11-10 PROCEDURE — G8419 CALC BMI OUT NRM PARAM NOF/U: HCPCS | Performed by: PHYSICIAN ASSISTANT

## 2020-11-10 RX ORDER — TRIAMCINOLONE ACETONIDE 40 MG/ML
40 INJECTION, SUSPENSION INTRA-ARTICULAR; INTRAMUSCULAR ONCE
Status: CANCELLED | OUTPATIENT
Start: 2020-11-10 | End: 2020-11-10

## 2020-11-10 NOTE — PROGRESS NOTES
Patient: Bharath Byers                MRN: [id number]       SSN: [id number]  YOB: 1950        AGE: 79 y.o. SEX: male          PCP: Nighat Ellington MD  11/10/20    Chief Complaint   Patient presents with    Knee Pain     anne-marie knee pain       HISTORY:  Monisha Woody III is a 79 y.o. male who presents to the office complaining of right medial knee pain. He is status post left medial hemiarthroplasty with [place] hardware dating back to late 2018. He has been doing well with his left knee and has no complaints he is very pleased with the surgical outcome to date. Previous x-rays dating back to 2018 revealed moderate medial joint space narrowing as well as patellofemoral disease. He had early evidence of lateral joint space narrowing. He used to refer to his right knee as his good knee indicating prior to his surgical left knee replacement that his left knee was much worse and he favored using the right more so than the balance of both. His right knee pain is limited his ability to only stand for short periods walk short distances it also limits his ability to sit from a standing position and stand from a sitting position. His daughter's wedding was about 2 weeks ago when he felt like he must of walked a little too much because since then his knee is hurt significantly over the inner portion. He denies any locking or give way. Pain at rest dull and aching characteristic over the medial aspect of the knee with popping and cracking noted when he bends with a pain rating of 3-4 on a 10 point scale with activity aforementioned pain increases to upwards of an 8-9 on a 10 point scale.       Pain Assessment  11/10/2020   Location of Pain Knee   Location Modifiers Left;Right   Severity of Pain 4   Quality of Pain Sharp   Quality of Pain Comment clicking   Duration of Pain Persistent   Frequency of Pain Constant   Frequency of Pain Comment - Aggravating Factors Stairs; Walking;Standing;Squatting;Kneeling;Bending   Aggravating Factors Comment -   Limiting Behavior -   Relieving Factors NSAID   Relieving Factors Comment -   Result of Injury No   Work-Related Injury -   Type of Injury -   Type of Injury Comment -           Lab Results   Component Value Date/Time    Hemoglobin A1c 5.1 09/18/2018 07:18 AM     Weight Metrics 11/10/2020 9/2/2020 8/25/2020 7/8/2020 4/29/2020 10/30/2019 9/18/2019   Weight 192 lb 6.4 oz 190 lb 191 lb 288 lb 288 lb 206 lb 206 lb   BMI 26.09 kg/m2 25.77 kg/m2 25.9 kg/m2 39.06 kg/m2 39.06 kg/m2 27.94 kg/m2 27.94 kg/m2            Problem List Items Addressed This Visit     None      Visit Diagnoses     Right knee pain, unspecified chronicity    -  Primary    Relevant Orders    AMB POC X-RAY KNEE 3 VIEW (Completed)    Arthritis of right knee              PAST MEDICAL HISTORY:   Past Medical History:   Diagnosis Date    Arthritis     Elevated PSA     Hypertension     Hypothyroid     Personal history of prostate cancer     Pituitary tumor     Renal insufficiency     S/P rotator cuff repair 10/15/2015    Sleep apnea     patient states resolved after surgery       PAST SURGICAL HISTORY:   Past Surgical History:   Procedure Laterality Date    HX HEENT      surgery for sleep apnea    HX HERNIA REPAIR      x2    HX SHOULDER ARTHROSCOPY Right 1/13/14    Dr. Arellano [place] ARTHROSCOPY Left 2016    HX UROLOGICAL  10/21/2016    Prostate Biopsy with Dr. Packer Safer:   Allergies   Allergen Reactions    Penicillins Other (comments)     paralyzation    Sulfa (Sulfonamide Antibiotics) Unknown (comments)        CURRENT MEDICATIONS:  A list of medications prior to the time of admission include:  Prior to Admission medications    Medication Sig Start Date End Date Taking?  Authorizing Provider   famotidine (PEPCID) 40 mg tablet  4/27/20  Yes Provider, Historical alprostadil (MUSE) 1,000 mcg supp UNWRAP AND INSERT 1 SUPPOSITORY PER URETHRA EVERY DAY AS NEEDED 12/9/19  Yes Rob Andrews MD   sildenafil citrate (VIAGRA) 100 mg tablet  10/20/19  Yes Provider, Historical   levothyroxine (SYNTHROID) 25 mcg tablet take 1 tablet by mouth once daily 10/1/19  Yes Provider, Historical   diclofenac (VOLTAREN) 1 % gel Apply topically to left wrist 2 grams 3 x daily 9/18/19  Yes Geoffrey Jones PA-C   propranolol LA (INDERAL LA) 120 mg SR capsule take 1 capsule by mouth once daily 3/16/19  Yes Provider, Historical   cholecalciferol, vitamin D3, 2,000 unit tab Take 2,000 Units by mouth daily. Yes Provider, Historical       FAMILY HISTORY:   Family History   Problem Relation Age of Onset    Diabetes Other     Hypertension Other     Heart Disease Other     Arthritis-osteo Other        SOCIAL HISTORY:   Social History     Socioeconomic History    Marital status: SINGLE     Spouse name: Not on file    Number of children: Not on file    Years of education: Not on file    Highest education level: Not on file   Tobacco Use    Smoking status: Former Smoker    Smokeless tobacco: Never Used    Tobacco comment: quit in 1976   Substance and Sexual Activity    Alcohol use: Yes     Alcohol/week: 0.0 standard drinks     Comment: occasionally    Drug use: No       ROS:No CP, No SOB, No fever/chills nor night sweats. No headaches, vision abnormalities to include double and oral loss of vision. No hearing abnormalities. Musculoskeletal pain per HPI. Pain is exacerbated positionally. Pt denies h/o spinal surgery, injections, or PT/chiropractor. Self treated with less than adequate relief on oral antiinflammatories. . Pt denies change in bowel or bladder habits. Pt denies fever, weight loss, or skin changes. EXAM:  Patient alert and oriented x 3,   CN II-XII grossly intact  Sitting comfortably in the exam room, interacting with conversation with pleasant affect.   Breathing appears regular effortless with no visible usage of accessory muscles  Distal cap refill intact at 2/2 [place] UE / LE. Neuro intact [place] UE/LE to noxious stimuli    Ortho Specific exam:    Right knee reveals no warmth erythema edema ecchymosis or effusion. He extends -5 degrees to full with a significant varus deformity. His active flexion is guarded at only 95 degrees with significant crepitation patella tracking midline throughout. Varus and valgus stressing reveal no instability or laxity. Negative anterior drawer sign. As noted patella tracks midline[facility] the right knee AP tunnel and lateral reveals moderate to severe medial joint space narrowing with moderate lateral joint space narrowing there is severe patellofemoral disease. No soft tissue calcifications noted. IMPRESSION:      ICD-10-CM ICD-9-CM    1. Right knee pain, unspecified chronicity  M25.561 719.46 AMB POC X-RAY KNEE 3 VIEW      DRAIN/INJECT LARGE JOINT/BURSA      PROCEDURE AUTHORIZATION TO    2. Arthritis of right knee  M17.11 716.96 DRAIN/INJECT LARGE JOINT/BURSA      PROCEDURE AUTHORIZATION TO    3. Decreased range of motion of the right knee secondary to above  4. Status post left knee hemiarthroplasty with [place] hardware stable  Chart reviewed for the following:   IReagan PA-C, have reviewed the History, Physical and updated the Allergic reactions for [address] performed immediately prior to start of procedure:   Sho Jones PA-C, have performed the following reviews on Gwenyth Massy III prior to the start of the procedure:            * Patient was identified by name and date of birth   * Agreement on procedure being performed was verified  * Risks and Benefits explained to the patient  * Procedure site verified and marked as necessary  * Patient was positioned for comfort  * Consent was signed and verified             Date of procedure: 11/10/20    Time: 11:05 AM    Procedure performed by:  Jani Willis PA-C    Provider assisted by: None     Patient assisted by: self    How tolerated by patient: tolerated the procedure well with no complications    Comments: none      Procedure: Using sterile technique and verbal and written consent were obtained and appropriate timeout formed patient sitting on exam room table right knee flexed at 90 degrees using sterile technique the right knee was injected through the anterior medial interarticular approach with 1 cc of Kenalog 40 mg/mL mixed with 7 mils of Sensorcaine 0.75%. There were no complications. Patient tolerated procedure well. PLAN: Today we discussed alternatives to care to include but not limited to beginning to treat his right knee pain with a low-dose cortisone injection and follow on hyaluronic acid. He is not ready for a total joint replacement surgery at this time. He would like to progress with a low-dose cortisone injection and will continue his home exercises as demonstrated postoperatively for his left knee hoping that his right knee responds. We will go ahead and preauthorize his hyaluronic acid and see him back once that is available for administration. Today's x-rays reviewed all of his questions answered to his satisfaction. Patient provided a reminder for a \"due or due soon\" health maintenance. I have asked the patient to schedule an appointment with their primary care provider for follow-up on general health maintenance concerns. Today all the patient's questions were answered to their satisfaction. Copies of x-rays reviewed if obtained this visit, and provided to patient. Dictation disclaimer:  Please note that this dictation was completed with [employer], the [employer] voice recognition software.   Quite often unanticipated grammatical, syntax, homophones, and other interpretive errors are inadvertently transcribed by the computer software. Please disregard these errors. Please excuse any errors that have escaped final proofreading. Rita BARNES, APC, MPAS, PADARCIE [facility]

## 2020-11-11 ENCOUNTER — OFFICE VISIT (OUTPATIENT)
Dept: ORTHOPEDIC SURGERY | Age: 70
End: 2020-11-11
Payer: MEDICARE

## 2020-11-11 VITALS
RESPIRATION RATE: 16 BRPM | WEIGHT: 194.2 LBS | HEIGHT: 72 IN | DIASTOLIC BLOOD PRESSURE: 97 MMHG | TEMPERATURE: 97.3 F | SYSTOLIC BLOOD PRESSURE: 141 MMHG | HEART RATE: 71 BPM | BODY MASS INDEX: 26.3 KG/M2 | OXYGEN SATURATION: 98 %

## 2020-11-11 DIAGNOSIS — G56.03 BILATERAL CARPAL TUNNEL SYNDROME: Primary | ICD-10-CM

## 2020-11-11 DIAGNOSIS — M65.832 EXTENSOR TENOSYNOVITIS OF LEFT WRIST: ICD-10-CM

## 2020-11-11 DIAGNOSIS — M19.132 DRUJ (DISTAL RADIOULNAR JOINT) POST-TRAUMATIC ARTHRITIS, LEFT: ICD-10-CM

## 2020-11-11 PROCEDURE — 20526 THER INJECTION CARP TUNNEL: CPT | Performed by: ORTHOPAEDIC SURGERY

## 2020-11-11 PROCEDURE — 99214 OFFICE O/P EST MOD 30 MIN: CPT | Performed by: ORTHOPAEDIC SURGERY

## 2020-11-11 RX ORDER — LIDOCAINE HYDROCHLORIDE 10 MG/ML
1 INJECTION INFILTRATION; PERINEURAL ONCE
Status: COMPLETED | OUTPATIENT
Start: 2020-11-11 | End: 2020-11-11

## 2020-11-11 RX ADMIN — LIDOCAINE HYDROCHLORIDE 1 ML: 10 INJECTION INFILTRATION; PERINEURAL at 09:47

## 2020-11-11 NOTE — PROGRESS NOTES
Vandana Miller III is a 79 y.o. male right handed retiree. Worker's Compensation and legal considerations: none filed. Vitals:    11/11/20 0844   BP: (!) 141/97   Pulse: 71   Resp: 16   Temp: 97.3 °F (36.3 °C)   TempSrc: Temporal   SpO2: 98%   Weight: 194 lb 3.2 oz (88.1 kg)   Height: 6' (1.829 m)   PainSc:   0 - No pain   PainLoc: Hand           Chief Complaint   Patient presents with    Hand Pain     left hand pain       HPI: Patient comes in today for MRI follow-up of left wrist.  He reports numbness and tingling in the hands bilaterally left much worse than right at night. He reports the wrist pain has come back and it is localized to the ulnar aspect of the wrist.  And wearing a brace. He says it does not hurt at rest.    Previous HPI: Patient comes in today for follow-up approximately 1 year after receiving a left wrist joint injection. He says this only helped for about a day. He reports his pain today to be localized to the wrist joint as well as the base of the thumb. He localizes the pain to the ulnar aspect of the wrist joint. Initial HPI: Patient comes in today with complaints of left wrist pain. He reports been off and on for quite some time but over the past week it is increased when he was getting up from a chair and felt a pop. He denies any other specific injuries.     Date of onset: 7/7/2019    Injury: No    Prior Treatment:  Yes: Comment: Left wrist DRUJ injection    Numbness/ Tingling: No    ROS: Review of Systems - General ROS: negative  Respiratory ROS: no cough, shortness of breath, or wheezing  Cardiovascular ROS: no chest pain or dyspnea on exertion  Musculoskeletal ROS: positive for - pain in wrist - left  Neurological ROS: negative  Dermatological ROS: negative    Past Medical History:   Diagnosis Date    Arthritis     Elevated PSA     Hypertension     Hypothyroid     Personal history of prostate cancer     Pituitary tumor     Renal insufficiency     S/P rotator cuff repair 10/15/2015    Sleep apnea     patient states resolved after surgery       Past Surgical History:   Procedure Laterality Date    HX HEENT      surgery for sleep apnea    HX HERNIA REPAIR      x2    HX SHOULDER ARTHROSCOPY Right 1/13/14    Dr. Mando Fletcher ARTHROSCOPY Left 2016    HX UROLOGICAL  10/21/2016    Prostate Biopsy with Dr. Christiane Hurt OF SHOULDER         Current Outpatient Medications   Medication Sig Dispense Refill    famotidine (PEPCID) 40 mg tablet       alprostadil (MUSE) 1,000 mcg supp UNWRAP AND INSERT 1 SUPPOSITORY PER URETHRA EVERY DAY AS NEEDED 6 Suppository 12    sildenafil citrate (VIAGRA) 100 mg tablet   0    levothyroxine (SYNTHROID) 25 mcg tablet take 1 tablet by mouth once daily  0    diclofenac (VOLTAREN) 1 % gel Apply topically to left wrist 2 grams 3 x daily 200 g 3    propranolol LA (INDERAL LA) 120 mg SR capsule take 1 capsule by mouth once daily  0    cholecalciferol, vitamin D3, 2,000 unit tab Take 2,000 Units by mouth daily. Allergies   Allergen Reactions    Penicillins Other (comments)     paralyzation    Sulfa (Sulfonamide Antibiotics) Unknown (comments)         PE:     Physical Exam  Vitals signs and nursing note reviewed. Constitutional:       General: He is not in acute distress. Appearance: Normal appearance. He is not ill-appearing. Eyes:      Pupils: Pupils are equal, round, and reactive to light. Neck:      Musculoskeletal: Normal range of motion. Cardiovascular:      Pulses: Normal pulses. Pulmonary:      Effort: Pulmonary effort is normal. No respiratory distress. Abdominal:      General: Abdomen is flat. Musculoskeletal: Normal range of motion. General: Tenderness present. No swelling, deformity or signs of injury. Right lower leg: No edema. Left lower leg: No edema. Skin:     General: Skin is warm and dry.       Capillary Refill: Capillary refill takes less than 2 seconds. Findings: No bruising or erythema. Neurological:      General: No focal deficit present. Mental Status: He is alert and oriented to person, place, and time. Psychiatric:         Mood and Affect: Mood normal.         Behavior: Behavior normal.         NEUROVASCULAR    Examination L R Examination L R   Carpal Comp. + +- Pronator Comp. - -   Carpal Tinel + +- Pronator Tinel - -   Phalen's + +- Pronator Stress - -   Cubital Comp. - - Guyon Comp. - -   Cubital Tinel - - Guyon Tinel - -   Elbow Hyperflexion - - Adson's - -   Spurling's - - SC Comp. - -   PCB Median abn - - SC Tinel - -   Radial Tinel - - IC Comp. - -   Digital Tinel - - IC Tinel - -   Radial 2-Pt WNL WNL Ulnar 2-Pt WNL WNL     Radial Pulse: 2+  Capillary Refill: < 2 sec  Diego: Not Performed  [employer]: Not Performed      Left wrist: Again there is tenderness to palpation along the ulnar aspect of the wrist specifically the ECU tendon sheath. There is no tenderness to palpation about the DRUJ today. Hand:    Examination L Digit(s) R Digit(s)   1st CMC Tenderness -  -    1st CMC Grind -  -    Rachelle Nodes -  -    Heberden Nodes -  -    A1 Pulley Tenderness -  -    Triggering -  -    UCL Instability -  -    RCL Instability -  -    Lateral Stress Pain -  -    Palmar Cords -  -    Tabletop test -  -    Garrod's Pads -  -     Strength       Pinch Strength         ROM: Full      Imaging:     Left Wrist MRI  IMPRESSION:     1. Distal radioulnar, radiocarpal, STT and first CMC joints moderate  degenerative arthritic changes with joint effusions as discussed. 2. Degeneration and tear of the TFCC as discussed. Likely chronic fracture of  the ulnar styloid process. 3. Flattening of the median nerve in the carpal tunnel with slight increased  signal, correlate with EMG and carpal tunnel syndrome symptoms as clinically  warranted.       8/25/2020 plain films about the left wrist are positive for degenerative changes of the radiocarpal joint. His wrist appears to be ulnar negative with some osteoarthritic changes at the DRUJ as well. ICD-10-CM ICD-9-CM    1. Bilateral carpal tunnel syndrome  G56.03 354.0 REFERRAL TO OCCUPATIONAL THERAPY      AMB SUPPLY ORDER      EMG TWO EXTREMITIES UPPER      NCV/LAT MOTOR PER NERVE UP/RT      NCV/LAT MOTOR PER NERVE UP/LT      lidocaine (XYLOCAINE) 10 mg/mL (1 %) injection 1 mL      triamcinolone acetonide (KENALOG) 10 mg/mL injection 10 mg      INJECT CARPAL TUNNEL   2. Extensor tenosynovitis of left wrist  M65.832 727.05 REFERRAL TO OCCUPATIONAL THERAPY   3. DRUJ (distal radioulnar joint) post-traumatic arthritis, left  M19.132 716.13 REFERRAL TO OCCUPATIONAL THERAPY       Plan:     Bilateral carpal tunnel injections. Bilateral upper extremity EMG and nerve conduction study. Right carpal tunnel brace for nighttime wear. Continue wearing left wrist brace at night and during the day as needed. OT for range of motion exercises, edema control, and other modalities. Follow-up and Dispositions    · Return for EMG and OT follow-up.          Plan was reviewed with patient, who verbalized agreement and understanding of the plan    [facility] [address] NOTE        Chart reviewed for the following:   Aki DIXON DO, have reviewed the History, Physical and updated the Allergic reactions for [address] performed immediately prior to start of procedure:   Aki DIXON DO, have performed the following reviews on [facility] prior to the start of the procedure:            * Patient was identified by name and date of birth   * Agreement on procedure being performed was verified  * Risks and Benefits explained to the patient  * Procedure site verified and marked as necessary  * Patient was positioned for comfort  * Consent was signed and verified     Time: 09:20 AM      Date of procedure: 11/20/2020    Procedure performed by: Sangita Briggs DO    Provider assisted by: José Manule Smith LPN    Patient assisted by: self    How tolerated by patient: tolerated the procedure well with no complications    Post Procedural Pain Scale: 0 - No Hurt    Comments: none    Procedure:  After consent was obtained, using sterile technique the joint was prepped. Local anesthetic used: 1% lidocaine. Kenalog 5 mg X2 and was then injected and the needle withdrawn. The procedure was well tolerated. The patient is asked to continue to rest the area for a few more days before resuming regular activities. It may be more painful for the first 1-2 days. Watch for fever, or increased swelling or persistent pain in the joint. Call or return to clinic prn if such symptoms occur or there is failure to improve as anticipated.

## 2020-11-18 DIAGNOSIS — G56.03 BILATERAL CARPAL TUNNEL SYNDROME: ICD-10-CM

## 2020-11-24 ENCOUNTER — APPOINTMENT (OUTPATIENT)
Dept: PHYSICAL THERAPY | Age: 70
End: 2020-11-24

## 2020-11-27 ENCOUNTER — HOSPITAL ENCOUNTER (OUTPATIENT)
Dept: PHYSICAL THERAPY | Age: 70
Discharge: HOME | End: 2020-11-27
Payer: MEDICARE

## 2020-11-27 PROCEDURE — 97535 SELF CARE MNGMENT TRAINING: CPT

## 2020-11-27 PROCEDURE — 97166 OT EVAL MOD COMPLEX 45 MIN: CPT

## 2020-11-27 NOTE — PROGRESS NOTES
[facility]  [address]  [place], [address]  [phone] fax    Plan of Care/Statement of Necessity for Occupational Therapy Services    Patient name: Elisabeth Hoover III Start of Care: 2020   Referral source: Jannet Taylor DO : 1950    Medical Diagnosis: Left hand pain [M79.642]  Right hand pain [M79.641]  Left wrist pain [M25.532]  Payor: [place] MEDICARE / Plan: VA MEDICARE PART A & B / Product Type: Medicare /  Onset Date:greater than 6 months    Treatment Diagnosis: UE pain/weakness   Prior Hospitalization: see medical history Provider#: [id number]   Medications: Verified on Patient summary List    Comorbidities:Cancer, arthritis, thyroid, HTN   Prior Level of Function: modified I with ADL and IADL tasks          The Plan of Care and following information is based on the information from the initial evaluation. Assessment/ key information: Patient is a 79 y.o. male with a chief complaint  of bilateral hand pain however, the right UE has minimal pain and discomfort as compared to the left wrist.  Patient reports that the left wrist and hand have been weak and limited in functional strength and use for greater than 6 months. He has difficulty holding objects, carrying items and using the hand with ADL and IADL tasks. He reports his pain elevates to 7/10- with lifting heavy items. He is noted to have mild muscle atrophy in the left UE/hand, inattention tremor in the UE, left greater than right. And pain with palpation at ulnar side of wrist.  His ROM is [facility] [place] for all joints of bilateral UE from shoulder to digits, noted shoulders are rotated internally and left greater than right. Bilateral UE have limitations in shoulder external rotation.  He was positive for Andree's- positive bilateral UE fatigue, indicating thoracic outlet syndrome as well as reports of positional symptom increase during sleeping with hands in overhead position. Patient received an initial evaluation today followed by education as to diagnosis, precautions and treatment plan. Evaluation Complexity: History MEDIUM Complexity : Expanded review of history including physical, cognitive and psychosocial  history  Examination MEDIUM Complexity : 3-5 performance deficits relating to physical, cognitive , or psychosocial skils that result in activity limitations and / or participation restrictions Clinical Decision Making MEDIUM Complexity : Patient may present with comorbidities that affect occupational performnce. Miniml to moderate modification of tasks or assistance (eg, physical or verbal ) with assesment(s) is necessary to enable patient to complete evaluation   Overall Complexity Rating: MEDIUM    Patient would benefit from OT/Hand therapy services for the following problems:  Problem List: Pain effecting function, Decreased range of motion, Decreased coordination/prehension and Decreased ADL/functional abilities      Treatment Plan may include any combination of the following: Therapeutic exercise, Neuromuscular re-education, Physical agent/modality, Manual therapy, Splinting/orthoses and Patient education    Patient / Family readiness to learn indicated by: asking questions and interest    Persons(s) to be included in education:   patient (P)    Barriers to Learning/Limitations: None    Patient Goal (s): improve left UE strength    Patient Self Reported Health Status: good    Rehabilitation Potential: good    Short Term Goals: To be accomplished in 2  weeks:  Goal:* Patient will be compliant with initial home exercise program to take an active role in their rehabilitation process. Status at Eval:    Goal:* Patient will demonstrate a good understanding of their condition and strategies for self-management. Status at Eval:    Long Term Goals: To be accomplished in 4 weeks:   1.  Patient will report improvement in symptom of fatigue in the bilateral UE with overhead activities with shoulder flexion. At Eval: 100% symptoms with flexion at 90 degrees. 2. Patient will report 0/10 pain in the left wrist at rest.  At Eval: 2/10, 7/10 with use    Frequency / Duration: Patient to be seen 2 times per week for 4 weeks:    Patient/ Caregiver education and instruction: Diagnosis, prognosis, self care, activity modification, brace/ splint application and exercises      Certification Period: 11/27/20-12/27/20    Torie Best OT, CHT, CLT 11/27/2020 3:29 PM    [x]  Plan of care has been reviewed with Anai Santos OT 11/27/2020 4:21 PM  ________________________________________________________________________    I certify that the above Therapy Services are being furnished while the patient is under my care. I agree with the treatment plan and certify that this therapy is necessary.     Physician's Signature:____________Date:_________TIME:________    ** Signature, Date and Time must be completed for valid certification **    Please sign and return to [facility] [address] [facility] [address]  [address] [facility] [address] [place], [place] [address], [facility] [address]  [phone] fax

## 2020-11-27 NOTE — PROGRESS NOTES
Hand Therapy Evaluation and Daily Note    Patient Name: Raymond Starkey III  Date:2020  : 1950  Age: 79 y.o.y/o  [x]  Patient  Verified  Payor: VA MEDICARE / Plan: VA MEDICARE PART A & B / Product Type: Medicare /    Referring Provider: DO Tevin Deshpande MD Visit:  uknown  Onset Date:  6 months    In time:1525  Out time:1600  Total Treatment Time (min): 35   Total Timed Codes (min): 15  1:1 Treatment Time ( only): 35  Visit #: 1 of 8    Treatment Area: Left hand pain [M79.642]  Right hand pain [M79.641]  Left wrist pain [M25.532]    Precautions: NA    Hand Dominance: right handed   Hand Involved: bilateral    Total Evaluation Time:  20 min    History of Present Condition:  Patient is a 79 y.o. male with a chief complaint  of bilateral hand pain however, the right UE has minimal pain and discomfort as compared to the left wrist.  Patient reports that the left wrist and hand have been weak and limited in functional strength and use for greater than 6 months. He has difficulty holding objects, carrying items and using the hand with ADL and IADL tasks. Pain Rating:   Current: (0-no pain 10-debilitating pain) 0/10   At best: (0-no pain 10-debilitating pain) 0/10  At worst: (0-no pain 10-debilitating pain) 7/10- with lifting heavy items  Location: bilateral arm,  left hand  Type:  moderate and severe   Better with: rest  Worse with: use    Medications/Allergies/Past Medical History:  See chart; reviewed with patient. Cancer, arthritis, thyroid, HTN    Diagnostic Tests: NT    Prior Level of Function: Independent without limitations in ADL and IADL activities.       Current Level of Function:  Limited by pain    Social History: single    Occupation/Job Requirements: retired, wood working, reading, cooking, car mechanics    Observation: mild muscle atrophy in the left UE/hand, inattention tremor in the UE, left greater than right    Palpation:  Pain at ulnar side of wrist    Range of Motion:  [facility] for all joints of bilateral UE from shoulder to digits, noted shoulders are rotated internally and left greater than right. Bilateral UE have limitations in shoulder external rotation. Strength:   Measurements: Taken with Kenrick Dynamometer, in Lbs   Level 2 11/27/20 Date Date Date Date Date Date   Right 85         Left 78         Deficit          Change                Pinch Measurements: Taken with Pinch Gauge, in Lbs   (hand) 11/27/20 Date Date Date Date Date   Lateral          Right 22        Left  19        Deficit         Change         Pad         Right 18        Left 15        Deficit         Change         Jose Alfredo         Right 16        Left 13        Deficit         Change             Sensation:  Impairments reported intermittently at night      Edema: NA    Special Tests:   Provocative test: Andree's- positive bilateral UE fatigue     ADLs  Feeding:        []MaxA   []ModA   []Omar   [] CGA   []SBA   [x]Dani   []Independent  UE Dressing:       []MaxA   []ModA   []Omar   [] CGA   []SBA   [x]Dani   []Independent  LE Dressing:       []MaxA   []ModA   []Omar   [] CGA   []SBA   [x]Dani   []Independent  Grooming:       []MaxA   []ModA   []Omar   [] CGA   []SBA   [x]Dani   []Independent  Toileting:       []MaxA   []ModA   []Omar   [] CGA   []SBA   [x]Dani   []Independent  Bathing:       []MaxA   []ModA   []Omar   [] CGA   []SBA   [x]Dani   []Independent  Light Meal Prep:    []MaxA   []ModA   []Omar   [] CGA   []SBA   [x]Dani   []Independent  Household/Other: []MaxA   []ModA   []Omar   [] CGA   []SBA   [x]Dani   []Independent  Adaptive Equip:     []MaxA   []ModA   []Omar   [] CGA   []SBA   []Dani   []Independent  Driving:       []MaxA   []ModA   []Omar   [] CGA   []SBA   [x]Dani   []Independent      Todays Treatment:  Patient received an initial evaluation today followed by education as to diagnosis, precautions and treatment plan.       OBJECTIVE  15 min Self Care/Home Management: activity modfications, positioning and splint wear, care and precautions. Rationale: education  to improve the patients ability to understand cause of symptoms    With   [] TE   [] TA   [] neuro   [] other: Patient Education: [x] Review HEP    [] Progressed/Changed HEP based on:   [] positioning   [] body mechanics   [] transfers   [] heat/ice application   [] Splint wear/care   [] Sensory re-education   [] scar management      [] other:      Pain Level (0-10 scale) post treatment: 2/10    Patient will continue to benefit from skilled OT services to address ROM deficits, address strength deficits, analyze and address soft tissue restrictions and analyze and cue movement patterns to attain goals. Assessment: Patient is a 79 y.o. male with a chief complaint  of bilateral hand pain however, the right UE has minimal pain and discomfort as compared to the left wrist.  Patient reports that the left wrist and hand have been weak and limited in functional strength and use for greater than 6 months. He has difficulty holding objects, carrying items and using the hand with ADL and IADL tasks. He reports his pain elevates to 7/10- with lifting heavy items. He is noted to have mild muscle atrophy in the left UE/hand, inattention tremor in the UE, left greater than right. And pain with palpation at ulnar side of wrist.  His ROM is [facility] [place] for all joints of bilateral UE from shoulder to digits, noted shoulders are rotated internally and left greater than right. Bilateral UE have limitations in shoulder external rotation. He was positive for Andree's- positive bilateral UE fatigue, indicating thoracic outlet syndrome as well as reports of positional symptom increase during sleeping with hands in overhead position. Patient received an initial evaluation today followed by education as to diagnosis, precautions and treatment plan.        Evaluation Complexity: History MEDIUM Complexity : Expanded review of history including physical, cognitive and psychosocial  history  Examination MEDIUM Complexity : 3-5 performance deficits relating to physical, cognitive , or psychosocial skils that result in activity limitations and / or participation restrictions Clinical Decision Making MEDIUM Complexity : Patient may present with comorbidities that affect occupational performnce. Miniml to moderate modification of tasks or assistance (eg, physical or verbal ) with assesment(s) is necessary to enable patient to complete evaluation   Overall Complexity Rating: MEDIUM    Patient would benefit from OT/Hand therapy services for the following problems:  Problem List: Pain effecting function, Decreased range of motion, Decreased coordination/prehension and Decreased ADL/functional abilities      Treatment Plan may include any combination of the following: Therapeutic exercise, Neuromuscular re-education, Physical agent/modality, Manual therapy, Splinting/orthoses and Patient education    Patient / Family readiness to learn indicated by: asking questions and interest    Persons(s) to be included in education:   patient (P)    Barriers to Learning/Limitations: None    Patient Goal (s): improve left UE strength    Patient Self Reported Health Status: good    Rehabilitation Potential: good    Short Term Goals: To be accomplished in 2  weeks:  Goal:* Patient will be compliant with initial home exercise program to take an active role in their rehabilitation process. Status at Eval:    Goal:* Patient will demonstrate a good understanding of their condition and strategies for self-management. Status at Eval:    Long Term Goals: To be accomplished in 4 weeks:   1. Patient will report improvement in symptom of fatigue in the bilateral UE with overhead activities with shoulder flexion. At Eval: 100% symptoms with flexion at 90 degrees.     2. Patient will report 0/10 pain in the left wrist at rest.  At Eval: 2/10, 7/10 with use    Frequency / Duration: Patient to be seen 2 times per week for 4 weeks:    Patient/ Caregiver education and instruction: Diagnosis, prognosis, self care, activity modification, brace/ splint application and exercises      Certification Period: 11/27/20-12/27/20    Ashley Irving OT, CHT, CLT 11/27/2020 3:29 PM

## 2020-12-02 ENCOUNTER — HOSPITAL ENCOUNTER (OUTPATIENT)
Dept: PHYSICAL THERAPY | Age: 70
Discharge: HOME | End: 2020-12-02
Payer: MEDICARE

## 2020-12-02 PROCEDURE — 97112 NEUROMUSCULAR REEDUCATION: CPT

## 2020-12-02 PROCEDURE — 97110 THERAPEUTIC EXERCISES: CPT

## 2020-12-02 PROCEDURE — 97035 APP MDLTY 1+ULTRASOUND EA 15: CPT

## 2020-12-02 NOTE — PROGRESS NOTES
OT DAILY TREATMENT NOTE     Patient Name: Valerie Lomax  Date:2020  : 1950  [x]  Patient  Verified  Payor: VA MEDICARE / Plan: VA MEDICARE PART A & B / Product Type: Medicare /    In time:13:11  Out time:13:59  Total Treatment Time (min): 48  Visit #: 2 of 8    Medicare/BCBS Only   Total Timed Codes (min): 38 1:1 Treatment Time:  48     Treatment Area: Left hand pain [M79.642]  Right hand pain [M79.641]  Left wrist pain [M25.532]    SUBJECTIVE  Pain Level (0-10 scale): 2/10  Any medication changes, allergies to medications, adverse drug reactions, diagnosis change, or new procedure performed?: [x] No    [] Yes (see summary sheet for update)  Subjective functional status/changes:   [] No changes reported  \"everything feels the same\".  \"I wear brace when I feel like it might hurt\"  OBJECTIVE    Modality rationale: decrease edema, decrease inflammation and decrease pain to improve the patients ability to improve functional use of left hand   Min Type Additional Details    [] Estim:  []Unatt       []IFC  []Premod                        []Other:  []w/ice   []w/heat  Position:  Location:    [] Estim: []Att    []TENS instruct  []NMES                    []Other:  []w/US   []w/ice   []w/heat  Position:  Location:    []  Traction: [] Cervical       []Lumbar                       [] Prone          []Supine                       []Intermittent   []Continuous Lbs:  [] before manual  [] after manual   10 [x]  Ultrasound: []Continuous   [x] Pulsed                           []1MHz   [x]3MHz W/cm2: 1.0  Location: ulnar side of left wrist    []  Iontophoresis with dexamethasone         Location: [] Take home patch   [] In clinic   10 []  Ice     []  heat  []  Ice massage  []  Laser   [x]  Paraffin Position: neutral  Location: left hand/wrist    []  Laser with stim  []  Other:  Position:  Location:    []  Vasopneumatic Device Pressure:       [] lo [] med [] hi   Temperature: [] lo [] med [] hi       [x] Skin assessment post-treatment:  []intact [x]redness- no adverse reaction    []redness  adverse reaction:     17 min Therapeutic Exercise:  [x] See flow sheet :  Stretches for cervical and thoracic spine   Rationale: increase ROM, increase strength and improve coordination to improve the patients ability to use bilateral upper extremities for ADLs. 11 min Neuromuscular Re-education:  [x]  See flow sheet :   Rationale: to improve pt's postural awareness  to improve the patients ability to use bilateral hands without pain for functional tasks    With   [] TE   [] TA   [] neuro   [] other: Patient Education: [x] Review HEP    [] Progressed/Changed HEP based on:   [] positioning   [] body mechanics   [] transfers   [] heat/ice application   [] Splint wear/care   [] Sensory re-education   [] scar management      [] other:            Other Objective/Functional Measures: decreased tenderness over ulnar aspect of left hand following US     Pain Level (0-10 scale) post treatment: 0/10    ASSESSMENT/Changes in Function: Initiated US for the pt for left wrist to decrease inflammation and decrease pain. Pt reports decrease in tenderness at the ulnar side of the left wrist following Paraffin and US. Pt required visual demonstration and repetition of TOS stretching exercises to ensure understanding of proper technique for carryover at home. Patient will continue to benefit from skilled OT services to address ROM deficits and address strength deficits and improve postural awareness and stabilization to attain remaining goals. [x]  See Plan of Care  []  See progress note/recertification  []  See Discharge Summary         Progress towards goals / Updated goals:  Short Term Goals: To be accomplished in 2  weeks:  Goal:* Patient will be compliant with initial home exercise program to take an active role in their rehabilitation process. Status at [facility]:  12/2/20: Ginna Barrera for thoracic stretches.     Goal:* Patient will demonstrate a good understanding of their condition and strategies for self-management. Status at Eval:  12/2/20: educated on HEP     Long Term Goals: To be accomplished in 4 weeks:              1. Patient will report improvement in symptom of fatigue in the bilateral UE with overhead activities with shoulder flexion.   At Eval: 100% symptoms with flexion at 90 degrees.      2. Patient will report 0/10 pain in the left wrist at rest.  At Eval: 2/10, 7/10 with use  12/2/20: 2/10 at rest     PLAN  [x]  Upgrade activities as tolerated     [x]  Continue plan of care  []  Update interventions per flow sheet       []  Discharge due to:_  []  Other:_      Shalini English, OTR/L 12/2/2020  1:10 PM    Future Appointments   Date Time Provider Mary Mccall   12/2/2020  1:15 PM Izabela Hernandze, OTR/L [facility]   12/4/2020  1:45 PM David Cape, OT [facility]   12/8/2020  1:15 PM Ashkan Solares, OT [facility]   12/10/2020  1:45 PM David Cape, OT [facility]   12/15/2020  1:15 PM Ashkan Solares, OT [facility]   12/17/2020  1:15 PM Ashkan Solares, OT [facility]   12/22/2020  1:15 PM CatAshkan gutierrez, OT [facility]

## 2020-12-04 ENCOUNTER — HOSPITAL ENCOUNTER (OUTPATIENT)
Dept: PHYSICAL THERAPY | Age: 70
Discharge: HOME | End: 2020-12-04
Payer: MEDICARE

## 2020-12-04 PROCEDURE — 97535 SELF CARE MNGMENT TRAINING: CPT

## 2020-12-04 PROCEDURE — 97110 THERAPEUTIC EXERCISES: CPT

## 2020-12-04 PROCEDURE — 97035 APP MDLTY 1+ULTRASOUND EA 15: CPT

## 2020-12-04 NOTE — PROGRESS NOTES
OT DAILY TREATMENT NOTE     Patient Name: Quang Brownlee  Date:2020  : 1950  [x]  Patient  Verified  Payor: VA MEDICARE / Plan: VA MEDICARE PART A & B / Product Type: Medicare /    In time:1:30 PM  Out time:2:20 PM  Total Treatment Time (min): 50  Visit #: 3 of 8    Medicare/BCBS Only   Total Timed Codes (min):  40 1:1 Treatment Time:  50     Treatment Area: Left hand pain [M79.642]  Right hand pain [M79.641]  Left wrist pain [M25.532]    SUBJECTIVE  Pain Level (0-10 scale): 1/10  Any medication changes, allergies to medications, adverse drug reactions, diagnosis change, or new procedure performed?: [x] No    [] Yes (see summary sheet for update)  Subjective functional status/changes:   [] No changes reported  \"I wear the brace whenever I am going to do something because I don't want to take any chances. \"    OBJECTIVE    Modality rationale: decrease inflammation, decrease pain and increase tissue extensibility to improve the patients ability to functionally use left hand   Min Type Additional Details    [] Estim:  []Unatt       []IFC  []Premod                        []Other:  []w/ice   []w/heat  Position:  Location:    [] Estim: []Att    []TENS instruct  []NMES                    []Other:  []w/US   []w/ice   []w/heat  Position:  Location:    []  Traction: [] Cervical       []Lumbar                       [] Prone          []Supine                       []Intermittent   []Continuous Lbs:  [] before manual  [] after manual   10 [x]  Ultrasound: []Continuous   [x] Pulsed                           []1MHz   [x]3MHz W/cm2:1.0  Location: ulnar side of left wrist.     []  Iontophoresis with dexamethasone         Location: [] Take home patch   [] In clinic   10 []  Ice     []  heat  []  Ice massage  []  Laser   [x]  Paraffin Position: resting  Location: left hand/wrist    []  Laser with stim  []  Other:  Position:  Location:    []  Vasopneumatic Device Pressure:       [] lo [] med [] hi Temperature: [] lo [] med [] hi       [x] Skin assessment post-treatment:  [x]intact [x]redness- no adverse reaction    []redness  adverse reaction:     15 min Therapeutic Exercise:  [x] See flow sheet :   Rationale: increase ROM, increase strength and improve coordination to improve the patients ability to use bilateral UEs for functional tasks. 15 min Self Care/Home Management: activity modifications, sleep positioning, splint wear, joint protection. Rationale: education  to improve the patients ability to reduce symptoms and improve functional use of UEs. With   [] TE   [] TA   [] neuro   [] other: Patient Education: [x] Review HEP    [] Progressed/Changed HEP based on:   [] positioning   [] body mechanics   [] transfers   [] heat/ice application   [] Splint wear/care   [] Sensory re-education   [] scar management      [] other:            Other Objective/Functional Measures: left UE limitations with wall angels and fatigue reported. Pain Level (0-10 scale) post treatment: 1/10    ASSESSMENT/Changes in Function: Wrist pain reduced with modalities. Fatigued with therex. Patient will continue to benefit from skilled OT services to modify and progress therapeutic interventions, address ROM deficits, address strength deficits, analyze and address soft tissue restrictions and instruct in home and community integration to attain remaining goals. []  See Plan of Care  []  See progress note/recertification  []  See Discharge Summary         Progress towards goals / Updated goals:  Short Term Goals: To be accomplished in 2  weeks:  Goal:* Patient will be compliant with initial home exercise program to take an active role in their rehabilitation process. Status at Eval:  12/2/20: Ollie Nicolas for thoracic stretches.     Goal:* Patient will demonstrate a good understanding of their condition and strategies for self-management.   Status at Eval:  12/2/20: educated on [address] be accomplished in 4 weeks:              1. Patient will report improvement in symptom of fatigue in the bilateral UE with overhead activities with shoulder flexion. At Eval: 100% symptoms with flexion at 90 degrees. 12/4/2020 - fatigue with wall amrit therex     2.  Patient will report 0/10 pain in the left wrist at rest.  At Eval: 2/10, 7/10 with use  12/2/20: 2/10 at rest     PLAN  []  Upgrade activities as tolerated     [x]  Continue plan of care  []  Update interventions per flow sheet       []  Discharge due to:_  []  Other:_      Union Latha, OT 12/4/2020  1:27 PM    Future Appointments   Date Time Provider Mary Mccall   12/4/2020  1:45 PM Vance Power, OT [facility]   12/8/2020  1:15 PM CattenheadShania Gilchrist, OT [facility]   12/10/2020  1:45 PM Vance Power, OT [facility]   12/15/2020  1:15 PM CattenheadShania Gilchrist, OT [facility]   12/17/2020  1:15 PM CattenheadShania Gilchrist, OT [facility]   12/22/2020  1:15 PM CattenheadShania Gilchrist, OT [facility]

## 2020-12-08 ENCOUNTER — HOSPITAL ENCOUNTER (OUTPATIENT)
Dept: PHYSICAL THERAPY | Age: 70
Discharge: HOME | End: 2020-12-08
Payer: MEDICARE

## 2020-12-08 PROCEDURE — 97110 THERAPEUTIC EXERCISES: CPT

## 2020-12-08 PROCEDURE — 97018 PARAFFIN BATH THERAPY: CPT

## 2020-12-08 NOTE — PROGRESS NOTES
OT DAILY TREATMENT NOTE     Patient Name: Sumit Abreu  Date:2020  : 1950  [x]  Patient  Verified  Payor: [place] MEDICARE / Plan: VA MEDICARE PART A & B / Product Type: Medicare /    In time:1317  Out time:1355  Total Treatment Time (min): 43  Visit #: 4 of 8    Medicare/BCBS Only   Total Timed Codes (min):  33 1:1 Treatment Time:  33     Treatment Area: Left hand pain [M79.642]  Right hand pain [M79.641]  Left wrist pain [M25.532]    SUBJECTIVE  Pain Level (0-10 scale): 1-2/10  Any medication changes, allergies to medications, adverse drug reactions, diagnosis change, or new procedure performed?: [x] No    [] Yes (see summary sheet for update)  Subjective functional status/changes:   [x] No changes reported      OBJECTIVE    Modality rationale: decrease pain to improve the patients ability to WB in wrist   Min Type Additional Details    [] Estim:  []Unatt       []IFC  []Premod                        []Other:  []w/ice   []w/heat  Position:  Location:    [] Estim: []Att    []TENS instruct  []NMES                    []Other:  []w/US   []w/ice   []w/heat  Position:  Location:    []  Traction: [] Cervical       []Lumbar                       [] Prone          []Supine                       []Intermittent   []Continuous Lbs:  [] before manual  [] after manual   8 [x]  Ultrasound: [x]Continuous   [] Pulsed                           []1MHz   [x]3MHz W/cm2: 1.0  Location: left wrist    []  Iontophoresis with dexamethasone         Location: [] Take home patch   [] In clinic   10 []  Ice     [x]  heat  []  Ice massage  []  Laser   [x]  Paraffin Position: resting  Location: left hand/wrist    []  Laser with stim  []  Other:  Position:  Location:    []  Vasopneumatic Device Pressure:       [] lo [] med [] hi   Temperature: [] lo [] med [] hi         25 min Therapeutic Exercise:  [x] See flow sheet :   Rationale: increase ROM, increase strength and improve coordination to improve the patients ability to use bilateral UE with functional purpose without pain. With   [] TE   [] TA   [] neuro   [] other: Patient Education: [x] Review HEP    [] Progressed/Changed HEP based on:   [] positioning   [] body mechanics   [] transfers   [] heat/ice application   [] Splint wear/care   [] Sensory re-education   [] scar management      [] other:            Other Objective/Functional Measures: NT    Pain Level (0-10 scale) post treatment: 1/10    ASSESSMENT/Changes in Function: severe tightness noted in the pectoral region wit exercises. Patient will continue to benefit from skilled OT services to address ROM deficits, address strength deficits, analyze and address soft tissue restrictions and analyze and cue movement patterns to attain remaining goals. [x]  See Plan of Care  []  See progress note/recertification  []  See Discharge Summary         Progress towards goals / Updated goals:  Short Term Goals: To be accomplished in 2  weeks:  Goal:* Patient will be compliant with initial home exercise program to take an active role in their rehabilitation process. Status at [facility]:  12/2/20: Issued HEP for thoracic stretches.     Goal:* Patient will demonstrate a good understanding of their condition and strategies for self-management. Status at [facility]:  12/2/20: educated on [address] be accomplished in 4 weeks:              1. Patient will report improvement in symptom of fatigue in the bilateral UE with overhead activities with shoulder flexion. At [facility]: 100% symptoms with flexion at 90 degrees. 12/4/2020 - fatigue with wall amrit therex     2.  Patient will report 0/10 pain in the left wrist at rest.  At [facility]: 2/10, 7/10 with use  12/2/20: 2/10 at rest    PLAN  []  Upgrade activities as tolerated     [x]  Continue plan of care  []  Update interventions per flow sheet       []  Discharge due to:_  []  Other:_      Johnny Leyva OT 12/8/2020  1:29 PM    Future Appointments   Date Time Provider Mary Mccall   12/10/2020  1:45 PM Chantell Reyes, OT [facility]   12/15/2020  1:15 PM Guerline Mcclain, OT [facility]   12/17/2020  1:15 PM James Solares, OT [facility]   12/22/2020  1:15 PM James Solares, OT [facility]

## 2020-12-10 ENCOUNTER — HOSPITAL ENCOUNTER (OUTPATIENT)
Dept: PHYSICAL THERAPY | Age: 70
Discharge: HOME | End: 2020-12-10
Payer: MEDICARE

## 2020-12-10 PROCEDURE — 97035 APP MDLTY 1+ULTRASOUND EA 15: CPT

## 2020-12-10 PROCEDURE — 97110 THERAPEUTIC EXERCISES: CPT

## 2020-12-10 PROCEDURE — 97530 THERAPEUTIC ACTIVITIES: CPT

## 2020-12-10 NOTE — PROGRESS NOTES
OT DAILY TREATMENT NOTE     Patient Name: William Odonnell  Date:12/10/2020  : 1950  [x]  Patient  Verified  Payor: VA MEDICARE / Plan: VA MEDICARE PART A & B / Product Type: Medicare /    In time:1:46 PM  Out time:2:30 PM  Total Treatment Time (min): 44  Visit #: 5 of 8    Treatment Area: Left hand pain [M79.642]  Right hand pain [M79.641]  Left wrist pain [M25.532]    SUBJECTIVE  Pain Level (0-10 scale): 2/10  Any medication changes, allergies to medications, adverse drug reactions, diagnosis change, or new procedure performed?: [x] No    [] Yes (see summary sheet for update)  Subjective functional status/changes:   [] No changes reported  \"Last night I kept my hand still and I went to turn it (ulnar deviation) and it was a sharp pain. \"    OBJECTIVE    Modality rationale: decrease inflammation, decrease pain and increase tissue extensibility to improve the patients ability to functionally use left hand.     Min Type Additional Details    [] Estim:  []Unatt       []IFC  []Premod                        []Other:  []w/ice   []w/heat  Position:  Location:    [] Estim: []Att    []TENS instruct  []NMES                    []Other:  []w/US   []w/ice   []w/heat  Position:  Location:    []  Traction: [] Cervical       []Lumbar                       [] Prone          []Supine                       []Intermittent   []Continuous Lbs:  [] before manual  [] after manual   8 [x]  Ultrasound: [x]Continuous   [] Pulsed                           []1MHz   [x]3MHz W/cm2: 1.0  Location: left wrist ulnar    []  Iontophoresis with dexamethasone         Location: [] Take home patch   [] In clinic   10 []  Ice     []  heat  []  Ice massage  []  Laser   [x]  Paraffin Position: resting  Location: left hand    []  Laser with stim  []  Other:  Position:  Location:    []  Vasopneumatic Device Pressure:       [] lo [] med [] hi   Temperature: [] lo [] med [] hi       [x] Skin assessment post-treatment:  [x]intact []redness- no adverse reaction    []redness  adverse reaction:     26 min Therapeutic Exercise:  [x] See flow sheet :   Rationale: increase ROM, increase strength and improve coordination to improve the patients ability to increase functional use of left UE    10 min Therapeutic Activity:  []  See flow sheet : FOTO outcome measure   Rationale: FOTO outcome measure  to improve the patients ability to measure progress regarding skilled OT. Completed while patient sitting with paraffin wrap on left hand. With   [] TE   [] TA   [] neuro   [] other: Patient Education: [x] Review HEP    [] Progressed/Changed HEP based on:   [] positioning   [] body mechanics   [] transfers   [] heat/ice application   [] Splint wear/care   [] Sensory re-education   [] scar management      [] other:            Other Objective/Functional Measures: FOTO outcome measure score: 49     Pain Level (0-10 scale) post treatment: 0/10    ASSESSMENT/Changes in Function: Reduced pain after modalities. Continues to demonstrate LUE weakness and fatigue with activity. Patient will continue to benefit from skilled OT services to modify and progress therapeutic interventions, analyze and address soft tissue restrictions and instruct in home and community integration to attain remaining goals. []  See Plan of Care  []  See progress note/recertification  []  See Discharge Summary         Progress towards goals / Updated goals:  Short Term Goals: To be accomplished in 2  weeks:  Goal:* Patient will be compliant with initial home exercise program to take an active role in their rehabilitation process. Status at [facility]:  12/2/20: Issued HEP for thoracic stretches.     Goal:* Patient will demonstrate a good understanding of their condition and strategies for self-management. Status at [facility]:  12/2/20: educated on [address] [place] [address] Pkwy be accomplished in 4 weeks:              1.  Patient will report improvement in symptom of fatigue in the bilateral UE with overhead activities with shoulder flexion.   At [facility]: 100% symptoms with flexion at 90 degrees.   12/4/2020 - fatigue with wall amrit therex  12/10/2020 - continues to fatigue with activity.      2. Patient will report 0/10 pain in the left wrist at rest.  At [facility]: 2/10, 7/10 with use  12/2/20: 2/10 at rest    PLAN  []  Upgrade activities as tolerated     [x]  Continue plan of care  []  Update interventions per flow sheet       []  Discharge due to:_  []  Other:_      Latanya Ramirez, OT 12/10/2020  1:51 PM    Future Appointments   Date Time Provider Mary Connorsi   12/15/2020  1:15 PM Jese Lackey, EMILY [facility]   12/17/2020  1:15 PM Gus Solares, OT [facility]   12/22/2020  1:15 PM Gus Solares, OT [facility]   1/4/2021 11:30 AM Florence Delgado MD [facility] BS AMB   1/6/2021  8:45 AM Aki Mccarthy DO [facility] BS AMB

## 2020-12-15 ENCOUNTER — HOSPITAL ENCOUNTER (OUTPATIENT)
Dept: PHYSICAL THERAPY | Age: 70
Discharge: HOME | End: 2020-12-15
Payer: MEDICARE

## 2020-12-15 PROCEDURE — 97035 APP MDLTY 1+ULTRASOUND EA 15: CPT

## 2020-12-15 PROCEDURE — 97110 THERAPEUTIC EXERCISES: CPT

## 2020-12-15 PROCEDURE — 97535 SELF CARE MNGMENT TRAINING: CPT

## 2020-12-15 NOTE — PROGRESS NOTES
OT DAILY TREATMENT NOTE     Patient Name: Vandana Miller III  Date:12/15/2020  : 1950  [x]  Patient  Verified  Payor: VA MEDICARE / Plan: VA MEDICARE PART A & B / Product Type: Medicare /    In time:3:00 PM  Out time:3:45 PM  Total Treatment Time (min): 45  Visit #: 6 of 8    Medicare/BCBS Only   Total Timed Codes (min):  45 1:1 Treatment Time:  45     Treatment Area: Left hand pain [M79.642]  Right hand pain [M79.641]  Left wrist pain [M25.532]    SUBJECTIVE  Pain Level (0-10 scale): 2/10  Any medication changes, allergies to medications, adverse drug reactions, diagnosis change, or new procedure performed?: [x] No    [] Yes (see summary sheet for update)  Subjective functional status/changes:   [] No changes reported  Pt reports reduced intensity of paresthesias of BUEs however continues to report no change in pain in left ulnar side of wrist    OBJECTIVE    Modality rationale: decrease inflammation, decrease pain and increase tissue extensibility to improve the patients ability to move left wrist.    Min Type Additional Details    [] Estim:  []Unatt       []IFC  []Premod                        []Other:  []w/ice   []w/heat  Position:  Location:    [] Estim: []Att    []TENS instruct  []NMES                    []Other:  []w/US   []w/ice   []w/heat  Position:  Location:    []  Traction: [] Cervical       []Lumbar                       [] Prone          []Supine                       []Intermittent   []Continuous Lbs:  [] before manual  [] after manual   8 [x]  Ultrasound: [x]Continuous   [] Pulsed                           []1MHz   [x]3MHz W/cm2: 1.0  Location: left ulnar wrist    []  Iontophoresis with dexamethasone         Location: [] Take home patch   [] In clinic    []  Ice     []  heat  []  Ice massage  []  Laser   []  Paraffin Position:  Location:    []  Laser with stim  []  Other:  Position:  Location:    []  Vasopneumatic Device Pressure:       [] lo [] med [] hi   Temperature: [] lo [] med [] hi       [x] Skin assessment post-treatment:  [x]intact []redness- no adverse reaction    []redness  adverse reaction:     22 min Therapeutic Exercise:  [x] See flow sheet :   Rationale: increase ROM, increase strength and improve coordination to improve the patients ability to grasp,     15 min Self Care/Home Management: wrist A&P, activity modifications, splint wear   Rationale: education  to improve the patients ability to reduce symptoms in left wrist.       With   [] TE   [] TA   [] neuro   [] other: Patient Education: [x] Review HEP    [] Progressed/Changed HEP based on:   [] positioning   [] body mechanics   [] transfers   [] heat/ice application   [] Splint wear/care   [] Sensory re-education   [] scar management      [] other:            Other Objective/Functional Measures: fatigued with scapular squeezes with moderate verbal and physical cueing for muscle contraction     Pain Level (0-10 scale) post treatment: 2/10    ASSESSMENT/Changes in Function: Reduced intensity of paresthesias per patient report. No change in ulnar sided wrist pain. Patient will continue to benefit from skilled OT services to modify and progress therapeutic interventions, address ROM deficits, address strength deficits, analyze and address soft tissue restrictions and instruct in home and community integration to attain remaining goals. []  See Plan of Care  []  See progress note/recertification  []  See Discharge Summary         Progress towards goals / Updated goals:  Short Term Goals: To be accomplished in 2  weeks:  Goal:* Patient will be compliant with initial home exercise program to take an active role in their rehabilitation process. Status at [facility]:  12/2/20: Issued HEP for thoracic stretches. 12/15/2020 - pt reports compliance, goal met     Goal:* Patient will demonstrate a good understanding of their condition and strategies for self-management.   Status at [facility]:  12/2/20: educated on HEP     Long Term Goals: To be accomplished in 4 weeks:              1. Patient will report improvement in symptom of fatigue in the bilateral UE with overhead activities with shoulder flexion.   At Eval: 100% symptoms with flexion at 90 degrees.   12/4/2020 - fatigue with wall amrit therex  12/10/2020 - continues to fatigue with activity.       2. Patient will report 0/10 pain in the left wrist at rest.  At Eval: 2/10, 7/10 with use  12/2/20: 2/10 at rest    PLAN  []  Upgrade activities as tolerated     [x]  Continue plan of care  []  Update interventions per flow sheet       []  Discharge due to:_  []  Other:_      Renetta Toro, OT 12/15/2020  3:03 PM    Future Appointments   Date Time Provider Mary Mccall   12/17/2020  1:15 PM [facility]   12/22/2020  1:15 PM [facility]   1/4/2021 11:30 AM Casandra Gonzalez MD [facility] BS AMB   1/6/2021  8:45 AM Aki Sotelo DO [facility] BS AMB

## 2020-12-17 ENCOUNTER — HOSPITAL ENCOUNTER (OUTPATIENT)
Dept: PHYSICAL THERAPY | Age: 70
Discharge: HOME | End: 2020-12-17
Payer: MEDICARE

## 2020-12-17 PROCEDURE — 97110 THERAPEUTIC EXERCISES: CPT

## 2020-12-17 PROCEDURE — 97035 APP MDLTY 1+ULTRASOUND EA 15: CPT

## 2020-12-17 NOTE — PROGRESS NOTES
OT DAILY TREATMENT NOTE     Patient Name: Elisabeth Hoover III  Date:2020  : 1950  [x]  Patient  Verified  Payor: Saranya Joya / Plan: VA MEDICARE PART A & B / Product Type: Medicare /    In time:115  Out time:200  Total Treatment Time (min): 45  Visit #: 7 of 8    Medicare/BCBS Only   Total Timed Codes (min):  35 1:1 Treatment Time:  45     Treatment Area: Left hand pain [M79.642]  Right hand pain [M79.641]  Left wrist pain [M25.532]    SUBJECTIVE  Pain Level (0-10 scale): 2/10  Any medication changes, allergies to medications, adverse drug reactions, diagnosis change, or new procedure performed?: [x] No    [] Yes (see summary sheet for update)  Subjective functional status/changes:   [] No changes reported  \" I try to wear the splint at night but I always end up taking it off because its hot and it gets stuck on everything\"    OBJECTIVE    Modality rationale: decrease inflammation and increase tissue extensibility to improve the patients ability to complete AROM   Min Type Additional Details    [] Estim:  []Unatt       []IFC  []Premod                        []Other:  []w/ice   []w/heat  Position:  Location:    [] Estim: []Att    []TENS instruct  []NMES                    []Other:  []w/US   []w/ice   []w/heat  Position:  Location:    []  Traction: [] Cervical       []Lumbar                       [] Prone          []Supine                       []Intermittent   []Continuous Lbs:  [] before manual  [] after manual   10 [x]  Ultrasound: []Continuous   [] Pulsed                           []1MHz   [x]3MHz W/cm2:1.2  Location: lateral left hand pisiform area    []  Iontophoresis with dexamethasone         Location: [] Take home patch   [] In clinic    []  Ice     []  heat  []  Ice massage  []  Laser   []  Paraffin Position:  Location:    []  Laser with stim  []  Other:  Position:  Location:    []  Vasopneumatic Device Pressure:       [] lo [] med [] hi   Temperature: [] lo [] med [] hi       [x] Skin assessment post-treatment:  [x]intact [x]redness- no adverse reaction    []redness  adverse reaction:       35 min Therapeutic Exercise:  [x] See flow sheet :   Rationale: increase ROM and increase strength to improve the patients ability to  and manipulate while completing functional AROM      Stretches thoracic outlet: 10 reps/2 sets ea/3 exercises    Ulnar Glides: x4 bilaterally UEs    Wrist Maze x4 with left hand    Finger web yellow: left hand for increasing  width progressing to hold ball for turning radial and ulnar deviation    Therabar yellow: 4 exercises (including rolls)/10 reps/2 sets bilaterally        With   [] TE   [] TA   [] neuro   [] other: Patient Education: [x] Review HEP    [] Progressed/Changed HEP based on:   [] positioning   [] body mechanics   [] transfers   [] heat/ice application   [] Splint wear/care   [] Sensory re-education   [] scar management      [] other:            Other Objective/Functional Measures:   Pt unable to complete dexterity balls 2/2 to reduced coordination in right hand; lead to more frustration than successful completion    Pt cannot complete the wall and door frame exercises for thoracic outlet     Rest length given PRN between sets 2/2 to fatigue especially with activities incorporating shoulder movements     Pain Level (0-10 scale) post treatment: 2/10    ASSESSMENT/Changes in Function: Pt is states he feels he is progressing especially with stretches; his pain is reducing; he also reports paresthesia reduced. Patient will continue to benefit from skilled OT services to modify and progress therapeutic interventions, address functional mobility deficits, address ROM deficits, address strength deficits, analyze and address soft tissue restrictions, analyze and cue movement patterns, analyze and modify body mechanics/ergonomics, assess and modify postural abnormalities and instruct in home and community integration to attain remaining goals.      [x] See Plan of Care  []  See progress note/recertification  []  See Discharge Summary         Progress towards goals / Updated goals:  Short Term Goals: To be accomplished in 2  weeks:  Goal: Patient will be compliant with initial home exercise program to take an active role in their rehabilitation process. Status at [facility]:  12/2/20: Issued HEP for thoracic stretches. 12/15/2020 - pt reports compliance, goal met     Goal: Patient will demonstrate a good understanding of their condition and strategies for self-management. Status at [facility]:  12/2/20: educated on HEP  12/17/2020: ongoing review with pt as new exercises are added      Long Term Goals: To be accomplished in 4 weeks:              1. Patient will report improvement in symptom of fatigue in the bilateral UE with overhead activities with shoulder flexion. At [facility]: 100% symptoms with flexion at 90 degrees.   12/4/2020 - fatigue with wall amrit therex  12/10/2020 - continues to fatigue with activity.   12/17/2020: still reports inability to complete activities with shoulder flexion >90deg      2.  Patient will report 0/10 pain in the left wrist at rest.  At Eval: 2/10, 7/10 with use  12/2/20: 2/10 at rest   12/17/2020: 2/10 at rest     PLAN  []  Upgrade activities as tolerated     [x]  Continue plan of care  []  Update interventions per flow sheet       []  Discharge due to:_  []  Other:_      Margaret Pacheco CLWT, MOT, OTR/L 12/17/2020  1:09 PM    Future Appointments   Date Time Provider Mary Mccall   12/17/2020  1:15 PM Michelle Maldonado [facility]   12/22/2020  1:15 PM Young Billings OTR/L [facility]   1/4/2021 11:30 AM Connie Mcintosh MD [facility] BS AMB   1/6/2021  8:45 AM Aki Nascimento DO [facility] BS AMB

## 2020-12-22 ENCOUNTER — HOSPITAL ENCOUNTER (OUTPATIENT)
Dept: PHYSICAL THERAPY | Age: 70
Discharge: HOME | End: 2020-12-22
Payer: MEDICARE

## 2020-12-22 PROCEDURE — 97035 APP MDLTY 1+ULTRASOUND EA 15: CPT

## 2020-12-22 PROCEDURE — 97112 NEUROMUSCULAR REEDUCATION: CPT

## 2020-12-22 PROCEDURE — 97110 THERAPEUTIC EXERCISES: CPT

## 2020-12-22 NOTE — PROGRESS NOTES
OT DISCHARGE DAILY NOTE AND SUMMARY- [facility]     Date:2020  Patient name: Meena Caban III Start of Care: 20   Referral source: Angy Aguilar DO : 1950   Medical/Treatment Diagnosis: Left hand pain [M79.642]  Right hand pain [M79.641]  Left wrist pain [M25.532] Onset Date: Approx May 2020     Prior Hospitalization: see medical history Provider#: [id number]   Medications: Verified on Patient Summary List    Comorbidities:Cancer, arthritis, thyroid, HTN   Prior Level of Function: modified I with ADL and IADL tasks     Visits from Start of Care: 8   Missed Visits: 0    Reporting Period: 20 to 20      [x]  Patient  Verified  Payor: [place] MEDICARE / Plan: [place] MEDICARE PART A & B / Product Type: Medicare /    In time:13:10  Out time: 13:56  Total Treatment Time (min): 46  Visit #: 8 of 8    Medicare/BCBS Only   Total Timed Codes (min):  46 1:1 Treatment Time:  46       Treatment Area: Left hand pain [M79.642]  Right hand pain [M79.641]  Left wrist pain [M25.532]    SUBJECTIVE  Pain Level (0-10 scale): 2/10  Any medication changes, allergies to medications, adverse drug reactions, diagnosis change, or new procedure performed?: [x] No    [] Yes (see summary sheet for update)  Subjective functional status/changes:   [] No changes reported  \"I think the stretches are helping some.  I do them every day\" \" I have a nerve study in 2 weeks\"    OBJECTIVE    Modality rationale: decrease inflammation and decrease pain to improve the patients ability to use left hand for ADLs   Min Type Additional Details    [] Estim:  []Unatt       []IFC  []Premod                        []Other:  []w/ice   []w/heat  Position:  Location:    [] Estim: []Att    []TENS instruct  []NMES                    []Other:  []w/US   []w/ice   []w/heat  Position:  Location:    []  Traction: [] Cervical       []Lumbar                       [] Prone          []Supine                       []Intermittent   []Continuous Lbs:  [] before manual  [] after manual   8 [x]  Ultrasound: [x]Continuous   [] Pulsed                           []1MHz   [x]3MHz W/cm2: 1.0  Location: left wrist    []  Iontophoresis with dexamethasone         Location: [] Take home patch   [] In clinic    []  Ice     []  heat  []  Ice massage  []  Laser   []  Anodyne Position:  Location:    []  Laser with stim  []  Other:  Position:  Location:    []  Vasopneumatic Device Pressure:       [] lo [] med [] hi   Temperature: [] lo [] med [] hi   [x] Skin assessment post-treatment:  [x]intact []redness- no adverse reaction    []redness  adverse reaction:     15 min Therapeutic Exercise:  [x] See flow sheet :   Rationale: to decrease pain and discomfort associated with functional use of upper extremities to improve the patients ability to use both hands for I/ADLs. 13 min Neuromuscular Re-education:  [x]  See flow sheet :   Rationale: facilitate improved postural awareness  to improve the patients ability to decrease pain and improve participation in sustained ADLs. With   [x] TE   [] TA   [] neuro   [] other: Patient Education: [x] Review HEP    [x] Updated HEP based on pt's difficulty with several stretches required modifications. [] positioning   [] body mechanics   [] transfers   [] heat/ice application   [] Splint wear/care   [] Sensory re-education   [] scar management      [] other:            Other Objective/Functional Measures:   Decreased pain and paraesthesias with sustained activity with shoulders flexed. Increased ability to hold and lift items per pt's report. Pain Level (0-10 scale) post treatment: 2/10    Summary of Care:  Short Term Goals: To be accomplished in 2  weeks:  Goal: Patient will be compliant with initial home exercise program to take an active role in their rehabilitation process. Status at Eval:  12/2/20: Issued HEP for thoracic stretches.   12/15/2020 - pt reports compliance, goal met     Goal: Patient will demonstrate a good understanding of their condition and strategies for self-management. Status at Eval:  12/2/20: educated on HEP  12/17/2020: ongoing review with pt as new exercises are added   12/22/2020: Goal met     Long Term Goals: To be accomplished in 4 weeks:              1. Patient will report improvement in symptom of fatigue in the bilateral UE with overhead activities with shoulder flexion. At Eval: 100% symptoms with flexion at 90 degrees.   12/4/2020 - fatigue with wall amrit therex  12/22/2020: pt reports minimal fatigue following 1 minute of sustained activity with each hand with shoulder flexed at 90 degrees. 2. Patient will report 0/10 pain in the left wrist at rest.  At Eval: 2/10, 7/10 with use  12/22/2020: pain remains 1-2/10 at rest     ASSESSMENT/Changes in Function: Pt demonstrated progress during OT sessions since evaluation. Pt reports compliance with wearing his wrist support brace and performing thoracic and cervical spine stretches at home. Due to pt's shoulder ROM restrictions, HEP was adjusted to allow for proper form during completion. Pt reports at home he is able to perform all ADLs when allowing for additional time and rest breaks, including overhead reaching tasks. Pt's pain remains 1-2 at rest, however, pt reports the pain is not as severe as it was prior to OT. Pt has partially met his goals and is in agreement with plan to transition to HEP. FOTO is not administered at OR due to initial survey not being related to pt's diagnosis.     PLAN:  [x]Discontinue therapy: [x]Patient has reached or is progressing toward set goals      []Patient is non-compliant or has abdicated      []Due to lack of appreciable progress towards set goals    Thank you for this referral!    Beatris Dubon, OTR/L 12/22/2020 1:12 PM

## 2020-12-24 ENCOUNTER — APPOINTMENT (OUTPATIENT)
Dept: PHYSICAL THERAPY | Age: 70
End: 2020-12-24
Payer: MEDICARE

## 2021-01-04 ENCOUNTER — OFFICE VISIT (OUTPATIENT)
Dept: ORTHOPEDIC SURGERY | Age: 71
End: 2021-01-04
Payer: MEDICARE

## 2021-01-04 VITALS
WEIGHT: 183 LBS | TEMPERATURE: 98.6 F | HEART RATE: 64 BPM | SYSTOLIC BLOOD PRESSURE: 139 MMHG | BODY MASS INDEX: 24.79 KG/M2 | HEIGHT: 72 IN | RESPIRATION RATE: 20 BRPM | DIASTOLIC BLOOD PRESSURE: 91 MMHG

## 2021-01-04 DIAGNOSIS — R20.2 NUMBNESS AND TINGLING IN BOTH HANDS: ICD-10-CM

## 2021-01-04 DIAGNOSIS — R20.2 NUMBNESS AND TINGLING IN BOTH HANDS: Primary | ICD-10-CM

## 2021-01-04 DIAGNOSIS — R20.0 NUMBNESS AND TINGLING IN BOTH HANDS: Primary | ICD-10-CM

## 2021-01-04 DIAGNOSIS — R20.0 NUMBNESS AND TINGLING IN BOTH HANDS: ICD-10-CM

## 2021-01-04 PROCEDURE — 95911 NRV CNDJ TEST 9-10 STUDIES: CPT | Performed by: PHYSICAL MEDICINE & REHABILITATION

## 2021-01-04 PROCEDURE — 95886 MUSC TEST DONE W/N TEST COMP: CPT | Performed by: PHYSICAL MEDICINE & REHABILITATION

## 2021-01-04 NOTE — PROGRESS NOTES
[address]  [address], [address]  [address]  Phone: [phone]  Fax: [phone]        Kylah Alberto  : 1950  PCP: Chere Bamberger, MD  2021    ELECTROMYOGRAPHY AND NERVE CONDUCTION STUDIES    Forrest Phan III was referred by Dr. Kathy Denney for electrodiagnostic evaluation of bilateral hand numbness and tingling. NCV & EMG Findings:  Evaluation of the right ulnar motor nerve showed decreased conduction velocity (A Elbow-B Elbow, 45 m/s). The left median sensory nerve showed prolonged distal peak latency (3.8 ms) and decreased conduction velocity (Wrist-2nd Digit, 37 m/s). All remaining nerves (as indicated in the following tables) were within normal limits. All left vs. right side differences were within normal limits. All examined muscles (as indicated in the following table) showed no evidence of electrical instability. INTERPRETATION  This is an abnormal electrodiagnostic examination. These findings may be consistent with:   1. Mild median mononeuropathy at the left wrist (carpal tunnel syndrome)   2. Mild ulnar mononeuropathy at the right elbow (cubital tunnel syndrome)    There is no electrodiagnostic evidence of any cervical radiculopathy, brachial plexopathy, peripheral polyneuropathy, or any other mononeuropathy. CLINICAL INTERPRETATION  His electrodiagnostic findings of carpal tunnel and cubital tunnel syndromes may contribute to symptoms of the bilateral arms. HISTORY OF PRESENT ILLNESS  Forrest Phan III is a 79 y.o. male. Pt presents today for BUE EMG evaluation of bilateral hand numbness and tingling, L>R, especially at night.      PAST MEDICAL HISTORY   Past Medical History:   Diagnosis Date    Arthritis     Elevated PSA     Hypertension     Hypothyroid     Personal history of prostate cancer     Pituitary tumor     Renal insufficiency     S/P rotator cuff repair 10/15/2015    Sleep apnea     patient states resolved after surgery       Past Surgical History:   Procedure Laterality Date    HX HEENT      surgery for sleep apnea    HX HERNIA REPAIR      x2    HX SHOULDER ARTHROSCOPY Right 1/13/14    Dr. Juan Galvin ARTHROSCOPY Left 2016    HX UROLOGICAL  10/21/2016    Prostate Biopsy with Dr. Daphney Nolasco     . MEDICATIONS    Current Outpatient Medications   Medication Sig Dispense Refill    famotidine (PEPCID) 40 mg tablet       alprostadil (MUSE) 1,000 mcg supp UNWRAP AND INSERT 1 SUPPOSITORY PER URETHRA EVERY DAY AS NEEDED 6 Suppository 12    sildenafil citrate (VIAGRA) 100 mg tablet   0    levothyroxine (SYNTHROID) 25 mcg tablet take 1 tablet by mouth once daily  0    diclofenac (VOLTAREN) 1 % gel Apply topically to left wrist 2 grams 3 x daily 200 g 3    propranolol LA (INDERAL LA) 120 mg SR capsule take 1 capsule by mouth once daily  0    cholecalciferol, vitamin D3, 2,000 unit tab Take 2,000 Units by mouth daily. ALLERGIES  Allergies   Allergen Reactions    Penicillins Other (comments)     paralyzation    Sulfa (Sulfonamide Antibiotics) Unknown (comments)          SOCIAL HISTORY    Social History     Socioeconomic History    Marital status: SINGLE     Spouse name: Not on file    Number of children: Not on file    Years of education: Not on file    Highest education level: Not on file   Tobacco Use    Smoking status: Former Smoker    Smokeless tobacco: Never Used    Tobacco comment: quit in 1976   Substance and Sexual Activity    Alcohol use: Yes     Alcohol/week: 0.0 standard drinks     Comment: occasionally    Drug use: No       FAMILY HISTORY  Family History   Problem Relation Age of Onset    Diabetes Other     Hypertension Other     Heart Disease Other     Arthritis-osteo Other          PHYSICAL EXAMINATION  There were no vitals taken for this visit.     Pain Assessment 11/11/2020   Location of Pain Hand   Location Modifiers Left   Severity of Pain 0   Quality of Pain -   Quality of Pain Comment -   Duration of Pain -   Frequency of Pain -   Frequency of Pain Comment -   Aggravating Factors -   Aggravating Factors Comment -   Limiting Behavior -   Relieving Factors -   Relieving Factors Comment -   Result of Injury -   Work-Related Injury -   Type of Injury -   Type of Injury Comment -           Constitutional:  Well developed, well nourished, in no acute distress. Psychiatric: Affect and mood are appropriate. Integumentary: No rashes or abrasions noted on exposed areas. SPINE/MUSCULOSKELETAL EXAM    On brief examination: Essential tremor affecting RUE.       NCV & EMG Findings:  Nerve Conduction Studies  Anti Sensory Summary Table     Stim Site NR Peak (ms) Norm Peak (ms) O-P Amp (µV) Norm O-P Amp Site1 Site2 Delta-P (ms) Dist (cm) Yemi (m/s) Norm Yemi (m/s)   Left Median Anti Sensory (2nd Digit)   Wrist    3.8 <3.6 54.0 >10 Wrist 2nd Digit 3.8 14.0 37 >39   Right Median Anti Sensory (2nd Digit)   Wrist    3.4 <3.6 27.2 >10 Wrist 2nd Digit 3.4 14.0 41 >39   Left Radial Anti Sensory (Base 1st Digit)   Wrist    2.5 <3.1 15.1  Wrist Base 1st Digit 2.5 0.0     Right Radial Anti Sensory (Base 1st Digit)   Wrist    2.3 <3.1 63.4  Wrist Base 1st Digit 2.3 0.0     Left Ulnar Anti Sensory (5th Digit)   Wrist    3.5 <3.7 17.9 >15.0 Wrist 5th Digit 3.5 14.0 40 >38   B Elbow    3.5  13.2  B Elbow Wrist 0.0 0.0  >47   Right Ulnar Anti Sensory (5th Digit)   Wrist    3.3 <3.7 20.1 >15.0 Wrist 5th Digit 3.3 14.0 42 >38     Motor Summary Table     Stim Site NR Onset (ms) Norm Onset (ms) O-P Amp (mV) Norm O-P Amp Site1 Site2 Delta-0 (ms) Dist (cm) Yemi (m/s) Norm Yemi (m/s)   Left Median Motor (Abd Poll Brev)   Wrist    3.5 <4.2 8.8 >5 Elbow Wrist 4.4 22.0 50 >50   Elbow    7.9  8.8          Right Median Motor (Abd Poll Brev)   Wrist    3.7 <4.2 7.7 >5 Elbow Wrist 4.4 24.5 56 >50   Elbow 8.1  7.3          Left Ulnar Motor (Abd Dig Min)   Wrist    3.2 <4.2 13.0 >3 B Elbow Wrist 3.8 20.5 54 >53   B Elbow    7.0  12.4  A Elbow B Elbow 2.1 0.0  >53   A Elbow    9.1  11.8          Right Ulnar Motor (Abd Dig Min)   Wrist    3.0 <4.2 9.9 >3 B Elbow Wrist 3.6 19.5 54 >53   B Elbow    6.6  9.6  A Elbow B Elbow 2.2 10.0 45 >53   A Elbow    8.8  9.8            EMG     Side Muscle Nerve Root Ins Act Fibs Psw Amp Dur Poly Recrt Int Keri Smolder Comment   Left Biceps Musculocut C5-6 Nml Nml Nml Nml Nml 0 Nml Nml    Left Triceps Radial C6-7-8 Nml Nml Nml Nml Nml 0 Nml Nml    Left PronatorTeres Median C6-7 Nml Nml Nml Nml Nml 0 Nml Nml    Left Abd Poll Brev Median C8-T1 Nml Nml Nml Nml Nml 0 Nml Nml    Left 1stDorInt Ulnar C8-T1 Nml Nml Nml Nml Nml 0 Nml Nml    Right Biceps Musculocut C5-6 Nml Nml Nml Nml Nml 0 Nml Nml    Right Triceps Radial C6-7-8 Nml Nml Nml Nml Nml 0 Nml Nml    Right PronatorTeres Median C6-7 Nml Nml Nml Nml Nml 0 Nml Nml    Right Abd Poll Brev Median C8-T1 Nml Nml Nml Nml Nml 0 Nml Nml    Right 1stDorInt Ulnar C8-T1 Nml Nml Nml Nml Nml 0 Nml Nml        Nerve Conduction Studies  Anti Sensory Left/Right Comparison     Stim Site L Lat (ms) R Lat (ms) L-R Lat (ms) L Amp (µV) R Amp (µV) L-R Amp (%) Site1 Site2 L Yemi (m/s) R Yemi (m/s) L-R Yemi (m/s)   Median Anti Sensory (2nd Digit)   Wrist 3.8 3.4 0.4 54.0 27.2 49.6 Wrist 2nd Digit 37 41 4   Radial Anti Sensory (Base 1st Digit)   Wrist 2.5 2.3 0.2 15.1 63.4 76.2 Wrist Base 1st Digit      Ulnar Anti Sensory (5th Digit)   Wrist 3.5 3.3 0.2 17.9 20.1 10.9 Wrist 5th Digit 40 42 2   B Elbow 3.5   13.2   B Elbow Wrist        Motor Left/Right Comparison     Stim Site L Lat (ms) R Lat (ms) L-R Lat (ms) L Amp (mV) R Amp (mV) L-R Amp (%) Site1 Site2 L Yemi (m/s) R Yemi (m/s) L-R Yemi (m/s)   Median Motor (Abd Poll Brev)   Wrist 3.5 3.7 0.2 8.8 7.7 12.5 Elbow Wrist 50 56 6   Elbow 7.9 8.1 0.2 8.8 7.3 17.0        Ulnar Motor (Abd Dig Min)   Wrist 3.2 3.0 0.2 13.0 9.9 23.8 B Elbow Wrist 54 54 0   B Elbow 7.0 6.6 0.4 12.4 9.6 22.6 A Elbow B Elbow  45    A Elbow 9.1 8.8 0.3 11.8 9.8 16.9              Waveforms:                                [facility]  OFFICE PROCEDURE PROGRESS NOTE        Chart reviewed for the following:   I, Navid Donnelly, have reviewed the History, Physical and updated the Allergic reactions for [address] performed immediately prior to start of procedure:   Moe Gudino, have performed the following reviews on Ford Echevaria III prior to the start of the procedure:            * Patient was identified by name and date of birth   * Agreement on procedure being performed was verified  * Risks and Benefits explained to the patient  * Procedure site verified and marked as necessary  * Patient was positioned for comfort  * Consent was signed and verified     Time: 12:20 PM    Date of procedure: 1/4/2021    Procedure performed by:  Jonny Mcmanus MD    Provider accompanied by: Eduardoibmarisa. Patient accompanied by: Self.     How tolerated by patient: tolerated the procedure well with no complications    Post Procedural Pain Scale: 0 - No Hurt    Comments: none    Written by Mir Haddad, [address] as dictated by Navid Donnelly MD

## 2021-01-06 ENCOUNTER — OFFICE VISIT (OUTPATIENT)
Dept: ORTHOPEDIC SURGERY | Age: 71
End: 2021-01-06
Payer: MEDICARE

## 2021-01-06 VITALS
HEIGHT: 72 IN | BODY MASS INDEX: 25.38 KG/M2 | RESPIRATION RATE: 16 BRPM | SYSTOLIC BLOOD PRESSURE: 129 MMHG | DIASTOLIC BLOOD PRESSURE: 90 MMHG | TEMPERATURE: 96.9 F | OXYGEN SATURATION: 99 % | WEIGHT: 187.4 LBS | HEART RATE: 68 BPM

## 2021-01-06 DIAGNOSIS — G56.21 CUBITAL TUNNEL SYNDROME, RIGHT: ICD-10-CM

## 2021-01-06 DIAGNOSIS — M65.832 EXTENSOR TENOSYNOVITIS OF LEFT WRIST: Primary | ICD-10-CM

## 2021-01-06 DIAGNOSIS — G56.02 LEFT CARPAL TUNNEL SYNDROME: ICD-10-CM

## 2021-01-06 DIAGNOSIS — M19.132 DRUJ (DISTAL RADIOULNAR JOINT) POST-TRAUMATIC ARTHRITIS, LEFT: ICD-10-CM

## 2021-01-06 PROCEDURE — G8427 DOCREV CUR MEDS BY ELIG CLIN: HCPCS | Performed by: ORTHOPAEDIC SURGERY

## 2021-01-06 PROCEDURE — 99214 OFFICE O/P EST MOD 30 MIN: CPT | Performed by: ORTHOPAEDIC SURGERY

## 2021-01-06 PROCEDURE — G8432 DEP SCR NOT DOC, RNG: HCPCS | Performed by: ORTHOPAEDIC SURGERY

## 2021-01-06 PROCEDURE — 3017F COLORECTAL CA SCREEN DOC REV: CPT | Performed by: ORTHOPAEDIC SURGERY

## 2021-01-06 PROCEDURE — G8536 NO DOC ELDER MAL SCRN: HCPCS | Performed by: ORTHOPAEDIC SURGERY

## 2021-01-06 PROCEDURE — 1101F PT FALLS ASSESS-DOCD LE1/YR: CPT | Performed by: ORTHOPAEDIC SURGERY

## 2021-01-06 PROCEDURE — 20550 NJX 1 TENDON SHEATH/LIGAMENT: CPT | Performed by: ORTHOPAEDIC SURGERY

## 2021-01-06 PROCEDURE — G8419 CALC BMI OUT NRM PARAM NOF/U: HCPCS | Performed by: ORTHOPAEDIC SURGERY

## 2021-01-06 NOTE — PROGRESS NOTES
Stefanie Tello III is a 79 y.o. male right handed retiree. Worker's Compensation and legal considerations: none filed. Vitals:    01/06/21 0843   BP: (!) 129/90   Pulse: 68   Resp: 16   Temp: 96.9 °F (36.1 °C)   TempSrc: Temporal   SpO2: 99%   Weight: 187 lb 6.4 oz (85 kg)   Height: 6' (1.829 m)   PainSc:   2   PainLoc: Wrist           Chief Complaint   Patient presents with    Wrist Pain     Left       HPI: Patient returns today for bilateral upper extremity EMG. He reports that he is not having any numbness but mainly just pain at the wrist.  And proximal to the wrist joint as well.    11/11/2020 HPI: Patient comes in today for MRI follow-up of left wrist.  He reports numbness and tingling in the hands bilaterally left much worse than right at night. He reports the wrist pain has come back and it is localized to the ulnar aspect of the wrist.  And wearing a brace. He says it does not hurt at rest.    Initial HPI: Patient comes in today with complaints of left wrist pain. He reports been off and on for quite some time but over the past week it is increased when he was getting up from a chair and felt a pop. He denies any other specific injuries.     Date of onset: 7/7/2019    Injury: No    Prior Treatment:  Yes: Comment: Left wrist DRUJ injection    Numbness/ Tingling: No    ROS: Review of Systems - General ROS: negative  Respiratory ROS: no cough, shortness of breath, or wheezing  Cardiovascular ROS: no chest pain or dyspnea on exertion  Musculoskeletal ROS: positive for - pain in wrist - left  Neurological ROS: negative  Dermatological ROS: negative    Past Medical History:   Diagnosis Date    Arthritis     Elevated PSA     Hypertension     Hypothyroid     Personal history of prostate cancer     Pituitary tumor     Renal insufficiency     S/P rotator cuff repair 10/15/2015    Sleep apnea     patient states resolved after surgery       Past Surgical History:   Procedure Laterality Date    HX HEENT      surgery for sleep apnea    HX HERNIA REPAIR      x2    HX SHOULDER ARTHROSCOPY Right 1/13/14    Dr. Rikki Sharp ARTHROSCOPY Left 2016    HX UROLOGICAL  10/21/2016    Prostate Biopsy with Dr. Feng Newton OF SHOULDER         Current Outpatient Medications   Medication Sig Dispense Refill    famotidine (PEPCID) 40 mg tablet       alprostadil (MUSE) 1,000 mcg supp UNWRAP AND INSERT 1 SUPPOSITORY PER URETHRA EVERY DAY AS NEEDED 6 Suppository 12    sildenafil citrate (VIAGRA) 100 mg tablet   0    levothyroxine (SYNTHROID) 25 mcg tablet take 1 tablet by mouth once daily  0    diclofenac (VOLTAREN) 1 % gel Apply topically to left wrist 2 grams 3 x daily 200 g 3    propranolol LA (INDERAL LA) 120 mg SR capsule take 1 capsule by mouth once daily  0    cholecalciferol, vitamin D3, 2,000 unit tab Take 2,000 Units by mouth daily. Current Facility-Administered Medications   Medication Dose Route Frequency Provider Last Rate Last Admin    triamcinolone acetonide (KENALOG) 10 mg/mL injection 5 mg  5 mg Other ONCE Aki Merrill DO           Allergies   Allergen Reactions    Penicillins Other (comments)     paralyzation    Sulfa (Sulfonamide Antibiotics) Unknown (comments)         PE:     Physical Exam  Vitals signs and nursing note reviewed. Constitutional:       General: He is not in acute distress. Appearance: Normal appearance. He is not ill-appearing. Eyes:      Pupils: Pupils are equal, round, and reactive to light. Neck:      Musculoskeletal: Normal range of motion. Cardiovascular:      Pulses: Normal pulses. Pulmonary:      Effort: Pulmonary effort is normal. No respiratory distress. Abdominal:      General: Abdomen is flat. Musculoskeletal: Normal range of motion. General: Tenderness present. No swelling, deformity or signs of injury. Right lower leg: No edema. Left lower leg: No edema.    Skin:     General: Skin is warm and dry. Capillary Refill: Capillary refill takes less than 2 seconds. Findings: No bruising or erythema. Neurological:      General: No focal deficit present. Mental Status: He is alert and oriented to person, place, and time. Psychiatric:         Mood and Affect: Mood normal.         Behavior: Behavior normal.         NEUROVASCULAR    Examination L R Examination L R   Carpal Comp. +- - Pronator Comp. - -   Carpal Tinel +- - Pronator Tinel - -   Phalen's +- - Pronator Stress - -   Cubital Comp. - - Guyon Comp. - -   Cubital Tinel - - Guyon Tinel - -   Elbow Hyperflexion - - Adson's - -   Spurling's - - SC Comp. - -   PCB Median abn - - SC Tinel - -   Radial Tinel - - IC Comp. - -   Digital Tinel - - IC Tinel - -   Radial 2-Pt WNL WNL Ulnar 2-Pt WNL WNL     Radial Pulse: 2+  Capillary Refill: < 2 sec  Diego: Not Performed  [employer]: Not Performed      Left wrist: Tenderness is localized on the ulnar aspect of the wrist proximal to the joint. Hand:    Examination L Digit(s) R Digit(s)   1st CMC Tenderness -  -    1st CMC Grind -  -    Rachelle Nodes -  -    Heberden Nodes -  -    A1 Pulley Tenderness -  -    Triggering -  -    UCL Instability -  -    RCL Instability -  -    Lateral Stress Pain -  -    Palmar Cords -  -    Tabletop test -  -    Garrod's Pads -  -     Strength       Pinch Strength         ROM: Full      NCV & EMG Findings:  Evaluation of the right ulnar motor nerve showed decreased conduction velocity (A Elbow-B Elbow, 45 m/s). The left median sensory nerve showed prolonged distal peak latency (3.8 ms) and decreased conduction velocity (Wrist-2nd Digit, 37 m/s). All remaining nerves (as indicated in the following tables) were within normal limits.   All left vs. right side differences were within normal limits.       All examined muscles (as indicated in the following table) showed no evidence of electrical instability.          INTERPRETATION  This is an abnormal electrodiagnostic examination. These findings may be consistent with:   1. Mild median mononeuropathy at the left wrist (carpal tunnel syndrome)   2. Mild ulnar mononeuropathy at the right elbow (cubital tunnel syndrome)     There is no electrodiagnostic evidence of any cervical radiculopathy, brachial plexopathy, peripheral polyneuropathy, or any other mononeuropathy.     CLINICAL INTERPRETATION  His electrodiagnostic findings of carpal tunnel and cubital tunnel syndromes may contribute to symptoms of the bilateral arms. Imaging:     Left Wrist MRI  IMPRESSION:     1. Distal radioulnar, radiocarpal, STT and first CMC joints moderate  degenerative arthritic changes with joint effusions as discussed. 2. Degeneration and tear of the TFCC as discussed. Likely chronic fracture of  the ulnar styloid process. 3. Flattening of the median nerve in the carpal tunnel with slight increased  signal, correlate with EMG and carpal tunnel syndrome symptoms as clinically  warranted. 8/25/2020 plain films about the left wrist are positive for degenerative changes of the radiocarpal joint. His wrist appears to be ulnar negative with some osteoarthritic changes at the DRUJ as well. ICD-10-CM ICD-9-CM    1. Extensor tenosynovitis of left wrist  M65.832 727.05 triamcinolone acetonide (KENALOG) 10 mg/mL injection 5 mg      INJECT TENDON SHEATH/LIGAMENT   2. DRUJ (distal radioulnar joint) post-traumatic arthritis, left  M19.132 716.13    3. Left carpal tunnel syndrome  G56.02 354.0    4. Cubital tunnel syndrome, right  G56.21 354.2        Plan:     Left wrist ulnar-sided extensor tendon sheath injection. Continue brace wear. Follow-up and Dispositions    · Return if symptoms worsen or fail to improve.          Plan was reviewed with patient, who verbalized agreement and understanding of the plan    [place]  NOTE        Chart reviewed for the following:   Guiod DIXON DO, have reviewed the History, Physical and updated the Allergic reactions for [address] performed immediately prior to start of procedure:   Aki DIXON DO, have performed the following reviews on [facility] prior to the start of the procedure:            * Patient was identified by name and date of birth   * Agreement on procedure being performed was verified  * Risks and Benefits explained to the patient  * Procedure site verified and marked as necessary  * Patient was positioned for comfort  * Consent was signed and verified     Time: 09:21 AM      Date of procedure: 1/6/2021    Procedure performed by: Guido Burroughs DO    Provider assisted by: Macrina Cisneros LPN    Patient assisted by: self    How tolerated by patient: tolerated the procedure well with no complications    Post Procedural Pain Scale: 0 - No Hurt    Comments: none    Procedure:  After consent was obtained, using sterile technique the tendon sheath was prepped. Local anesthetic used: 1% lidocaine. Kenalog 5 mg and was then injected and the needle withdrawn. The procedure was well tolerated. The patient is asked to continue to rest the area for a few more days before resuming regular activities. It may be more painful for the first 1-2 days. Watch for fever, or increased swelling or persistent pain in the joint. Call or return to clinic prn if such symptoms occur or there is failure to improve as anticipated.

## 2021-01-08 ENCOUNTER — OFFICE VISIT (OUTPATIENT)
Dept: ORTHOPEDIC SURGERY | Age: 71
End: 2021-01-08
Payer: MEDICARE

## 2021-01-08 VITALS
SYSTOLIC BLOOD PRESSURE: 149 MMHG | RESPIRATION RATE: 16 BRPM | HEIGHT: 72 IN | BODY MASS INDEX: 25.06 KG/M2 | TEMPERATURE: 97.5 F | DIASTOLIC BLOOD PRESSURE: 98 MMHG | WEIGHT: 185 LBS | HEART RATE: 91 BPM

## 2021-01-08 DIAGNOSIS — M76.60 ACHILLES TENDON PAIN: ICD-10-CM

## 2021-01-08 DIAGNOSIS — M76.62 ACHILLES TENDINITIS OF LEFT LOWER EXTREMITY: Primary | ICD-10-CM

## 2021-01-08 DIAGNOSIS — M79.672 LEFT FOOT PAIN: ICD-10-CM

## 2021-01-08 PROCEDURE — 3017F COLORECTAL CA SCREEN DOC REV: CPT | Performed by: SPECIALIST

## 2021-01-08 PROCEDURE — 1101F PT FALLS ASSESS-DOCD LE1/YR: CPT | Performed by: SPECIALIST

## 2021-01-08 PROCEDURE — G8419 CALC BMI OUT NRM PARAM NOF/U: HCPCS | Performed by: SPECIALIST

## 2021-01-08 PROCEDURE — G8432 DEP SCR NOT DOC, RNG: HCPCS | Performed by: SPECIALIST

## 2021-01-08 PROCEDURE — 99213 OFFICE O/P EST LOW 20 MIN: CPT | Performed by: SPECIALIST

## 2021-01-08 PROCEDURE — G8427 DOCREV CUR MEDS BY ELIG CLIN: HCPCS | Performed by: SPECIALIST

## 2021-01-08 PROCEDURE — G8536 NO DOC ELDER MAL SCRN: HCPCS | Performed by: SPECIALIST

## 2021-01-08 NOTE — PROGRESS NOTES
Patient: Bk Hussein                MRN: [id number]       SSN: [id number]  YOB: 1950        AGE: 79 y.o. SEX: male    PCP: Kedric Lombard, MD  01/08/21    Chief Complaint   Patient presents with    Ankle Pain     left ankle pain     HISTORY:  Carie Meneses III is a 79 y.o. male who is seen for left ankle pain. He woke up with pain over his Achilles tendon and beneath his left heel. He wore a fracture walker two years ago for a similar problem. He was previously seen for  increased medial left knee pain. He had experienced an injury while in the [employer] in the 1970s. He states he originally injured his left knee while playing softball for the [employer] team. 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 He is s/p uni L knee arthroplasty 9/20/18--doing well. Pain Assessment  1/8/2021   Location of Pain Ankle   Location Modifiers Left   Severity of Pain 3   Quality of Pain Sharp   Quality of Pain Comment -   Duration of Pain A few hours   Frequency of Pain Intermittent   Frequency of Pain Comment -   Date Pain First Started 1/2/2021   Aggravating Factors Walking;Standing   Aggravating Factors Comment -   Limiting Behavior -   Relieving Factors Elevation; Rest   Relieving Factors Comment -   Result of Injury No   Work-Related Injury -   Type of Injury -   Type of Injury Comment -     Occupation, etc:   [employer] retired as a civilian [employer] . He lives in [place] with Faiza Raza and Nicki--his 23year old cats. He stays active with his yard work and working on his motorcycles. He was drafted into the [employer] and served in [employer]. He retired in [address] [place] [address]. He has a daughter who is a RN. Current weight is 187 pounds. He is 6' tall. He has a girlfriend. He has three daughters and a son, with only one daughter who is NOT in the area. He is not diabetic.       Lab Results   Component Value Date/Time    Hemoglobin A1c 5.1 09/18/2018 07:18 AM     Weight Metrics 1/8/2021 1/6/2021 1/4/2021 11/11/2020 11/10/2020 9/2/2020 8/25/2020   Weight 185 lb 187 lb 6.4 oz 183 lb 194 lb 3.2 oz 192 lb 6.4 oz 190 lb 191 lb   BMI 25.09 kg/m2 25.42 kg/m2 24.82 kg/m2 26.34 kg/m2 26.09 kg/m2 25.77 kg/m2 25.9 kg/m2       Patient Active Problem List   Diagnosis Code    Left knee pain M25.562    Right shoulder pain M25.511    S/P rotator cuff repair Z98.890    Prostate cancer ([facility] [address] C61    Family hx of prostate cancer Z80.42    Erectile dysfunction following urethral surgery N52.33    Localized osteoarthritis of left knee M17.12    S/P left knee arthroscopy Z98.890     REVIEW OF SYSTEMS: All Below are Negative except: See HPI   Constitutional: negative for fever, chills, and weight loss. Cardiovascular: negative for chest pain, claudication, leg swelling, SOB, GALVAN   Gastrointestinal: Negative for pain, N/V/C/D, Blood in stool or urine, dysuria,  hematuria, incontinence, pelvic pain. Musculoskeletal: See HPI   Neurological: Negative for dizziness and weakness. Negative for headaches, Visual changes, confusion, seizures   Phychiatric/Behavioral: Negative for depression, memory loss, substance  abuse. Extremities: Negative for hair changes, rash, or skin lesion changes. Hematologic: Negative for bleeding problems, bruising, pallor or swollen lymph  nodes   Peripheral Vascular: No calf pain, no circulation deficits.     Social History     Socioeconomic History    Marital status: SINGLE     Spouse name: Not on file    Number of children: Not on file    Years of education: Not on file    Highest education level: Not on file   Occupational History    Not on file   Social Needs    Financial resource strain: Not on file    Food insecurity     Worry: Not on file     Inability: Not on file    Transportation needs     Medical: Not on file     Non-medical: Not on file   Tobacco Use    Smoking status: Former Smoker    Smokeless tobacco: Never Used    Tobacco comment: quit in 1976   Substance and Sexual Activity    Alcohol use: Yes     Alcohol/week: 0.0 standard drinks     Comment: occasionally    Drug use: No    Sexual activity: Not on file   Lifestyle    Physical activity     Days per week: Not on file     Minutes per session: Not on file    Stress: Not on file   Relationships    Social connections     Talks on phone: Not on file     Gets together: Not on file     Attends Hindu service: Not on file     Active member of club or organization: Not on file     Attends meetings of clubs or organizations: Not on file     Relationship status: Not on file    Intimate partner violence     Fear of current or ex partner: Not on file     Emotionally abused: Not on file     Physically abused: Not on file     Forced sexual activity: Not on file   Other Topics Concern    Not on file   Social History Narrative    Not on file      Allergies   Allergen Reactions    Penicillins Other (comments)     paralyzation    Sulfa (Sulfonamide Antibiotics) Unknown (comments)      Current Outpatient Medications   Medication Sig    famotidine (PEPCID) 40 mg tablet     alprostadil (MUSE) 1,000 mcg supp UNWRAP AND INSERT 1 SUPPOSITORY PER URETHRA EVERY DAY AS NEEDED    sildenafil citrate (VIAGRA) 100 mg tablet     levothyroxine (SYNTHROID) 25 mcg tablet take 1 tablet by mouth once daily    diclofenac (VOLTAREN) 1 % gel Apply topically to left wrist 2 grams 3 x daily    propranolol LA (INDERAL LA) 120 mg SR capsule take 1 capsule by mouth once daily    cholecalciferol, vitamin D3, 2,000 unit tab Take 2,000 Units by mouth daily. No current facility-administered medications for this visit.        PHYSICAL EXAMINATION:  Visit Vitals  BP (!) 149/98 (BP 1 Location: Right arm, BP Patient Position: Sitting)   Pulse 91   Temp 97.5 °F (36.4 °C) (Temporal)   Resp 16   Ht 6' (1.829 m)   Wt 185 lb (83.9 kg)   BMI 25.09 kg/m²   ORTHO EXAMINATION:  Examination Right knee Left knee   Skin Intact Well healed knee incision site   Range of motion 120-0 95-5   Effusion - 1+   Medial joint line tenderness + ++   Lateral joint line tenderness - -   Popliteal tenderness - -   Osteophytes palpable - Large medial   Laverns - -   Patella crepitus - +   Anterior drawer - -   Lateral laxity - -   Medial laxity - -   Varus deformity - +   Valgus deformity - -   Pretibial edema - -   Calf tenderness - -   Wearing a compression bandage on the left knee  Examination Right Ankle/Foot Left Ankle/Foot   Skin Intact Intact   Swelling - -   Dorsiflexion 10 10   Plantarflexion 25 25   Deformity - -   Inversion laxity - -   Anterior drawer - -   Medial tenderness - -   Lateral tenderness - -   Heel cord Intact Intact   Sensation Intact Intact   Bunion - -   Toe nails Normal Normal   Capillary refill Normal Normal    L tenderness over distal tendo Achilles  L tenderness over distal calcaneal     RADIOGRAPHS:  XR LT KNEE PREOP 7/30/18  AP view of left femur and tibia: mechanical/femoral shaft valgus angle 6.3 degrees. Degenerative changes are present. XR LT KNEE 8/17/18 SUAD  IMPRESSION:  Three views - No fractures, + effusion, severe medial and moderate lateral joint space narrowing, large medial osteophytes present. IKDC Grade D    IMPRESSION:      ICD-10-CM ICD-9-CM    1. Achilles tendinitis of left lower extremity  M76.62 726.71 REFERRAL TO PHYSICAL THERAPY   2. Left foot pain  M79.672 729.5 REFERRAL TO PHYSICAL THERAPY   3. Achilles tendon pain  M76.60 727.89 REFERRAL TO PHYSICAL THERAPY     PLAN:  He will start a brief course of outpatient physical therapy. There is no need for surgery at this time. He will follow up PRN with Dr. Hubert Garcia for ortho foot/ankle consultation.        Scribed by Jordy Ludwig  ([address]) as dictated by Anna Butler MD

## 2021-01-19 ENCOUNTER — OFFICE VISIT (OUTPATIENT)
Dept: ORTHOPEDIC SURGERY | Age: 71
End: 2021-01-19
Payer: MEDICARE

## 2021-01-19 VITALS
BODY MASS INDEX: 25.6 KG/M2 | OXYGEN SATURATION: 99 % | SYSTOLIC BLOOD PRESSURE: 142 MMHG | HEART RATE: 63 BPM | HEIGHT: 72 IN | TEMPERATURE: 97 F | WEIGHT: 189 LBS | DIASTOLIC BLOOD PRESSURE: 94 MMHG

## 2021-01-19 DIAGNOSIS — M25.572 ACUTE LEFT ANKLE PAIN: ICD-10-CM

## 2021-01-19 DIAGNOSIS — M79.671 RIGHT FOOT PAIN: ICD-10-CM

## 2021-01-19 DIAGNOSIS — M76.62 ACHILLES TENDINITIS OF LEFT LOWER EXTREMITY: Primary | ICD-10-CM

## 2021-01-19 PROCEDURE — 3017F COLORECTAL CA SCREEN DOC REV: CPT | Performed by: ORTHOPAEDIC SURGERY

## 2021-01-19 PROCEDURE — G8536 NO DOC ELDER MAL SCRN: HCPCS | Performed by: ORTHOPAEDIC SURGERY

## 2021-01-19 PROCEDURE — G8427 DOCREV CUR MEDS BY ELIG CLIN: HCPCS | Performed by: ORTHOPAEDIC SURGERY

## 2021-01-19 PROCEDURE — 73630 X-RAY EXAM OF FOOT: CPT | Performed by: ORTHOPAEDIC SURGERY

## 2021-01-19 PROCEDURE — G8432 DEP SCR NOT DOC, RNG: HCPCS | Performed by: ORTHOPAEDIC SURGERY

## 2021-01-19 PROCEDURE — 1101F PT FALLS ASSESS-DOCD LE1/YR: CPT | Performed by: ORTHOPAEDIC SURGERY

## 2021-01-19 PROCEDURE — G8419 CALC BMI OUT NRM PARAM NOF/U: HCPCS | Performed by: ORTHOPAEDIC SURGERY

## 2021-01-19 PROCEDURE — 99213 OFFICE O/P EST LOW 20 MIN: CPT | Performed by: ORTHOPAEDIC SURGERY

## 2021-01-19 PROCEDURE — 73610 X-RAY EXAM OF ANKLE: CPT | Performed by: ORTHOPAEDIC SURGERY

## 2021-01-19 NOTE — PROGRESS NOTES
AMBULATORY PROGRESS NOTE      Patient: Elizabeth Perkins             MRN: [id number]     SSN: [id number] Body mass index is 25.63 kg/m². YOB: 1950     AGE: 79 y.o. EX: male    PCP: Renny Johnson MD       IMPRESSION //  DIAGNOSIS AND TREATMENT PLAN      DIAGNOSES  1. Achilles tendinitis of left lower extremity    2. Acute left ankle pain    3. Right foot pain        Orders Placed This Encounter    AMB POC XRAY, ANKLE; COMPLETE, 3+ VIE     ASK ALL FEMALE PATIENTS IF THEY ARE PREGNANT     Order Specific Question:   Reason for Exam     Answer:   PAIN    AMB POC XRAY, FOOT; COMPLETE, 3+ VIEW     Order Specific Question:   Reason for Exam     Answer:   pain          PLAN:    1. Encouraged gentle Achilles stretching  2. Will hold off on PT for now, anticipate Graston technique and low frequency ultrasound    RTO-prn      HPI //  [address] IS A 79 y.o. male who presents to my outpatient office for evaluation of: left Achilles tendon pain. Patient reports spontaneous onset of severe pain along his achilles when he woke up one morning. He states not being able to walk at the time and had to use his walker. Today in the office, however, patient reports no major pain in his left achilles for the last week. He also mentions that he started feeling pain in his right foot, mid plantar arch during that timeframe, but since then has improved. He reports no major pain in both feet. He presents with gel foam inserts in his dress shoes.     Visit Vitals  BP (!) 142/94 (BP 1 Location: Left arm, BP Patient Position: Sitting)   Pulse 63   Temp 97 °F (36.1 °C) (Skin)   Ht 6' (1.829 m)   Wt 189 lb (85.7 kg)   SpO2 99%   BMI 25.63 kg/m²       ANKLE/FOOT left    Psychiatry: Alert, oriented x 3 (name,place,time of day); speech normal in context and clarity, memory intact grossly, no involuntary movements - tremors, no dementia  Gait: normal  Tenderness: mild to insertional point of Achilles,  Cutaneous: no visible bruising or swelling  Joint Motion: WNL  Joint / Tendon Stability: No Ankle or Subtalar instability or joint laxity. No peroneal sublux ability or dislocation  Alignment: neutral Hindfoot, none Metatarsus Adductus Metatarsus. Neuro Motor/Sensory: NL/NL,  Vascular: NL foot/ankle pulses,   Lymphatics: No extremity lymphedema, No calf swelling, no tenderness to calf muscles. ANKLE/FOOT right    Psychiatry: Alert, oriented x 3 (name,place,time of day); speech normal in context and clarity, memory intact grossly, no involuntary movements - tremors, no dementia  Gait: normal  Tenderness: mild to mid arch, no lumps or bumps present   Cutaneous: WNL  Joint Motion: WNL  Joint / Tendon Stability: No Ankle or Subtalar instability or joint laxity. No peroneal sublux ability or dislocation  Alignment: neutral Hindfoot, none Metatarsus Adductus Metatarsus. Neuro Motor/Sensory: NL/NL,  Vascular: NL foot/ankle pulses,   Lymphatics: No extremity lymphedema, No calf swelling, no tenderness to calf muscles. CHART REVIEW     Patient Active Problem List   Diagnosis Code    Left knee pain M25.562    Right shoulder pain M25.511    S/P rotator cuff repair Z98.890    Prostate cancer ([facility] [address] C61    Family hx of prostate cancer Z80.42    Erectile dysfunction following urethral surgery N52.33    Localized osteoarthritis of left knee M17.12    S/P left knee arthroscopy Z98.890        Juan Briggs III has been experiencing pain and discomfort confirmed as outlined in the pain assessment outlined below. Pain Assessment  1/19/2021   Location of Pain Ankle; Foot   Location Modifiers Right;Left   Severity of Pain 1   Quality of Pain Aching; Sharp   Quality of Pain Comment -   Duration of Pain Persistent   Duration of Pain Comment only while walking   Frequency of Pain Intermittent   Frequency of Pain Comment -   Date Pain First Started -   Aggravating Factors Walking;Standing   Aggravating Factors Comment -   Limiting Behavior Yes   Relieving Factors Ice;NSAID   Relieving Factors Comment -   Result of Injury No   Work-Related Injury -   Type of Injury -   Type of Injury Comment -        Quincy Host III  has a past medical history of Arthritis, Elevated PSA, Hypertension, Hypothyroid, Personal history of prostate cancer, Pituitary tumor, Renal insufficiency, S/P rotator cuff repair (10/15/2015), and Sleep apnea. Patients is employed at:         Past Medical History:   Diagnosis Date    Arthritis     Elevated PSA     Hypertension     Hypothyroid     Personal history of prostate cancer     Pituitary tumor     Renal insufficiency     S/P rotator cuff repair 10/15/2015    Sleep apnea     patient states resolved after surgery     Past Surgical History:   Procedure Laterality Date    HX HEENT      surgery for sleep apnea    HX HERNIA REPAIR      x2    HX SHOULDER ARTHROSCOPY Right 1/13/14    Dr. Fili Mejia ARTHROSCOPY Left 2016    HX UROLOGICAL  10/21/2016    Prostate Biopsy with Dr. Lan Danielson OF SHOULDER       Current Outpatient Medications   Medication Sig    famotidine (PEPCID) 40 mg tablet     alprostadil (MUSE) 1,000 mcg supp UNWRAP AND INSERT 1 SUPPOSITORY PER URETHRA EVERY DAY AS NEEDED    sildenafil citrate (VIAGRA) 100 mg tablet     levothyroxine (SYNTHROID) 25 mcg tablet take 1 tablet by mouth once daily    diclofenac (VOLTAREN) 1 % gel Apply topically to left wrist 2 grams 3 x daily    propranolol LA (INDERAL LA) 120 mg SR capsule take 1 capsule by mouth once daily    cholecalciferol, vitamin D3, 2,000 unit tab Take 2,000 Units by mouth daily. No current facility-administered medications for this visit.       Allergies   Allergen Reactions    Penicillins Other (comments)     paralyzation    Sulfa (Sulfonamide Antibiotics) Unknown (comments)     Social History Occupational History    Not on file   Tobacco Use    Smoking status: Former Smoker    Smokeless tobacco: Never Used    Tobacco comment: quit in 1976   Substance and Sexual Activity    Alcohol use: Yes     Alcohol/week: 0.0 standard drinks     Comment: occasionally    Drug use: No    Sexual activity: Not on file     Family History   Problem Relation Age of Onset    Diabetes Other     Hypertension Other     Heart Disease Other     Arthritis-osteo Other        THE  FOR Juan Briggs III  WAS REVIEWED BY Britni Albright MD 1/19/2021 . DIAGNOSTIC IMAGING  LAB DATA      Lab Results   Component Value Date/Time    Hemoglobin A1c 5.1 09/18/2018 07:18 AM    //   Lab Results   Component Value Date/Time    Glucose 93 07/08/2020 01:11 PM        No results found for: [id number], [id number]      Lab Results   Component Value Date/Time    VITAMIN D, 25-HYDROXY 35.7 07/08/2020 01:11 PM         REVIEW OF SYSTEMS : 1/19/2021  ALL BELOW ARE Negative except : SEE HPI     CONSTITUTIONAL: No weight loss  PSYCHOLOGICAL : No Feelings of anxiety, depression, agitation  EYES: No blurred vision and no eye discharge. NO eye pain, double vision  ENT: No nasal discharge. No ear pain. CARDIOVASCULAR: No chest pain and no diaphoresis. RESPIRATORY: No cough, no hemoptysis. GI: No vomiting, no diarrhea   : No urinary frequency and no dysuria. MUSCULOSKELETAL: see HPI  SKIN: No rashes. NEURO:  No dizziness,weakness, headaches// No visual changes or confusion, or seizures,   ENDOCRINE: No polyphagia and no polydipsia. HEMATOLOGY: No bleeding tendencies. DIAGNOSTIC IMAGING      ANKLE X RAYS 3 VIEWS LEFT  X RAYS AT [address] [place] [address]  1/19/2021    NON WEIGHT BEARING    X RAYS AT [address] [place] [address]  1/19/2021    Bones: No fractures or dislocations.  No focal osteolytic or osteoblastic process     Bone Spurs: No significant bone spurs  Alignment: Ankle mortise alignment is congruent, Tibial plafond and talar dome intact. No Osteochondral defects seen   Joint: No Significant OA changes present  Soft Tissues: Normal, No radiopaque foreign body     No abnormal calcific densities to soft tissues    No ankle joint effusion in lateral projection. Mineralization: Suggests no Osteopenia    I have personally reviewed the results of the above study. The interpretation of this study is my professional opinion    FOOT X RAYS 3 VIEWS LEFT  1/19/2021    NON WEIGHT BEARING    X RAYS AT [address] [place] [address]  1/19/2021      Bones: No fractures or dislocations. No focal osteolytic or osteoblastic process     Bone Spurs: No significant bone spurs  Foot Alignment: WNL  Joint Condition: No Significant OA  Soft Tissues: Normal, No radiopaque foreign body and No abnormal calcific densities to soft tissues   No ankle joint effusion in lateral projection.   Mineralization: Suggests  no Osteopenia    I have personally reviewed the results of the above study and the interpretation of this study is my professional opinion    Gage Benz MD  1/19/2021  8:49 AM

## 2021-01-21 ENCOUNTER — APPOINTMENT (OUTPATIENT)
Dept: PHYSICAL THERAPY | Age: 71
End: 2021-01-21

## 2021-07-19 ENCOUNTER — OFFICE VISIT (OUTPATIENT)
Dept: ORTHOPEDIC SURGERY | Age: 71
End: 2021-07-19
Payer: MEDICARE

## 2021-07-19 VITALS
HEIGHT: 72 IN | OXYGEN SATURATION: 99 % | WEIGHT: 177.5 LBS | HEART RATE: 65 BPM | BODY MASS INDEX: 24.04 KG/M2 | TEMPERATURE: 97 F | RESPIRATION RATE: 18 BRPM

## 2021-07-19 DIAGNOSIS — G56.02 LEFT CARPAL TUNNEL SYNDROME: ICD-10-CM

## 2021-07-19 DIAGNOSIS — G56.21 CUBITAL TUNNEL SYNDROME, RIGHT: ICD-10-CM

## 2021-07-19 DIAGNOSIS — G56.01 RIGHT CARPAL TUNNEL SYNDROME: Primary | ICD-10-CM

## 2021-07-19 DIAGNOSIS — M19.132 DRUJ (DISTAL RADIOULNAR JOINT) POST-TRAUMATIC ARTHRITIS, LEFT: ICD-10-CM

## 2021-07-19 PROCEDURE — 99213 OFFICE O/P EST LOW 20 MIN: CPT | Performed by: ORTHOPAEDIC SURGERY

## 2021-07-19 PROCEDURE — 20600 DRAIN/INJ JOINT/BURSA W/O US: CPT | Performed by: ORTHOPAEDIC SURGERY

## 2021-07-19 PROCEDURE — 1101F PT FALLS ASSESS-DOCD LE1/YR: CPT | Performed by: ORTHOPAEDIC SURGERY

## 2021-07-19 PROCEDURE — G8427 DOCREV CUR MEDS BY ELIG CLIN: HCPCS | Performed by: ORTHOPAEDIC SURGERY

## 2021-07-19 PROCEDURE — G8420 CALC BMI NORM PARAMETERS: HCPCS | Performed by: ORTHOPAEDIC SURGERY

## 2021-07-19 PROCEDURE — 3017F COLORECTAL CA SCREEN DOC REV: CPT | Performed by: ORTHOPAEDIC SURGERY

## 2021-07-19 PROCEDURE — G8536 NO DOC ELDER MAL SCRN: HCPCS | Performed by: ORTHOPAEDIC SURGERY

## 2021-07-19 PROCEDURE — 20526 THER INJECTION CARP TUNNEL: CPT | Performed by: ORTHOPAEDIC SURGERY

## 2021-07-19 PROCEDURE — G8510 SCR DEP NEG, NO PLAN REQD: HCPCS | Performed by: ORTHOPAEDIC SURGERY

## 2021-07-19 NOTE — PROGRESS NOTES
Jennifer Reyna is a 70 y.o. male right handed retiree. Worker's Compensation and legal considerations: none filed. Vitals:    07/19/21 0753   Pulse: 65   Resp: 18   Temp: 97 °F (36.1 °C)   TempSrc: Temporal   SpO2: 99%   Weight: 177 lb 8 oz (80.5 kg)   Height: 6' (1.829 m)   PainSc:   2   PainLoc: Wrist           Chief Complaint   Patient presents with    Wrist Pain     left wrist pain f/u       HPI: Patient presents today for follow-up of bilateral wrist issues. He reports left wrist pain over the back of his wrist as well as right hand numbness and tingling. He also reports bilateral elbow pain but only when putting pressure on it when he sitting in a chair. 1/6/2021 HPI: Patient returns today for bilateral upper extremity EMG. He reports that he is not having any numbness but mainly just pain at the wrist.  And proximal to the wrist joint as well.    11/11/2020 HPI: Patient comes in today for MRI follow-up of left wrist.  He reports numbness and tingling in the hands bilaterally left much worse than right at night. He reports the wrist pain has come back and it is localized to the ulnar aspect of the wrist.  And wearing a brace. He says it does not hurt at rest.    Initial HPI: Patient comes in today with complaints of left wrist pain. He reports been off and on for quite some time but over the past week it is increased when he was getting up from a chair and felt a pop. He denies any other specific injuries.     Date of onset: 7/7/2019    Injury: No    Prior Treatment:  Yes: Comment: Left wrist DRUJ injection    Numbness/ Tingling: No    ROS: Review of Systems - General ROS: negative  Respiratory ROS: no cough, shortness of breath, or wheezing  Cardiovascular ROS: no chest pain or dyspnea on exertion  Musculoskeletal ROS: positive for - pain in wrist - left  Neurological ROS: negative  Dermatological ROS: negative    Past Medical History:   Diagnosis Date    Arthritis     Elevated PSA     Hypertension     Hypothyroid     Personal history of prostate cancer     Pituitary tumor     Renal insufficiency     S/P rotator cuff repair 10/15/2015    Sleep apnea     patient states resolved after surgery       Past Surgical History:   Procedure Laterality Date    HX HEENT      surgery for sleep apnea    HX HERNIA REPAIR      x2    HX SHOULDER ARTHROSCOPY Right 1/13/14    Dr. Robina Shannon ARTHROSCOPY Left 2016    HX UROLOGICAL  10/21/2016    Prostate Biopsy with Dr. Sergio Victor OF SHOULDER         Current Outpatient Medications   Medication Sig Dispense Refill    famotidine (PEPCID) 40 mg tablet       alprostadil (MUSE) 1,000 mcg supp UNWRAP AND INSERT 1 SUPPOSITORY PER URETHRA EVERY DAY AS NEEDED 6 Suppository 12    levothyroxine (SYNTHROID) 25 mcg tablet take 1 tablet by mouth once daily  0    propranolol LA (INDERAL LA) 120 mg SR capsule take 1 capsule by mouth once daily  0    cholecalciferol, vitamin D3, 2,000 unit tab Take 2,000 Units by mouth daily.  sildenafil citrate (VIAGRA) 100 mg tablet  (Patient not taking: Reported on 7/19/2021)  0    diclofenac (VOLTAREN) 1 % gel Apply topically to left wrist 2 grams 3 x daily (Patient not taking: Reported on 7/19/2021) 200 g 3       Allergies   Allergen Reactions    Penicillins Other (comments)     paralyzation    Sulfa (Sulfonamide Antibiotics) Unknown (comments)         PE:     Physical Exam  Vitals and nursing note reviewed. Constitutional:       General: He is not in acute distress. Appearance: Normal appearance. He is not ill-appearing. Cardiovascular:      Pulses: Normal pulses. Pulmonary:      Effort: Pulmonary effort is normal. No respiratory distress. Musculoskeletal:         General: Tenderness present. No swelling, deformity or signs of injury. Normal range of motion. Cervical back: Normal range of motion. Right lower leg: No edema. Left lower leg: No edema. Skin:     General: Skin is warm and dry. Capillary Refill: Capillary refill takes less than 2 seconds. Findings: No bruising or erythema. Neurological:      General: No focal deficit present. Mental Status: He is alert and oriented to person, place, and time. Psychiatric:         Mood and Affect: Mood normal.         Behavior: Behavior normal.         NEUROVASCULAR    Examination L R Examination L R   Carpal Comp. +- + Pronator Comp. - -   Carpal Tinel +- + Pronator Tinel - -   Phalen's +- - Pronator Stress - -   Cubital Comp. - - Guyon Comp. - -   Cubital Tinel - - Guyon Tinel - -   Elbow Hyperflexion - - Adson's - -   Spurling's - - SC Comp. - -   PCB Median abn - - SC Tinel - -   Radial Tinel - - IC Comp. - -   Digital Tinel - - IC Tinel - -   Radial 2-Pt WNL WNL Ulnar 2-Pt WNL WNL     Radial Pulse: 2+  Capillary Refill: < 2 sec  Diego: Not Performed  [employer]: Not Performed      Left wrist: Tenderness again is localized on the ulnar aspect of the wrist joint. Hand:    Examination L Digit(s) R Digit(s)   1st CMC Tenderness -  -    1st CMC Grind -  -    Rachelle Nodes -  -    Heberden Nodes -  -    A1 Pulley Tenderness -  -    Triggering -  -    UCL Instability -  -    RCL Instability -  -    Lateral Stress Pain -  -    Palmar Cords -  -    Tabletop test -  -    Garrod's Pads -  -     Strength       Pinch Strength         ROM: Full      NCV & EMG Findings:  Evaluation of the right ulnar motor nerve showed decreased conduction velocity (A Elbow-B Elbow, 45 m/s). The left median sensory nerve showed prolonged distal peak latency (3.8 ms) and decreased conduction velocity (Wrist-2nd Digit, 37 m/s). All remaining nerves (as indicated in the following tables) were within normal limits. All left vs. right side differences were within normal limits.       All examined muscles (as indicated in the following table) showed no evidence of electrical instability.       INTERPRETATION  This is an abnormal electrodiagnostic examination. These findings may be consistent with:   1. Mild median mononeuropathy at the left wrist (carpal tunnel syndrome)   2. Mild ulnar mononeuropathy at the right elbow (cubital tunnel syndrome)     There is no electrodiagnostic evidence of any cervical radiculopathy, brachial plexopathy, peripheral polyneuropathy, or any other mononeuropathy.     CLINICAL INTERPRETATION  His electrodiagnostic findings of carpal tunnel and cubital tunnel syndromes may contribute to symptoms of the bilateral arms. Imaging:     Left Wrist MRI  IMPRESSION:     1. Distal radioulnar, radiocarpal, STT and first CMC joints moderate  degenerative arthritic changes with joint effusions as discussed. 2. Degeneration and tear of the TFCC as discussed. Likely chronic fracture of  the ulnar styloid process. 3. Flattening of the median nerve in the carpal tunnel with slight increased  signal, correlate with EMG and carpal tunnel syndrome symptoms as clinically  warranted. 8/25/2020 plain films about the left wrist are positive for degenerative changes of the radiocarpal joint. His wrist appears to be ulnar negative with some osteoarthritic changes at the DRUJ as well. ICD-10-CM ICD-9-CM    1. Right carpal tunnel syndrome  G56.01 354.0    2. DRUJ (distal radioulnar joint) post-traumatic arthritis, left  M19.132 716.13    3. Left carpal tunnel syndrome  G56.02 354.0    4. Cubital tunnel syndrome, right  G56.21 354.2        Plan:     Left wrist DRUJ joint injection and right carpal tunnel injection and right carpal tunnel brace for nighttime wear. Given the fact the patient has not had a positive EMG on the right side but having symptoms we will treat this as an EMG negative carpal tunnel syndrome on the right side. Follow-up and Dispositions    · Return if symptoms worsen or fail to improve.          Plan was reviewed with patient, who verbalized agreement and understanding of the plan    [address]  OFFICE PROCEDURE PROGRESS NOTE        Chart reviewed for the following:   Aki DIXON DO, have reviewed the History, Physical and updated the Allergic reactions for [address] performed immediately prior to start of procedure:   Bridgett DIXON DO, have performed the following reviews on [facility] prior to the start of the procedure:            * Patient was identified by name and date of birth   * Agreement on procedure being performed was verified  * Risks and Benefits explained to the patient  * Procedure site verified and marked as necessary  * Patient was positioned for comfort  * Consent was signed and verified     Time: 08:19 AM      Date of procedure: 7/19/2021    Procedure performed by: Bridgett Manzo DO    Provider assisted by: Henry Avalos LPN    Patient assisted by: self    How tolerated by patient: tolerated the procedure well with no complications    Post Procedural Pain Scale: 0 - No Hurt    Comments: none    Procedure:  After consent was obtained, using sterile technique the left wrist and right carpal tunnel was prepped. Local anesthetic used: 1% lidocaine. Kenalog 5 mg X2 and was then injected and the needle withdrawn. The procedure was well tolerated. The patient is asked to continue to rest the area for a few more days before resuming regular activities. It may be more painful for the first 1-2 days. Watch for fever, or increased swelling or persistent pain in the joint. Call or return to clinic prn if such symptoms occur or there is failure to improve as anticipated.

## 2021-08-11 PROBLEM — H52.4 PRESBYOPIA: Status: ACTIVE | Noted: 2021-08-11

## 2021-08-11 PROBLEM — H52.00 HYPEROPIA: Status: ACTIVE | Noted: 2021-08-11

## 2021-08-11 PROBLEM — H90.5 SENSORINEURAL HEARING LOSS: Status: ACTIVE | Noted: 2021-08-11

## 2021-08-11 PROBLEM — H52.209 ASTIGMATISM: Status: ACTIVE | Noted: 2021-08-11

## 2021-08-17 ENCOUNTER — HOSPITAL ENCOUNTER (OUTPATIENT)
Dept: CT IMAGING | Age: 71
Discharge: HOME | End: 2021-08-17
Attending: UROLOGY
Payer: MEDICARE

## 2021-08-17 ENCOUNTER — HOSPITAL ENCOUNTER (OUTPATIENT)
Dept: NUCLEAR MEDICINE | Age: 71
Discharge: HOME | End: 2021-08-17
Attending: UROLOGY
Payer: MEDICARE

## 2021-08-17 DIAGNOSIS — C61 PROSTATE CANCER (HCC): ICD-10-CM

## 2021-08-17 LAB — CREAT UR-MCNC: 1.1 MG/DL (ref 0.6–1.3)

## 2021-08-17 PROCEDURE — 82565 ASSAY OF CREATININE: CPT

## 2021-08-17 PROCEDURE — 74177 CT ABD & PELVIS W/CONTRAST: CPT

## 2021-08-17 PROCEDURE — 74011000636 HC RX REV CODE- 636: Performed by: UROLOGY

## 2021-08-17 PROCEDURE — A9503 TC99M MEDRONATE: HCPCS

## 2021-08-17 RX ADMIN — IOPAMIDOL 100 ML: 612 INJECTION, SOLUTION INTRAVENOUS at 12:25

## 2021-08-22 NOTE — PROGRESS NOTES
The recent CT imaging demonstrates new lymph node enlargement. This suggests progression of your prostate cancer.

## 2021-08-22 NOTE — PROGRESS NOTES
Recent bone scan demonstrates a concern for new bone metastases. Based on this finding, I am going to ask my staff to prepare for ADT start at our upcoming appt. Please contact me if you have any questions.     Michel Rob-- please arrange for  mg deg

## 2021-09-13 ENCOUNTER — TRANSCRIBE ORDER (OUTPATIENT)
Dept: SCHEDULING | Age: 71
End: 2021-09-13

## 2021-09-13 DIAGNOSIS — R63.4 LOSS OF WEIGHT: ICD-10-CM

## 2021-09-13 DIAGNOSIS — R94.8 ABNORMAL BONE SCAN OF CERVICAL SPINE: Primary | ICD-10-CM

## 2021-09-13 DIAGNOSIS — C61 PROSTATE CANCER (HCC): ICD-10-CM

## 2021-09-13 DIAGNOSIS — R94.8 ABNORMAL BONE SCAN OF THORACIC SPINE: ICD-10-CM

## 2021-09-22 ENCOUNTER — HOSPITAL ENCOUNTER (OUTPATIENT)
Dept: GENERAL RADIOLOGY | Age: 71
Discharge: HOME | End: 2021-09-22
Payer: MEDICARE

## 2021-09-22 DIAGNOSIS — C61 PROSTATE CANCER (HCC): ICD-10-CM

## 2021-09-22 PROCEDURE — 77075 RADEX OSSEOUS SURVEY COMPL: CPT

## 2021-09-23 ENCOUNTER — HOSPITAL ENCOUNTER (OUTPATIENT)
Age: 71
Discharge: HOME | End: 2021-09-23
Attending: INTERNAL MEDICINE
Payer: MEDICARE

## 2021-09-23 DIAGNOSIS — C61 PROSTATE CANCER (HCC): ICD-10-CM

## 2021-09-23 DIAGNOSIS — R63.4 LOSS OF WEIGHT: ICD-10-CM

## 2021-09-23 DIAGNOSIS — R94.8 ABNORMAL BONE SCAN OF CERVICAL SPINE: ICD-10-CM

## 2021-09-23 PROCEDURE — 82565 ASSAY OF CREATININE: CPT

## 2021-09-23 PROCEDURE — A9576 INJ PROHANCE MULTIPACK: HCPCS | Performed by: INTERNAL MEDICINE

## 2021-09-23 PROCEDURE — 72156 MRI NECK SPINE W/O & W/DYE: CPT

## 2021-09-23 PROCEDURE — 74011250636 HC RX REV CODE- 250/636: Performed by: INTERNAL MEDICINE

## 2021-09-23 PROCEDURE — 72157 MRI CHEST SPINE W/O & W/DYE: CPT

## 2021-09-23 RX ADMIN — GADOTERIDOL 17 ML: 279.3 INJECTION, SOLUTION INTRAVENOUS at 11:00

## 2021-09-24 LAB — CREAT UR-MCNC: 1 MG/DL (ref 0.6–1.3)

## 2021-09-30 DIAGNOSIS — R94.8 ABNORMAL BONE SCAN OF THORACIC SPINE: ICD-10-CM

## 2021-09-30 DIAGNOSIS — C61 PROSTATE CANCER (HCC): ICD-10-CM

## 2021-09-30 DIAGNOSIS — R63.4 LOSS OF WEIGHT: ICD-10-CM

## 2021-09-30 PROCEDURE — 74011250636 HC RX REV CODE- 250/636

## 2021-09-30 PROCEDURE — A9576 INJ PROHANCE MULTIPACK: HCPCS

## 2021-09-30 RX ADMIN — GADOTERIDOL 17 ML: 279.3 INJECTION, SOLUTION INTRAVENOUS at 12:00

## 2021-10-22 ENCOUNTER — TELEPHONE (OUTPATIENT)
Dept: ORTHOPEDIC SURGERY | Age: 71
End: 2021-10-22

## 2021-10-22 DIAGNOSIS — M19.132 DRUJ (DISTAL RADIOULNAR JOINT) POST-TRAUMATIC ARTHRITIS, LEFT: Primary | ICD-10-CM

## 2021-10-22 RX ORDER — DICLOFENAC SODIUM 75 MG/1
75 TABLET, DELAYED RELEASE ORAL 2 TIMES DAILY WITH MEALS
Qty: 20 TABLET | Refills: 0 | Status: SHIPPED | OUTPATIENT
Start: 2021-10-22 | End: 2021-10-31 | Stop reason: SDUPTHER

## 2021-10-22 NOTE — TELEPHONE ENCOUNTER
Patient called in to provide his pharmacy for his anti-inflammatory medication. Patient's pharmacy is [facility] on [facility] in [place].     Patient's contact is [phone]

## 2021-10-31 DIAGNOSIS — M19.132 DRUJ (DISTAL RADIOULNAR JOINT) POST-TRAUMATIC ARTHRITIS, LEFT: ICD-10-CM

## 2021-10-31 RX ORDER — DICLOFENAC SODIUM 75 MG/1
TABLET, DELAYED RELEASE ORAL
Qty: 20 TABLET | Refills: 0 | Status: SHIPPED | OUTPATIENT
Start: 2021-10-31 | End: 2021-11-12

## 2021-11-12 DIAGNOSIS — M19.132 DRUJ (DISTAL RADIOULNAR JOINT) POST-TRAUMATIC ARTHRITIS, LEFT: ICD-10-CM

## 2021-11-12 RX ORDER — DICLOFENAC SODIUM 75 MG/1
TABLET, DELAYED RELEASE ORAL
Qty: 20 TABLET | Refills: 0 | Status: SHIPPED | OUTPATIENT
Start: 2021-11-12 | End: 2021-11-29

## 2021-11-15 ENCOUNTER — OFFICE VISIT (OUTPATIENT)
Dept: ORTHOPEDIC SURGERY | Age: 71
End: 2021-11-15
Payer: MEDICARE

## 2021-11-15 VITALS — OXYGEN SATURATION: 99 % | BODY MASS INDEX: 23.3 KG/M2 | HEIGHT: 72 IN | HEART RATE: 85 BPM | WEIGHT: 172 LBS

## 2021-11-15 DIAGNOSIS — M19.132 DRUJ (DISTAL RADIOULNAR JOINT) POST-TRAUMATIC ARTHRITIS, LEFT: Primary | ICD-10-CM

## 2021-11-15 PROCEDURE — 20600 DRAIN/INJ JOINT/BURSA W/O US: CPT | Performed by: ORTHOPAEDIC SURGERY

## 2021-11-15 PROCEDURE — G8420 CALC BMI NORM PARAMETERS: HCPCS | Performed by: ORTHOPAEDIC SURGERY

## 2021-11-15 PROCEDURE — G8427 DOCREV CUR MEDS BY ELIG CLIN: HCPCS | Performed by: ORTHOPAEDIC SURGERY

## 2021-11-15 PROCEDURE — 3017F COLORECTAL CA SCREEN DOC REV: CPT | Performed by: ORTHOPAEDIC SURGERY

## 2021-11-15 PROCEDURE — 1101F PT FALLS ASSESS-DOCD LE1/YR: CPT | Performed by: ORTHOPAEDIC SURGERY

## 2021-11-15 PROCEDURE — G8536 NO DOC ELDER MAL SCRN: HCPCS | Performed by: ORTHOPAEDIC SURGERY

## 2021-11-15 PROCEDURE — 99213 OFFICE O/P EST LOW 20 MIN: CPT | Performed by: ORTHOPAEDIC SURGERY

## 2021-11-15 PROCEDURE — G8432 DEP SCR NOT DOC, RNG: HCPCS | Performed by: ORTHOPAEDIC SURGERY

## 2021-11-15 RX ORDER — APALUTAMIDE 60 MG/1
240 TABLET, FILM COATED ORAL DAILY
COMMUNITY
Start: 2021-11-12

## 2021-11-15 NOTE — PROGRESS NOTES
May Eduardo is a 70 y.o. male right handed retiree. Worker's Compensation and legal considerations: none filed. Vitals:    11/15/21 0750   Pulse: 85   SpO2: 99%   Weight: 172 lb (78 kg)   Height: 6' (1.829 m)   PainSc:   5   PainLoc: Wrist           Chief Complaint   Patient presents with    Wrist Pain     left wrist       HPI: Patient returns today requesting repeat injection for his left wrist.  He reports that helped up until the past couple months.    7/19/2021 HPI: Patient presents today for follow-up of bilateral wrist issues. He reports left wrist pain over the back of his wrist as well as right hand numbness and tingling. He also reports bilateral elbow pain but only when putting pressure on it when he sitting in a chair. 1/6/2021 HPI: Patient returns today for bilateral upper extremity EMG. He reports that he is not having any numbness but mainly just pain at the wrist.  And proximal to the wrist joint as well.    11/11/2020 HPI: Patient comes in today for MRI follow-up of left wrist.  He reports numbness and tingling in the hands bilaterally left much worse than right at night. He reports the wrist pain has come back and it is localized to the ulnar aspect of the wrist.  And wearing a brace. He says it does not hurt at rest.    Initial HPI: Patient comes in today with complaints of left wrist pain. He reports been off and on for quite some time but over the past week it is increased when he was getting up from a chair and felt a pop. He denies any other specific injuries. Date of onset: 7/7/2019    Injury: No    Prior Treatment:  Yes: Comment: Left wrist DRUJ injection.  right carpal tunnel injection    Numbness/ Tingling: No    ROS: Review of Systems - General ROS: negative  Respiratory ROS: no cough, shortness of breath, or wheezing  Cardiovascular ROS: no chest pain or dyspnea on exertion  Musculoskeletal ROS: positive for - pain in wrist - left  Neurological ROS: negative  Dermatological ROS: negative    Past Medical History:   Diagnosis Date    Arthritis     Elevated PSA     Hypertension     Hypothyroid     Personal history of prostate cancer     Pituitary tumor     Renal insufficiency     S/P rotator cuff repair 10/15/2015    Sleep apnea     patient states resolved after surgery       Past Surgical History:   Procedure Laterality Date    HX HEENT      surgery for sleep apnea    HX HERNIA REPAIR      x2    HX SHOULDER ARTHROSCOPY Right 1/13/14    Dr. Tete Oleary ARTHROSCOPY Left 2016    HX UROLOGICAL  10/21/2016    Prostate Biopsy with Dr. Anabelle Wong OF SHOULDER         Current Outpatient Medications   Medication Sig Dispense Refill    Erleada 60 mg tablet       diclofenac EC (VOLTAREN) 75 mg EC tablet take 1 tablet by mouth twice a day with meals for 10 days 20 Tablet 0    leuprolide (LUPRON) 1 mg/0.2 mL injection 1.88 mg by SubCUTAneous route every month.  calcium citrate 200 mg (950 mg) tablet Take  by mouth daily.  ergocalciferol (Vitamin D2) 1,250 mcg (50,000 unit) capsule Take 50,000 Units by mouth.  famotidine (PEPCID) 40 mg tablet       levothyroxine (SYNTHROID) 25 mcg tablet take 1 tablet by mouth once daily  0    propranolol LA (INDERAL LA) 120 mg SR capsule take 1 capsule by mouth once daily  0     Current Facility-Administered Medications   Medication Dose Route Frequency Provider Last Rate Last Admin    triamcinolone acetonide (KENALOG) 10 mg/mL injection 5 mg  5 mg Other ONCE Aki Merrill DO           Allergies   Allergen Reactions    Penicillins Other (comments)     paralyzation    Sulfa (Sulfonamide Antibiotics) Unknown (comments)         PE:     Physical Exam  Vitals and nursing note reviewed. Constitutional:       General: He is not in acute distress. Appearance: Normal appearance. He is not ill-appearing. Cardiovascular:      Pulses: Normal pulses.    Pulmonary: Effort: Pulmonary effort is normal. No respiratory distress. Musculoskeletal:         General: Tenderness present. No swelling, deformity or signs of injury. Normal range of motion. Cervical back: Normal range of motion and neck supple. Right lower leg: No edema. Left lower leg: No edema. Skin:     General: Skin is warm and dry. Capillary Refill: Capillary refill takes less than 2 seconds. Findings: No bruising or erythema. Neurological:      General: No focal deficit present. Mental Status: He is alert and oriented to person, place, and time. Psychiatric:         Mood and Affect: Mood normal.         Behavior: Behavior normal.         Wrist: Tenderness localized to the ulnar fovea on the left. Tenderness L R Test L R   1st Ext Comp - - Finkelstein's - -   Snuff Box - - Cowan - -   2nd Ext Comp - - S-L Shear - -   S-L Joint - - L-T Shear - -   L-T Joint - - DRUJ Sup - -   6th Ext Comp - - DRUJ Pro - -   Ulnar Snuff + - DRUJ Grind + -   Fovea - - TFCC - -   STT Joint - - Mid-Carp Inst - -   FCR - - P-T Grind - -   Intersection - - ECU Sublux. - -      Dorsal Ganglion: -   Volar Ganglion: -      ROM: Full        NCV & EMG Findings:  Evaluation of the right ulnar motor nerve showed decreased conduction velocity (A Elbow-B Elbow, 45 m/s). The left median sensory nerve showed prolonged distal peak latency (3.8 ms) and decreased conduction velocity (Wrist-2nd Digit, 37 m/s). All remaining nerves (as indicated in the following tables) were within normal limits. All left vs. right side differences were within normal limits.       All examined muscles (as indicated in the following table) showed no evidence of electrical instability.          INTERPRETATION  This is an abnormal electrodiagnostic examination. These findings may be consistent with:   1. Mild median mononeuropathy at the left wrist (carpal tunnel syndrome)   2.  Mild ulnar mononeuropathy at the right elbow (cubital tunnel syndrome)     There is no electrodiagnostic evidence of any cervical radiculopathy, brachial plexopathy, peripheral polyneuropathy, or any other mononeuropathy.     CLINICAL INTERPRETATION  His electrodiagnostic findings of carpal tunnel and cubital tunnel syndromes may contribute to symptoms of the bilateral arms. Imaging:     Left Wrist MRI  IMPRESSION:     1. Distal radioulnar, radiocarpal, STT and first CMC joints moderate  degenerative arthritic changes with joint effusions as discussed. 2. Degeneration and tear of the TFCC as discussed. Likely chronic fracture of  the ulnar styloid process. 3. Flattening of the median nerve in the carpal tunnel with slight increased  signal, correlate with EMG and carpal tunnel syndrome symptoms as clinically  warranted. 8/25/2020 plain films about the left wrist are positive for degenerative changes of the radiocarpal joint. His wrist appears to be ulnar negative with some osteoarthritic changes at the DRUJ as well. ICD-10-CM ICD-9-CM    1. DRUJ (distal radioulnar joint) post-traumatic arthritis, left  M19.132 716.13 DRAIN/INJECT SMALL JOINT/BURSA      triamcinolone acetonide (KENALOG) 10 mg/mL injection 5 mg       Plan:     Repeat Left wrist DRUJ joint injection     Follow-up and Dispositions    · Return if symptoms worsen or fail to improve.          Plan was reviewed with patient, who verbalized agreement and understanding of the plan    [address] NOTE        Chart reviewed for the following:   Aki DIXON DO, have reviewed the History, Physical and updated the Allergic reactions for [facility]     TIME OUT performed immediately prior to start of procedure:   Aki DIXON DO, have performed the following reviews on [facility] prior to the start of the procedure:            * Patient was identified by name and date of birth   * Agreement on procedure being performed was verified  * Risks and Benefits explained to the patient  * Procedure site verified and marked as necessary  * Patient was positioned for comfort  * Consent was signed and verified     Time: 08:28 AM      Date of procedure: 11/15/2021    Procedure performed by: Jigar Garza DO    Provider assisted by: Danni Ojeda LPN    Patient assisted by: self    How tolerated by patient: tolerated the procedure well with no complications    Post Procedural Pain Scale: 0 - No Hurt    Comments: none    Procedure:  After consent was obtained, using sterile technique the left wrist was prepped. Local anesthetic used: 1% lidocaine. Kenalog 5 mg and was then injected and the needle withdrawn. The procedure was well tolerated. The patient is asked to continue to rest the area for a few more days before resuming regular activities. It may be more painful for the first 1-2 days. Watch for fever, or increased swelling or persistent pain in the joint. Call or return to clinic prn if such symptoms occur or there is failure to improve as anticipated.

## 2021-11-18 ENCOUNTER — OFFICE VISIT (OUTPATIENT)
Dept: ORTHOPEDIC SURGERY | Age: 71
End: 2021-11-18
Payer: MEDICARE

## 2021-11-18 VITALS
TEMPERATURE: 97.3 F | WEIGHT: 175.5 LBS | HEART RATE: 70 BPM | HEIGHT: 72 IN | BODY MASS INDEX: 23.77 KG/M2 | OXYGEN SATURATION: 90 %

## 2021-11-18 DIAGNOSIS — C61 PROSTATE CANCER METASTATIC TO BONE (HCC): ICD-10-CM

## 2021-11-18 DIAGNOSIS — Z96.652 STATUS POST LEFT PARTIAL KNEE REPLACEMENT: Primary | ICD-10-CM

## 2021-11-18 DIAGNOSIS — C79.51 PROSTATE CANCER METASTATIC TO BONE (HCC): ICD-10-CM

## 2021-11-18 DIAGNOSIS — M17.11 LOCALIZED OSTEOARTHRITIS OF RIGHT KNEE: ICD-10-CM

## 2021-11-18 PROCEDURE — 3017F COLORECTAL CA SCREEN DOC REV: CPT | Performed by: SPECIALIST

## 2021-11-18 PROCEDURE — 1101F PT FALLS ASSESS-DOCD LE1/YR: CPT | Performed by: SPECIALIST

## 2021-11-18 PROCEDURE — G8536 NO DOC ELDER MAL SCRN: HCPCS | Performed by: SPECIALIST

## 2021-11-18 PROCEDURE — G8420 CALC BMI NORM PARAMETERS: HCPCS | Performed by: SPECIALIST

## 2021-11-18 PROCEDURE — G8510 SCR DEP NEG, NO PLAN REQD: HCPCS | Performed by: SPECIALIST

## 2021-11-18 PROCEDURE — G8427 DOCREV CUR MEDS BY ELIG CLIN: HCPCS | Performed by: SPECIALIST

## 2021-11-18 PROCEDURE — 99213 OFFICE O/P EST LOW 20 MIN: CPT | Performed by: SPECIALIST
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
## 2021-11-28 DIAGNOSIS — M19.132 DRUJ (DISTAL RADIOULNAR JOINT) POST-TRAUMATIC ARTHRITIS, LEFT: ICD-10-CM

## 2021-11-29 RX ORDER — DICLOFENAC SODIUM 75 MG/1
TABLET, DELAYED RELEASE ORAL
Qty: 20 TABLET | Refills: 0 | Status: SHIPPED | OUTPATIENT
Start: 2021-11-29 | End: 2022-05-19

## 2022-03-18 PROBLEM — N52.33 ERECTILE DYSFUNCTION FOLLOWING URETHRAL SURGERY: Status: ACTIVE | Noted: 2017-04-14

## 2022-03-18 PROBLEM — H90.5 SENSORINEURAL HEARING LOSS: Status: ACTIVE | Noted: 2021-08-11

## 2022-03-19 PROBLEM — Z98.890 S/P LEFT KNEE ARTHROSCOPY: Status: ACTIVE | Noted: 2018-09-25

## 2022-03-19 PROBLEM — Z80.42 FAMILY HX OF PROSTATE CANCER: Status: ACTIVE | Noted: 2017-02-08

## 2022-03-19 PROBLEM — H52.209 ASTIGMATISM: Status: ACTIVE | Noted: 2021-08-11

## 2022-03-19 PROBLEM — H52.00 HYPEROPIA: Status: ACTIVE | Noted: 2021-08-11

## 2022-03-20 PROBLEM — H52.4 PRESBYOPIA: Status: ACTIVE | Noted: 2021-08-11

## 2022-03-20 PROBLEM — M17.12 LOCALIZED OSTEOARTHRITIS OF LEFT KNEE: Status: ACTIVE | Noted: 2018-09-25

## 2022-05-04 ENCOUNTER — OFFICE VISIT (OUTPATIENT)
Dept: ORTHOPEDIC SURGERY | Age: 72
End: 2022-05-04
Payer: MEDICARE

## 2022-05-04 VITALS
HEART RATE: 77 BPM | TEMPERATURE: 96.9 F | BODY MASS INDEX: 24.08 KG/M2 | WEIGHT: 172 LBS | HEIGHT: 71 IN | OXYGEN SATURATION: 96 %

## 2022-05-04 DIAGNOSIS — G89.29 CHRONIC PAIN OF RIGHT KNEE: ICD-10-CM

## 2022-05-04 DIAGNOSIS — Z96.652 STATUS POST LEFT PARTIAL KNEE REPLACEMENT: Primary | ICD-10-CM

## 2022-05-04 DIAGNOSIS — M25.561 CHRONIC PAIN OF RIGHT KNEE: ICD-10-CM

## 2022-05-04 DIAGNOSIS — R25.1 TREMOR: ICD-10-CM

## 2022-05-04 DIAGNOSIS — C61 PROSTATE CANCER (HCC): ICD-10-CM

## 2022-05-04 DIAGNOSIS — M17.11 PRIMARY OSTEOARTHRITIS OF RIGHT KNEE: ICD-10-CM

## 2022-05-04 PROCEDURE — 20610 DRAIN/INJ JOINT/BURSA W/O US: CPT | Performed by: SPECIALIST

## 2022-05-04 PROCEDURE — 99213 OFFICE O/P EST LOW 20 MIN: CPT | Performed by: SPECIALIST

## 2022-05-04 PROCEDURE — 3017F COLORECTAL CA SCREEN DOC REV: CPT | Performed by: SPECIALIST

## 2022-05-04 PROCEDURE — 1101F PT FALLS ASSESS-DOCD LE1/YR: CPT | Performed by: SPECIALIST

## 2022-05-04 PROCEDURE — G8420 CALC BMI NORM PARAMETERS: HCPCS | Performed by: SPECIALIST

## 2022-05-04 PROCEDURE — G8536 NO DOC ELDER MAL SCRN: HCPCS | Performed by: SPECIALIST

## 2022-05-04 PROCEDURE — G8427 DOCREV CUR MEDS BY ELIG CLIN: HCPCS | Performed by: SPECIALIST

## 2022-05-04 PROCEDURE — G8432 DEP SCR NOT DOC, RNG: HCPCS | Performed by: SPECIALIST

## 2022-05-04 RX ORDER — CHOLECALCIFEROL (VITAMIN D3) 125 MCG
50 CAPSULE ORAL DAILY
COMMUNITY

## 2022-05-04 RX ORDER — BETAMETHASONE SODIUM PHOSPHATE AND BETAMETHASONE ACETATE 3; 3 MG/ML; MG/ML
3 INJECTION, SUSPENSION INTRA-ARTICULAR; INTRALESIONAL; INTRAMUSCULAR; SOFT TISSUE ONCE
Status: COMPLETED | OUTPATIENT
Start: 2022-05-04 | End: 2022-05-04

## 2022-05-04 RX ADMIN — BETAMETHASONE SODIUM PHOSPHATE AND BETAMETHASONE ACETATE 3 MG: 3; 3 INJECTION, SUSPENSION INTRA-ARTICULAR; INTRALESIONAL; INTRAMUSCULAR; SOFT TISSUE at 11:55
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
## 2022-05-05 ENCOUNTER — OFFICE VISIT (OUTPATIENT)
Dept: ORTHOPEDIC SURGERY | Age: 72
End: 2022-05-05
Payer: MEDICARE

## 2022-05-05 DIAGNOSIS — M19.032 PRIMARY OSTEOARTHRITIS OF LEFT WRIST: Primary | ICD-10-CM

## 2022-05-05 PROCEDURE — 20605 DRAIN/INJ JOINT/BURSA W/O US: CPT | Performed by: ORTHOPAEDIC SURGERY

## 2022-05-05 NOTE — PROGRESS NOTES
Zarina Razo is a 70 y.o. male right handed retiree. Worker's Compensation and legal considerations: none filed. Vitals:    05/05/22 1326   PainSc:  10 - Worst pain ever   PainLoc: Wrist           Chief Complaint   Patient presents with    Wrist Pain     Left       HPI: Patient presents today requesting a possible injection for his wrist but wanting to discuss his symptoms. His previous DRUJ injection lasted about a month. 11/15/2021 HPI: Patient returns today requesting repeat injection for his left wrist.  He reports that helped up until the past couple months.    7/19/2021 HPI: Patient presents today for follow-up of bilateral wrist issues. He reports left wrist pain over the back of his wrist as well as right hand numbness and tingling. He also reports bilateral elbow pain but only when putting pressure on it when he sitting in a chair. 1/6/2021 HPI: Patient returns today for bilateral upper extremity EMG. He reports that he is not having any numbness but mainly just pain at the wrist.  And proximal to the wrist joint as well.    11/11/2020 HPI: Patient comes in today for MRI follow-up of left wrist.  He reports numbness and tingling in the hands bilaterally left much worse than right at night. He reports the wrist pain has come back and it is localized to the ulnar aspect of the wrist.  And wearing a brace. He says it does not hurt at rest.    Initial HPI: Patient comes in today with complaints of left wrist pain. He reports been off and on for quite some time but over the past week it is increased when he was getting up from a chair and felt a pop. He denies any other specific injuries. Date of onset: 7/7/2019    Injury: No    Prior Treatment:  Yes: Comment: Left wrist DRUJ injection.  right carpal tunnel injection    Numbness/ Tingling: No    ROS: Review of Systems - General ROS: negative  Respiratory ROS: no cough, shortness of breath, or wheezing  Cardiovascular ROS: no chest pain or dyspnea on exertion  Musculoskeletal ROS: positive for - pain in wrist - left  Neurological ROS: negative  Dermatological ROS: negative    Past Medical History:   Diagnosis Date    Arthritis     Cancer ([address]     Elevated PSA     Hypertension     Hypothyroid     Personal history of prostate cancer     Pituitary tumor     Renal insufficiency     S/P rotator cuff repair 10/15/2015    Sleep apnea     patient states resolved after surgery       Past Surgical History:   Procedure Laterality Date    HX HEENT      surgery for sleep apnea    HX HERNIA REPAIR      x2    HX SHOULDER ARTHROSCOPY Right 1/13/14    Dr. Hafsa Norman ARTHROSCOPY Left 2016    HX UROLOGICAL  10/21/2016    Prostate Biopsy with Dr. Jorge Singh OF SHOULDER         Current Outpatient Medications   Medication Sig Dispense Refill    cholecalciferol, vitamin D3, 50 mcg (2,000 unit) tab Take 50 mcg by mouth.  Erleada 60 mg tablet       leuprolide (LUPRON) 1 mg/0.2 mL injection 1.88 mg by SubCUTAneous route every month.  calcium citrate 200 mg (950 mg) tablet Take  by mouth daily.  famotidine (PEPCID) 40 mg tablet       levothyroxine (SYNTHROID) 25 mcg tablet take 1 tablet by mouth once daily  0    propranolol LA (INDERAL LA) 120 mg SR capsule take 1 capsule by mouth once daily  0    diclofenac EC (VOLTAREN) 75 mg EC tablet take 1 tablet by mouth twice a day with meals for 10 days (Patient not taking: Reported on 5/4/2022) 20 Tablet 0    ergocalciferol (Vitamin D2) 1,250 mcg (50,000 unit) capsule Take 50,000 Units by mouth.  (Patient not taking: Reported on 5/4/2022)       Current Facility-Administered Medications   Medication Dose Route Frequency Provider Last Rate Last Admin    triamcinolone acetonide (KENALOG) 10 mg/mL injection 5 mg  5 mg Other ONCE Aki Merrill DO           Allergies   Allergen Reactions    Penicillins Other (comments)     paralyzation    Sulfa (Sulfonamide Antibiotics) Unknown (comments)         PE:     Physical Exam  Vitals and nursing note reviewed. Constitutional:       General: He is not in acute distress. Appearance: Normal appearance. He is not ill-appearing. Cardiovascular:      Pulses: Normal pulses. Pulmonary:      Effort: Pulmonary effort is normal. No respiratory distress. Musculoskeletal:         General: Tenderness present. No swelling, deformity or signs of injury. Normal range of motion. Cervical back: Normal range of motion and neck supple. Right lower leg: No edema. Left lower leg: No edema. Skin:     General: Skin is warm and dry. Capillary Refill: Capillary refill takes less than 2 seconds. Findings: No bruising or erythema. Neurological:      General: No focal deficit present. Mental Status: He is alert and oriented to person, place, and time. Psychiatric:         Mood and Affect: Mood normal.         Behavior: Behavior normal.         Wrist: Tenderness localized to the ulnar fovea on the left. Tenderness L R Test L R   1st Ext Comp - - Finkelstein's - -   Snuff Box - - Cowan - -   2nd Ext Comp - - S-L Shear - -   S-L Joint - - L-T Shear - -   L-T Joint - - DRUJ Sup - -   6th Ext Comp - - DRUJ Pro - -   Ulnar Snuff + - DRUJ Grind + -   Fovea - - TFCC - -   STT Joint - - Mid-Carp Inst - -   FCR - - P-T Grind - -   Intersection - - ECU Sublux. - -      Dorsal Ganglion: -   Volar Ganglion: -      ROM: Full        NCV & EMG Findings:  Evaluation of the right ulnar motor nerve showed decreased conduction velocity (A Elbow-B Elbow, 45 m/s). The left median sensory nerve showed prolonged distal peak latency (3.8 ms) and decreased conduction velocity (Wrist-2nd Digit, 37 m/s). All remaining nerves (as indicated in the following tables) were within normal limits.   All left vs. right side differences were within normal limits.       All examined muscles (as indicated in the following table) showed no evidence of electrical instability.          INTERPRETATION  This is an abnormal electrodiagnostic examination. These findings may be consistent with:   1. Mild median mononeuropathy at the left wrist (carpal tunnel syndrome)   2. Mild ulnar mononeuropathy at the right elbow (cubital tunnel syndrome)     There is no electrodiagnostic evidence of any cervical radiculopathy, brachial plexopathy, peripheral polyneuropathy, or any other mononeuropathy.     CLINICAL INTERPRETATION  His electrodiagnostic findings of carpal tunnel and cubital tunnel syndromes may contribute to symptoms of the bilateral arms. Imaging:     Left Wrist MRI  IMPRESSION:     1. Distal radioulnar, radiocarpal, STT and first CMC joints moderate  degenerative arthritic changes with joint effusions as discussed. 2. Degeneration and tear of the TFCC as discussed. Likely chronic fracture of  the ulnar styloid process. 3. Flattening of the median nerve in the carpal tunnel with slight increased  signal, correlate with EMG and carpal tunnel syndrome symptoms as clinically  warranted. 8/25/2020 plain films about the left wrist are positive for degenerative changes of the radiocarpal joint. His wrist appears to be ulnar negative with some osteoarthritic changes at the DRUJ as well. ICD-10-CM ICD-9-CM    1. Primary osteoarthritis of left wrist  M19.032 715.13 DRAIN/INJECT INTERMEDIATE JOINT/BURSA      triamcinolone acetonide (KENALOG) 10 mg/mL injection 5 mg       Plan:     Left wrist ulnar-sided joint injection. Continue brace wear as needed. Follow-up and Dispositions    · Return if symptoms worsen or fail to improve.          Plan was reviewed with patient, who verbalized agreement and understanding of the plan    [address] NOTE        Chart reviewed for the following:   IAki DO, have reviewed the History, Physical and updated the Allergic reactions for [address] performed immediately prior to start of procedure:   I, Torres Sterling DO, have performed the following reviews on Esther Whelan III prior to the start of the procedure:            * Patient was identified by name and date of birth   * Agreement on procedure being performed was verified  * Risks and Benefits explained to the patient  * Procedure site verified and marked as necessary  * Patient was positioned for comfort  * Consent was signed and verified     Time: 13:46      Date of procedure: 5/5/2022    Procedure performed by: Torres Sterling DO    Provider assisted by:[facility]    Patient assisted by: self    How tolerated by patient: tolerated the procedure well with no complications    Post Procedural Pain Scale: 0 - No Hurt    Comments: none    Procedure:  After consent was obtained, using sterile technique the left wrist was prepped. Local anesthetic used: 1% lidocaine. Kenalog 5 mg and was then injected and the needle withdrawn. The procedure was well tolerated. The patient is asked to continue to rest the area for a few more days before resuming regular activities. It may be more painful for the first 1-2 days. Watch for fever, or increased swelling or persistent pain in the joint. Call or return to clinic prn if such symptoms occur or there is failure to improve as anticipated.

## 2022-05-19 ENCOUNTER — OFFICE VISIT (OUTPATIENT)
Dept: ORTHOPEDIC SURGERY | Age: 72
End: 2022-05-19
Payer: MEDICARE

## 2022-05-19 VITALS
HEART RATE: 73 BPM | BODY MASS INDEX: 24.78 KG/M2 | TEMPERATURE: 96.8 F | HEIGHT: 71 IN | WEIGHT: 177 LBS | OXYGEN SATURATION: 97 %

## 2022-05-19 DIAGNOSIS — M89.8X8 ILIAC BONE PAIN: Primary | ICD-10-CM

## 2022-05-19 DIAGNOSIS — M51.36 DDD (DEGENERATIVE DISC DISEASE), LUMBAR: ICD-10-CM

## 2022-05-19 DIAGNOSIS — M50.30 DDD (DEGENERATIVE DISC DISEASE), CERVICAL: ICD-10-CM

## 2022-05-19 DIAGNOSIS — R53.0 NEOPLASTIC MALIGNANT RELATED FATIGUE: ICD-10-CM

## 2022-05-19 DIAGNOSIS — C61 MALIGNANT NEOPLASM OF PROSTATE METASTATIC TO BONE (HCC): ICD-10-CM

## 2022-05-19 DIAGNOSIS — C79.51 MALIGNANT NEOPLASM OF PROSTATE METASTATIC TO BONE (HCC): ICD-10-CM

## 2022-05-19 DIAGNOSIS — R26.89 POOR BALANCE: ICD-10-CM

## 2022-05-19 PROCEDURE — 99204 OFFICE O/P NEW MOD 45 MIN: CPT | Performed by: PHYSICAL MEDICINE & REHABILITATION

## 2022-05-19 PROCEDURE — G8420 CALC BMI NORM PARAMETERS: HCPCS | Performed by: PHYSICAL MEDICINE & REHABILITATION

## 2022-05-19 PROCEDURE — 3017F COLORECTAL CA SCREEN DOC REV: CPT | Performed by: PHYSICAL MEDICINE & REHABILITATION

## 2022-05-19 PROCEDURE — 72170 X-RAY EXAM OF PELVIS: CPT | Performed by: PHYSICAL MEDICINE & REHABILITATION

## 2022-05-19 PROCEDURE — G8432 DEP SCR NOT DOC, RNG: HCPCS | Performed by: PHYSICAL MEDICINE & REHABILITATION

## 2022-05-19 PROCEDURE — 1101F PT FALLS ASSESS-DOCD LE1/YR: CPT | Performed by: PHYSICAL MEDICINE & REHABILITATION

## 2022-05-19 PROCEDURE — G8427 DOCREV CUR MEDS BY ELIG CLIN: HCPCS | Performed by: PHYSICAL MEDICINE & REHABILITATION

## 2022-05-19 PROCEDURE — G8536 NO DOC ELDER MAL SCRN: HCPCS | Performed by: PHYSICAL MEDICINE & REHABILITATION

## 2022-05-19 NOTE — PROGRESS NOTES
[address], [address]  [facility], [address]  Phone: [phone]  Fax: [phone]        Diane Kraft  : 1950  PCP: Sarah Molina MD    NEW PATIENT EVALUATION      ASSESSMENT AND PLAN    Diagnoses and all orders for this visit:    1. Iliac bone pain  -     POC XRAY, PELVIS; 1 OR 2 VIEWS    2. DDD (degenerative disc disease), lumbar  -     REFERRAL TO PHYSICAL THERAPY    3. DDD (degenerative disc disease), cervical    4. 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 Poor balance  -     REFERRAL TO PHYSICAL THERAPY    6. Neoplastic malignant related fatigue  -     REFERRAL TO PHYSICAL THERAPY         1. Alexus Phan III is a 67 y.o. male with stage IV prostate cancer presenting with worsening hip pain. He has known metastatic disease to the ileum. He is due to follow-up with his oncologist tomorrow. 2. Referral to Physical Therapy for Balance eval, energy conservation exercises, strengthening. Once a week  3. Continue Tylenol PRN  4. He may be a candidate for radiation therapy   5. discussed injections if not improving  6. Has parkinsonian features but reports that he has been checked out for this condition. Follow-up and Dispositions    · Return in about 6 weeks (around 2022) for PT fu. HISTORY OF PRESENT ILLNESS  Alexus Phan III is seen today in consultation neck and back pain. He reports constant back pain. His pain is exacerbated with bending. Denies sciatic pain. Denies numbness or tingling. He also complains of neck pain. Denies radiating pain. He uses CBD gummies and Tylenol PRN with benefit. Denies side effects. Reports that daughter is NP and advised Gummies. They do help with his pain but he does not like feeling out of it. Reviewed his bone scan and CAT scans from  with diffuse metastatic disease. He is on hormone therapy. Does not recall having XRT.     Denies persistent fevers, chills, weight changes, saddle paresthesias, and neurogenic bowel or bladder symptoms. Pain Assessment  5/19/2022   Location of Pain Neck;Back   Location Modifiers -   Severity of Pain 0   Quality of Pain Sharp   Quality of Pain Comment -   Duration of Pain Persistent   Duration of Pain Comment -   Frequency of Pain Several times daily   Frequency of Pain Comment -   Date Pain First Started -   Aggravating Factors Other (Comment); Bending   Aggravating Factors Comment looking left or right   Limiting Behavior Yes   Relieving Factors Other (Comment); Rest   Relieving Factors Comment sitting   Result of Injury No   Work-Related Injury -   Type of Injury -   Type of Injury Comment -       Onset of pain: 2/2022      Investigations:   Pelv XR AP 1V 5/19/2022 (I personally reviewed these images): osteoblastic lesions left ileum   C MRI 9/2021: multilevel DDD with mets C5, C7, T2  T MRI 9/2021: numerous metastatic lesions  DEXA 9/2021: multiple signs of uptake including the ilium bilaterally, DDD, cervical, thoracic, lumbar  Spine surgery consult: none    Treatments:  Physical therapy: no  Spinal injections: no  Spinal surgery- no  Beneficial medications: CBD gummies, Tylenol  Failed medications: none    Work Status: retired. Former  for [employer]  Pertinent PMHx:  Stage 4 prostate to bone , OTIS, HTN, shoulder sx. Visit Vitals  Pulse 73   Temp 96.8 °F (36 °C) (Tympanic)   Ht 5' 11\" (1.803 m)   Wt 177 lb (80.3 kg)   SpO2 97% Comment: RA   BMI 24.69 kg/m²       PHYSICAL EXAM  Limited ROM C spine  Negative Spurling's, Howard's, Tinel's  Resting tremor bilateral upper extremities  TTP L5-S1, B/L iliac   SLR negative  B/L hamstring tightness   lower extremity strength 4 out of 5  Rigid gait with difficulty with turns.       Past Medical History:   Diagnosis Date    Arthritis     Cancer ([facility] [address]     Elevated PSA     Hypertension     Hypothyroid     Personal history of prostate cancer     Pituitary tumor     Renal insufficiency     S/P rotator cuff repair 10/15/2015    Sleep apnea     patient states resolved after surgery        Past Surgical History:   Procedure Laterality Date    HX HEENT      surgery for sleep apnea    HX HERNIA REPAIR      x2    HX SHOULDER ARTHROSCOPY Right 1/13/14    Dr. Jesús Singh ARTHROSCOPY Left 2016    HX UROLOGICAL  10/21/2016    Prostate Biopsy with Dr. Shmuel Aranda OF SHOULDER           Current Outpatient Medications   Medication Sig Dispense Refill    OTHER,NON-FORMULARY, CBD gummies      cholecalciferol, vitamin D3, 50 mcg (2,000 unit) tab Take 50 mcg by mouth daily.  Erleada 60 mg tablet Take 240 mg by mouth daily.  leuprolide (LUPRON) 1 mg/0.2 mL injection 1.88 mg by SubCUTAneous route every month.  calcium citrate 200 mg (950 mg) tablet Take  by mouth daily.  ergocalciferol (Vitamin D2) 1,250 mcg (50,000 unit) capsule Take 50,000 Units by mouth every seven (7) days.  famotidine (PEPCID) 40 mg tablet Take 40 mg by mouth daily.       levothyroxine (SYNTHROID) 25 mcg tablet take 1 tablet by mouth once daily  0    propranolol LA (INDERAL LA) 120 mg SR capsule take 1 capsule by mouth once daily  0

## 2022-05-19 NOTE — PROGRESS NOTES
Myrtle Troy presents today for   Chief Complaint   Patient presents with    Neck Pain    Back Pain       Is someone accompanying this pt? *no**    Is the patient using any DME equipment during OV? no    Depression Screening:  3 most recent PHQ Screens 11/18/2021   PHQ Not Done -   Little interest or pleasure in doing things Not at all   Feeling down, depressed, irritable, or hopeless Not at all   Total Score PHQ 2 0       Learning Assessment:  Learning Assessment 10/9/2017   PRIMARY LEARNER Patient   PRIMARY LANGUAGE ENGLISH   LEARNER PREFERENCE PRIMARY READING   ANSWERED BY Patient   RELATIONSHIP SELF         Fall Risk  Fall Risk Assessment, last 12 mths 7/19/2021   Able to walk? Yes   Fall in past 12 months? 0   Do you feel unsteady? 0   Are you worried about falling 0   Number of falls in past 12 months -   Fall with injury? -       Coordination of Care:  1. Have you been to the ER, urgent care clinic since your last visit? no  Hospitalized since your last visit? no    2. Have you seen or consulted any other health care providers outside of the [address] [place] [address] since your last visit? Yes, ortho, oncology Include any pap smears or colon screening.  no

## 2022-06-07 ENCOUNTER — HOSPITAL ENCOUNTER (OUTPATIENT)
Dept: PHYSICAL THERAPY | Age: 72
Discharge: HOME | End: 2022-06-07
Payer: MEDICARE

## 2022-06-07 PROCEDURE — 97162 PT EVAL MOD COMPLEX 30 MIN: CPT

## 2022-06-07 PROCEDURE — 97530 THERAPEUTIC ACTIVITIES: CPT

## 2022-06-07 NOTE — PROGRESS NOTES
[facility] - [facility], [address] [place] [address]  [phone] fax  Plan of Care/ Statement of Necessity for Physical Therapy Services    Patient name: Rob Marie Start of Care: 2022   Referral source: Yessenia Warren MD : 1950    Medical Diagnosis: Other low back pain [M54.59]  Other abnormalities of gait and mobility [R26.89]  [place]or: [place] MED[place]ARE / Plan: [place] MEDICARE PART A & B / Product Type: Medicare /  Onset Date:22    Treatment Diagnosis: LBP; Gait Abnormality   Prior Hospitalization: see medical history Provider#: [id number]   Medications: Verified on Patient summary List    Comorbidities: Arthritis; Back pain; Bone CA (stage 4 - undergoing current treatment); HTN   Prior Level of Function: Not working; lives in [facility] alone; enjoys woodworking, working on cars, riding motorcycle, [employer]. and following information is based on the information from the initial evaluation. Assessment/ key information: Pt is a 67 y.o. male who presents with c/o LBP, decreased stamina and endurance, and impaired gait/standing balance. Pt with hx of polyarthralgia and stage 4 Bone CA - diagnosed > 1 year ago, currently undergoing treatment and responding favorably. Functional deficits include: pain with FWD trunk bending, difficulty with sit to stand transfers, pain with prolonged sitting, especially without lumbar support, limited ambulation of 20 ft before fatigue and seated rest break required, limited endurance with activities in home, community, unable to perform yard work. Upon exam, Pt exhibited FWD head, thoracic kyphosis posture, impaired B hip strength to 3+ to 4/5 grossly, decreased functional strength, endurance with sit to stand test, and BBT score of 38/56, showing increased fall risk.   Pt would benefit from skilled PT to address above deficits to improve Pt's function and ability to return to more active lifestyle with less pain, improved stamina, and decreased falls risk. Evaluation Complexity History HIGH Complexity :3+ comorbidities / personal factors will impact the outcome/ POC ; Examination MEDIUM Complexity : 3 Standardized tests and measures addressing body structure, function, activity limitation and / or participation in recreation  ;Presentation MEDIUM Complexity : Evolving with changing characteristics  ; Clinical Decision Making MEDIUM Complexity : FOTO score of 26-74  Overall Complexity Rating: MEDIUM  Problem List: pain affecting function, decrease ROM, decrease strength, impaired gait/ balance, decrease ADL/ functional abilitiies, decrease activity tolerance, decrease flexibility/ joint mobility and decrease transfer abilities   Treatment Plan may include any combination of the following: Therapeutic exercise, Therapeutic activities, Neuromuscular re-education, Physical agent/modality, Gait/balance training, Manual therapy, Aquatic therapy, Patient education, Functional mobility training, Home safety training and Stair training  Patient / Family readiness to learn indicated by: asking questions, trying to perform skills and interest  Persons(s) to be included in education: patient (P)  Barriers to Learning/Limitations: None  Patient Goal (s): Be able to move without pain.   Patient Self Reported Health Status: fair  Rehabilitation Potential: good    Short Term Goals: To be accomplished in 1 weeks:  Goal: Pt to be compliant with initial HEP to improve lumbar mobility, LE strength for increased ease of transfers, ADLs. Status at last note/certification: Established and reviewed with Pt  Long Term Goals: To be accomplished in 5 weeks:  Goal: Pt to perform 8 sit to stands in 30 seconds without UE support or increased fatigue to improve functional LE strength and increase ease of transfers.   Status at last note/certification: 4x without UE support, increased fatigue, B knee pain  Goal: Pt to increase BBT score to at least 50/56 pts to show decreased falls risk, increased safety with household, community ambulation. Status at last note/certification: BBT 25/19  Goal: Pt to increase ambulation tolerance to 200 ft without fatigue and minimal to no gait deviations for ease maintaining 2-story home. Status at last note/certification: 20 ft ambulation tolerance before fatigue, seated break  Goal: Pt to report < 4/10 pain at worst in low back to increase ease with bending and performing household chores. Status at last note/certification: 3/48 pain at worst  Goal: Pt to report FOTO score of 69 pts to show improved function and quality of life. Status at last note/certification: FOTO 54 pts     Frequency / Duration: Patient to be seen 2 times per week for 5 weeks. Patient/ Caregiver education and instruction: Diagnosis, prognosis, exercises   [x]  Plan of care has been reviewed with PTA    Certification Period: 6/7/22 - 7/6/22  Aspen Berman, PT 6/7/2022 2:21 PM  _____________________________________________________________________  I certify that the above Therapy Services are being furnished while the patient is under my care. I agree with the treatment plan and certify that this therapy is necessary.     [de-identified] Signature:____________Date:_________TIME:________     Payal Patterson MD  ** Signature, Date and Time must be completed for valid certification **    Please sign and return to [facility] - [address]  [phone] fax

## 2022-06-07 NOTE — PROGRESS NOTES
PT DAILY TREATMENT NOTE     Patient Name: Tessy Zepeda  [id number]  : 1950  [x]  Patient  Verified  Payor: VA MEDICARE / Plan: VA MEDICARE PART A & B / Product Type: Medicare /    In time:2:25  Out time:3:05  Total Treatment Time (min): 40  Visit #: 1 of 10    Medicare/BCBS Only   Total Timed Codes (min):  10 1:1 Treatment Time:  40       Treatment Area: Other low back pain [M54.59]  Other abnormalities of gait and mobility [R26.89]    SUBJECTIVE  Pain Level (0-10 scale): 4/10  Any medication changes, allergies to medications, adverse drug reactions, diagnosis change, or new procedure performed?: [x] No    [] Yes (see summary sheet for update)  Subjective functional status/changes:   [] No changes reported    Chief Complaint: LBP; Generalized Weakness  History/Mechanism of Injury: Pt with hx of Bone CA, diagnosed over 1 yr ago that has progressed to stage 4 at this time. Pt undergoing treatment and is responding favorably but has dull pain in lower back but mostly reports overall decreased endurance and increased fatigue levels, limiting activity tolerance.   Current Symptoms/Deficits: pain with bending over; difficulty getting up from chair, squatting; poor endurance with activities, amb of 20 ft; pain with turning head either direction; a little dizziness with getting up too quickly but otherwise doesn't feel unbalanced with gait; pain with prolonged sitting to low back, especially without lumbar support  Pain-  Current: 4/10     Worst: 8/10   Best: 2/10  Previous Treatment/Compliance: Stage 4 Bone CA - calcium drip, [facility]  Gait/Mobility Devices: Rollator for long distances; walking stick/cane for walking in neighborhood  PMHx/Surgical Hx: Arthritis; back, left wrist, B hips; Bone CA; left partial knee replacement; right knee severe OA  Work Hx: N/A  Living Situation: 2-story home alone; performs all cooking, cleaning  Household Modifications: most difficult task making bed; has friends who come and do heavy cleaning 1x/month  Hobbies: riding motorcycle; working on cars; woodworking; yardwork  Pt Goals: \"Be able to move without pain. \"    OBJECTIVE    30 min [x]Eval                  []Re-Eval     10 min Therapeutic Activity:  []  See flow sheet : Patient education on therapy assessment, prognosis, expectations for therapy sessions, patient goals, and HEP. Rationale: to improve the patients ability to adhere to HEP and therapy sessions for increased compliance when working toward therapy goals. With   [] TE   [x] TA   [] neuro   [] other: Patient Education: [x] Review HEP    [] Progressed/Changed HEP based on:   [] positioning   [] body mechanics   [] transfers   [] heat/ice application    [] other:      Other Objective/Functional Measures: FOTO 54 pts    Observation: FWD head, thoracic kyphosis posture  Palpation: TTP at B L/S paraspinals    Tone:    LE Strength:   Right (/5) Left (/5)   Hip     Flexion 5 5             Abduction 4 4             Adduction 4 4             Extension 3+ 3+             ER 4 4             IR 4 4   Knee   Extension 5 5              Flexion 5 5   Ankle   Dorsiflexion 5 5               PF 5  5                Inversion 5 5               Eversion 5 5     Gait: slowed roxann, shortened step length without A.D. Functional Squat: able to squat retirement with B knee pain    Stair Negotiation: reciprocal pattern with use of handrail    Reflexes/Sensation: intact sensation to light touch    Optional Tests:       Dynamic Gait Index (24pt scale):        Functional Gait Assessment (30pt scale):       Torrez Balance Scale (56pt scale): 38/56       Timed Up and Go test (< 20 seconds):       5x Sit to Stand test (20 seconds):        30 sec Sit to Stand test (12-16): 4x in 30 seconds without UE support, increased fatigue, B knee pain    Balance/ Equilibrium:         Sitting Balance: Static:  [x] Good    [] Fair    [] Poor     Dynamic:   [x] Good    [] Fair    [] Poor        Standing Balance: Static:   [] Good    [x] Fair    [] Poor     Dynamic:   [] Good    [x] Fair    [] Poor        Protective Extension:  [] Present    [] Delayed    [] Absent        Single Leg Stance:         Eyes Open  Eyes Closed   L 1 sec L NT   R 15 sec R NT         Behavior: [x] Cooperative    [] Impulsive    [] Agitated    [] Perseverative    [] Confused   Oriented x: 3    Cognition: [] One Step Commands   [x] Multiple Commands   [] Displays Neglect [] R  [] L    Other:       Impaired Judgement: [] Y    [x] N      Impaired Vision:  [x] Y    [] N (glasses)      Safety Awareness Deficits  [] Y    [x] N      Impaired Hearing  [] Y    [x] N      Able to Express Needs [x] Y    [] N      -  Pain Level (0-10 scale) post treatment: 4/10    ASSESSMENT/Changes in Function: See POC    Patient will continue to benefit from skilled PT services to modify and progress therapeutic interventions, address functional mobility deficits, address ROM deficits, address strength deficits, analyze and address soft tissue restrictions, analyze and cue movement patterns, analyze and modify body mechanics/ergonomics, assess and modify postural abnormalities, address imbalance/dizziness and instruct in home and community integration to attain remaining goals.      [x]  See Plan of Care  []  See progress note/recertification  []  See Discharge Summary         Progress towards goals / Updated goals:  See POC    PLAN  [x]  Upgrade activities as tolerated     []  Continue plan of care  [x]  Update interventions per flow sheet       []  Discharge due to:_  []  Other:_      Cammy Berman, PT 6/7/2022  2:21 PM    Future Appointments   Date Time Provider Mary Mccall   7/18/2022 11:15 AM Lucina Haq MD [facility] AM[facility]

## 2022-06-08 ENCOUNTER — DOCUMENTATION ONLY (OUTPATIENT)
Dept: ORTHOPEDIC SURGERY | Age: 72
End: 2022-06-08

## 2022-06-09 ENCOUNTER — OFFICE VISIT (OUTPATIENT)
Dept: ORTHOPEDIC SURGERY | Age: 72
End: 2022-06-09
Payer: MEDICARE

## 2022-06-09 VITALS
TEMPERATURE: 96.8 F | HEIGHT: 72 IN | OXYGEN SATURATION: 99 % | BODY MASS INDEX: 23.3 KG/M2 | WEIGHT: 172 LBS | HEART RATE: 83 BPM

## 2022-06-09 DIAGNOSIS — C61 PROSTATE CANCER (HCC): ICD-10-CM

## 2022-06-09 DIAGNOSIS — Z96.652 STATUS POST LEFT PARTIAL KNEE REPLACEMENT: ICD-10-CM

## 2022-06-09 DIAGNOSIS — M25.561 CHRONIC PAIN OF RIGHT KNEE: ICD-10-CM

## 2022-06-09 DIAGNOSIS — M17.11 PRIMARY OSTEOARTHRITIS OF RIGHT KNEE: Primary | ICD-10-CM

## 2022-06-09 DIAGNOSIS — G89.29 CHRONIC PAIN OF RIGHT KNEE: ICD-10-CM

## 2022-06-09 PROCEDURE — 20610 DRAIN/INJ JOINT/BURSA W/O US: CPT | Performed by: SPECIALIST
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
## 2022-06-16 ENCOUNTER — HOSPITAL ENCOUNTER (OUTPATIENT)
Dept: PHYSICAL THERAPY | Age: 72
Discharge: HOME | End: 2022-06-16
Payer: MEDICARE

## 2022-06-16 ENCOUNTER — OFFICE VISIT (OUTPATIENT)
Dept: ORTHOPEDIC SURGERY | Age: 72
End: 2022-06-16
Payer: MEDICARE

## 2022-06-16 VITALS
WEIGHT: 176.4 LBS | OXYGEN SATURATION: 100 % | HEIGHT: 72 IN | BODY MASS INDEX: 23.89 KG/M2 | TEMPERATURE: 96.9 F | HEART RATE: 96 BPM

## 2022-06-16 DIAGNOSIS — M25.561 CHRONIC PAIN OF RIGHT KNEE: ICD-10-CM

## 2022-06-16 DIAGNOSIS — M17.11 PRIMARY OSTEOARTHRITIS OF RIGHT KNEE: Primary | ICD-10-CM

## 2022-06-16 DIAGNOSIS — G89.29 CHRONIC PAIN OF RIGHT KNEE: ICD-10-CM

## 2022-06-16 PROCEDURE — 97112 NEUROMUSCULAR REEDUCATION: CPT

## 2022-06-16 PROCEDURE — 20610 DRAIN/INJ JOINT/BURSA W/O US: CPT | Performed by: SPECIALIST

## 2022-06-16 PROCEDURE — 97110 THERAPEUTIC EXERCISES: CPT

## 2022-06-16 PROCEDURE — 97530 THERAPEUTIC ACTIVITIES: CPT

## 2022-06-16 RX ORDER — PROPRANOLOL HYDROCHLORIDE 80 MG/1
80 CAPSULE, EXTENDED RELEASE ORAL DAILY
COMMUNITY
Start: 2022-05-22

## 2022-06-16 NOTE — PROGRESS NOTES
Patient: Jazmine Ferro                MRN: [id number]       SSN: [id number]  YOB: 1950        AGE: 67 y.o. SEX: male  Body mass index is 23.92 kg/m². PCP: Justin Durant MD  06/16/22    CC: RIGHT KNEE PAIN    HISTORY:  Jazmine Ferro is a 67 y.o. male who is seen for rightr knee pain. ICD-10-CM ICD-9-CM    1. Primary osteoarthritis of right knee  M17.11 715.16 sodium hyaluronate (SUPARTZ FX/EUFLEXXA/HYALGAN) 10 mg/mL injection syrg 20 mg      DRAIN/INJECT LARGE JOINT/BURSA   2. Chronic pain of right knee  M25.561 719.46 sodium hyaluronate (SUPARTZ FX/EUFLEXXA/HYALGAN) 10 mg/mL injection syrg 20 mg    G89.29 338.29 DRAIN/INJECT LARGE JOINT/BURSA       Chart reviewed for the following:   Corin Brock MD, have reviewed the History, Physical and updated the Allergic reactions for Wandalee Gunner III     TIME OUT performed immediately prior to start of procedure:  Corin Brock MD, have performed the following reviews on Wandalee Gunner III prior to the start of the procedure:            * Patient was identified by name and date of birth   * Agreement on procedure being performed was verified  * Risks and Benefits explained to the patient  * Procedure site verified and marked as necessary  * Patient was positioned for comfort  * Consent was obtained     Time: 11:17 AM     Date of procedure: 6/16/2022    Procedure performed by:  Damaris Haider MD    Mr. Claudio Moreau tolerated the procedure well with no complications. PLAN:  After discussing treatment options, patient's right knee was injected with 2 cc of Euflexxa. Mr. Claudio Moreau will follow up in one week to complete his visco supplementation injection series.       Scribed by Damaris Haider MD [address]) as dictated by Damaris Haider MD

## 2022-06-16 NOTE — PROGRESS NOTES
PT DAILY TREATMENT NOTE     Patient Name: Mauro Hester III  Date:2022  : 1950  [x]  Patient  Verified  Payor: Eduard Peaks / Plan: [place] MEDICARE PART A & B / Product Type: Medicare /    In time:1507 pm Out time:1545  Total Treatment Time (min): 38  Visit #: 2 of 10    Medicare/BCBS Only   Total Timed Codes (min):  38 1:1 Treatment Time:  38       Treatment Area: Other low back pain [M54.59]  Other abnormalities of gait and mobility [R26.89]    SUBJECTIVE  Pain Level (0-10 scale): 4-5/10 Right knee  Any medication changes, allergies to medications, adverse drug reactions, diagnosis change, or new procedure performed?: [x] No    [] Yes (see summary sheet for update)  Subjective functional status/changes:   [] No changes reported  As per patient he received shot in right knee at around noon     OBJECTIVE      14 min Therapeutic Exercise:  [x] See flow sheet :   Rationale: increase ROM and increase strength to improve LE strength/mobility and  lumbar mobility to improve ease of ADLs and functional activities. 9 min Therapeutic Activity:  [x]  See flow sheet :   Rationale: increase strength, improve coordination, improve balance, and increase proprioception  to improve the patients ability to perform transfers, stair negotiation, functional lifts, and functional carries. 15 min Neuromuscular Re-education:  [x]  See flow sheet :   Rationale: increase strength, improve coordination, improve balance and increase proprioception  to improve the patients ability to perform functional tasks with improved abdominal brace utilization, improved control. With   [x] TE   [x] TA   [x] neuro   [] other: Patient Education: [x] Review HEP    [] Progressed/Changed HEP based on:   [] positioning   [x] body mechanics   [] transfers   [] heat/ice application    [] other:      Other Objective/Functional Measures:  Therex performed as per flow sheet     Pain Level (0-10 scale) post treatment: 4/10    ASSESSMENT/Changes in Function: Pt seen today for first follow up visit since IE; therapeutic interventions performed as per flow sheet. Skilled cues and demonstration provided to perform therex with proper form. Pt required rest breaks PRN secondary to fatigue. Will plan to assess pt's response to PT session next visit and modify or progress therex pending pt's tolerance. Patient will continue to benefit from skilled PT services to modify and progress therapeutic interventions, address functional mobility deficits, address ROM deficits, address strength deficits, analyze and address soft tissue restrictions, analyze and cue movement patterns, analyze and modify body mechanics/ergonomics, assess and modify postural abnormalities and address imbalance/dizziness to attain remaining goals. []  See Plan of Care  []  See progress note/recertification  []  See Discharge Summary         Progress towards goals / Updated goals:  Short Term Goals: To be accomplished in 1 weeks:  Goal: Pt to be compliant with initial HEP to improve lumbar mobility, LE strength for increased ease of transfers, ADLs. Status at last note/certification: Established and reviewed with Pt  Current: patient reports non-compliance (6/16/22) not met   Long Term Goals: To be accomplished in 5 weeks:  Goal: Pt to perform 8 sit to stands in 30 seconds without UE support or increased fatigue to improve functional LE strength and increase ease of transfers. Status at last note/certification: 4x without UE support, increased fatigue, B knee pain   Current:  Goal: Pt to increase BBT score to at least 50/56 pts to show decreased falls risk, increased safety with household, community ambulation. Status at last note/certification: BBT 90/21  Current:  Goal: Pt to increase ambulation tolerance to 200 ft without fatigue and minimal to no gait deviations for ease maintaining 2-story home.   Status at last note/certification: 20 ft ambulation tolerance before fatigue, seated break  Current:  Goal: Pt to report < 4/10 pain at worst in low back to increase ease with bending and performing household chores. Status at last note/certification: 8/52 pain at worst  Current:  Goal: Pt to report FOTO score of 69 pts to show improved function and quality of life.   Status at last note/certification: FOTO 54 pts   Current:    PLAN  [x]  Upgrade activities as tolerated     [x]  Continue plan of care  []  Update interventions per flow sheet       []  Discharge due to:_  []  Other:_      Alexis Leyva, PT 6/16/2022 4:14 PM     Future Appointments   Date Time Provider Mary Mccall   6/23/2022 10:30 AM Luigi Carballo MD [facility] BS AMB   6/23/2022  3:00 PM Miriam Berman, PT [facility]   6/30/2022  2:15 PM Angeli Mederos, PT [facility]   7/18/2022 11:15 AM Mere Ding MD [facility] BS AMB

## 2022-06-23 ENCOUNTER — OFFICE VISIT (OUTPATIENT)
Dept: ORTHOPEDIC SURGERY | Age: 72
End: 2022-06-23
Payer: MEDICARE

## 2022-06-23 ENCOUNTER — APPOINTMENT (OUTPATIENT)
Dept: PHYSICAL THERAPY | Age: 72
End: 2022-06-23
Payer: MEDICARE

## 2022-06-23 ENCOUNTER — TELEPHONE (OUTPATIENT)
Dept: PHYSICAL THERAPY | Age: 72
End: 2022-06-23

## 2022-06-23 VITALS
WEIGHT: 174.2 LBS | HEIGHT: 72 IN | HEART RATE: 94 BPM | TEMPERATURE: 96.6 F | BODY MASS INDEX: 23.6 KG/M2 | OXYGEN SATURATION: 100 %

## 2022-06-23 DIAGNOSIS — M17.11 PRIMARY OSTEOARTHRITIS OF RIGHT KNEE: Primary | ICD-10-CM

## 2022-06-23 DIAGNOSIS — G89.29 CHRONIC PAIN OF RIGHT KNEE: ICD-10-CM

## 2022-06-23 DIAGNOSIS — M25.561 CHRONIC PAIN OF RIGHT KNEE: ICD-10-CM

## 2022-06-23 PROCEDURE — 20610 DRAIN/INJ JOINT/BURSA W/O US: CPT | Performed by: SPECIALIST

## 2022-06-23 NOTE — PROGRESS NOTES
Patient: Annika Flores                MRN: [id number]       SSN: [id number]  YOB: 1950        AGE: 67 y.o. SEX: male  Body mass index is 23.63 kg/m². PCP: Crystal Jacobo MD  06/23/22    Chief Complaint   Patient presents with    Knee Pain     anne-marie     HISTORY:  Annika Flores is a 67 y.o. male who is seen for right knee pain. TIME OUT performed immediately prior to start of procedure:  Evens Aragon MD, have performed the following reviews on [facility] prior to the start of the procedure:            * Patient was identified by name and date of birth   * Agreement on procedure being performed was verified  * Risks and Benefits explained to the patient  * Procedure site verified and marked as necessary  * Patient was positioned for comfort  * Consent was obtained     Time: 11:21 AM      Date of procedure: 6/23/2022    Procedure performed by:  Peg Marte MD    Mr. Graciela Fitzgerald tolerated the procedure well with no complications      [id number] ICD-9-CM    1. Primary osteoarthritis of right knee  M17.11 715.16 sodium hyaluronate (SUPARTZ FX/EUFLEXXA/HYALGAN) 10 mg/mL injection syrg 20 mg      DRAIN/INJECT LARGE JOINT/BURSA   2. Chronic pain of right knee  M25.561 719.46 sodium hyaluronate (SUPARTZ FX/EUFLEXXA/HYALGAN) 10 mg/mL injection syrg 20 mg    G89.29 338.29 DRAIN/INJECT LARGE JOINT/BURSA     PLAN:  After discussing treatment options, patient's right knee was injected with 2 cc of Euflexxa. Mr. Graciela Fitzgerald will follow up PRN now that he has completed his visco supplementation injection series.       Scribed by Peg Marte MD [address]) as dictated by Peg Marte MD

## 2022-06-30 ENCOUNTER — HOSPITAL ENCOUNTER (OUTPATIENT)
Dept: PHYSICAL THERAPY | Age: 72
Discharge: HOME | End: 2022-06-30
Payer: MEDICARE

## 2022-06-30 PROCEDURE — 97110 THERAPEUTIC EXERCISES: CPT

## 2022-06-30 PROCEDURE — 97140 MANUAL THERAPY 1/> REGIONS: CPT

## 2022-06-30 NOTE — PROGRESS NOTES
PT DAILY TREATMENT NOTE     Patient Name: Daril Dance  Date:2022  : 1950  [x]  Patient  Verified  Payor: [place] MEDICARE / Plan: [place] MEDICARE PART A & B / Product Type: Medicare /    In time:215  Out time:255  Total Treatment Time (min): 40  Visit #: 3 of 10    Medicare/BCBS Only   Total Timed Codes (min):  40 1:1 Treatment Time:  40       Treatment Area: Other low back pain [M54.59]  Other abnormalities of gait and mobility [R26.89]    SUBJECTIVE  Pain Level (0-10 scale): 10  Any medication changes, allergies to medications, adverse drug reactions, diagnosis change, or new procedure performed?: [x] No    [] Yes (see summary sheet for update)  Subjective functional status/changes:   [] No changes reported  Pt reports no changes since last session. OBJECTIVE    30 min Therapeutic Exercise:  [] See flow sheet :   Rationale: increase ROM, increase strength, improve coordination, improve balance and increase proprioception to improve the patients ability to tolerate functional mobility and daily routine. 10 min Manual Therapy:  Hip flexor release bilaterally   The manual therapy interventions were performed at a separate and distinct time from the therapeutic activities interventions. Rationale: decrease pain, increase ROM, increase tissue extensibility, decrease trigger points and increase postural awareness to tolerate functional mobility and ROM      With   [] TE   [] TA   [] neuro   [] other: Patient Education: [x] Review HEP    [] Progressed/Changed HEP based on:   [] positioning   [] body mechanics   [] transfers   [] heat/ice application    [] other:         Pain Level (0-10 scale) post treatment: 3/10    ASSESSMENT/Changes in Function: Pt seen by PT to address LBP, Gait abnormality, strength, and functional mobility. Pt with good tolerance of the session with minimal to no lasting increase in pain or discomfort.  Pt challenged with SLR initially on the right side however with improved performance following hip flexor release. Steffany Soto PT provided intermittent verbal/tactile. cues for optimal form and alignment. Patient will continue to benefit from skilled PT services to modify and progress therapeutic interventions, address functional mobility deficits, address ROM deficits, address strength deficits, analyze and address soft tissue restrictions, analyze and cue movement patterns, analyze and modify body mechanics/ergonomics, assess and modify postural abnormalities, address imbalance/dizziness and instruct in home and community integration to attain remaining goals. [x]  See Plan of Care  []  See progress note/recertification  []  See Discharge Summary         Progress towards goals / Updated goals:  Short Term Goals: To be accomplished in 1 weeks:  Goal: Pt to be compliant with initial HEP to improve lumbar mobility, LE strength for increased ease of transfers, ADLs. Status at last note/certification: Established and reviewed with Pt  Current: patient reports non-compliance (6/16/22) not met   Long Term Goals: To be accomplished in 5 weeks:  Goal: Pt to perform 8 sit to stands in 30 seconds without UE support or increased fatigue to improve functional LE strength and increase ease of transfers. Status at last note/certification: 4x without UE support, increased fatigue, B knee pain   Current:  Goal: Pt to increase BBT score to at least 50/56 pts to show decreased falls risk, increased safety with household, community ambulation. Status at last note/certification: BBT 40/32  Current:  Goal: Pt to increase ambulation tolerance to 200 ft without fatigue and minimal to no gait deviations for ease maintaining 2-story home. Status at last note/certification: 20 ft ambulation tolerance before fatigue, seated break  Current:  Goal: Pt to report < 4/10 pain at worst in low back to increase ease with bending and performing household chores.   Status at last note/certification: 8/10 pain at worst  Current:  Goal: Pt to report FOTO score of 69 pts to show improved function and quality of life.   Status at last note/certification: DZQG 13 XNH   Current:    PLAN  [x]  Upgrade activities as tolerated     [x]  Continue plan of care  []  Update interventions per flow sheet       []  Discharge due to:_  []  Other:_      Justin Pain, PT 6/30/2022  2:19 PM    Future Appointments   Date Time Provider Mary Mccall   7/18/2022 11:15 AM Zenaida Bauer MD [facility]

## 2022-07-06 ENCOUNTER — HOSPITAL ENCOUNTER (OUTPATIENT)
Dept: PHYSICAL THERAPY | Age: 72
Discharge: HOME | End: 2022-07-06
Payer: MEDICARE

## 2022-07-06 PROCEDURE — 97110 THERAPEUTIC EXERCISES: CPT

## 2022-07-06 PROCEDURE — 97530 THERAPEUTIC ACTIVITIES: CPT

## 2022-07-06 NOTE — PROGRESS NOTES
PT DAILY TREATMENT NOTE     Patient Name: Karuna Li  KHAC:[id number]  : 1950  [x]  Patient  Verified  Payor: [place] MEDICARE / Plan: [place] MEDICARE PART A & B / Product Type: Medicare /    In time:11:15  Out time:12:02  Total Treatment Time (min): 47  Visit #: 4 of 10    Medicare/BCBS Only   Total Timed Codes (min):  47 1:1 Treatment Time:  47       Treatment Area: Other low back pain [M54.59]  Other abnormalities of gait and mobility [R26.89]    SUBJECTIVE  Pain Level (0-10 scale): 4/10  Any medication changes, allergies to medications, adverse drug reactions, diagnosis change, or new procedure performed?: [x] No    [] Yes (see summary sheet for update)  Subjective functional status/changes:   [] No changes reported  \"My back hurts today. \"     OBJECTIVE    10 min Therapeutic Exercise:  [] See flow sheet :   Rationale: increase ROM, increase strength, improve coordination, improve balance and increase proprioception to improve the patients ability to tolerate functional mobility and daily routine. 37 min Therapeutic Activity:  []  See flow sheet : goals, FOTO   Rationale: increase strength, improve coordination and improve balance  to improve the patients ability to increase LE strength, standing balance for improved standing/amb tolerance, gait safety      With   [] TE   [] TA   [] neuro   [] other: Patient Education: [x] Review HEP    [] Progressed/Changed HEP based on:   [] positioning   [] body mechanics   [] transfers   [] heat/ice application    [] other:       Other Objective/Functional Measures: Reassessed goals for re-certification note. Performed abbreviated program per flow sheet. Pain Level (0-10 scale) post treatment: 4/10    ASSESSMENT/Changes in Function: Pt has attended skilled therapy for 4 visits to address LBP, gait deviations, LE weakness.   Pt has noticed limited progress due to limited visits but has been able to experience some increased ease of performing sit to stand transfers. Pt continues to report pain in neck, lower back, and elbows (due to lesions from Bone CA) that impedes function. Pt would benefit from continued skilled therapy to address LBP, LE weakness, and impaired standing balance to improve function with less pain. Patient will continue to benefit from skilled PT services to modify and progress therapeutic interventions, address functional mobility deficits, address ROM deficits, address strength deficits, analyze and address soft tissue restrictions, analyze and cue movement patterns, analyze and modify body mechanics/ergonomics, assess and modify postural abnormalities, address imbalance/dizziness and instruct in home and community integration to attain remaining goals. []  See Plan of Care  [x]  See progress note/recertification  []  See Discharge Summary         Progress towards goals / Updated goals:  Short Term Goals: To be accomplished in 1 weeks:  Goal: Pt to be compliant with initial HEP to improve lumbar mobility, LE strength for increased ease of transfers, ADLs. Status at last note/certification: Established and reviewed with Pt  Current: met - Patient reports non-compliance (7/6/22)   Long Term Goals: To be accomplished in 5 weeks:  Goal: Pt to perform 8 sit to stands in 30 seconds without UE support or increased fatigue to improve functional LE strength and increase ease of transfers. Status at last note/certification: 4x without UE support, increased fatigue, B knee pain    Current: not met - still 4x with minimal UE support, increased B knee pain  Goal: Pt to increase BBT score to at least 50/56 pts to show decreased falls risk, increased safety with household, community ambulation. Status at last note/certification: BBT 91/89  Current: progressing - BBT 47/56 (7/6/22)  Goal: Pt to increase ambulation tolerance to 200 ft without fatigue and minimal to no gait deviations for ease maintaining 2-story home.   Status at last note/certification: 20 ft ambulation tolerance before fatigue, seated break  Current: progressing - 120 ft before fatigue, need to sit (7/6/22)  Goal: Pt to report < 4/10 pain at worst in low back to increase ease with bending and performing household chores. Status at last note/certification: 8/10 pain at worst  Current: not met - still 8/10 pain at worst (7/6/22)  Goal: Pt to report FOTO score of 69 pts to show improved function and quality of life.   Status at last note/certification: VKNN 81 PBE   Current: not met - FOTO 45 pts (no functional decline) (7/6/22)    PLAN  [x]  Upgrade activities as tolerated     [x]  Continue plan of care  []  Update interventions per flow sheet       []  Discharge due to:_  []  Other:_      Floyd Berman, PT 7/6/2022  2:19 PM    Future Appointments   Date Time Provider Mary Mccall   7/18/2022 11:15 AM Michaela Mays MD [facility]

## 2022-07-06 NOTE — PROGRESS NOTES
[facility] - [facility], [address] [place] [address]  [phone] fax    Continued Plan of Care/ Re-certification for Physical Therapy Services      Patient name: Pantera Marquez Start of Care: 2022   Referral source: Janet Simeon MD : 1950               Medical/Treatment Diagnosis: Other low back pain [M54.59]  Other abnormalities of gait and mobility [R26.89]  Payor: VA MEDICARE / Plan: VA MEDICARE PART A & B / Product Type: Medicare /  Onset Date:22               Prior Hospitalization: see medical history Provider#: [id number]   Medications: Verified on Patient summary List    Comorbidities: Arthritis; Back pain; Bone CA (stage 4 - undergoing current treatment); HTN   Prior Level of Function: Not working; lives in [facility] alone; enjoys woodworking, working on cars, riding motorcycle, yardwork    Visits from [place] of Care: 4    Missed Visits: 1    The [address] [place] [address] and following information is based on the patient's current status:    Short Term Goals: To be accomplished in 1 weeks:  Goal: Pt to be compliant with initial HEP to improve lumbar mobility, LE strength for increased ease of transfers, ADLs. Status at last note/certification: Established and reviewed with Pt  Current: met - Patient reports non-compliance (22)   Long Term Goals: To be accomplished in 5 weeks:  Goal: Pt to perform 8 sit to stands in 30 seconds without UE support or increased fatigue to improve functional LE strength and increase ease of transfers. Status at last note/certification: 4x without UE support, increased fatigue, B knee pain    Current: not met - still 4x with minimal UE support, increased B knee pain  Goal: Pt to increase BBT score to at least 50/56 pts to show decreased falls risk, increased safety with household, community ambulation.   Status at last note/certification: BBT 35/11  Current: progressing - BBT 47/56 (22)  Goal: Pt to increase ambulation tolerance to 200 ft without fatigue and minimal to no gait deviations for ease maintaining 2-story home. Status at last note/certification: 20 ft ambulation tolerance before fatigue, seated break  Current: progressing - 120 ft before fatigue, need to sit (7/6/22)  Goal: Pt to report < 4/10 pain at worst in low back to increase ease with bending and performing household chores. Status at last note/certification: 8/10 pain at worst  Current: not met - still 8/10 pain at worst (7/6/22)  Goal: Pt to report FOTO score of 69 pts to show improved function and quality of life. Status at last note/certification: USNY 31 MAA   Current: not met - FOTO 45 pts (no functional decline) (7/6/22)    Key functional changes:  Pt has attended skilled therapy for 4 visits to address LBP, gait deviations, LE weakness. Pt has noticed limited progress due to limited visits but has been able to experience some increased ease of performing sit to stand transfers. Pt continues to report pain in neck, lower back, and elbows (due to lesions from Bone CA) that impedes function. Problems/ barriers to goal attainment: None     Problem List: pain affecting function, decrease ROM, decrease strength, edema affecting function, impaired gait/ balance, decrease ADL/ functional abilitiies, decrease activity tolerance, decrease flexibility/ joint mobility and decrease transfer abilities    Treatment Plan: Therapeutic exercise, Therapeutic activities, Neuromuscular re-education, Physical agent/modality, Gait/balance training, Manual therapy, Aquatic therapy, Patient education, Functional mobility training and Stair training    Patient Goal (s) has been updated and includes: \"Be able to move without pain. \"     Goals for this certification period to be accomplished in 5 weeks:  Goal: Pt to perform 8 sit to stands in 30 seconds without UE support or increased fatigue to improve functional LE strength and increase ease of transfers. Status at last note/certification: still 4x with minimal UE support, increased B knee pain  Current:   Goal: Pt to increase BBT score to at least 50/56 pts to show decreased falls risk, increased safety with household, community ambulation. Status at last note/certification: BBT 99/60 (7/6/22)  Current:   Goal: Pt to increase ambulation tolerance to 200 ft without fatigue and minimal to no gait deviations for ease maintaining 2-story home. Status at last note/certification: 120 ft before fatigue, need to sit (7/6/22)  Current:   Goal: Pt to report < 4/10 pain at worst in low back to increase ease with bending and performing household chores. Status at last note/certification: still 8/10 pain at worst (7/6/22)  Current:  Goal: Pt to report FOTO score of 69 pts to show improved function and quality of life. Status at last note/certification: FOTO 45 pts (no functional decline) (7/6/22)  Current:     Frequency / Duration: Patient to be seen 1 times per week for 5 weeks:    Assessment / Recommendations: Pt would benefit from continued skilled therapy to address LBP, LE weakness, and impaired standing balance to improve function with less pain. Certification Period: 7/6/22 - 8/4/22    Aspen Berman, PT 7/6/2022 11:56 AM    ________________________________________________________________________  I certify that the above Therapy Services are being furnished while the patient is under my care. I agree with the treatment plan and certify that this therapy is necessary. [] I have read the above and request that my patient continue as recommended.   [] I have read the above report and request that my patient continue therapy with the following changes/special instructions: ______________________________________  [] I have read the above report and request that my patient be discharged from therapy    Physician's Signature:____________Date:_________TIME:________    ** Signature, Date and Time must be completed for valid certification **    Please sign and return to [facility] - Brie Waterman  [facility], [address] [place] [address]  [phone] fax

## 2022-07-11 ENCOUNTER — HOSPITAL ENCOUNTER (OUTPATIENT)
Dept: PHYSICAL THERAPY | Age: 72
Discharge: HOME | End: 2022-07-11
Payer: MEDICARE

## 2022-07-11 PROCEDURE — 97110 THERAPEUTIC EXERCISES: CPT

## 2022-07-11 PROCEDURE — 97530 THERAPEUTIC ACTIVITIES: CPT

## 2022-07-11 PROCEDURE — 97112 NEUROMUSCULAR REEDUCATION: CPT

## 2022-07-11 NOTE — PROGRESS NOTES
PT DAILY TREATMENT NOTE     Patient Name: Geovanna Chand III  Date:2022  : 1950  [x]  Patient  Verified  Payor: Bianka Ca / Plan: VA MEDICARE PART A & B / Product Type: Medicare /    In time:3:00  Out time:3:50  Total Treatment Time (min): 50  Visit #: 1 of 5    Medicare/BCBS Only   Total Timed Codes (min):  40 1:1 Treatment Time:  40       Treatment Area: Other low back pain [M54.59]  Other abnormalities of gait and mobility [R26.89]    SUBJECTIVE  Pain Level (0-10 scale): 610  Any medication changes, allergies to medications, adverse drug reactions, diagnosis change, or new procedure performed?: [x] No    [] Yes (see summary sheet for update)  Subjective functional status/changes:   [] No changes reported  \"I'm in more pain today. If I stay still, I feel fine. \"    OBJECTIVE    Modality rationale: decrease pain and increase tissue extensibility to improve the patients ability to increase ease of ADLs   Min Type Additional Details    [] Estim:  []Unatt       []IFC  []Premod                        []Other:  []w/ice   []w/heat  Position:  Location:    [] Estim: []Att    []TENS instruct  []NMES                    []Other:  []w/US   []w/ice   []w/heat  Position:  Location:    []  Traction: [] Cervical       []Lumbar                       [] Prone          []Supine                       []Intermittent   []Continuous Lbs:  [] before manual  [] after manual    []  Ultrasound: []Continuous   [] Pulsed                           []1MHz   []3MHz W/cm2:  Location:    []  Iontophoresis with dexamethasone         Location: [] Take home patch   [] In clinic   10 []  Ice     [x]  heat  []  Ice massage  []  Laser   []  Anodyne Position: seated  Location: low back    []  Laser with stim  []  Other:  Position:  Location:    []  Vasopneumatic Device    []  Right     []  Left  Pre-treatment girth:  Post-treatment girth:  Measured at (location):  Pressure:       [] lo [] med [] hi   Temperature: [] lo [] med [] hi   [x] Skin assessment post-treatment:  [x]intact []redness- no adverse reaction    []redness - adverse reaction:     10 min Therapeutic Exercise:  [] See flow sheet :   Rationale: increase ROM to improve the patients ability to increase lumbar mobility for ease of ADLs    15 min Therapeutic Activity:  []  See flow sheet :   Rationale: increase strength, improve coordination and improve balance  to improve the patients ability to increase ease of transfers, stair negotiation     15 min Neuromuscular Re-education:  []  See flow sheet :   Rationale: increase strength, improve coordination and improve balance  to improve the patients ability to increase LE stability, standing balance for safety with gait          With   [] TE   [] TA   [] neuro   [] other: Patient Education: [x] Review HEP    [] Progressed/Changed HEP based on:   [] positioning   [] body mechanics   [] transfers   [] heat/ice application    [] other:      Other Objective/Functional Measures: Resumed exercises per flow sheet. Pain Level (0-10 scale) post treatment: 3/10    ASSESSMENT/Changes in Function: Pt's session focused on lumbar mobility, core and LE strength to increase endurance with activities and improve ADL function. Pt had one episode of pain with changing position for lumbar stretches in supine but afterwards was able to complete all exercises without increased difficulty. Applied modalities to further assist with pain relief. Pt left session in no apparent distress. Patient will continue to benefit from skilled PT services to address functional mobility deficits, address ROM deficits, address strength deficits, analyze and address soft tissue restrictions, analyze and cue movement patterns, analyze and modify body mechanics/ergonomics, assess and modify postural abnormalities, address imbalance/dizziness and instruct in home and community integration to attain remaining goals.      []  See Plan of Care  []  See progress note/recertification  []  See Discharge Summary         Progress towards goals / Updated goals:  Goal: Pt to perform 8 sit to stands in 30 seconds without UE support or increased fatigue to improve functional LE strength and increase ease of transfers. Status at last note/certification: still 4x with minimal UE support, increased B knee pain  Current:   Goal: Pt to increase BBT score to at least 50/56 pts to show decreased falls risk, increased safety with household, community ambulation. Status at last note/certification: BBT 55/46 (7/6/22)  Current:   Goal: Pt to increase ambulation tolerance to 200 ft without fatigue and minimal to no gait deviations for ease maintaining 2-story home. Status at last note/certification: 120 ft before fatigue, need to sit (7/6/22)  Current:   Goal: Pt to report < 4/10 pain at worst in low back to increase ease with bending and performing household chores. Status at last note/certification: still 8/10 pain at worst (7/6/22)  Current:  Goal: Pt to report FOTO score of 69 pts to show improved function and quality of life.   Status at last note/certification: FOTO 45 pts (no functional decline) (7/6/22)  Current:     PLAN  [x]  Upgrade activities as tolerated     [x]  Continue plan of care  []  Update interventions per flow sheet       []  Discharge due to:_  []  Other:_      Floyd Berman, PT 7/11/2022  5:04 PM    Future Appointments   Date Time Provider Mary Mccall   7/18/2022 11:15 AM Michaela Mays MD [facility] AMB   7/18/2022  2:15 PM Floyd eBrman, [facility]   7/25/2022  2:15 PM Floyd Berman [facility]   8/1/2022  2:15 PM Floyd Berman, [facility]

## 2022-07-18 ENCOUNTER — HOSPITAL ENCOUNTER (OUTPATIENT)
Dept: PHYSICAL THERAPY | Age: 72
Discharge: HOME | End: 2022-07-18
Payer: MEDICARE

## 2022-07-18 PROCEDURE — 97110 THERAPEUTIC EXERCISES: CPT

## 2022-07-18 PROCEDURE — 97112 NEUROMUSCULAR REEDUCATION: CPT

## 2022-07-18 NOTE — PROGRESS NOTES
PT DAILY TREATMENT NOTE     Patient Name: Felix Crandall III  Date:2022  : 1950  [x]  Patient  Verified  Payor: Kecia Kwon / Plan: [place] MEDICARE PART A & B / Product Type: Medicare /    In time:2:20  Out time:2:52  Total Treatment Time (min): 32  Visit #: 2 of 5    Medicare/BCBS Only   Total Timed Codes (min):  32 1:1 Treatment Time:  32       Treatment Area: Other low back pain [M54.59]  Other abnormalities of gait and mobility [R26.89]    SUBJECTIVE  Pain Level (0-10 scale): 8/10  Any medication changes, allergies to medications, adverse drug reactions, diagnosis change, or new procedure performed?: [x] No    [] Yes (see summary sheet for update)  Subjective functional status/changes:   [] No changes reported  \"I feel so-so. \"    OBJECTIVE    10 min Therapeutic Exercise:  [] See flow sheet :   Rationale: increase ROM to improve the patients ability to increase lumbar mobility for ease of ADLs    8 min Therapeutic Activity:  []  See flow sheet :   Rationale: increase strength, improve coordination and improve balance  to improve the patients ability to increase ease of transfers, stair negotiation     14 min Neuromuscular Re-education:  []  See flow sheet :   Rationale: increase strength, improve coordination and improve balance  to improve the patients ability to increase LE stability, standing balance for safety with gait          With   [] TE   [] TA   [] neuro   [] other: Patient Education: [x] Review HEP    [] Progressed/Changed HEP based on:   [] positioning   [] body mechanics   [] transfers   [] heat/ice application    [] other:      Other Objective/Functional Measures: Added deadbugs and balance activities per flow sheet. Pain Level (0-10 scale) post treatment: 4/10    ASSESSMENT/Changes in Function: Pt's session focused on lumbar mobility, core and LE strength, and standing balance to increase endurance with activities and improve ADL function.   Pt appropriately challenged with progressions and required one UE support for stepping over 1/2 foam in parallel bars. Pt requested to end session early due to increased fatigue. Pt was able, however, to report decrease in overall pain levels after session. Pt declined modalities. Patient will continue to benefit from skilled PT services to address functional mobility deficits, address ROM deficits, address strength deficits, analyze and address soft tissue restrictions, analyze and cue movement patterns, analyze and modify body mechanics/ergonomics, assess and modify postural abnormalities, address imbalance/dizziness and instruct in home and community integration to attain remaining goals. []  See Plan of Care  []  See progress note/recertification  []  See Discharge Summary         Progress towards goals / Updated goals:  Goal: Pt to perform 8 sit to stands in 30 seconds without UE support or increased fatigue to improve functional LE strength and increase ease of transfers. Status at last note/certification: still 4x with minimal UE support, increased B knee pain  Current:   Goal: Pt to increase BBT score to at least 50/56 pts to show decreased falls risk, increased safety with household, community ambulation. Status at last note/certification: BBT 27/04 (7/6/22)  Current:   Goal: Pt to increase ambulation tolerance to 200 ft without fatigue and minimal to no gait deviations for ease maintaining 2-story home. Status at last note/certification: 120 ft before fatigue, need to sit (7/6/22)  Current:   Goal: Pt to report < 4/10 pain at worst in low back to increase ease with bending and performing household chores. Status at last note/certification: still 8/10 pain at worst (7/6/22)  Current:  Goal: Pt to report FOTO score of 69 pts to show improved function and quality of life.   Status at last note/certification: FOTO 45 pts (no functional decline) (7/6/22)  Current:     PLAN  [x]  Upgrade activities as tolerated     [x]  Continue plan of care  []  Update interventions per flow sheet       []  Discharge due to:_  []  Other:_      Teetee Berman PT 7/18/2022  5:04 PM    Future Appointments   Date Time Provider Mary Mccall   7/19/2022  2:20 PM [facility]   7/25/2022  2:15 PM Teetee Berman, PT [facility]   8/1/2022  2:15 PM Teetee Berman, PT [facility]

## 2022-07-19 ENCOUNTER — OFFICE VISIT (OUTPATIENT)
Dept: ORTHOPEDIC SURGERY | Age: 72
End: 2022-07-19
Payer: MEDICARE

## 2022-07-19 VITALS
TEMPERATURE: 97.3 F | BODY MASS INDEX: 22.53 KG/M2 | OXYGEN SATURATION: 100 % | HEART RATE: 69 BPM | WEIGHT: 170 LBS | HEIGHT: 73 IN

## 2022-07-19 DIAGNOSIS — C41.9 METASTATIC BONE CANCER (HCC): ICD-10-CM

## 2022-07-19 DIAGNOSIS — M54.50 CHRONIC BILATERAL LOW BACK PAIN WITHOUT SCIATICA: ICD-10-CM

## 2022-07-19 DIAGNOSIS — G89.29 CHRONIC BILATERAL LOW BACK PAIN WITHOUT SCIATICA: ICD-10-CM

## 2022-07-19 DIAGNOSIS — M54.50 CHRONIC BILATERAL LOW BACK PAIN WITHOUT SCIATICA: Primary | ICD-10-CM

## 2022-07-19 DIAGNOSIS — G89.29 CHRONIC BILATERAL LOW BACK PAIN WITHOUT SCIATICA: Primary | ICD-10-CM

## 2022-07-19 PROCEDURE — 1123F ACP DISCUSS/DSCN MKR DOCD: CPT | Performed by: NURSE PRACTITIONER

## 2022-07-19 PROCEDURE — G8536 NO DOC ELDER MAL SCRN: HCPCS | Performed by: NURSE PRACTITIONER

## 2022-07-19 PROCEDURE — G8427 DOCREV CUR MEDS BY ELIG CLIN: HCPCS | Performed by: NURSE PRACTITIONER

## 2022-07-19 PROCEDURE — 1101F PT FALLS ASSESS-DOCD LE1/YR: CPT | Performed by: NURSE PRACTITIONER

## 2022-07-19 PROCEDURE — G8432 DEP SCR NOT DOC, RNG: HCPCS | Performed by: NURSE PRACTITIONER

## 2022-07-19 PROCEDURE — 99213 OFFICE O/P EST LOW 20 MIN: CPT | Performed by: NURSE PRACTITIONER

## 2022-07-19 PROCEDURE — G8420 CALC BMI NORM PARAMETERS: HCPCS | Performed by: NURSE PRACTITIONER

## 2022-07-19 PROCEDURE — 3017F COLORECTAL CA SCREEN DOC REV: CPT | Performed by: NURSE PRACTITIONER

## 2022-07-19 RX ORDER — AZITHROMYCIN 250 MG/1
TABLET, FILM COATED ORAL
COMMUNITY
Start: 2022-07-12

## 2022-07-19 NOTE — PROGRESS NOTES
Chief complaint   Chief Complaint   Patient presents with    Back Pain       History of Present Illness:  Argenis Orosco is a  67 y.o.  male who comes in today stating he has had increased low back pain since we last saw him on May 19, 2022 with Dr. Dusty Leslie as a new patient. 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 He does have lesions on the ileum. He states the pain is just across his low back and does not radiate into his legs. He states he is only comfortable in his chair at home and in his bed. He states the pain is gotten so bad that he does not really feel like going to the grocery store. Last visit Dr. Dusty Leslie put him in physical therapy which he did do at his last visit was yesterday. He has had to discontinue it due to severe fatigue. He is being treated with a calcium drip some kind of shot every 3 months and hormonal therapy for his prostate cancer and bone cancer. He has tried using CBD Gummies, Tylenol and ibuprofen. It is not helping that much. He describes the pain as sharp and throbbing. He denies fever bowel bladder dysfunction      Physical Exam:. The patient is a 68-year-old male well-developed well-nourished who is alert and oriented with a normal mood and affect. He has a slow but full weightbearing nonantalgic gait. He did not use any assist device. He is rather stiff. He has 4 out of 5 strength bilateral lower extremities. Negative straight leg raise. He has pain with hyperextension lumbar spine. Assessment and Plan: This is a patient who is having increased lumbar pain. He has a history of malignant prostate cancer with metastasis to the bone. I will get a MRI of his lumbar spine. We will see him back after the MRI. Medications:  Current Outpatient Medications   Medication Sig Dispense Refill    OTHER,NON-FORMULARY, CBD gummies      cholecalciferol, vitamin D3, 50 mcg (2,000 unit) tab Take 50 mcg by mouth daily.       Erleada 60 mg tablet Take 240 mg by mouth daily.  leuprolide (LUPRON) 1 mg/0.2 mL injection 1.88 mg by SubCUTAneous route every month.  calcium citrate 200 mg (950 mg) tablet Take  by mouth daily.  ergocalciferol (Vitamin D2) 1,250 mcg (50,000 unit) capsule Take 50,000 Units by mouth every seven (7) days.  famotidine (PEPCID) 40 mg tablet Take 40 mg by mouth daily.  levothyroxine (SYNTHROID) 25 mcg tablet take 1 tablet by mouth once daily  0    azithromycin (ZITHROMAX) 250 mg tablet take 2 tablets by mouth 1 hour prior to dental appointment      propranolol LA (INDERAL LA) 80 mg SR capsule Take 80 mg by mouth daily. Review of systems:    Past Medical History:   Diagnosis Date    Arthritis     Cancer ([address]     Elevated PSA     Hypertension     Hypothyroid     Personal history of prostate cancer     Pituitary tumor     Renal insufficiency     S/P rotator cuff repair 10/15/2015    Sleep apnea     patient states resolved after surgery     Past Surgical History:   Procedure Laterality Date    HX HEENT      surgery for sleep apnea    HX HERNIA REPAIR      x2    HX SHOULDER ARTHROSCOPY Right 1/13/14    Dr. Cortez Alu ARTHROSCOPY Left 2016    HX UROLOGICAL  10/21/2016    Prostate Biopsy with Dr. Key Valdez History     Socioeconomic History    Marital status: LEGALLY      Spouse name: Not on file    Number of children: Not on file    Years of education: Not on file    Highest education level: Not on file   Occupational History    Not on file   Tobacco Use    Smoking status: Former Smoker    Smokeless tobacco: Never Used    Tobacco comment: quit in 1976   Vaping Use    Vaping Use: Never used   Substance and Sexual Activity    Alcohol use:  Yes     Alcohol/week: 0.0 standard drinks     Comment: occasionally    Drug use: No    Sexual activity: Not on file   Other Topics Concern    Not on file   Social History Narrative    Not on file     Social Determinants of Health     Financial Resource Strain:     Difficulty of Paying Living Expenses: Not on file   Food Insecurity:     Worried About Running Out of Food in the Last Year: Not on file    Kasandra of Food in the Last Year: Not on file   Transportation Needs:     Lack of Transportation (Medical): Not on file    Lack of Transportation (Non-Medical): Not on file   Physical Activity:     Days of Exercise per Week: Not on file    Minutes of Exercise per Session: Not on file   Stress:     Feeling of Stress : Not on file   Social Connections:     Frequency of Communication with Friends and Family: Not on file    Frequency of Social Gatherings with Friends and Family: Not on file    Attends [employer] Services: Not on file    Active Member of [address] [place] [address] [employer] or Organizations: Not on file    Attends Club or Organization Meetings: Not on file    Marital Status: Not on file   Intimate Partner Violence:     Fear of Current or Ex-Partner: Not on file    Emotionally Abused: Not on file    Physically Abused: Not on file    Sexually Abused: Not on file   Housing Stability:     Unable to Pay for Housing in the Last Year: Not on file    Number of [place] in the Last Year: Not on file    Unstable Housing in the Last Year: Not on file     Family History   Problem Relation Age of Onset    Diabetes Other     Hypertension Other     Heart Disease Other     OSTEOARTHRITIS Other        Physical Exam:  Visit Vitals  Pulse 69   Temp 97.3 °F (36.3 °C) (Temporal)   Ht 6' 1\" (1.854 m)   Wt 170 lb (77.1 kg)   SpO2 100%   BMI 22.43 kg/m²     Pain Scale: 5/10       has been . reviewed and is appropriate          Diagnoses and all orders for this visit:    1. Chronic bilateral low back pain without sciatica  -     MRI LUMB SPINE WO CONT; Future    2.  Metastatic bone cancer ([facility]  -     MRI LUMB SPINE WO CONT; Future            Follow-up and Dispositions    · Return in about 4 weeks (around 8/16/2022) for  with Dr Antonio Simeon for MRI fu.             We have informed Anuradha Weathers III to notify us for immediate appointment if he has any worsening neurogical symptoms or if an emergency situation presents, then call 911    Please note that this dictation was completed with [employer], the computer voice recognition software. Quite often unanticipated grammatical, syntax, homophones, and other interpretive errors are inadvertently transcribed by the computer software. Please disregard these errors. Please excuse any errors that have escaped final proofreading.

## 2022-07-19 NOTE — PROGRESS NOTES
James Soto presents today for   Chief Complaint   Patient presents with    Back Pain       Is someone accompanying this pt? no    Is the patient using any DME equipment during OV? no    Depression Screening:  3 most recent PHQ Screens 11/18/2021   PHQ Not Done -   Little interest or pleasure in doing things Not at all   Feeling down, depressed, irritable, or hopeless Not at all   Total Score PHQ 2 0       Learning Assessment:  Learning Assessment 10/9/2017   PRIMARY LEARNER Patient   PRIMARY LANGUAGE ENGLISH   LEARNER PREFERENCE PRIMARY READING   ANSWERED BY Patient   RELATIONSHIP SELF       Abuse Screening:  No flowsheet data found. Fall Risk  Fall Risk Assessment, last 12 mths 7/19/2021   Able to walk? Yes   Fall in past 12 months? 0   Do you feel unsteady? 0   Are you worried about falling 0   Number of falls in past 12 months -   Fall with injury? -       OPIOID RISK TOOL  No flowsheet data found. Coordination of Care:  1. Have you been to the ER, urgent care clinic since your last visit? no  Hospitalized since your last visit? no    2. Have you seen or consulted any other health care providers outside of the [address] [place] [address] since your last visit? no Include any pap smears or colon screening.  no

## 2022-07-25 ENCOUNTER — APPOINTMENT (OUTPATIENT)
Dept: PHYSICAL THERAPY | Age: 72
End: 2022-07-25
Payer: MEDICARE

## 2022-08-01 ENCOUNTER — APPOINTMENT (OUTPATIENT)
Dept: PHYSICAL THERAPY | Age: 72
End: 2022-08-01

## 2022-08-11 ENCOUNTER — TELEPHONE (OUTPATIENT)
Dept: PHYSICAL THERAPY | Age: 72
End: 2022-08-11

## 2022-08-18 ENCOUNTER — HOSPITAL ENCOUNTER (OUTPATIENT)
Age: 72
Discharge: HOME | End: 2022-08-18
Attending: NURSE PRACTITIONER
Payer: MEDICARE

## 2022-08-18 PROCEDURE — 72148 MRI LUMBAR SPINE W/O DYE: CPT

## 2022-08-27 NOTE — PROGRESS NOTES
[facility] - [facility], [address] [place] [address]  [phone] fax    Discharge Summary    Patient name: Giacomo Fleischer Start of Care: 2022   Referral source: Jessica Escalera MD : 1950               Medical/Treatment Diagnosis: Other low back pain [M54.59]  Other abnormalities of gait and mobility [R26.89]  Payor: [place] MEDICARE / Plan: [place] MEDICARE PART A & B / Product Type: Medicare /  Onset Date:22               Prior Hospitalization: see medical history Provider#: [id number]   Medications: Verified on Patient summary List    Comorbidities: Arthritis; Back pain; Bone CA (stage 4 - undergoing current treatment); HTN   Prior Level of Function: Not working; lives in [facility] alone; enjoys woodworking, working on cars, riding motorcycle, yardwork    Visits from [place] of Care: 6    Missed Visits: 2    Reporting Period : 22 to 22    Goal: Pt to perform 8 sit to stands in 30 seconds without UE support or increased fatigue to improve functional LE strength and increase ease of transfers. Status at last note/certification: still 4x with minimal UE support, increased B knee pain  Current: not met - unable to reassess  Goal: Pt to increase BBT score to at least 50/56 pts to show decreased falls risk, increased safety with household, community ambulation. Status at last note/certification: BBT 00/10 (22)  Current: not met - unable to reassess  Goal: Pt to increase ambulation tolerance to 200 ft without fatigue and minimal to no gait deviations for ease maintaining 2-story home. Status at last note/certification: 130 ft before fatigue, need to sit (22)  Current: not met - unable to reassess  Goal: Pt to report < 4/10 pain at worst in low back to increase ease with bending and performing household chores.   Status at last note/certification: still 8/10 pain at worst (22)  Current: not met - unable to reassess  Goal: Pt to report FOTO score of 69 pts to show improved function and quality of life. Status at last note/certification: FOTO 45 pts (no functional decline) (7/6/22)  Current: not met - unable to reassess    Assessment/ Summary of Care: Pt attended 6 visits, then did not return for further appointments. Pt was contacted via phone but was unable to be reached. Thus, Pt's goals were unable to be further reassessed. Due to clinic attendance policy, Pt will be discharged at this time with no further instructions.   Thank you for this referral.      RECOMMENDATIONS:  [x]Discontinue therapy: []Patient has reached or is progressing toward set goals      [x]Patient is non-compliant or has abdicated      []Due to lack of appreciable progress towards set goals    Flor Berman, PT 8/27/2022 12:57 PM

## 2022-09-12 ENCOUNTER — OFFICE VISIT (OUTPATIENT)
Dept: ORTHOPEDIC SURGERY | Age: 72
End: 2022-09-12
Payer: MEDICARE

## 2022-09-12 VITALS
WEIGHT: 169 LBS | TEMPERATURE: 97.7 F | BODY MASS INDEX: 22.89 KG/M2 | DIASTOLIC BLOOD PRESSURE: 65 MMHG | HEIGHT: 72 IN | OXYGEN SATURATION: 98 % | SYSTOLIC BLOOD PRESSURE: 109 MMHG | HEART RATE: 69 BPM | RESPIRATION RATE: 18 BRPM

## 2022-09-12 DIAGNOSIS — M54.50 CHRONIC BILATERAL LOW BACK PAIN WITHOUT SCIATICA: Primary | ICD-10-CM

## 2022-09-12 DIAGNOSIS — R53.0 NEOPLASTIC MALIGNANT RELATED FATIGUE: ICD-10-CM

## 2022-09-12 DIAGNOSIS — G89.29 CHRONIC BILATERAL LOW BACK PAIN WITHOUT SCIATICA: Primary | ICD-10-CM

## 2022-09-12 DIAGNOSIS — C41.9 METASTATIC BONE CANCER (HCC): ICD-10-CM

## 2022-09-12 PROCEDURE — 1123F ACP DISCUSS/DSCN MKR DOCD: CPT | Performed by: PHYSICAL MEDICINE & REHABILITATION

## 2022-09-12 PROCEDURE — G8427 DOCREV CUR MEDS BY ELIG CLIN: HCPCS | Performed by: PHYSICAL MEDICINE & REHABILITATION

## 2022-09-12 PROCEDURE — 99214 OFFICE O/P EST MOD 30 MIN: CPT | Performed by: PHYSICAL MEDICINE & REHABILITATION

## 2022-09-12 PROCEDURE — 1101F PT FALLS ASSESS-DOCD LE1/YR: CPT | Performed by: PHYSICAL MEDICINE & REHABILITATION

## 2022-09-12 PROCEDURE — G8420 CALC BMI NORM PARAMETERS: HCPCS | Performed by: PHYSICAL MEDICINE & REHABILITATION

## 2022-09-12 PROCEDURE — G8432 DEP SCR NOT DOC, RNG: HCPCS | Performed by: PHYSICAL MEDICINE & REHABILITATION

## 2022-09-12 PROCEDURE — 3017F COLORECTAL CA SCREEN DOC REV: CPT | Performed by: PHYSICAL MEDICINE & REHABILITATION

## 2022-09-12 PROCEDURE — G8536 NO DOC ELDER MAL SCRN: HCPCS | Performed by: PHYSICAL MEDICINE & REHABILITATION

## 2022-09-12 RX ORDER — EPINEPHRINE 1 MG/ML
0.3 INJECTION INTRAMUSCULAR; INTRAVENOUS; SUBCUTANEOUS
COMMUNITY
Start: 2021-12-03 | End: 2022-10-27

## 2022-09-12 RX ORDER — 1.1% SODIUM FLUORIDE PRESCRIPTION DENTAL CREAM 5 MG/G
CREAM DENTAL
COMMUNITY
Start: 2022-07-24

## 2022-09-12 RX ORDER — DIPHENHYDRAMINE/0.9 % SOD.CHLR 25 MG/50ML
50 INTRAVENOUS SOLUTION, PIGGYBACK (ML) INTRAVENOUS
COMMUNITY
Start: 2021-12-03 | End: 2022-10-27

## 2022-09-12 RX ORDER — DICLOFENAC SODIUM 75 MG/1
TABLET, DELAYED RELEASE ORAL
COMMUNITY
End: 2022-09-12

## 2022-09-12 RX ORDER — PREGABALIN 25 MG/1
25 CAPSULE ORAL 2 TIMES DAILY
Qty: 60 CAPSULE | Refills: 1 | Status: SHIPPED | OUTPATIENT
Start: 2022-09-12 | End: 2022-10-27 | Stop reason: SDUPTHER

## 2022-09-12 NOTE — PROGRESS NOTES
Jose Alfredo oV presents today for   Chief Complaint   Patient presents with    Back Pain       Is someone accompanying this pt? no    Is the patient using any DME equipment during OV? Yes cane    Depression Screening:  3 most recent PHQ Screens 11/18/2021   PHQ Not Done -   Little interest or pleasure in doing things Not at all   Feeling down, depressed, irritable, or hopeless Not at all   Total Score PHQ 2 0       Learning Assessment:  Learning Assessment 10/9/2017   PRIMARY LEARNER Patient   PRIMARY LANGUAGE ENGLISH   LEARNER PREFERENCE PRIMARY READING   ANSWERED BY Patient   RELATIONSHIP SELF       Abuse Screening:  No flowsheet data found. Fall Risk  Fall Risk Assessment, last 12 mths 9/12/2022   Able to walk? Yes   Fall in past 12 months? 0   Do you feel unsteady? 1   Are you worried about falling 0   Number of falls in past 12 months -   Fall with injury? -       OPIOID RISK TOOL  No flowsheet data found. Coordination of Care:  1. Have you been to the ER, urgent care clinic since your last visit? no  Hospitalized since your last visit? no    2. Have you seen or consulted any other health care providers outside of the [address] [place] [address] since your last visit? no Include any pap smears or colon screening.  no

## 2022-09-12 NOTE — PROGRESS NOTES
[address], [address]  [place], [address] [place] [address] Street  Phone: [phone]  Fax: [phone]        Jae Mcmullen  : 1950  PCP: Sergey Pizarro MD    PROGRESS NOTE      ASSESSMENT AND PLAN    Diagnoses and all orders for this visit:    1. Chronic bilateral low back pain without sciatica  -     pregabalin (LYRICA) 25 mg capsule; Take 1 Capsule by mouth two (2) times a day. Max Daily Amount: 50 mg. Start w/ one po qhs, then increase to one po BID    2. Metastatic bone cancer (HCC)  -     pregabalin (LYRICA) 25 mg capsule; Take 1 Capsule by mouth two (2) times a day. Max Daily Amount: 50 mg. Start w/ one po qhs, then increase to one po BID    3. Neoplastic malignant related fatigue      Benita Aquino III is a 67 y.o. male with metastatic prostate cancer to the bone. MRI shows progression of disease. He has an appointment with oncology upcoming and has given them a copy of his new MRI. Consider palliative XRT. Trial of Lyrica 25 mg. Take 1 po QHS x one week then increase to 1 po BID thereafter. We will start low-dose as he is prone to fatigue. Continue PRN Ibuprofen and Tylenol  Uncertain if you would be a candidate for calcitonin. Would consider palliative spinal injections. Follow-up and Dispositions    Return in about 4 weeks (around 10/10/2022) for medication management. HISTORY OF PRESENT ILLNESS      Benita Aquino III is a 67 y.o. male presents for follow up of back pain. LV referred to PT.    L MRI reviewed with patient. Pt reports constant low back pain exacerbated with movement. He notes a short walking tolerance. His pain is relieved with sitting in his chair and laying in bed. Denies sciatica. Denies numbness or tingling BLE. He uses CBD gummies, Tylenol, and Ibuprofen PRN with little benefit. Denies side effects.     Pain Assessment  2022   Location of Pain Back   Location Modifiers -   Severity of Pain 8 Quality of Pain Sharp   Quality of Pain Comment -   Duration of Pain Persistent   Duration of Pain Comment -   Frequency of Pain Intermittent   Frequency of Pain Comment -   Date Pain First Started -   Aggravating Factors Bending   Aggravating Factors Comment -   Limiting Behavior Yes   Relieving Factors Rest;Ice;Heat   Relieving Factors Comment -   Result of Injury No   Work-Related Injury -   Type of Injury -   Type of Injury Comment -         Onset of pain: 2/2022        Investigations:   L MRI 8/2022: multiple lumbar and iliac bone metastasis, progression compared to 2019, mild to moderate compression right L5, listhesis L5-S1  Pelv XR AP 1V 5/19/2022 (I personally reviewed these images): osteoblastic lesions left ileum   C MRI 9/2021: multilevel DDD with mets C5, C7, T2  T MRI 9/2021: numerous metastatic lesions  DEXA 9/2021: multiple signs of uptake including the ilium bilaterally, DDD, cervical, thoracic, lumbar  Spine surgery consult: none     Treatments:  Physical therapy: 7/2022 for low back - had to stop due to severe fatigue  Spinal injections: no  Spinal surgery- no  Beneficial medications: CBD gummies, Tylenol  Failed medications: none     Work Status: retired. 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         PHYSICAL EXAMINATION    Visit Vitals  /65 (BP 1 Location: Left upper arm, BP Patient Position: Sitting, BP Cuff Size: Adult)   Pulse 69   Temp 97.7 °F (36.5 °C) (Temporal)   Resp 18   Ht 6' (1.829 m)   Wt 169 lb (76.7 kg)   SpO2 98% Comment: RA   BMI 22.92 kg/m²       Ambulating with single point cane  Tender L5-S1  LE strength intact  SLR negative  B/L Hamstring tightness                Written by León Alvarez, as dictated by Sheyla Magana MD.

## 2022-09-12 NOTE — LETTER
9/14/2022    Patient: Hao Jovel   YOB: 1950   Date of Visit: 9/12/2022     Felipe Dougherty MD  [address] [address]  Via Fax: [phone]    Dear Felipe Dougherty MD,      Thank you for referring Mr. Héctor Matamoros to [address] [place] [address] [facility] for evaluation. My notes for this consultation are attached. If you have questions, please do not hesitate to call me. I look forward to following your patient along with you.       Sincerely,    Rick Finnegan MD

## 2022-10-14 ENCOUNTER — TRANSCRIBE ORDER (OUTPATIENT)
Dept: SCHEDULING | Age: 72
End: 2022-10-14

## 2022-10-14 DIAGNOSIS — C61 PROSTATE CANCER (HCC): Primary | ICD-10-CM

## 2022-10-18 ENCOUNTER — HOSPITAL ENCOUNTER (OUTPATIENT)
Dept: NUCLEAR MEDICINE | Age: 72
Discharge: HOME | End: 2022-10-18
Payer: MEDICARE

## 2022-10-18 DIAGNOSIS — C61 PROSTATE CANCER (HCC): ICD-10-CM

## 2022-10-18 PROCEDURE — 78306 BONE IMAGING WHOLE BODY: CPT

## 2022-10-19 ENCOUNTER — TRANSCRIBE ORDER (OUTPATIENT)
Dept: SCHEDULING | Age: 72
End: 2022-10-19

## 2022-10-19 DIAGNOSIS — C61 MALIGNANT NEOPLASM OF PROSTATE (HCC): Primary | ICD-10-CM

## 2022-10-27 ENCOUNTER — OFFICE VISIT (OUTPATIENT)
Dept: ORTHOPEDIC SURGERY | Age: 72
End: 2022-10-27
Payer: MEDICARE

## 2022-10-27 ENCOUNTER — HOSPITAL ENCOUNTER (OUTPATIENT)
Dept: CT IMAGING | Age: 72
Discharge: HOME | End: 2022-10-27
Payer: MEDICARE

## 2022-10-27 VITALS
HEIGHT: 72 IN | BODY MASS INDEX: 23.43 KG/M2 | WEIGHT: 173 LBS | OXYGEN SATURATION: 99 % | HEART RATE: 74 BPM | TEMPERATURE: 96.9 F

## 2022-10-27 DIAGNOSIS — M43.17 SPONDYLOLISTHESIS AT L5-S1 LEVEL: Primary | ICD-10-CM

## 2022-10-27 DIAGNOSIS — G89.29 CHRONIC BILATERAL LOW BACK PAIN WITHOUT SCIATICA: ICD-10-CM

## 2022-10-27 DIAGNOSIS — M62.9 HAMSTRING TIGHTNESS OF BOTH LOWER EXTREMITIES: ICD-10-CM

## 2022-10-27 DIAGNOSIS — C41.9 METASTATIC BONE CANCER (HCC): ICD-10-CM

## 2022-10-27 DIAGNOSIS — M54.50 CHRONIC BILATERAL LOW BACK PAIN WITHOUT SCIATICA: ICD-10-CM

## 2022-10-27 DIAGNOSIS — C61 MALIGNANT NEOPLASM OF PROSTATE (HCC): ICD-10-CM

## 2022-10-27 LAB — CREAT UR-MCNC: 1 MG/DL (ref 0.6–1.3)

## 2022-10-27 PROCEDURE — 1123F ACP DISCUSS/DSCN MKR DOCD: CPT | Performed by: PHYSICAL MEDICINE & REHABILITATION

## 2022-10-27 PROCEDURE — 74177 CT ABD & PELVIS W/CONTRAST: CPT

## 2022-10-27 PROCEDURE — G8432 DEP SCR NOT DOC, RNG: HCPCS | Performed by: PHYSICAL MEDICINE & REHABILITATION

## 2022-10-27 PROCEDURE — 74011000636 HC RX REV CODE- 636

## 2022-10-27 PROCEDURE — 3017F COLORECTAL CA SCREEN DOC REV: CPT | Performed by: PHYSICAL MEDICINE & REHABILITATION

## 2022-10-27 PROCEDURE — G8536 NO DOC ELDER MAL SCRN: HCPCS | Performed by: PHYSICAL MEDICINE & REHABILITATION

## 2022-10-27 PROCEDURE — 1101F PT FALLS ASSESS-DOCD LE1/YR: CPT | Performed by: PHYSICAL MEDICINE & REHABILITATION

## 2022-10-27 PROCEDURE — G8427 DOCREV CUR MEDS BY ELIG CLIN: HCPCS | Performed by: PHYSICAL MEDICINE & REHABILITATION

## 2022-10-27 PROCEDURE — G8420 CALC BMI NORM PARAMETERS: HCPCS | Performed by: PHYSICAL MEDICINE & REHABILITATION

## 2022-10-27 PROCEDURE — 82565 ASSAY OF CREATININE: CPT

## 2022-10-27 PROCEDURE — 99214 OFFICE O/P EST MOD 30 MIN: CPT | Performed by: PHYSICAL MEDICINE & REHABILITATION

## 2022-10-27 RX ORDER — CHOLECALCIFEROL (VITAMIN D3) 25 MCG
TABLET,CHEWABLE ORAL
COMMUNITY
Start: 2022-10-13

## 2022-10-27 RX ORDER — PREGABALIN 50 MG/1
50 CAPSULE ORAL 2 TIMES DAILY
Qty: 60 CAPSULE | Refills: 2 | Status: SHIPPED | OUTPATIENT
Start: 2022-10-27

## 2022-10-27 RX ADMIN — IOPAMIDOL 100 ML: 612 INJECTION, SOLUTION INTRAVENOUS at 14:40

## 2022-10-27 NOTE — LETTER
10/27/2022    Patient: Farheen Palacio   YOB: 1950   Date of Visit: 10/27/2022     Bard Stefan MD  [address] [facility]  [address] [place] [id number]  Via Fax: [phone]    Dear Bard Stefan MD,      Thank you for referring Mr. Ramon Aragon to [facility] for evaluation. My notes for this consultation are attached. If you have questions, please do not hesitate to call me. I look forward to following your patient along with you.       Sincerely,    Blanca Shaikh MD

## 2022-10-27 NOTE — PROGRESS NOTES
Anabela Client presents today for   Chief Complaint   Patient presents with    Back Pain     Lower         Is someone accompanying this pt? no    Is the patient using any DME equipment during OV? no    Depression Screening:  3 most recent PHQ Screens 11/18/2021   PHQ Not Done -   Little interest or pleasure in doing things Not at all   Feeling down, depressed, irritable, or hopeless Not at all   Total Score PHQ 2 0       Learning Assessment:  Learning Assessment 10/9/2017   PRIMARY LEARNER Patient   PRIMARY LANGUAGE ENGLISH   LEARNER PREFERENCE PRIMARY READING   ANSWERED BY Patient   RELATIONSHIP SELF       Abuse Screening:  Abuse Screening Questionnaire 10/27/2022   Do you ever feel afraid of your partner? N   Are you in a relationship with someone who physically or mentally threatens you? N   Is it safe for you to go home? Y       Fall Risk  Fall Risk Assessment, last 12 mths 9/12/2022   Able to walk? Yes   Fall in past 12 months? 0   Do you feel unsteady? 1   Are you worried about falling 0   Number of falls in past 12 months -   Fall with injury? -       Coordination of Care:  1. Have you been to the ER, urgent care clinic since your last visit? no  Hospitalized since your last visit? no    2. Have you seen or consulted any other health care providers outside of the [address] [place] [address] since your last visit? Yes, oncology Include any pap smears or colon screening.  no

## 2022-10-27 NOTE — PROGRESS NOTES
[address], [address]  [place], [address] [place] [address] Street  Phone: [phone]  Fax: [phone]        Cheryl Cleary  : 1950  PCP: Naheed Franco MD    PROGRESS NOTE      ASSESSMENT AND PLAN    Diagnoses and all orders for this visit:    1. Spondylolisthesis at L5-S1 level    2. Metastatic bone cancer (HCC)  -     pregabalin (LYRICA) 50 mg capsule; Take 1 Capsule by mouth two (2) times a day. Max Daily Amount: 100 mg.    3. Hamstring tightness of both lower extremities    4. Chronic bilateral low back pain without sciatica  -     pregabalin (LYRICA) 50 mg capsule; Take 1 Capsule by mouth two (2) times a day. Max Daily Amount: 100 mg. Ramandeep Shane is a 67 y.o. male with prostate cancer widely metastatic to bone and mechanical low back pain. We will continue to titrate Lyrica. Discussed cortisone injections, I am hesitant to perform these as he has had a flare of pain with previous knee steroid injection. Given instructions on hamstring exercises. Perform as tolerated. Patient may continue PRN Tylenol and Ibuprofen  Dose adjustment. Increase Lyrica to 50 mg BID    Follow-up and Dispositions    Return in about 6 weeks (around 2022) for medication management. HISTORY OF PRESENT ILLNESS      lGenn Michael III is a 67 y.o. male presents for follow up of back pain. LV trial of Lyrica 25 mg BID. Pt currently has no back pain. He only has pain with bending. He cannot stand and walk for long periods of time due to fatigue. Denies numbness or tingling in his BLE or feet. He notes numbness and tingling in his hands. Intermittent, positional.    He is in the process of getting a new series of scans and has an oncology follow-up. He does notice any difference with the Lyrica except that his shoulders are bothering him less. Denies side effects.     Pain Assessment  10/27/2022   Location of Pain Back   Location Modifiers (No Data)   Severity of Pain 0   Quality of Pain Sharp   Quality of Pain Comment -   Duration of Pain Persistent   Duration of Pain Comment -   Frequency of Pain Intermittent   Frequency of Pain Comment -   Date Pain First Started -   Aggravating Factors Bending   Aggravating Factors Comment -   Limiting Behavior Yes   Relieving Factors Other (Comment)   Relieving Factors Comment sitting in the right chair   Result of Injury No   Work-Related Injury -   Type of Injury -   Type of Injury Comment -         Onset of pain: 2/2022        Investigations:   L MRI 8/2022: multiple lumbar and iliac bone metastasis, progression compared to 2019, mild to moderate compression right L5, listhesis L5-S1  Pelv XR AP 1V 5/19/2022 (I personally reviewed these images): osteoblastic lesions left ileum   C MRI 9/2021: multilevel DDD with mets C5, C7, T2  T MRI 9/2021: numerous metastatic lesions  DEXA 9/2021: multiple signs of uptake including the ilium bilaterally, DDD, cervical, thoracic, lumbar  Spine surgery consult: none     Treatments:  Physical therapy: 7/2022 for low back - had to stop due to severe fatigue  Spinal injections: no  Spinal surgery- no  Beneficial medications: CBD gummies, Tylenol  Failed medications: none     Work Status: retired. 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 Steroid injection L knee years ago caused 1 month of increased pain. , partial L knee arthroplasty    PHYSICAL EXAMINATION    Visit Vitals  Pulse 74   Temp 96.9 °F (36.1 °C) (Temporal)   Ht 6' (1.829 m)   Wt 173 lb (78.5 kg)   SpO2 99% Comment: RA   BMI 23.46 kg/m²     Hamstring tightness B/L L > R  TTP B/L L4-5  SLR negative  Forward flexion, immediate knee flexion. Difficulty lumbar flexing with straight leg.   LE motor intact                Written by Vidal Bertrand, as dictated by Arnold Elizabeth MD.

## 2022-12-08 ENCOUNTER — OFFICE VISIT (OUTPATIENT)
Dept: ORTHOPEDIC SURGERY | Age: 72
End: 2022-12-08
Payer: MEDICARE

## 2022-12-08 VITALS
WEIGHT: 176 LBS | HEIGHT: 72 IN | OXYGEN SATURATION: 99 % | TEMPERATURE: 97.1 F | HEART RATE: 72 BPM | BODY MASS INDEX: 23.84 KG/M2

## 2022-12-08 DIAGNOSIS — G89.29 CHRONIC BILATERAL LOW BACK PAIN WITHOUT SCIATICA: Primary | ICD-10-CM

## 2022-12-08 DIAGNOSIS — M54.50 CHRONIC BILATERAL LOW BACK PAIN WITHOUT SCIATICA: Primary | ICD-10-CM

## 2022-12-08 DIAGNOSIS — C41.9 METASTATIC BONE CANCER (HCC): ICD-10-CM

## 2022-12-08 PROCEDURE — 1123F ACP DISCUSS/DSCN MKR DOCD: CPT | Performed by: PHYSICAL MEDICINE & REHABILITATION

## 2022-12-08 PROCEDURE — G8536 NO DOC ELDER MAL SCRN: HCPCS | Performed by: PHYSICAL MEDICINE & REHABILITATION

## 2022-12-08 PROCEDURE — G8427 DOCREV CUR MEDS BY ELIG CLIN: HCPCS | Performed by: PHYSICAL MEDICINE & REHABILITATION

## 2022-12-08 PROCEDURE — 3017F COLORECTAL CA SCREEN DOC REV: CPT | Performed by: PHYSICAL MEDICINE & REHABILITATION

## 2022-12-08 PROCEDURE — G8510 SCR DEP NEG, NO PLAN REQD: HCPCS | Performed by: PHYSICAL MEDICINE & REHABILITATION

## 2022-12-08 PROCEDURE — 99214 OFFICE O/P EST MOD 30 MIN: CPT | Performed by: PHYSICAL MEDICINE & REHABILITATION

## 2022-12-08 PROCEDURE — G8420 CALC BMI NORM PARAMETERS: HCPCS | Performed by: PHYSICAL MEDICINE & REHABILITATION

## 2022-12-08 PROCEDURE — 1101F PT FALLS ASSESS-DOCD LE1/YR: CPT | Performed by: PHYSICAL MEDICINE & REHABILITATION

## 2022-12-08 RX ORDER — PREGABALIN 75 MG/1
75 CAPSULE ORAL 2 TIMES DAILY
Qty: 180 CAPSULE | Refills: 0 | Status: SHIPPED | OUTPATIENT
Start: 2022-12-08

## 2022-12-08 NOTE — PROGRESS NOTES
[address]  [address], [address]  [place], [address] [place] [address] Street  Phone: [phone]  Fax: [phone]        Cheryl Claery  : 1950  PCP: Naheed Franco MD    PROGRESS NOTE      ASSESSMENT AND PLAN    Diagnoses and all orders for this visit:    1. Chronic bilateral low back pain without sciatica  -     pregabalin (LYRICA) 75 mg capsule; Take 1 Capsule by mouth two (2) times a day. Max Daily Amount: 150 mg.  -     REFERRAL TO PHYSICAL THERAPY    2. Metastatic bone cancer (HCC)  -     pregabalin (LYRICA) 75 mg capsule; Take 1 Capsule by mouth two (2) times a day. Max Daily Amount: 150 mg.  -     REFERRAL TO PHYSICAL THERAPY      Glenn Michael III is a 67 y.o. male having some benefit w/Lyrica, will cont to titrate gradually. Dose adjustment. Increase Lyrica to 75 mg BID  Referral to Physical Therapy for ROM, gait eval, strengthening  Discussed fall precautions with patient  Follow-up and Dispositions    Return in about 2 months (around 2023) for medication management. HISTORY OF PRESENT ILLNESS      Glenn Michael III is a 67 y.o. male presents for follow up of back pain. LV increased Lyrica to 50 mg BID. Pt reports back pain with bending. He cannot stand or walk for long periods of time due to fatigue. Pt feels like he does not have complete control of his movements. He notes a recent fall, where he tripped over a gecko in his garage. Pt typically uses a cane, but he does not have one today. Feels that Lyrica is helping his neck. Pt states Lyrica 50 mg BID helps some. Denies side effects. He is compliant with his HEP as tolerated. Bone scan and CT reviewed.     Pain Assessment  2022   Location of Pain Back;Neck   Location Modifiers -   Severity of Pain 1   Quality of Pain Burning   Quality of Pain Comment -   Duration of Pain Persistent   Duration of Pain Comment -   Frequency of Pain Intermittent   Frequency of Pain Comment - Date Pain First Started -   Aggravating Factors Other (Comment); Bending   Aggravating Factors Comment certain movements   Limiting Behavior Yes   Relieving Factors Other (Comment)   Relieving Factors Comment sitting   Result of Injury No   Work-Related Injury -   Type of Injury -   Type of Injury Comment -         Onset of pain: 2/2022        Investigations:   Chest/Ab/Pelv CT 10/2022: multiple scattered sclerotic densities, largest at T4 and right hip  L MRI 8/2022: multiple lumbar and iliac bone metastasis, progression compared to 2019, mild to moderate compression right L5, listhesis L5-S1  Pelv XR AP 1V 5/19/2022 (I personally reviewed these images): osteoblastic lesions left ileum   C MRI 9/2021: multilevel DDD with mets C5, C7, T2  T MRI 9/2021: numerous metastatic lesions  DEXA 9/2021: multiple signs of uptake including the ilium bilaterally, DDD, cervical, thoracic, lumbar  Spine surgery consult: none     Treatments:  Physical therapy: 7/2022 for low back - had to stop due to severe fatigue  Spinal injections: no  Spinal surgery- no  Beneficial medications: CBD gummies, Tylenol  Failed medications: none     Work Status: retired. 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 Steroid injection L knee years ago caused 1 month of increased pain. , partial L knee arthroplasty    PHYSICAL EXAMINATION    Visit Vitals  Pulse 72   Temp 97.1 °F (36.2 °C) (Temporal)   Ht 6' (1.829 m)   Wt 176 lb (79.8 kg)   SpO2 99% Comment: RA   BMI 23.87 kg/m²       Tender left levator scapula  Pain w/lumbar flexion, fingertips to knees  Non tender to percussion in his T spine. No pain w/R hip ROM. Intact tandem gait  Resting tremor  LE strength intact. SLR neg.               Written by Jagdeep Melara, as dictated by Malina Riley MD.

## 2022-12-08 NOTE — PROGRESS NOTES
Baeta Abbott presents today for   Chief Complaint   Patient presents with    Neck Pain    Back Pain     Lower         Is someone accompanying this pt? no    Is the patient using any DME equipment during OV? no    Depression Screening:  3 most recent PHQ Screens 12/8/2022   PHQ Not Done -   Little interest or pleasure in doing things Not at all   Feeling down, depressed, irritable, or hopeless Not at all   Total Score PHQ 2 0       Learning Assessment:  Learning Assessment 10/9/2017   PRIMARY LEARNER Patient   PRIMARY LANGUAGE ENGLISH   LEARNER PREFERENCE PRIMARY READING   ANSWERED BY Patient   RELATIONSHIP SELF       Abuse Screening:  Abuse Screening Questionnaire 10/27/2022   Do you ever feel afraid of your partner? N   Are you in a relationship with someone who physically or mentally threatens you? N   Is it safe for you to go home? Y       Fall Risk  Fall Risk Assessment, last 12 mths 9/12/2022   Able to walk? Yes   Fall in past 12 months? 0   Do you feel unsteady? 1   Are you worried about falling 0   Number of falls in past 12 months -   Fall with injury? -       Coordination of Care:  1. Have you been to the ER, urgent care clinic since your last visit? no  Hospitalized since your last visit? no    2. Have you seen or consulted any other health care providers outside of the [address] [place] [address] since your last visit? Yes, oncology and pcp Include any pap smears or colon screening.  no

## 2022-12-08 NOTE — LETTER
12/8/2022    Patient: Gayla Patetrson   YOB: 1950   Date of Visit: 12/8/2022     Rich Bah MD  [address] [facility]  [address] [address]  Via Fax: [phone]    Dear Rich Bah MD,      Thank you for referring Mr. Samuel to [facility] for evaluation. My notes for this consultation are attached. If you have questions, please do not hesitate to call me. I look forward to following your patient along with you.       Sincerely,    Marija Perez MD

## 2022-12-29 ENCOUNTER — HOSPITAL ENCOUNTER (OUTPATIENT)
Dept: PHYSICAL THERAPY | Age: 72
Discharge: HOME | End: 2022-12-29
Payer: MEDICARE

## 2022-12-29 PROCEDURE — 97535 SELF CARE MNGMENT TRAINING: CPT

## 2022-12-29 PROCEDURE — 97162 PT EVAL MOD COMPLEX 30 MIN: CPT

## 2022-12-29 PROCEDURE — 97110 THERAPEUTIC EXERCISES: CPT

## 2022-12-29 NOTE — PROGRESS NOTES
[facility] - [facility]  Critical access hospital [address]  [phone] fax  Plan of Care/ Statement of Necessity for Physical Therapy Services     Patient name: Stephy Rowland Start of Care: 2022   Referral source: Peter Medellin MD : 1950    Medical Diagnosis: Other low back pain [M54.59]  Payor: Charlotte Kurtz / Plan: [place] MEDICARE PART A & B / Product Type: Medicare /  Onset Date: chronic, worsening over the past few months    Treatment Diagnosis: LBP, back pain   Prior Hospitalization: see medical history Provider#: [id number]   Medications: Verified on Patient summary List    Comorbidities: HTN, hearing impaired, bone CA (stage 4 - undergoing current treatment)   Prior Level of Function: Independent with ADLs and functional tasks with more strength and less fatigue. The Plan of Care and following information is based on the information from the initial evaluation. Assessment/ key information:   Pt is a 67year old male who presents to therapy today with LBP and back pain. Pt was seen for therapy from -2022 for LBP and impaired gait but was d/c'ed secondary to not returning to therapy after attending 6 appointments. Pt reports he feels weaker over the past few months and fell about 2 months ago in his garage. Pt demonstrated decreased AROM, muscle tightness, decreased strength, and impaired mobility. Pt would benefit from physical therapy to improve the above impairments to help the pt return to performing ADLs and functional activities. Evaluation Complexity History HIGH Complexity :3+ comorbidities / personal factors will impact the outcome/ POC ; Examination MEDIUM Complexity : 3 Standardized tests and measures addressing body structure, function, activity limitation and / or participation in recreation  ;Presentation MEDIUM Complexity : Evolving with changing characteristics  ; Clinical Decision Making MEDIUM Complexity : FOTO score of 26-74  Overall Complexity Rating: MEDIUM  Problem List: pain affecting function, decrease ROM, decrease strength, edema affecting function, impaired gait/ balance, decrease ADL/ functional abilitiies, decrease activity tolerance, decrease flexibility/ joint mobility, and decrease transfer abilities   Treatment Plan may include any combination of the following: Therapeutic exercise, Neuromuscular reeducation, Manual therapy, Therapeutic activity, Self care/home management, Aquatic therapy, and Gait training  Patient / Family readiness to learn indicated by: asking questions, trying to perform skills, and interest  Persons(s) to be included in education: patient (P)  Barriers to Learning/Limitations: None  Patient Goal (s): do normal things again  Patient Self Reported Health Status: fair  Rehabilitation Potential: fair    Short Term Goals: To be accomplished in 2 treatments:  1. Pt will report compliance and independence to HEP to help the pt manage their pain and symptoms. Eval: established   Long Term Goals: To be accomplished in 10 treatments:  1. Pt will increase FOTO score to 51 points to improve ability to perform ADLs. Eval: 42 points  2. Pt will increase MMT B hip flex to 4/5, B knee EXT to 3+/5 to improve ability to tolerate sit to stands. Eval: B hip flex 4-/5, B knee EXT 3-/5  3. Pt will report at least 50% improvement in symptoms to improve ability to ambulate with less pain. Eval: 0%  4. Pt will report having a little bit of difficulty with his usual household activities to improve ability to perform household chores and activities. Eval: quite a bit of difficulty    Frequency / Duration: Patient to be seen 2 times per week for 10 treatments.     Patient/ Caregiver education and instruction: Diagnosis, prognosis, self care, activity modification, and exercises   [x]  Plan of care has been reviewed with PTA    Certification Period: 12/29/2022-1/27/203  Darren Leventhal, PT 12/29/2022 4:55 PM  _____________________________________________________________________  I certify that the above Therapy Services are being furnished while the patient is under my care. I agree with the treatment plan and certify that this therapy is necessary.     Physician's Signature:____________Date:_________TIME:________    ** Signature, Date and Time must be completed for valid certification **    Please sign and return to [facility] - [address]  [phone] fax

## 2022-12-29 NOTE — PROGRESS NOTES
PT DAILY TREATMENT NOTE     Patient Name: Quincy Sorto III  Date:2022  : 1950  [x]  Patient  Verified  Payor: [place] MEDICARE / Plan: [place] MEDICARE PART A & B / Product Type: Medicare /    In time: 3:54  Out time:4:33  Total Treatment Time (min): 39  Visit #: 1 of 10    Medicare/BCBS Only   Total Timed Codes (min):  23 1:1 Treatment Time:  39       Treatment Area: Other low back pain [M54.59]    SUBJECTIVE  Pain Level (0-10 scale): \"achy\"  Any medication changes, allergies to medications, adverse drug reactions, diagnosis change, or new procedure performed?: [x] No    [] Yes (see summary sheet for update)  Subjective functional status/changes:   [] No changes reported  See POC    OBJECTIVE    16 min [x]Eval                  []Re-Eval       13 min Therapeutic Exercise:  [] See flow sheet : HEP instruction and demonstration    Rationale: increase ROM and increase strength to improve the patients ability to perform ADLs. 10 min Self Care/Home Management: []  See flow sheet : pt education regarding anatomy and physiology of the spine/bones and how it relates to the pt's condition   Rationale: increase ROM, increase strength and decrease pain/symptoms  to improve the patients ability to tolerate functional tasks. With   [x] TE   [] TA   [] neuro   [x] Other: Self Care/Home management Patient Education: [x] Review HEP    [] Progressed/Changed HEP based on:   [] positioning   [] body mechanics   [] transfers   [] heat/ice application    [] other:      Other Objective/Functional Measures: See evaluation. Pain Level (0-10 scale) post treatment: \"achy\"    ASSESSMENT/Changes in Function: Pt given HEP handout to perform. Pt understood exercises in HEP handout. Pt demonstrated decreased AROM, muscle tightness, decreased strength, and impaired mobility. Pt would benefit from physical therapy to improve the above impairments to help the pt return to performing ADLs and functional activities. Patient will continue to benefit from skilled PT services to modify and progress therapeutic interventions, address functional mobility deficits, address ROM deficits, address strength deficits, analyze and address soft tissue restrictions, analyze and cue movement patterns, analyze and modify body mechanics/ergonomics, assess and modify postural abnormalities, address imbalance/dizziness, and instruct in home and community integration to attain remaining goals. [x]  See Plan of Care  []  See progress note/recertification  []  See Discharge Summary         Progress towards goals / Updated goals:  Short Term Goals: To be accomplished in 2 treatments:  1. Pt will report compliance and independence to [facility] to help the pt manage their pain and symptoms. Eval: established   Long Term Goals: To be accomplished in 10 treatments:  1. Pt will increase FOTO score to 51 points to improve ability to perform ADLs. Eval: 42 points  2. Pt will increase MMT B hip flex to 4/5, B knee EXT to 3+/5 to improve ability to tolerate sit to stands. Eval: B hip flex 4-/5, B knee EXT 3-/5  3. Pt will report at least 50% improvement in symptoms to improve ability to ambulate with less pain. Eval: 0%  4. Pt will report having a little bit of difficulty with his usual household activities to improve ability to perform household chores and activities.    Eval: quite a bit of difficulty    PLAN  [x]  Upgrade activities as tolerated     [x]  Continue plan of care  [x]  Update interventions per flow sheet       []  Discharge due to:_  []  Other:_      Keturah Schlatter, PT 12/29/2022  4:55 PM    Future Appointments   Date Time Provider Mary Mccall   12/29/2022  3:45 PM Ai Baldwin PT [facility]   2/9/2023  1:20 PM Dalila Oliveira MD [facility]

## 2023-01-03 ENCOUNTER — HOSPITAL ENCOUNTER (OUTPATIENT)
Dept: PHYSICAL THERAPY | Age: 73
Discharge: HOME | End: 2023-01-03
Payer: MEDICARE

## 2023-01-03 PROCEDURE — 97112 NEUROMUSCULAR REEDUCATION: CPT

## 2023-01-03 PROCEDURE — 97110 THERAPEUTIC EXERCISES: CPT

## 2023-01-03 NOTE — PROGRESS NOTES
PT DAILY TREATMENT NOTE     Patient Name: Mackenzie Nino  Date:1/3/2023  : 1950  [x]  Patient  Verified  Payor: VA MEDICARE / Plan: VA MEDICARE PART A & B / Product Type: Medicare /    In time:3:35  Out time:4:25  Total Treatment Time (min): 50  Visit #: 2 of 10    Medicare/BCBS Only   Total Timed Codes (min):  50 1:1 Treatment Time:  50       Treatment Area: Other low back pain [M54.59]    SUBJECTIVE  Pain Level (0-10 scale): 7/10  Any medication changes, allergies to medications, adverse drug reactions, diagnosis change, or new procedure performed?: [x] No    [] Yes (see summary sheet for update)  Subjective functional status/changes:   [] No changes reported  \"I'm tired today. \"    OBJECTIVE    20 min Therapeutic Exercise:  [] See flow sheet :   Rationale: increase ROM and increase strength to improve the patients ability to increase lumbar mobility, functional LE strength for ease of ADLs     30 min Neuromuscular Re-education:  []  See flow sheet :   Rationale: increase strength, improve coordination, and increase proprioception  to improve the patients ability to increase core/trunk stability to increase ease of ADLs          With   [] TE   [] TA   [] neuro   [] other: Patient Education: [x] Review HEP    [] Progressed/Changed HEP based on:   [] positioning   [] body mechanics   [] transfers   [] heat/ice application    [] other:      Other Objective/Functional Measures: Initiated treatment program per flow sheet. Performed LTR on table, DKTC stretch performed on peanut ball. Pain Level (0-10 scale) post treatment: 7/10    ASSESSMENT/Changes in Function: Pt instructed in first session since initial evaluation. Pt given skilled verbal and tactile instructions to ensure proper technique. Pt noted continued soreness in lower back but was able to perform all exercise interventions. Pt declined standing exercises due to increased fatigue levels this date.   Pt declined modalities post session but did not denote any increases in pain levels post session. Patient will continue to benefit from skilled PT services to address functional mobility deficits, address ROM deficits, address strength deficits, analyze and address soft tissue restrictions, analyze and cue movement patterns, analyze and modify body mechanics/ergonomics, assess and modify postural abnormalities, address imbalance/dizziness, and instruct in home and community integration to attain remaining goals. []  See Plan of Care  []  See progress note/recertification  []  See Discharge Summary         Progress towards goals / Updated goals:  Short Term Goals: To be accomplished in 2 treatments:  1. Pt will report compliance and independence to HEP to help the pt manage their pain and symptoms. Eval: established   Current: progressing - Pt has initiated HEP (1/3/23)  Long Term Goals: To be accomplished in 10 treatments:  1. Pt will increase FOTO score to 51 points to improve ability to perform ADLs. Eval: 42 points  2. Pt will increase MMT B hip flex to 4/5, B knee EXT to 3+/5 to improve ability to tolerate sit to stands. Eval: B hip flex 4-/5, B knee EXT 3-/5  3. Pt will report at least 50% improvement in symptoms to improve ability to ambulate with less pain. Eval: 0%  4. Pt will report having a little bit of difficulty with his usual household activities to improve ability to perform household chores and activities.    Eval: quite a bit of difficulty    PLAN  []  Upgrade activities as tolerated     [x]  Continue plan of care  []  Update interventions per flow sheet       []  Discharge due to:_  []  Other:_      Maday Berman, PT 1/3/2023  4:06 PM    Future Appointments   Date Time Provider Mary Mccall   1/5/2023  3:30 PM Maday Berman, PT [facility]   1/9/2023  4:00 PM [facility]   1/11/2023  3:30 PM Maday Berman, PT [facility]   1/16/2023  3:30 PM Ronal Damon, PT [id number]   1/18/2023  3:30 PM Jese Aranda [facility]   1/23/2023  3:30 PM Vicky Roe, PTA [facility]   1/25/2023  3:30 PM Bowen Berman, PT [facility]   2/9/2023  1:20 PM Waqar Martines MD [facility]

## 2023-01-05 ENCOUNTER — HOSPITAL ENCOUNTER (OUTPATIENT)
Dept: PHYSICAL THERAPY | Age: 73
Discharge: HOME | End: 2023-01-05
Payer: MEDICARE

## 2023-01-05 PROCEDURE — 97112 NEUROMUSCULAR REEDUCATION: CPT

## 2023-01-05 PROCEDURE — 97110 THERAPEUTIC EXERCISES: CPT

## 2023-01-05 NOTE — PROGRESS NOTES
PT DAILY TREATMENT NOTE     Patient Name: Sendy Soto  Date:2023  : 1950  [x]  Patient  Verified  Payor: [place] MEDICARE / Plan: [place] MEDICARE PART A & B / Product Type: Medicare /    In time:3:35  Out time:4:06  Total Treatment Time (min): 31  Visit #: 3 of 10    Medicare/BCBS Only   Total Timed Codes (min):  31 1:1 Treatment Time:  31       Treatment Area: Other low back pain [M54.59]    SUBJECTIVE  Pain Level (0-10 scale): 5/10  Any medication changes, allergies to medications, adverse drug reactions, diagnosis change, or new procedure performed?: [x] No    [] Yes (see summary sheet for update)  Subjective functional status/changes:   [] No changes reported  \"I feel a little better today. \"    OBJECTIVE    15 min Therapeutic Exercise:  [] See flow sheet :   Rationale: increase ROM and increase strength to improve the patients ability to increase lumbar mobility, functional LE strength for ease of ADLs     16 min Neuromuscular Re-education:  []  See flow sheet :   Rationale: increase strength, improve coordination, and increase proprioception  to improve the patients ability to increase core/trunk stability to increase ease of ADLs          With   [] TE   [] TA   [] neuro   [] other: Patient Education: [x] Review HEP    [] Progressed/Changed HEP based on:   [] positioning   [] body mechanics   [] transfers   [] heat/ice application    [] other:      Other Objective/Functional Measures: Continued treatment program per flow sheet. Pain Level (0-10 scale) post treatment: 2/10 back, knees 7/10    ASSESSMENT/Changes in Function: Pt's session focused on functional lumbar mobility and core/trunk stability. Pt given skilled verbal and tactile instructions to ensure proper technique. Pt noted continued soreness in lower back but was able to perform all exercise interventions.   Pt declined standing exercises due to increased fatigue levels this date but able to incorporate seated theraband exercises. Pt declined modalities post session but did not denote any increases in pain levels post session. Patient will continue to benefit from skilled PT services to address functional mobility deficits, address ROM deficits, address strength deficits, analyze and address soft tissue restrictions, analyze and cue movement patterns, analyze and modify body mechanics/ergonomics, assess and modify postural abnormalities, address imbalance/dizziness, and instruct in home and community integration to attain remaining goals. []  See Plan of Care  []  See progress note/recertification  []  See Discharge Summary         Progress towards goals / Updated goals:  Short Term Goals: To be accomplished in 2 treatments:  1. Pt will report compliance and independence to HEP to help the pt manage their pain and symptoms. Eval: established   Current: progressing - Pt has initiated HEP (1/3/23)  Long Term Goals: To be accomplished in 10 treatments:  1. Pt will increase FOTO score to 51 points to improve ability to perform ADLs. Eval: 42 points  2. Pt will increase MMT B hip flex to 4/5, B knee EXT to 3+/5 to improve ability to tolerate sit to stands. Eval: B hip flex 4-/5, B knee EXT 3-/5  3. Pt will report at least 50% improvement in symptoms to improve ability to ambulate with less pain. Eval: 0%  4. Pt will report having a little bit of difficulty with his usual household activities to improve ability to perform household chores and activities.    Eval: quite a bit of difficulty    PLAN  []  Upgrade activities as tolerated     [x]  Continue plan of care  []  Update interventions per flow sheet       []  Discharge due to:_  []  Other:_      Rama Berman PT 1/5/2023  4:06 PM    Future Appointments   Date Time Provider Mary Mccall   1/9/2023  4:00 PM Evy Berman [facility]   1/11/2023  3:30 PM Rama Berman PT [facility]   1/16/2023  3:30 PM Osvaldo Palma, PT [facility] 1/18/2023  3:30 PM Ella Goodwin [facility]   1/23/2023  3:30 PM Nisha Meyers PTA [facility]   1/25/2023  3:30 PM Flor Berman, PT [facility]   2/9/2023  1:20 PM Bk Whitley MD [facility]

## 2023-01-09 ENCOUNTER — HOSPITAL ENCOUNTER (OUTPATIENT)
Dept: PHYSICAL THERAPY | Age: 73
Discharge: HOME | End: 2023-01-09
Payer: MEDICARE

## 2023-01-09 PROCEDURE — 97110 THERAPEUTIC EXERCISES: CPT

## 2023-01-09 PROCEDURE — 97112 NEUROMUSCULAR REEDUCATION: CPT

## 2023-01-09 NOTE — PROGRESS NOTES
PT DAILY TREATMENT NOTE     Patient Name: Farheen Palacio  Date:2023  : 1950  [x]  Patient  Verified  Payor: VA MEDICARE / Plan: VA MEDICARE PART A & B / Product Type: Medicare /    In time:4:00  Out time:4:30  Total Treatment Time (min): 30  Visit #: 4 of 10    Medicare/BCBS Only   Total Timed Codes (min):  30 1:1 Treatment Time:  30       Treatment Area: Other low back pain [M54.59]    SUBJECTIVE  Pain Level (0-10 scale): 410  Any medication changes, allergies to medications, adverse drug reactions, diagnosis change, or new procedure performed?: [x] No    [] Yes (see summary sheet for update)  Subjective functional status/changes:   [] No changes reported  \"I feel a little better today. \"    OBJECTIVE    15 min Therapeutic Exercise:  [] See flow sheet : LE strengthening   Rationale: increase ROM and increase strength to improve the patients ability to increase lumbar mobility, functional LE strength for ease of ADLs     15 min Neuromuscular Re-education:  []  See flow sheet : postural re-education, balance training   Rationale: increase strength, improve coordination, and increase proprioception  to improve the patients ability to increase core/trunk stability to increase ease of ADLs          With   [] TE   [] TA   [] neuro   [] other: Patient Education: [x] Review HEP    [] Progressed/Changed HEP based on:   [] positioning   [] body mechanics   [] transfers   [] heat/ice application    [] other:      Other Objective/Functional Measures: Focused on postural and LE strengthening this date. Pain Level (0-10 scale) post treatment: 0/10 \"numb\"    ASSESSMENT/Changes in Function: Pt's session focused on postural stability, LE strengthening. Pt given skilled verbal and tactile instructions to ensure proper technique. Pt required close SBA to CGA for standing balance activities this date, especially for MSR eyes closed and 1/2 stance.   Pt declined modalities post session and reported no pain, just feeling \"numb. \"      Patient will continue to benefit from skilled PT services to address functional mobility deficits, address ROM deficits, address strength deficits, analyze and address soft tissue restrictions, analyze and cue movement patterns, analyze and modify body mechanics/ergonomics, assess and modify postural abnormalities, address imbalance/dizziness, and instruct in home and community integration to attain remaining goals. []  See Plan of Care  []  See progress note/recertification  []  See Discharge Summary         Progress towards goals / Updated goals:  Short Term Goals: To be accomplished in 2 treatments:  1. Pt will report compliance and independence to HEP to help the pt manage their pain and symptoms. Eval: established   Current: met - Pt compliant with HEP (1/9/2023)  Long Term Goals: To be accomplished in 10 treatments:  1. Pt will increase FOTO score to 51 points to improve ability to perform ADLs. Eval: 42 points  Current:   2. Pt will increase MMT B hip flex to 4/5, B knee EXT to 3+/5 to improve ability to tolerate sit to stands. Eval: B hip flex 4-/5, B knee EXT 3-/5  Current:  3. Pt will report at least 50% improvement in symptoms to improve ability to ambulate with less pain. Eval: 0%  Current:  4. Pt will report having a little bit of difficulty with his usual household activities to improve ability to perform household chores and activities.    Eval: quite a bit of difficulty  Current:    PLAN  []  Upgrade activities as tolerated     [x]  Continue plan of care  []  Update interventions per flow sheet       []  Discharge due to:_  []  Other:_      Serg Berman, PT 1/9/2023  4:06 PM    Future Appointments   Date Time Provider Mary Mccall   1/11/2023  3:30 PM Serg Berman, PT [facility]   1/16/2023  3:30 PM Dorita Mary, PT [facility]   1/18/2023  3:30 PM Adalberto Cornejo [facility]   1/23/2023  3:30 PM Cliff Samayoa, PTA [facility]   1/25/2023  3:30 PM Gerry Berman PT [facility]   2/9/2023  1:20 PM Nick Vo MD [facility]

## 2023-01-11 ENCOUNTER — APPOINTMENT (OUTPATIENT)
Dept: PHYSICAL THERAPY | Age: 73
End: 2023-01-11
Payer: MEDICARE

## 2023-01-11 ENCOUNTER — TELEPHONE (OUTPATIENT)
Dept: PHYSICAL THERAPY | Age: 73
End: 2023-01-11

## 2023-01-16 ENCOUNTER — HOSPITAL ENCOUNTER (OUTPATIENT)
Dept: PHYSICAL THERAPY | Age: 73
Discharge: HOME | End: 2023-01-16
Payer: MEDICARE

## 2023-01-16 NOTE — PROGRESS NOTES
PT DAILY TREATMENT NOTE     Patient Name: Kevyn Ames III  Date:2023  : 1950  [x]  Patient  Verified  Payor: Sharon Lawson / Plan: [place] MEDICARE PART A & B / Product Type: Medicare /    In time: 3:30    Out time: 4:05  Total Treatment Time (min): 35  Visit #: 5 of 10    Medicare/BCBS Only   Total Timed Codes (min):  35 1:1 Treatment Time: 32       Treatment Area: Other low back pain [M54.59]    SUBJECTIVE  Pain Level (0-10 scale): 6  Any medication changes, allergies to medications, adverse drug reactions, diagnosis change, or new procedure performed?: [x] No    [] Yes (see summary sheet for update)  Subjective functional status/changes:   [] No changes reported  Pt reports he got his cancer injections and is tired today. OBJECTIVE    13 min Therapeutic Exercise:  [x] See flow sheet :    Rationale: increase ROM and increase strength to improve the patients ability to tolerate daily tasks. 22 min Neuromuscular Re-education:  [x]  See flow sheet : core/glute re-education, balance training   Rationale: increase strength, improve coordination, and increase proprioception  to improve the patients ability to increase ease of ADLs          With   [x] TE   [] TA   [x] neuro   [] other: Patient Education: [x] Review HEP    [] Progressed/Changed HEP based on:   [] positioning   [] body mechanics   [] transfers   [] heat/ice application    [] other:      Other Objective/Functional Measures: Continued exercises per flow sheet. Pain Level (0-10 scale) post treatment: 4    ASSESSMENT/Changes in Function: Reported improvement in pain post session today. Limited standing tolerance today due to pain in the low back. HHA used for [facility] for maintaining balance today. Pt fatigued today due to being tired and will progress pt as able. Continue POC as tolerated to improve pain and mobility.      Patient will continue to benefit from skilled PT services to address functional mobility deficits, address ROM deficits, address strength deficits, analyze and address soft tissue restrictions, analyze and cue movement patterns, analyze and modify body mechanics/ergonomics, assess and modify postural abnormalities, address imbalance/dizziness, and instruct in home and community integration to attain remaining goals. []  See Plan of Care  []  See progress note/recertification  []  See Discharge Summary         Progress towards goals / Updated goals:  Short Term Goals: To be accomplished in 2 treatments:  1. Pt will report compliance and independence to HEP to help the pt manage their pain and symptoms. Eval: established   Current: met - Pt compliant with HEP (1/9/2023)  Long Term Goals: To be accomplished in 10 treatments:  1. Pt will increase FOTO score to 51 points to improve ability to perform ADLs. Eval: 42 points  Current: assess at physician note 1/16/2023  2. Pt will increase MMT B hip flex to 4/5, B knee EXT to 3+/5 to improve ability to tolerate sit to stands. Eval: B hip flex 4-/5, B knee EXT 3-/5  Current:  3. Pt will report at least 50% improvement in symptoms to improve ability to ambulate with less pain. Eval: 0%  Current:  4. Pt will report having a little bit of difficulty with his usual household activities to improve ability to perform household chores and activities.    Eval: quite a bit of difficulty  Current:    PLAN  [x]  Upgrade activities as tolerated     [x]  Continue plan of care  [x]  Update interventions per flow sheet       []  Discharge due to:_  []  Other:_      Jorgito Chavez, PT 1/16/2023  3:46 PM    Future Appointments   Date Time Provider Mary Mccall   1/18/2023  3:30 PM Jcarlos Martinez [facility]   1/23/2023  3:30 PM Gavino Marie PTA [facility]   1/25/2023  3:30 PM Lavern Berman, PT [facility]   2/9/2023  1:20 PM Hernando Dumont MD [facility]

## 2023-01-18 ENCOUNTER — HOSPITAL ENCOUNTER (OUTPATIENT)
Dept: PHYSICAL THERAPY | Age: 73
Discharge: HOME | End: 2023-01-18
Payer: MEDICARE

## 2023-01-18 PROCEDURE — 97110 THERAPEUTIC EXERCISES: CPT

## 2023-01-18 PROCEDURE — 97112 NEUROMUSCULAR REEDUCATION: CPT

## 2023-01-18 NOTE — PROGRESS NOTES
PT DAILY TREATMENT NOTE     Patient Name: Stephany Mckeon III  Date:2023  : 1950  [x]  Patient  Verified  Payor: Joaquín Carlos / Plan: [place] MEDICARE PART A & B / Product Type: Medicare /    In time:3:20  Out time: 4:00  Total Treatment Time (min): 40  Visit #: 6 of 10    Medicare/BCBS Only   Total Timed Codes (min):  40 1:1 Treatment Time:  40       Treatment Area: Other low back pain [M54.59]    SUBJECTIVE  Pain Level (0-10 scale): 2 sitting, 7 bending  Any medication changes, allergies to medications, adverse drug reactions, diagnosis change, or new procedure performed?: [x] No    [] Yes (see summary sheet for update)  Subjective functional status/changes:   [] No changes reported  When I bend over the pain gets worse    OBJECTIVE      15 min Therapeutic Exercise:  [] See flow sheet :   Rationale: increase ROM and increase strength to improve the patients ability to increase standing/walking tolerance      25 min Neuromuscular Re-education:  []  See flow sheet :   Rationale: increase strength, improve coordination, improve balance, and increase proprioception  to improve the patients ability to strengthen core, support LB and improve function    With   [] TE   [] TA   [] neuro   [] other: Patient Education: [x] Review HEP    [] Progressed/Changed HEP based on:   [] positioning   [] body mechanics   [] transfers   [] heat/ice application    [] other:      Other Objective/Functional Measures: ex's per card     Pain Level (0-10 scale) post treatment: 2     ASSESSMENT/Changes in Function: pt seen today to address core stability and balance. Pt able to perform all ex's without increased symptoms. Static balance performed with eyes open and closed without LOB.     Patient will continue to benefit from skilled PT services to modify and progress therapeutic interventions, address functional mobility deficits, address ROM deficits, address strength deficits, analyze and address soft tissue restrictions, analyze and cue movement patterns, analyze and modify body mechanics/ergonomics, assess and modify postural abnormalities, address imbalance/dizziness, and instruct in home and community integration to attain remaining goals. []  See Plan of Care  []  See progress note/recertification  []  See Discharge Summary         Progress towards goals / Updated goals:  1. Pt will report compliance and independence to HEP to help the pt manage their pain and symptoms. Eval: established   Current: met - Pt compliant with HEP (1/9/2023)  Long Term Goals: To be accomplished in 10 treatments:  1. Pt will increase FOTO score to 51 points to improve ability to perform ADLs. Eval: 42 points  Current: assess at physician note 1/16/2023  2. Pt will increase MMT B hip flex to 4/5, B knee EXT to 3+/5 to improve ability to tolerate sit to stands. Eval: B hip flex 4-/5, B knee EXT 3-/5  Current:  3. Pt will report at least 50% improvement in symptoms to improve ability to ambulate with less pain. Eval: 0%  Current:  4. Pt will report having a little bit of difficulty with his usual household activities to improve ability to perform household chores and activities.    Eval: quite a bit of difficulty  Current:       PLAN  [x]  Upgrade activities as tolerated     []  Continue plan of care  []  Update interventions per flow sheet       []  Discharge due to:_  []  Other:_      Azucena Shane PTA 1/18/2023  3:24 PM    Future Appointments   Date Time Provider Mary Mccall   1/18/2023  3:30 PM Malini Simon [facility]   1/23/2023  3:30 PM Adwoa Reeves PTA [facility]   1/25/2023  3:30 PM Ochoa Berman, PT [facility]   2/9/2023  1:20 PM Alexandre Armas MD [facility]

## 2023-01-23 ENCOUNTER — HOSPITAL ENCOUNTER (OUTPATIENT)
Dept: PHYSICAL THERAPY | Age: 73
Discharge: HOME | End: 2023-01-23
Payer: MEDICARE

## 2023-01-23 PROCEDURE — 97112 NEUROMUSCULAR REEDUCATION: CPT

## 2023-01-23 PROCEDURE — 97110 THERAPEUTIC EXERCISES: CPT

## 2023-01-23 NOTE — PROGRESS NOTES
PT DAILY TREATMENT NOTE     Patient Name: Fara Miranda III  Date:2023  : 1950  [x]  Patient  Verified  Payor: [place] MEDICARE / Plan: [place] MEDICARE PART A & B / Product Type: Medicare /    In time:3:32  Out time:4:06  Total Treatment Time (min): 34  Visit #: 7 of 10    Medicare/BCBS Only   Total Timed Codes (min):  34 1:1 Treatment Time:  34       Treatment Area: Other low back pain [M54.59]    SUBJECTIVE  Pain Level (0-10 scale): 7  Any medication changes, allergies to medications, adverse drug reactions, diagnosis change, or new procedure performed?: [x] No    [] Yes (see summary sheet for update)  Subjective functional status/changes:   [] No changes reported  It really hurts to get in and out of my trunk. OBJECTIVE      20 min Therapeutic Exercise:  [] See flow sheet :   Rationale: increase ROM and increase strength to improve the patients ability to bend, lift and squat. 14 min Neuromuscular Re-education:  []  See flow sheet :   Rationale: improve coordination, improve balance, and increase proprioception  to improve the patients ability to maintain balance in SLS and decrease the risk of falls. With   [x] TE   [] TA   [x] neuro   [] other: Patient Education: [x] Review HEP    [] Progressed/Changed HEP based on:   [] positioning   [] body mechanics   [] transfers   [] heat/ice application    [] other:      Other Objective/Functional Measures: functional gains: getting in and out of recliner, Able to walk to his mailbox and back without pain. Functional gains: Pt notes increased pain with bending over to  keys/ light objects. Increased difficulty with left knee flexion. Pain Level (0-10 scale) post treatment: 5    ASSESSMENT/Changes in Function: Pt reports to skilled therapy session with decreased functional LE strength and standing/ walking endurance. Pt experienced increased trunk sway during romberg stance eyes closed but no LOB.  Pt was able to increase repetitions during sidelying clamshells to engage the gluteus medius and increase walking endurance. Pt experienced minor increases in dizziness when going from left sidelying to right and noted minor increases in dizziness that improved with rest. Pt is making progress towards his LTGs. Session tolerated well. Patient will continue to benefit from skilled PT services to modify and progress therapeutic interventions, address functional mobility deficits, address ROM deficits, address strength deficits, analyze and address soft tissue restrictions, analyze and cue movement patterns, analyze and modify body mechanics/ergonomics, and assess and modify postural abnormalities to attain remaining goals. [x]  See Plan of Care  []  See progress note/recertification  []  See Discharge Summary         Progress towards goals / Updated goals:  1. Pt will report compliance and independence to HEP to help the pt manage their pain and symptoms. Eval: established   Current: met - Pt compliant with HEP (1/9/2023)  Long Term Goals: To be accomplished in 10 treatments:  1. Pt will increase FOTO score to 51 points to improve ability to perform ADLs. Eval: 42 points  Current: assess at physician note 1/16/2023  2. Pt will increase MMT B hip flex to 4/5, B knee EXT to 3+/5 to improve ability to tolerate sit to stands. Eval: B hip flex 4-/5, B knee EXT 3-/5  Current: Bilateral hip flexion 4/5, Bilateral knee extension: 5/5 (1/23/23)  3. Pt will report at least 50% improvement in symptoms to improve ability to ambulate with less pain. Eval: 0%  Current: self reported improvement 50 percent (1/23/2023)  4. Pt will report having a little bit of difficulty with his usual household activities to improve ability to perform household chores and activities.    Eval: quite a bit of difficulty  Current:    PLAN  [x]  Upgrade activities as tolerated     [x]  Continue plan of care  []  Update interventions per flow sheet       [] Discharge due to:_  []  Other:_      Terry Brady, PTA 1/23/2023  3:34 PM    Future Appointments   Date Time Provider Mary Mccall   1/25/2023  3:30 PM Eliezer Berman, [facility]   2/9/2023  1:20 PM Reina Solomon MD [facility]

## 2023-01-25 ENCOUNTER — HOSPITAL ENCOUNTER (OUTPATIENT)
Dept: PHYSICAL THERAPY | Age: 73
Discharge: HOME | End: 2023-01-25
Payer: MEDICARE

## 2023-01-25 PROCEDURE — 97112 NEUROMUSCULAR REEDUCATION: CPT

## 2023-01-25 PROCEDURE — 97530 THERAPEUTIC ACTIVITIES: CPT

## 2023-01-25 NOTE — PROGRESS NOTES
PT DAILY TREATMENT NOTE     Patient Name: Ivana Buerger III  Date:2023  : 1950  [x]  Patient  Verified  Payor: [place] MEDICARE / Plan: [place] MEDICARE PART A & B / Product Type: Medicare /    In time: 3:30   Out time: 3:56  Total Treatment Time (min): 26  Visit #: 8 of 10    Medicare/BCBS Only   Total Timed Codes (min):  26 1:1 Treatment Time:  26       Treatment Area: Other low back pain [M54.59]    SUBJECTIVE  Pain Level (0-10 scale): 4  Any medication changes, allergies to medications, adverse drug reactions, diagnosis change, or new procedure performed?: [x] No    [] Yes (see summary sheet for update)  Subjective functional status/changes:   [] No changes reported  Pt states he would like to work on his leg strength because he has difficulty with putting his socks and pants. OBJECTIVE    18 min Therapeutic Activity:  [x] See flow sheet : goal assessment and FOTO   Rationale: increase ROM and increase strength to improve the patients ability to tolerate ADLs    8 min Neuromuscular Re-education:  [x]  See flow sheet :   Rationale: improve coordination, improve balance, and increase proprioception  to improve the patients ability to perform daily activities. With   [x] TE   [] TA   [x] neuro   [] other: Patient Education: [x] Review HEP    [] Progressed/Changed HEP based on:   [x] positioning   [x] body mechanics   [] transfers   [] heat/ice application    [] other:      Other Objective/Functional Measures: See goals below. AROM hip: ER left 26 degs, right 23 degs; left IR 16 degs, right IR 29 degs. MMT hip flex: right 4/5 with right knee pain, left 3+/5. Functional gains: getting in and out of recliner, able to walk to/from his mailbox without pain. Functional gains: Increased pain with bending over to  keys/light objects. Increased difficulty with left knee flexion.        Pain Level (0-10 scale) post treatment: 5 neck/low back    ASSESSMENT/Changes in Function: See re-certification. Attempted hipx3 with red tband but pt reported having increased left LE pain with this exercise and stated he felt his \"neck and back\" tightened up and felt very fatigued. Reported having some increased LBP when placing his right LE up onto the leg of the chair for the chair piliates EXT exercises. Had pt sit down in a chair and reported improvement in his symptoms and ended the session. Pt stated he did not take his pain medication today which could be contributing to his symptoms today. Therefore held the rest of the exercises. Pt reports overall improvements in mobility and ambulation tolerance. He reports he continues to have pain with bending over and weakness in his left LE with dressing. He also fatigues with exercises and has limited ROM of the B hips and limited hip strength. We will plan on continuing therapy to improve the pt's fatigue, strength, and mobility. Patient will continue to benefit from skilled PT services to modify and progress therapeutic interventions, address functional mobility deficits, address ROM deficits, address strength deficits, analyze and address soft tissue restrictions, analyze and cue movement patterns, analyze and modify body mechanics/ergonomics, and assess and modify postural abnormalities to attain remaining goals. []  See Plan of Care  []  See progress note/recertification  []  See Discharge Summary         Progress towards goals / Updated goals:  Short Term Goals: To be accomplished in 2 treatments:  1. Pt will report compliance and independence to HEP to help the pt manage their pain and symptoms. Eval: established   Current: met - Pt compliant with HEP (1/9/2023)  Long Term Goals: To be accomplished in 10 treatments:  1. Pt will increase FOTO score to 51 points to improve ability to perform ADLs. Eval: 42 points  Current: progressing 45 points 1/25/2023  2.  Pt will increase MMT B hip flex to 4/5, B knee EXT to 3+/5 to improve ability to tolerate sit to stands. Eval: B hip flex 4-/5, B knee EXT 3-/5  Current: MET, Bilateral hip flexion 4/5, Bilateral knee extension: 5/5 (1/23/23)  3. Pt will report at least 50% improvement in symptoms to improve ability to ambulate with less pain. Eval: 0%  Current: MET, improvement 50% 1/23/2023  4. Pt will report having a little bit of difficulty with his usual household activities to improve ability to perform household chores and activities.    Eval: quite a bit of difficulty  Current: progressing, reports increased ease of walking to and from his mailbox but limited with lifting objects 1/25/2023    PLAN  [x]  Upgrade activities as tolerated     [x]  Continue plan of care  [x]  Update interventions per flow sheet       []  Discharge due to:_  []  Other:_      Fabiano Brown, PT 1/25/2023  3:35 PM    Future Appointments   Date Time Provider Mary Mccall   1/25/2023  3:30 PM Curry Bijou [facility]   2/9/2023  1:20 PM Peter Medellin MD [facility]

## 2023-01-25 NOTE — PROGRESS NOTES
[facility] - [facility] Guevara Scales Krz[address]  [phone] fax    Continued Plan of Care/ Re-certification for Physical Therapy Services      Patient name: Daril Dance Start of Care: 2022   Referral source: Minerva Daigle MD : 1950                Medical Diagnosis: Other low back pain [M54.59]  Payor: Vladimir Cisneros / Plan: [place] MEDICARE PART A & B / Product Type: Medicare /  Onset Date: chronic, worsening over the past few months                Treatment Diagnosis: LBP, back pain   Prior Hospitalization: see medical history Provider#: [id number]   Medications: Verified on Patient summary List    Comorbidities: HTN, hearing impaired, bone CA (stage 4 - undergoing current treatment)   Prior Level of Function: Independent with ADLs and functional tasks with more strength and less fatigue. Visits from Start of Care: 8    Missed Visits: 1    The Plan of Care and following information is based on the patient's current status:  Goal: Pt will report compliance and independence to HEP to help the pt manage their pain and symptoms. Status at last note/certification: met - Pt compliant with HEP   Current Status: met    Goal: Pt will increase FOTO score to 51 points to improve ability to perform ADLs. Status at last note/certification: progressing 45 points   Current Status: not met    Goal: Pt will increase MMT B hip flex to 4/5, B knee EXT to 3+/5 to improve ability to tolerate sit to stands. Status at last note/certification: MET, Bilateral hip flexion 4/5, Bilateral knee extension: 5/5   Current Status: met    Goal: Pt will report at least 50% improvement in symptoms to improve ability to ambulate with less pain.   Status at last note/certification: MET, improvement 50%  Current Status: met    Goal: Pt will report having a little bit of difficulty with his usual household activities to improve ability to perform household chores and activities  Status at last note/certification: progressing, reports increased ease of walking to and from his mailbox but limited with lifting objects   Current Status: not met    Key functional changes:   AROM hip: ER left 26 degs, right 23 degs; left IR 16 degs, right IR 29 degs. MMT hip flex: right 4/5 with right knee pain, left 3+/5. Functional gains: getting in and out of recliner, able to walk to/from his mailbox without pain. Functional gains: Increased pain with bending over to  keys/light objects. Increased difficulty with left knee flexion. Problems/ barriers to goal attainment: none     Problem List: pain affecting function, decrease ROM, decrease strength, edema affecting function, impaired gait/ balance, decrease ADL/ functional abilitiies, decrease activity tolerance, decrease flexibility/ joint mobility, and decrease transfer abilities    Treatment Plan: Therapeutic exercise, Neuromuscular reeducation, Manual therapy, Therapeutic activity, Self care/home management, Electric stim unattended , Vasopneumatic device, Aquatic therapy, Gait training, Ultrasound, Mechanical traction, and Electric stim attended     Patient Goal (s) has been updated and includes: \"be able to put on my pant leg better\"     Goals for this certification period to be accomplished in 10 treatments:  1. Pt will increase FOTO score to 51 points to improve ability to perform ADLs. Re-certification: 45 points   2. Pt will report being able to lift his left LE to put on his pants with minimal to no difficulty to improve the pt's ease of dressing. Re-certification: increased difficulty with lifting his left LE for donning pants  3. Pt will improve MMT left hip flex to 4/5 to improve endurance with community ambulation.   Re-certification: 3+/5    Frequency / Duration: Patient to be seen 2 times per week for 10 treatments:    Assessment / Recommendations:  Pt reports overall improvements in mobility and ambulation tolerance. He reports he continues to have pain with bending over and weakness in his left LE with dressing. He also fatigues with exercises and has limited ROM of the B hips and limited hip strength. We will plan on continuing therapy to improve the pt's fatigue, strength, and mobility. Certification Period: 1/26/2023-2/24/2023    Rhina Rinne, PT 1/25/2023 4:09 PM    ________________________________________________________________________  I certify that the above Therapy Services are being furnished while the patient is under my care. I agree with the treatment plan and certify that this therapy is necessary. [] I have read the above and request that my patient continue as recommended.   [] I have read the above report and request that my patient continue therapy with the following changes/special instructions: ______________________________________  [] I have read the above report and request that my patient be discharged from therapy    Physician's Signature:____________Date:_________TIME:________     Murtaza Dinh MD  ** Signature, Date and Time must be completed for valid certification **    Please sign and return to [facility] - [address]  [phone] fax

## 2023-01-30 ENCOUNTER — HOSPITAL ENCOUNTER (OUTPATIENT)
Dept: PHYSICAL THERAPY | Age: 73
Discharge: HOME | End: 2023-01-30
Payer: MEDICARE

## 2023-01-30 PROCEDURE — 97110 THERAPEUTIC EXERCISES: CPT

## 2023-01-30 PROCEDURE — 97112 NEUROMUSCULAR REEDUCATION: CPT

## 2023-01-30 NOTE — PROGRESS NOTES
PT DAILY TREATMENT NOTE     Patient Name: Kyle Ramon III  Date:2023  : 1950  [x]  Patient  Verified  Payor: VA MEDICARE / Plan: [place] MEDICARE PART A & B / Product Type: Medicare /    In time:3:31  Out time:3:55  Total Treatment Time (min): 24  Visit #: 1 of 10    Medicare/BCBS Only   Total Timed Codes (min):  24 1:1 Treatment Time:  24       Treatment Area: Other low back pain [M54.59]    SUBJECTIVE  Pain Level (0-10 scale): 4  Any medication changes, allergies to medications, adverse drug reactions, diagnosis change, or new procedure performed?: [x] No    [] Yes (see summary sheet for update)  Subjective functional status/changes:   [] No changes reported  I just have my regular pain level. OBJECTIVE    10 min Therapeutic Exercise:  [] See flow sheet :   Rationale: increase ROM and increase strength to improve the patients ability to bend, lift and squat. 14 min Neuromuscular Re-education:  []  See flow sheet :   Rationale: improve coordination, improve balance, and increase proprioception  to improve the patients ability to maintain balance during SLS and decrease the risk of falls. With   [x] TE   [] TA   [x] neuro   [] other: Patient Education: [x] Review HEP    [] Progressed/Changed HEP based on:   [] positioning   [] body mechanics   [] transfers   [] heat/ice application    [] other:      Other Objective/Functional Measures: exercises per chart. Pain Level (0-10 scale) post treatment: 7    ASSESSMENT/Changes in Function: Pt reports to skilled therapy session with increased lower back pain with ADLs and decreased standing/ walking tolerance. Pt experienced minor increases in left wrist pain during trapeze crunches but noted improvement with a reduction in resistance. He experienced tremors in the right UE during standing paloff presses but states, \" it was normal for me due to my blood pressure medication\".  He experienced minor increases in bilateral hip pain during standing hip extensions but experienced relief with rest. Pt noted minor increases in light headedness during standing exercises but experienced relief with standing rest break. Pt requested session be terminated 5 minutes early, stating,\" he still has to drive home\". All post treatment modalities declined. Session tolerated with moderate increases in pain. Patient will continue to benefit from skilled PT services to modify and progress therapeutic interventions, address functional mobility deficits, address ROM deficits, address strength deficits, analyze and address soft tissue restrictions, analyze and cue movement patterns, analyze and modify body mechanics/ergonomics, and assess and modify postural abnormalities to attain remaining goals. [x]  See Plan of Care  []  See progress note/recertification  []  See Discharge Summary         Progress towards goals / Updated goals:  1. Pt will increase FOTO score to 51 points to improve ability to perform ADLs. Re-certification: 45 points   2. Pt will report being able to lift his left LE to put on his pants with minimal to no difficulty to improve the pt's ease of dressing. Re-certification: increased difficulty with lifting his left LE for donning pants  3. Pt will improve MMT left hip flex to 4/5 to improve endurance with community ambulation.   Re-certification: 3+/5    PLAN  [x]  Upgrade activities as tolerated     [x]  Continue plan of care  []  Update interventions per flow sheet       []  Discharge due to:_  []  Other:_      Gus Moon PTA 1/30/2023  3:32 PM    Future Appointments   Date Time Provider Mary Mccall   2/1/2023  3:30 PM Gerry Berman, PT [facility]   2/6/2023  3:30 PM Surya Mays PTA [facility]   2/8/2023  3:30 PM Dave, [address] [facility] [address]   2/9/2023  1:20 PM Nick Vo MD [facility]

## 2023-02-01 ENCOUNTER — HOSPITAL ENCOUNTER (OUTPATIENT)
Dept: PHYSICAL THERAPY | Age: 73
Discharge: HOME | End: 2023-02-01
Payer: MEDICARE

## 2023-02-01 PROCEDURE — 97530 THERAPEUTIC ACTIVITIES: CPT

## 2023-02-01 PROCEDURE — 97112 NEUROMUSCULAR REEDUCATION: CPT

## 2023-02-01 NOTE — PROGRESS NOTES
PT DAILY TREATMENT NOTE     Patient Name: July Zendejas III  Date:2023  : 1950  [x]  Patient  Verified  Payor: [place] MEDICARE / Plan: [place] MEDICARE PART A & B / Product Type: Medicare /    In time:3:34  Out time:4:04  Total Treatment Time (min): 30  Visit #: 2 of 10    Medicare/BCBS Only   Total Timed Codes (min):  30 1:1 Treatment Time:  30       Treatment Area: Other low back pain [M54.59]    SUBJECTIVE  Pain Level (0-10 scale): 4/10  Any medication changes, allergies to medications, adverse drug reactions, diagnosis change, or new procedure performed?: [x] No    [] Yes (see summary sheet for update)  Subjective functional status/changes:   [] No changes reported  \"I feel about the same today. I was worn out after last session. \"     OBJECTIVE    15 min Therapeutic Activity:  [] See flow sheet :   Rationale: increase ROM and increase strength to improve the patients ability to bend, lift and squat. 15 min Neuromuscular Re-education:  []  See flow sheet :   Rationale: improve coordination, improve balance, and increase proprioception  to improve the patients ability to maintain balance during SLS and decrease the risk of falls. With   [x] TE   [] TA   [x] neuro   [] other: Patient Education: [x] Review HEP    [] Progressed/Changed HEP based on:   [] positioning   [] body mechanics   [] transfers   [] heat/ice application    [] other:      Other Objective/Functional Measures: Performed standing exercises per flow sheet      Pain Level (0-10 scale) post treatment: 7/10    ASSESSMENT/Changes in Function: Pt's session focused on standing endurance, functional LE strength, and static/dynamic balance to increase safety with gait. Pt required one seated rest break throughout session due to fatigue but did not report any further issues throughout session. Pt declined modalities but did note increased pain in lower back with erect standing post session.      Patient will continue to benefit from skilled PT services to modify and progress therapeutic interventions, address functional mobility deficits, address ROM deficits, address strength deficits, analyze and address soft tissue restrictions, analyze and cue movement patterns, analyze and modify body mechanics/ergonomics, and assess and modify postural abnormalities to attain remaining goals. []  See Plan of Care  []  See progress note/recertification  []  See Discharge Summary         Progress towards goals / Updated goals:  1. Pt will increase FOTO score to 51 points to improve ability to perform ADLs. Re-certification: 45 points   Current:  2. Pt will report being able to lift his left LE to put on his pants with minimal to no difficulty to improve the pt's ease of dressing. Re-certification: increased difficulty with lifting his left LE for donning pants  Current:  3. Pt will improve MMT left hip flex to 4/5 to improve endurance with community ambulation.   Re-certification: 3+/5  Current:    PLAN  [x]  Upgrade activities as tolerated     [x]  Continue plan of care  []  Update interventions per flow sheet       []  Discharge due to:_  []  Other:_      Tigist Prader Daughtry, PT 2/1/2023  3:32 PM    Future Appointments   Date Time Provider Mary Mccall   2/6/2023  3:30 PM Corene Soho [facility]   2/8/2023  3:30 PM Dave, [address]   2/9/2023  1:20 PM Peg Roman MD [facility]

## 2023-02-06 ENCOUNTER — HOSPITAL ENCOUNTER (OUTPATIENT)
Dept: PHYSICAL THERAPY | Age: 73
Discharge: HOME | End: 2023-02-06
Payer: MEDICARE

## 2023-02-06 PROCEDURE — 97112 NEUROMUSCULAR REEDUCATION: CPT

## 2023-02-06 PROCEDURE — 97110 THERAPEUTIC EXERCISES: CPT

## 2023-02-06 NOTE — PROGRESS NOTES
PT DAILY TREATMENT NOTE     Patient Name: Oh Crenshaw III  Date:2023  : 1950  [x]  Patient  Verified  Payor: VA MEDICARE / Plan: VA MEDICARE PART A & B / Product Type: Medicare /    In time:3:28  Out time:3:59  Total Treatment Time (min): 31  Visit #: 3 of 10    Medicare/BCBS Only   Total Timed Codes (min):  31 1:1 Treatment Time:  31       Treatment Area: Other low back pain [M54.59]    SUBJECTIVE  Pain Level (0-10 scale): 6  Any medication changes, allergies to medications, adverse drug reactions, diagnosis change, or new procedure performed?: [x] No    [] Yes (see summary sheet for update)  Subjective functional status/changes:   [] No changes reported  My lower back is bothering me today. I'm not sure why. OBJECTIVE    11 min Therapeutic Exercise:  [] See flow sheet :   Rationale: increase ROM and increase strength to improve the patients ability to bend, lift and squat. 19 min Neuromuscular Re-education:  []  See flow sheet :   Rationale: increase strength, improve coordination, and increase proprioception  to improve the patients LE biomechanics. With   [x] TE   [] TA   [x] neuro   [] other: Patient Education: [x] Review HEP    [] Progressed/Changed HEP based on:   [] positioning   [] body mechanics   [] transfers   [] heat/ice application    [] other:      Other Objective/Functional Measures: exercises per chart. Pain Level (0-10 scale) post treatment: 6    ASSESSMENT/Changes in Function: Pt reports to skilled therapy session with decreased functional LE strength and increased lower back pain with ADLs. Therapist provided dues throughout session with patient demonstrating proper sequencing/ technique. Pt unable to maintain balance during shoulder extensions with march but tolerated trapeze bar crunches. Pt noted significant increases in systemic fatigue following standing exercises but experienced relief following a sitting rest break session tolerated well. Patient will continue to benefit from skilled PT services to modify and progress therapeutic interventions, address functional mobility deficits, address ROM deficits, address strength deficits, analyze and address soft tissue restrictions, analyze and cue movement patterns, analyze and modify body mechanics/ergonomics, and assess and modify postural abnormalities to attain remaining goals. [x]  See Plan of Care  []  See progress note/recertification  []  See Discharge Summary         Progress towards goals / Updated goals:  1. Pt will increase FOTO score to 51 points to improve ability to perform ADLs. Re-certification: 45 points   Current:  2. Pt will report being able to lift his left LE to put on his pants with minimal to no difficulty to improve the pt's ease of dressing. Re-certification: increased difficulty with lifting his left LE for donning pants  Current:  3. Pt will improve MMT left hip flex to 4/5 to improve endurance with community ambulation.   Re-certification: 3+/5  Current:    PLAN  [x]  Upgrade activities as tolerated     [x]  Continue plan of care  []  Update interventions per flow sheet       []  Discharge due to:_  []  Other:_      Aj Cueto PTA 2/6/2023  3:31 PM    Future Appointments   Date Time Provider Mary Mccall   2/8/2023  3:30 PM [facility]   2/9/2023  1:20 PM Ibeth Thomas MD [facility]

## 2023-02-08 ENCOUNTER — HOSPITAL ENCOUNTER (OUTPATIENT)
Dept: PHYSICAL THERAPY | Age: 73
Discharge: HOME | End: 2023-02-08
Payer: MEDICARE

## 2023-02-08 PROCEDURE — 97112 NEUROMUSCULAR REEDUCATION: CPT

## 2023-02-08 PROCEDURE — 97110 THERAPEUTIC EXERCISES: CPT

## 2023-02-08 NOTE — PROGRESS NOTES
PT DAILY TREATMENT NOTE     Patient Name: Unruly Schwartz III  Date:2023  : 1950  [x]  Patient  Verified  Payor: VA MEDICARE / Plan: VA MEDICARE PART A & B / Product Type: Medicare /    In time:3:35  Out time:4:05  Total Treatment Time (min): 30  Visit #: 4 of 10    Medicare/BCBS Only   Total Timed Codes (min):  30 1:1 Treatment Time:  30       Treatment Area: Other low back pain [M54.59]    SUBJECTIVE  Pain Level (0-10 scale): 7  Any medication changes, allergies to medications, adverse drug reactions, diagnosis change, or new procedure performed?: [x] No    [] Yes (see summary sheet for update)  Subjective functional status/changes:   [] No changes reported  My neck is sore    OBJECTIVE      10 min Therapeutic Exercise:  [] See flow sheet :   Rationale: increase ROM and increase strength to improve the patients ability to perform dressing LE's safely    20 min Neuromuscular Re-education:  []  See flow sheet :   Rationale: increase strength, improve coordination, improve balance, and increase proprioception  to improve the patients ability to safely negotiate home and community              With   [] TE   [] TA   [] neuro   [] other: Patient Education: [x] Review HEP    [] Progressed/Changed HEP based on:   [] positioning   [] body mechanics   [] transfers   [] heat/ice application    [] other:      Other Objective/Functional Measures: ex's per card     Pain Level (0-10 scale) post treatment: 7    ASSESSMENT/Changes in Function: pt seen today in clinic for LBP. Demonstrates good static balance but c/o neck pain today when trying to keep upright posture.     Patient will continue to benefit from skilled PT services to modify and progress therapeutic interventions, address functional mobility deficits, address ROM deficits, address strength deficits, analyze and address soft tissue restrictions, analyze and cue movement patterns, analyze and modify body mechanics/ergonomics, assess and modify postural abnormalities, address imbalance/dizziness, and instruct in home and community integration to attain remaining goals. []  See Plan of Care  []  See progress note/recertification  []  See Discharge Summary         Progress towards goals / Updated goals:  1. Pt will increase FOTO score to 51 points to improve ability to perform ADLs. Re-certification: 45 points   Current:  2. Pt will report being able to lift his left LE to put on his pants with minimal to no difficulty to improve the pt's ease of dressing. Re-certification: increased difficulty with lifting his left LE for donning pants  Current:  3. Pt will improve MMT left hip flex to 4/5 to improve endurance with community ambulation.   Re-certification: 3+/5  Current:       PLAN  []  Upgrade activities as tolerated     []  Continue plan of care  []  Update interventions per flow sheet       []  Discharge due to:_  []  Other:_      Lizette Larson, TONI 2/8/2023  3:47 PM    Future Appointments   Date Time Provider Mary Mccall   2/9/2023  1:20 PM Panfilo Sterling MD [facility]

## 2023-02-09 ENCOUNTER — OFFICE VISIT (OUTPATIENT)
Dept: ORTHOPEDIC SURGERY | Age: 73
End: 2023-02-09
Payer: MEDICARE

## 2023-02-09 VITALS
RESPIRATION RATE: 18 BRPM | SYSTOLIC BLOOD PRESSURE: 124 MMHG | BODY MASS INDEX: 23.62 KG/M2 | WEIGHT: 174.4 LBS | OXYGEN SATURATION: 100 % | HEIGHT: 72 IN | HEART RATE: 74 BPM | TEMPERATURE: 97.9 F | DIASTOLIC BLOOD PRESSURE: 77 MMHG

## 2023-02-09 DIAGNOSIS — M54.50 CHRONIC BILATERAL LOW BACK PAIN WITHOUT SCIATICA: ICD-10-CM

## 2023-02-09 DIAGNOSIS — M43.17 SPONDYLOLISTHESIS AT L5-S1 LEVEL: Primary | ICD-10-CM

## 2023-02-09 DIAGNOSIS — G89.29 CHRONIC BILATERAL LOW BACK PAIN WITHOUT SCIATICA: ICD-10-CM

## 2023-02-09 DIAGNOSIS — C41.9 METASTATIC BONE CANCER (HCC): ICD-10-CM

## 2023-02-09 RX ORDER — DULOXETIN HYDROCHLORIDE 30 MG/1
30 CAPSULE, DELAYED RELEASE ORAL DAILY
Qty: 30 CAPSULE | Refills: 2 | Status: SHIPPED | OUTPATIENT
Start: 2023-02-09

## 2023-02-09 NOTE — PROGRESS NOTES
Mariia Pascual [facility] [address].  [address], [address]  [place], [address] [place] [address] Street  Phone: [phone]  Fax: [phone]        Taurus King  : 1950  PCP: Audi Miramontes MD    PROGRESS NOTE      ASSESSMENT AND PLAN    Diagnoses and all orders for this visit:    1. Spondylolisthesis at L5-S1 level  -     DULoxetine (CYMBALTA) 30 mg capsule; Take 1 Capsule by mouth daily. 2. Chronic bilateral low back pain without sciatica    3. Metastatic bone cancer ([facility] [address]      Leticia Sutherland is a 67 y.o. male with ongoing mechanical low back pain, no radiculopathy. He initially thought he was having some benefit with Lyrica, no longer effective. Discussed alternate medication versus spine injection. .   Risks, benefits, alternatives, and limitations of injections discussed with patient. Will revisit next appointment if pain does not improve  DC Lyrica due to ineffectiveness and reflux  Continue PRN Tylenol  Trial of Cymbalta 30 mg daily  Advised to continue HEP as tolerated. Advised continue activity as tolerated. Follow-up and Dispositions    Return in about 7 weeks (around 3/30/2023) for medication management. HISTORY OF PRESENT ILLNESS      Irish Lombardo III is a 67 y.o. male presents for follow up of back pain. LV increased Lyrica to 75 mg TID, referred to PT. Pt reports low back pain exacerbated with bending and outward reaching. Pt usually uses a reacher to  objects off the ground. He does not have increased pain with standing and walking. Denies numbness or tingling BLE. He has intermittent numbness in his hands. Pt walks throughout his house 3-4 times a day for exercise. He takes Lyrica without benefit, and it promotes reflux. Pt also takes Tylenol as needed, which helps his neck pain. He has some benefit with physical therapy. Pt is increasing his strength and walking tolerance. Pt is compliant with his HEP.     Reports that his cancer treatment is effective and keeping his metastatic disease stable. Denies history of depression. Pain Assessment  2/9/2023   Location of Pain Back   Location Modifiers -   Severity of Pain 9   Quality of Pain Sharp   Quality of Pain Comment -   Duration of Pain -   Duration of Pain Comment -   Frequency of Pain Constant   Frequency of Pain Comment -   Date Pain First Started -   Aggravating Factors Bending   Aggravating Factors Comment -   Limiting Behavior Yes   Relieving Factors Rest   Relieving Factors Comment -   Result of Injury No   Work-Related Injury -   Type of Injury Other (Comment)   Type of Injury Comment CANCER         Onset of pain: 2/2022        Investigations:   Chest/Ab/Pelv CT 10/2022: multiple scattered sclerotic densities, largest at T4 and right hip  L MRI 8/2022: multiple lumbar and iliac bone metastasis, progression compared to 2019, mild to moderate compression right L5, listhesis L5-S1  Pelv XR AP 1V 5/19/2022 (I personally reviewed these images): osteoblastic lesions left ileum   C MRI 9/2021: multilevel DDD with mets C5, C7, T2  T MRI 9/2021: numerous metastatic lesions  DEXA 9/2021: multiple signs of uptake including the ilium bilaterally, DDD, cervical, thoracic, lumbar  Spine surgery consult: none     Treatments:  Physical therapy: 2/2023 for low back - some benefit, 7/2022 for low back - had to stop due to severe fatigue  Spinal injections: no  Spinal surgery- no  Beneficial medications: CBD gummies, Tylenol  Failed medications: Lyrica - ineffective, reflux. Work Status: retired. 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 Steroid injection L knee years ago caused 1 month of increased pain. , partial L knee arthroplasty    PHYSICAL EXAMINATION    Visit Vitals  /77 (BP 1 Location: Left upper arm, BP Patient Position: Sitting, BP Cuff Size: Adult)   Pulse 74   Temp 97.9 °F (36.6 °C) (Temporal)   Resp 18   Ht 6' (1.829 m)   Wt 174 lb 6.4 oz (79.1 kg)   SpO2 100% Comment: ra   BMI 23.65 kg/m²     Resting tremor  Tender L4  LE strength 4+/5  SLR negative              Written by Hollie Quinn, as dictated by Dudley Cox MD.

## 2023-02-09 NOTE — PROGRESS NOTES
Veldon Bamberger presents today for   Chief Complaint   Patient presents with    Back Pain       Is someone accompanying this pt? NO    Is the patient using any DME equipment during OV? NO    Depression Screening:  3 most recent PHQ Screens 12/8/2022   PHQ Not Done -   Little interest or pleasure in doing things Not at all   Feeling down, depressed, irritable, or hopeless Not at all   Total Score PHQ 2 0       Learning Assessment:  Learning Assessment 10/9/2017   PRIMARY LEARNER Patient   PRIMARY LANGUAGE ENGLISH   LEARNER PREFERENCE PRIMARY READING   ANSWERED BY Patient   RELATIONSHIP SELF       Abuse Screening:  Abuse Screening Questionnaire 10/27/2022   Do you ever feel afraid of your partner? N   Are you in a relationship with someone who physically or mentally threatens you? N   Is it safe for you to go home? Y       Fall Risk  Fall Risk Assessment, last 12 mths 2/9/2023   Able to walk? Yes   Fall in past 12 months? 1   Do you feel unsteady? 0   Are you worried about falling 1   Number of falls in past 12 months 1   Fall with injury? 0       OPIOID RISK TOOL  No flowsheet data found. Coordination of Care:  1. Have you been to the ER, urgent care clinic since your last visit? NO  Hospitalized since your last visit? NO    2. Have you seen or consulted any other health care providers outside of the [address] [place] [address] since your last visit? NO Include any pap smears or colon screening.  NO

## 2023-02-09 NOTE — LETTER
2/9/2023    Patient: Tami Bush   YOB: 1950   Date of Visit: 2/9/2023     Valentina Hernandez MD  [address] [address]  Via Fax: [phone]    Dear Valentina Hernandez MD,      Thank you for referring Mr. Nicolette Moore to [address] [place] [address] [facility] for evaluation. My notes for this consultation are attached. If you have questions, please do not hesitate to call me. I look forward to following your patient along with you.       Sincerely,    Hi Keyes MD

## 2023-04-05 ENCOUNTER — OFFICE VISIT (OUTPATIENT)
Age: 73
End: 2023-04-05
Payer: MEDICARE

## 2023-04-05 VITALS
HEIGHT: 72 IN | WEIGHT: 172.8 LBS | RESPIRATION RATE: 18 BRPM | OXYGEN SATURATION: 98 % | BODY MASS INDEX: 23.4 KG/M2 | HEART RATE: 75 BPM | TEMPERATURE: 97.6 F

## 2023-04-05 DIAGNOSIS — M62.89 MUSCLE TIGHTNESS: ICD-10-CM

## 2023-04-05 DIAGNOSIS — M43.17 SPONDYLOLISTHESIS AT L5-S1 LEVEL: Primary | ICD-10-CM

## 2023-04-05 DIAGNOSIS — C79.51 CANCER, METASTATIC TO BONE (HCC): ICD-10-CM

## 2023-04-05 PROCEDURE — G8420 CALC BMI NORM PARAMETERS: HCPCS | Performed by: PHYSICAL MEDICINE & REHABILITATION

## 2023-04-05 PROCEDURE — 1036F TOBACCO NON-USER: CPT | Performed by: PHYSICAL MEDICINE & REHABILITATION

## 2023-04-05 PROCEDURE — 1123F ACP DISCUSS/DSCN MKR DOCD: CPT | Performed by: PHYSICAL MEDICINE & REHABILITATION

## 2023-04-05 PROCEDURE — 99214 OFFICE O/P EST MOD 30 MIN: CPT | Performed by: PHYSICAL MEDICINE & REHABILITATION

## 2023-04-05 PROCEDURE — G8427 DOCREV CUR MEDS BY ELIG CLIN: HCPCS | Performed by: PHYSICAL MEDICINE & REHABILITATION

## 2023-04-05 PROCEDURE — 3017F COLORECTAL CA SCREEN DOC REV: CPT | Performed by: PHYSICAL MEDICINE & REHABILITATION

## 2023-04-05 RX ORDER — PROPRANOLOL HYDROCHLORIDE 80 MG/1
80 CAPSULE, EXTENDED RELEASE ORAL DAILY
COMMUNITY
Start: 2023-03-06

## 2023-04-05 RX ORDER — DULOXETIN HYDROCHLORIDE 30 MG/1
30 CAPSULE, DELAYED RELEASE ORAL DAILY
COMMUNITY
Start: 2023-02-09

## 2023-04-05 RX ORDER — LEVOTHYROXINE SODIUM 0.05 MG/1
50 TABLET ORAL DAILY
COMMUNITY
Start: 2023-01-22

## 2023-04-05 RX ORDER — LIDOCAINE 50 MG/G
1-2 PATCH TOPICAL EVERY 24 HOURS
Qty: 60 PATCH | Refills: 2 | Status: SHIPPED | OUTPATIENT
Start: 2023-04-05 | End: 2023-06-04

## 2023-04-05 NOTE — PROGRESS NOTES
Tevin Ladd presents today for   Chief Complaint   Patient presents with    Back Problem           Back Pain    Pain       Is someone accompanying this pt? no    Is the patient using any DME equipment during OV? no    Depression Screening:  No flowsheet data found. Learning Assessment:  No flowsheet data found. Abuse Screening:  No flowsheet data found. Fall Risk  No flowsheet data found. OPIOID RISK TOOL  No flowsheet data found. Coordination of Care:  1. Have you been to the ER, urgent care clinic since your last visit? no  Hospitalized since your last visit? no    2. Have you seen or consulted any other health care providers outside of the [address] [place] [address] since your last visit? no Include any pap smears or colon screening.  no

## 2023-04-05 NOTE — PROGRESS NOTES
[facility] [address]    [address], [address]  [place], [address] [place] [address] Street  Phone: [phone]  Fax: [phone]        [facility]  : 1950  PCP: Jorge Luis Block MD    PROGRESS NOTE      ASSESSMENT AND PLAN    Edy Singletary was seen today for back problem, back pain and pain. Diagnoses and all orders for this visit:    Spondylolisthesis at L5-S1 level  -     lidocaine (LIDODERM) 5 %; Place 1-2 patches onto the skin every 24 hours 12 hours on, 12 hours off. Cancer, metastatic to bone (HCC)  -     lidocaine (LIDODERM) 5 %; Place 1-2 patches onto the skin every 24 hours 12 hours on, 12 hours off. Caitlyn Guzman is a 67 y.o. male w/stable metastatic prostate CA and chronic mechanical lumbosacral pain. No radiculopathy. Very stiff, becoming deconditioned. Continue Ibuprofen as needed. Advised patient to take with food. Patient may begin Cymbalta 30 mg daily  Trial of Lidoderm patches  Advised to continue activity as tolerated. Consider injections if not improving . Failed PT/Lyrica    Follow-up and Dispositions    Return in about 2 months (around 2023) for medication management. HISTORY OF PRESENT ILLNESS      Ildefonso Olvera III is a 67 y.o. male presents for follow up of back pain. LV trial of Cymbalta 30 mg. His low back pain remains unchanged, exacerbated with bending. He has difficulty transitioning from a stand to sit. Denies numbness or tingling BLE. He reports paresthesias in his left wrist.     Pt states his tremor and reflux has improved when he stopped taking Lyrica. He has not started the Cymbalta-concerned about side effects. Daughter is NP and will be staying w/him next week so he can try Cymbalta. Pt currently currently takes Ibuprofen as needed. Denies side effects.         AMB PAIN ASSESSMENT 2023   Location of Pain Back   Severity of Pain 9   Quality of Pain Throbbing   Duration of Pain Persistent   Frequency of

## 2023-06-05 ENCOUNTER — OFFICE VISIT (OUTPATIENT)
Age: 73
End: 2023-06-05
Payer: COMMERCIAL

## 2023-06-05 VITALS
OXYGEN SATURATION: 99 % | DIASTOLIC BLOOD PRESSURE: 84 MMHG | SYSTOLIC BLOOD PRESSURE: 122 MMHG | TEMPERATURE: 97 F | HEART RATE: 75 BPM | BODY MASS INDEX: 25.19 KG/M2 | WEIGHT: 186 LBS | HEIGHT: 72 IN

## 2023-06-05 DIAGNOSIS — M62.89 MUSCLE TIGHTNESS: ICD-10-CM

## 2023-06-05 DIAGNOSIS — M43.17 SPONDYLOLISTHESIS AT L5-S1 LEVEL: Primary | ICD-10-CM

## 2023-06-05 DIAGNOSIS — C79.51 CANCER, METASTATIC TO BONE (HCC): ICD-10-CM

## 2023-06-05 PROCEDURE — 1123F ACP DISCUSS/DSCN MKR DOCD: CPT | Performed by: PHYSICAL MEDICINE & REHABILITATION

## 2023-06-05 PROCEDURE — 99213 OFFICE O/P EST LOW 20 MIN: CPT | Performed by: PHYSICAL MEDICINE & REHABILITATION

## 2023-06-05 NOTE — PROGRESS NOTES
Devendra Stafford presents today for   Chief Complaint   Patient presents with    Back Pain       Is someone accompanying this pt? no    Is the patient using any DME equipment during OV? Yes, cane    Coordination of Care:  1. Have you been to the ER, urgent care clinic since your last visit? no  Hospitalized since your last visit? no    2. Have you seen or consulted any other health care providers outside of the [address] [place] [address] since your last visit? Yes, oncology Include any pap smears or colon screening.  no
Onset of pain: 2/2022         Investigations:   Chest/Ab/Pelv CT 10/2022: multiple scattered sclerotic densities, largest at T4 and right hip  L MRI 8/2022: multiple lumbar and iliac bone metastasis, progression compared to 2019, mild to moderate compression right L5, listhesis L5-S1  Pelv XR AP 1V 5/19/2022 (I personally reviewed these images): osteoblastic lesions left ileum   C MRI 9/2021: multilevel DDD with mets C5, C7, T2  T MRI 9/2021: numerous metastatic lesions  DEXA 9/2021: multiple signs of uptake including the ilium bilaterally, DDD, cervical, thoracic, lumbar  Spine surgery consult: none      Treatments:  Physical therapy: 2/2023 for low back - some benefit, 7/2022 for low back - had to stop due to severe fatigue  Spinal injections: no  Spinal surgery- no  Beneficial medications: CBD gummies, Tylenol, Ibuprofen  Failed medications: Lyrica - ineffective, reflux. Work Status: retired. 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 Steroid injection L knee  years ago caused 1 month of increased pain. , partial L knee arthroplasty    PHYSICAL EXAMINATION    /84 (Site: Left Upper Arm, Position: Sitting, Cuff Size: Medium Adult)   Pulse 75   Temp 97 °F (36.1 °C) (Temporal)   Ht 6' (1.829 m)   Wt 186 lb (84.4 kg)   SpO2 99%   BMI 25.23 kg/m²     Resting tremor right hand. Ambulates with single point cane  Kyphotic  Tender lower thoracic paraspinals, L5-S1  B/L knee extension lag  LE strength intact  No edema            Written by Margarita Granado, as dictated by Mary Mcmanus MD.  This note was created using Dragon transcription software and may contain unintended errors.

## 2023-08-12 ENCOUNTER — APPOINTMENT (OUTPATIENT)
Facility: HOSPITAL | Age: 73
End: 2023-08-12
Payer: COMMERCIAL

## 2023-08-12 ENCOUNTER — HOSPITAL ENCOUNTER (EMERGENCY)
Facility: HOSPITAL | Age: 73
Discharge: HOME | End: 2023-08-13
Attending: EMERGENCY MEDICINE
Payer: COMMERCIAL
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
## 2023-08-12 DIAGNOSIS — E86.0 DEHYDRATION: ICD-10-CM

## 2023-08-12 LAB
ALBUMIN SERPL-MCNC: 3.6 G/DL (ref 3.4–5)
ALBUMIN/GLOB SERPL: 1.1 (ref 0.8–1.7)
ALP SERPL-CCNC: 57 U/L (ref 45–117)
ALT SERPL-CCNC: 25 U/L (ref 16–61)
AMORPH CRY URNS QL MICRO: ABNORMAL
AMPHET UR QL SCN: NEGATIVE
ANION GAP SERPL CALC-SCNC: 7 MMOL/L (ref 3–18)
APPEARANCE UR: ABNORMAL
AST SERPL-CCNC: 36 U/L (ref 10–38)
BACTERIA URNS QL MICRO: ABNORMAL /HPF
BARBITURATES UR QL SCN: NEGATIVE
BASOPHILS # BLD: 0 K/UL (ref 0–0.1)
BASOPHILS NFR BLD: 0 % (ref 0–2)
BENZODIAZ UR QL: NEGATIVE
BILIRUB SERPL-MCNC: 1.2 MG/DL (ref 0.2–1)
BILIRUB UR QL: NEGATIVE
BUN SERPL-MCNC: 16 MG/DL (ref 7–18)
BUN/CREAT SERPL: 15 (ref 12–20)
CALCIUM SERPL-MCNC: 8.8 MG/DL (ref 8.5–10.1)
CANNABINOIDS UR QL SCN: POSITIVE
CHLORIDE SERPL-SCNC: 102 MMOL/L (ref 100–111)
CK SERPL-CCNC: 689 U/L (ref 39–308)
CO2 SERPL-SCNC: 27 MMOL/L (ref 21–32)
COCAINE UR QL SCN: NEGATIVE
COLOR UR: ABNORMAL
CREAT SERPL-MCNC: 1.04 MG/DL (ref 0.6–1.3)
DIFFERENTIAL METHOD BLD: ABNORMAL
EOSINOPHIL # BLD: 0 K/UL (ref 0–0.4)
EOSINOPHIL NFR BLD: 0 % (ref 0–5)
EPITH CASTS URNS QL MICRO: ABNORMAL /LPF (ref 0–5)
ERYTHROCYTE [DISTWIDTH] IN BLOOD BY AUTOMATED COUNT: 12.4 % (ref 11.6–14.5)
GLOBULIN SER CALC-MCNC: 3.3 G/DL (ref 2–4)
GLUCOSE SERPL-MCNC: 101 MG/DL (ref 74–99)
GLUCOSE UR STRIP.AUTO-MCNC: NEGATIVE MG/DL
HCT VFR BLD AUTO: 37.3 % (ref 36–48)
HGB BLD-MCNC: 12.9 G/DL (ref 13–16)
HGB UR QL STRIP: NEGATIVE
IMM GRANULOCYTES # BLD AUTO: 0 K/UL (ref 0–0.04)
IMM GRANULOCYTES NFR BLD AUTO: 0 % (ref 0–0.5)
KETONES UR QL STRIP.AUTO: 15 MG/DL
LEUKOCYTE ESTERASE UR QL STRIP.AUTO: NEGATIVE
LYMPHOCYTES # BLD: 1.6 K/UL (ref 0.9–3.6)
LYMPHOCYTES NFR BLD: 14 % (ref 21–52)
Lab: ABNORMAL
MCH RBC QN AUTO: 31.5 PG (ref 24–34)
MCHC RBC AUTO-ENTMCNC: 34.6 G/DL (ref 31–37)
MCV RBC AUTO: 91.2 FL (ref 78–100)
METHADONE UR QL: NEGATIVE
MONOCYTES # BLD: 0.8 K/UL (ref 0.05–1.2)
MONOCYTES NFR BLD: 7 % (ref 3–10)
MUCOUS THREADS URNS QL MICRO: ABNORMAL /LPF
NEUTS SEG # BLD: 8.8 K/UL (ref 1.8–8)
NEUTS SEG NFR BLD: 79 % (ref 40–73)
NITRITE UR QL STRIP.AUTO: NEGATIVE
NRBC # BLD: 0 K/UL (ref 0–0.01)
NRBC BLD-RTO: 0 PER 100 WBC
OPIATES UR QL: NEGATIVE
PCP UR QL: NEGATIVE
PH UR STRIP: 5.5 (ref 5–8)
PLATELET # BLD AUTO: 167 K/UL (ref 135–420)
PMV BLD AUTO: 9.7 FL (ref 9.2–11.8)
POTASSIUM SERPL-SCNC: 3.9 MMOL/L (ref 3.5–5.5)
PROT SERPL-MCNC: 6.9 G/DL (ref 6.4–8.2)
PROT UR STRIP-MCNC: 30 MG/DL
RBC # BLD AUTO: 4.09 M/UL (ref 4.35–5.65)
RBC #/AREA URNS HPF: NEGATIVE /HPF (ref 0–5)
SODIUM SERPL-SCNC: 136 MMOL/L (ref 136–145)
SP GR UR REFRACTOMETRY: 1.03 (ref 1–1.03)
UROBILINOGEN UR QL STRIP.AUTO: 1 EU/DL (ref 0.2–1)
WBC # BLD AUTO: 11.2 K/UL (ref 4.6–13.2)
WBC URNS QL MICRO: ABNORMAL /HPF (ref 0–4)

## 2023-08-12 PROCEDURE — 85025 COMPLETE CBC W/AUTO DIFF WBC: CPT

## 2023-08-12 PROCEDURE — 70450 CT HEAD/BRAIN W/O DYE: CPT

## 2023-08-12 PROCEDURE — 73521 X-RAY EXAM HIPS BI 2 VIEWS: CPT

## 2023-08-12 PROCEDURE — 96361 HYDRATE IV INFUSION ADD-ON: CPT

## 2023-08-12 PROCEDURE — 81001 URINALYSIS AUTO W/SCOPE: CPT

## 2023-08-12 PROCEDURE — 2580000003 HC RX 258: Performed by: PHYSICIAN ASSISTANT

## 2023-08-12 PROCEDURE — 72125 CT NECK SPINE W/O DYE: CPT

## 2023-08-12 PROCEDURE — 80053 COMPREHEN METABOLIC PANEL: CPT

## 2023-08-12 PROCEDURE — 87086 URINE CULTURE/COLONY COUNT: CPT

## 2023-08-12 PROCEDURE — 82550 ASSAY OF CK (CPK): CPT

## 2023-08-12 PROCEDURE — 6370000000 HC RX 637 (ALT 250 FOR IP): Performed by: PHYSICIAN ASSISTANT

## 2023-08-12 PROCEDURE — 80307 DRUG TEST PRSMV CHEM ANLYZR: CPT

## 2023-08-12 PROCEDURE — 99284 EMERGENCY DEPT VISIT MOD MDM: CPT

## 2023-08-12 PROCEDURE — 96360 HYDRATION IV INFUSION INIT: CPT

## 2023-08-12 RX ORDER — ACETAMINOPHEN 325 MG/1
650 TABLET ORAL
Status: COMPLETED | OUTPATIENT
Start: 2023-08-12 | End: 2023-08-12

## 2023-08-12 RX ORDER — 0.9 % SODIUM CHLORIDE 0.9 %
1000 INTRAVENOUS SOLUTION INTRAVENOUS ONCE
Status: COMPLETED | OUTPATIENT
Start: 2023-08-12 | End: 2023-08-12

## 2023-08-12 RX ADMIN — SODIUM CHLORIDE 1000 ML: 900 INJECTION, SOLUTION INTRAVENOUS at 23:34

## 2023-08-12 RX ADMIN — ACETAMINOPHEN 325MG 650 MG: 325 TABLET ORAL at 20:20

## 2023-08-12 ASSESSMENT — ENCOUNTER SYMPTOMS
DIARRHEA: 0
SHORTNESS OF BREATH: 0
COUGH: 0
ABDOMINAL PAIN: 0
RHINORRHEA: 0
VOMITING: 0
NAUSEA: 0
ABDOMINAL DISTENTION: 0

## 2023-08-12 NOTE — ED NOTES
Blood work collected  Urinal given for patient to give UA  Patient alert  Family at bedside     Esperanza Quinones, [address]  08/12/23 1942

## 2023-08-12 NOTE — ED PROVIDER NOTES
EMERGENCY DEPARTMENT HISTORY AND PHYSICAL EXAM      Patient Name: Merary Zhao  MRN: [id number]  YOB: 1950  Provider: Miranda Torres PA-C  PCP: Burgess Raghu MD   Time/Date of evaluation: 6:33 PM EDT on 8/12/23    History of Presenting Illness     Chief Complaint   Patient presents with    Fall       History Provided By: Patient     History Michaelcesari Yuridiakwasi):   Merary Zhao is a 68 y.o. male with a PMHX of thyroiditis, cancer, hypertension, hypothyroidism renal insufficiency  who presents to the emergency department  by EMS C/O of fall. Patient states that he was trying to get up from bed when he slipped from the bed to the floor and he could not get up for approximately 12 hours. Patient denies hitting his head or neck or loss of consciousness, and states that the only pain he has are abrasions to his bilateral elbows from trying to crawl across the floor. Daughter was at bedside, and stated that when they found him he appeared to be hallucinating, they stated he was alert and oriented, however he was answering questions in an unusual manner. Stating that he saw bugs on the wall that were not there.   Patient adamantly denies this, and is currently alert and oriented and answering questions appropriately    Past History     Past Medical History:  Past Medical History:   Diagnosis Date    Arthritis     Cancer ([address])     Elevated PSA     Hypertension     Hypothyroid     Personal history of prostate cancer     Pituitary tumor     Renal insufficiency     S/P rotator cuff repair 10/15/2015    Sleep apnea     patient states resolved after surgery       Past Surgical History:  Past Surgical History:   Procedure Laterality Date    ANESTH,SURGERY OF SHOULDER      HEENT      surgery for sleep apnea    HERNIA REPAIR      x2    SHOULDER ARTHROSCOPY Left 2016    SHOULDER ARTHROSCOPY Right 1/13/14    Dr. Saima Obrien  10/21/2016    Prostate Biopsy with Dr. Zenia Barthel reproducible pain on palpation, no guarding or rigidity, no signs of bruising or trauma   Musculoskeletal:      Cervical back: Normal range of motion and neck supple. Comments: Patient has passive range of motion of both hips bilaterally, flexion extension at the knee joint, dorsi flexion and plantarflexion, +2 dorsal pedal pulses, no signs of obvious trauma or bony deformities to the lower extremities, no abrasions or bruising      As far as the upper extremities, patient has passive range of motion of both shoulders, flexion extension at the elbow joint, flexion extension at the wrist joint, and once again no obvious bony deformities or trauma      Patient does have bilateral superficial abrasions to the elbow joint   Skin:     General: Skin is warm. Neurological:      General: No focal deficit present. Mental Status: He is alert and oriented to person, place, and time. Psychiatric:         Mood and Affect: Mood normal.            Diagnostic Study Results     Labs:  No results found for this or any previous visit (from the past 12 hour(s)). Radiologic Studies  CT HEAD WO CONTRAST    (Results Pending)   CT CERVICAL SPINE WO CONTRAST    (Results Pending)   XR HIP BILATERAL W AP PELVIS (2 VIEWS)    (Results Pending)       EKG interpretation: (Preliminary)      Procedures     Procedures    ED Course     6:33 PM EDT I (Leana Cui PA-C) am the first provider for this patient. Initial assessment performed. I reviewed the vital signs, available nursing notes, past medical history, past surgical history, family history and social history. The patients presenting problems have been discussed, and they are in agreement with the care plan formulated and outlined with them. I have encouraged them to ask questions as they arise throughout their visit. Records Reviewed: Nursing Notes and Old Medical Records    Is this patient to be included in the SEP-1 core measure?  No Exclusion criteria - the patient

## 2023-08-13 VITALS
RESPIRATION RATE: 18 BRPM | OXYGEN SATURATION: 98 % | HEIGHT: 72 IN | DIASTOLIC BLOOD PRESSURE: 78 MMHG | WEIGHT: 186 LBS | TEMPERATURE: 98.2 F | HEART RATE: 88 BPM | SYSTOLIC BLOOD PRESSURE: 144 MMHG | BODY MASS INDEX: 25.19 KG/M2

## 2023-08-13 PROCEDURE — 96360 HYDRATION IV INFUSION INIT: CPT

## 2023-08-13 PROCEDURE — 2580000003 HC RX 258: Performed by: PHYSICIAN ASSISTANT

## 2023-08-13 PROCEDURE — 96361 HYDRATE IV INFUSION ADD-ON: CPT

## 2023-08-13 RX ORDER — 0.9 % SODIUM CHLORIDE 0.9 %
1000 INTRAVENOUS SOLUTION INTRAVENOUS ONCE
Status: COMPLETED | OUTPATIENT
Start: 2023-08-13 | End: 2023-08-13

## 2023-08-13 RX ADMIN — SODIUM CHLORIDE 1000 ML: 900 INJECTION, SOLUTION INTRAVENOUS at 00:12

## 2023-08-13 NOTE — ED NOTES
Patient discharged by provider, home health care consult put in by provider  Patient discharged home with daughter and more family at home to help. All discharge instructions explained at this time.      Alyssa Smith RN  08/13/23 9590

## 2023-08-13 NOTE — ED NOTES
Patient back from [address] [place] [address] on ED monitor  Urine obtained     Michael Goodman RN  08/12/23 3173

## 2023-08-13 NOTE — ED NOTES
Sat pt up from a laying position with minimum assistance. Stayed sitting for approx 1 minute before attempting to stand. Pt was able to stand but stated he was too weak to stand on his at this time. Pt was returned to a seated position  and then assisted back into bed. Pt stated he was too weak to stand on his own at this time. Daughter at bedside did request that if he is discharged this evening if she could have help getting him into her vehicle. She states she does have help at home to remove him from the vehicle.        Sarah Edward  08/12/23 9580

## 2023-08-13 NOTE — ED NOTES
Pt attempted to use the urinal with a minor spillage on his person, RN and ED tech with the assistance of another RN preformed hygenic care on the patietn.      Brennen Mayo  08/13/23 0140

## 2023-08-13 NOTE — ED NOTES
8:15 PM Assumed care of the patient at this time. Discussed with MALLIKA Murphy concerning patient Aj Carter III, standard discussion of reason for visit, HPI, ROS, PE, and current results available. Recommendation for obtaining pending labs and CT imaging followed by bedside re-evaluation to dispo the pt. Tahira Beltran PA-C      10:45 PM CT imaging resulted negative for acute process. Blastic metastasis noted to the cervical spine. I have seen and reevaluated the patient at bedside. Patient was aware of this metastasis already. The patient's daughter is at bedside as well. There is a good follow-up plan as well as a very good support system for helping the patient with his activities of daily living at home. The patient is still unable to ambulate in the ED but he states this is due to generalized weakness from being on the floor for such a long period of time. Patient denies pain and states that the Tylenol he was given earlier has helped tremendously. Patient does appear to be dehydrated we will give IV fluids and reassess. Tahira Beltran PA-C     2:00 AM patient reevaluated and states he feels much better and would like to be discharged. We will plan to discharge patient with close outpatient follow-up. Consults for case management and home health were placed as well. Return precautions advised. Patient agrees. Tahira Beltran PA-C     Disposition: d/c        Dictation disclaimer:  Please note that this dictation was completed with [employer], the computer voice recognition software. Quite often unanticipated grammatical, syntax, homophones, and other interpretive errors are inadvertently transcribed by the computer software. Please disregard these errors. Please excuse any errors that have escaped final proofreading.        Glenda Leggett  08/13/23 1820

## 2023-08-14 LAB
BACTERIA SPEC CULT: NORMAL
CC UR VC: NORMAL
SERVICE CMNT-IMP: NORMAL

## 2023-12-25 ENCOUNTER — HOSPITAL ENCOUNTER (INPATIENT)
Facility: HOSPITAL | Age: 73
LOS: 7 days | Discharge: NURSING, REHAB OR LONG-TERM CARE | DRG: 853 | End: 2024-01-03
Attending: EMERGENCY MEDICINE | Admitting: INTERNAL MEDICINE
Payer: MEDICARE

## 2023-12-25 ENCOUNTER — APPOINTMENT (OUTPATIENT)
Facility: HOSPITAL | Age: 73
DRG: 853 | End: 2023-12-25
Payer: MEDICARE

## 2023-12-25 ENCOUNTER — HOSPITAL ENCOUNTER (EMERGENCY)
Facility: HOSPITAL | Age: 73
Discharge: HOME | DRG: 853 | End: 2023-12-28
Payer: MEDICARE

## 2023-12-25 DIAGNOSIS — K80.50 CHOLEDOCHOLITHIASIS: Primary | ICD-10-CM

## 2023-12-25 DIAGNOSIS — I48.91 ATRIAL FIBRILLATION WITH RAPID VENTRICULAR RESPONSE (HCC): ICD-10-CM

## 2023-12-25 DIAGNOSIS — A41.9 SEPTICEMIA (HCC): ICD-10-CM

## 2023-12-25 DIAGNOSIS — K81.0 ACUTE CHOLECYSTITIS: ICD-10-CM

## 2023-12-25 DIAGNOSIS — M43.17 SPONDYLOLISTHESIS AT L5-S1 LEVEL: ICD-10-CM

## 2023-12-25 LAB
ALBUMIN SERPL-MCNC: 3.1 G/DL (ref 3.4–5)
ALBUMIN/GLOB SERPL: 1.1 (ref 0.8–1.7)
ALP SERPL-CCNC: 52 U/L (ref 45–117)
ALT SERPL-CCNC: 16 U/L (ref 16–61)
ANION GAP SERPL CALC-SCNC: 8 MMOL/L (ref 3–18)
APPEARANCE UR: CLEAR
AST SERPL-CCNC: 19 U/L (ref 10–38)
BACTERIA URNS QL MICRO: NEGATIVE /HPF
BASOPHILS # BLD: 0 K/UL (ref 0–0.1)
BASOPHILS NFR BLD: 0 % (ref 0–2)
BILIRUB SERPL-MCNC: 1 MG/DL (ref 0.2–1)
BILIRUB UR QL: NEGATIVE
BUN SERPL-MCNC: 18 MG/DL (ref 7–18)
BUN/CREAT SERPL: 20 (ref 12–20)
CALCIUM SERPL-MCNC: 8.6 MG/DL (ref 8.5–10.1)
CHLORIDE SERPL-SCNC: 94 MMOL/L (ref 100–111)
CO2 SERPL-SCNC: 27 MMOL/L (ref 21–32)
COLOR UR: ABNORMAL
CREAT SERPL-MCNC: 0.89 MG/DL (ref 0.6–1.3)
DIFFERENTIAL METHOD BLD: ABNORMAL
EKG ATRIAL RATE: 80 BPM
EKG DIAGNOSIS: NORMAL
EKG P AXIS: 46 DEGREES
EKG P-R INTERVAL: 146 MS
EKG Q-T INTERVAL: 380 MS
EKG QRS DURATION: 76 MS
EKG QTC CALCULATION (BAZETT): 438 MS
EKG R AXIS: 26 DEGREES
EKG T AXIS: 58 DEGREES
EKG VENTRICULAR RATE: 80 BPM
EOSINOPHIL # BLD: 0 K/UL (ref 0–0.4)
EOSINOPHIL NFR BLD: 0 % (ref 0–5)
EPITH CASTS URNS QL MICRO: NEGATIVE /LPF (ref 0–5)
ERYTHROCYTE [DISTWIDTH] IN BLOOD BY AUTOMATED COUNT: 11.8 % (ref 11.6–14.5)
FLUAV RNA SPEC QL NAA+PROBE: NOT DETECTED
FLUBV RNA SPEC QL NAA+PROBE: NOT DETECTED
GLOBULIN SER CALC-MCNC: 2.7 G/DL (ref 2–4)
GLUCOSE BLD STRIP.AUTO-MCNC: 123 MG/DL (ref 70–110)
GLUCOSE SERPL-MCNC: 108 MG/DL (ref 74–99)
GLUCOSE UR STRIP.AUTO-MCNC: NEGATIVE MG/DL
HCT VFR BLD AUTO: 33.5 % (ref 36–48)
HGB BLD-MCNC: 11.8 G/DL (ref 13–16)
HGB UR QL STRIP: NEGATIVE
HYALINE CASTS URNS QL MICRO: NORMAL /LPF (ref 0–2)
IMM GRANULOCYTES # BLD AUTO: 0 K/UL (ref 0–0.04)
IMM GRANULOCYTES NFR BLD AUTO: 0 % (ref 0–0.5)
KETONES UR QL STRIP.AUTO: 40 MG/DL
LACTATE SERPL-SCNC: 1.1 MMOL/L (ref 0.4–2)
LEUKOCYTE ESTERASE UR QL STRIP.AUTO: ABNORMAL
LIPASE SERPL-CCNC: 32 U/L (ref 13–75)
LYMPHOCYTES # BLD: 1 K/UL (ref 0.9–3.6)
LYMPHOCYTES NFR BLD: 7 % (ref 21–52)
MCH RBC QN AUTO: 32 PG (ref 24–34)
MCHC RBC AUTO-ENTMCNC: 35.2 G/DL (ref 31–37)
MCV RBC AUTO: 90.8 FL (ref 78–100)
MONOCYTES # BLD: 0.7 K/UL (ref 0.05–1.2)
MONOCYTES NFR BLD: 5 % (ref 3–10)
NEUTS SEG # BLD: 12.6 K/UL (ref 1.8–8)
NEUTS SEG NFR BLD: 87 % (ref 40–73)
NITRITE UR QL STRIP.AUTO: NEGATIVE
NRBC # BLD: 0 K/UL (ref 0–0.01)
NRBC BLD-RTO: 0 PER 100 WBC
PH UR STRIP: 5.5 (ref 5–8)
PLATELET # BLD AUTO: 178 K/UL (ref 135–420)
PMV BLD AUTO: 10 FL (ref 9.2–11.8)
POTASSIUM SERPL-SCNC: 3.8 MMOL/L (ref 3.5–5.5)
PROCALCITONIN SERPL-MCNC: 1.84 NG/ML
PROT SERPL-MCNC: 5.8 G/DL (ref 6.4–8.2)
PROT UR STRIP-MCNC: ABNORMAL MG/DL
RBC # BLD AUTO: 3.69 M/UL (ref 4.35–5.65)
RBC #/AREA URNS HPF: NEGATIVE /HPF (ref 0–5)
SARS-COV-2 RNA RESP QL NAA+PROBE: NOT DETECTED
SODIUM SERPL-SCNC: 129 MMOL/L (ref 136–145)
SP GR UR REFRACTOMETRY: 1.02 (ref 1–1.03)
TROPONIN I SERPL HS-MCNC: 6 NG/L (ref 0–78)
UROBILINOGEN UR QL STRIP.AUTO: 1 EU/DL (ref 0.2–1)
WBC # BLD AUTO: 14.4 K/UL (ref 4.6–13.2)
WBC URNS QL MICRO: NORMAL /HPF (ref 0–4)

## 2023-12-25 PROCEDURE — 83690 ASSAY OF LIPASE: CPT

## 2023-12-25 PROCEDURE — 87636 SARSCOV2 & INF A&B AMP PRB: CPT

## 2023-12-25 PROCEDURE — 96376 TX/PRO/DX INJ SAME DRUG ADON: CPT

## 2023-12-25 PROCEDURE — 76705 ECHO EXAM OF ABDOMEN: CPT

## 2023-12-25 PROCEDURE — 82962 GLUCOSE BLOOD TEST: CPT

## 2023-12-25 PROCEDURE — 80053 COMPREHEN METABOLIC PANEL: CPT

## 2023-12-25 PROCEDURE — 87076 CULTURE ANAEROBE IDENT EACH: CPT

## 2023-12-25 PROCEDURE — 99222 1ST HOSP IP/OBS MODERATE 55: CPT | Performed by: COLON & RECTAL SURGERY

## 2023-12-25 PROCEDURE — 83605 ASSAY OF LACTIC ACID: CPT

## 2023-12-25 PROCEDURE — 74183 MRI ABD W/O CNTR FLWD CNTR: CPT

## 2023-12-25 PROCEDURE — 6360000004 HC RX CONTRAST MEDICATION: Performed by: EMERGENCY MEDICINE

## 2023-12-25 PROCEDURE — 2580000003 HC RX 258: Performed by: EMERGENCY MEDICINE

## 2023-12-25 PROCEDURE — 85025 COMPLETE CBC W/AUTO DIFF WBC: CPT

## 2023-12-25 PROCEDURE — 99285 EMERGENCY DEPT VISIT HI MDM: CPT

## 2023-12-25 PROCEDURE — 87040 BLOOD CULTURE FOR BACTERIA: CPT

## 2023-12-25 PROCEDURE — 93010 ELECTROCARDIOGRAM REPORT: CPT | Performed by: INTERNAL MEDICINE

## 2023-12-25 PROCEDURE — 71250 CT THORAX DX C-: CPT

## 2023-12-25 PROCEDURE — 71045 X-RAY EXAM CHEST 1 VIEW: CPT

## 2023-12-25 PROCEDURE — 81001 URINALYSIS AUTO W/SCOPE: CPT

## 2023-12-25 PROCEDURE — 6360000002 HC RX W HCPCS: Performed by: EMERGENCY MEDICINE

## 2023-12-25 PROCEDURE — 93005 ELECTROCARDIOGRAM TRACING: CPT | Performed by: EMERGENCY MEDICINE

## 2023-12-25 PROCEDURE — 96374 THER/PROPH/DIAG INJ IV PUSH: CPT

## 2023-12-25 PROCEDURE — A9577 INJ MULTIHANCE: HCPCS | Performed by: EMERGENCY MEDICINE

## 2023-12-25 PROCEDURE — 84484 ASSAY OF TROPONIN QUANT: CPT

## 2023-12-25 PROCEDURE — 84145 PROCALCITONIN (PCT): CPT

## 2023-12-25 PROCEDURE — 87154 CUL TYP ID BLD PTHGN 6+ TRGT: CPT

## 2023-12-25 RX ORDER — FOLIC ACID 1 MG/1
1 TABLET ORAL DAILY
COMMUNITY

## 2023-12-25 RX ORDER — LANOLIN ALCOHOL/MO/W.PET/CERES
1000 CREAM (GRAM) TOPICAL DAILY
COMMUNITY

## 2023-12-25 RX ORDER — 0.9 % SODIUM CHLORIDE 0.9 %
30 INTRAVENOUS SOLUTION INTRAVENOUS ONCE
Status: COMPLETED | OUTPATIENT
Start: 2023-12-25 | End: 2023-12-25

## 2023-12-25 RX ORDER — METRONIDAZOLE 500 MG/100ML
500 INJECTION, SOLUTION INTRAVENOUS ONCE
Status: COMPLETED | OUTPATIENT
Start: 2023-12-25 | End: 2023-12-25

## 2023-12-25 RX ADMIN — SODIUM CHLORIDE 2421 ML: 9 INJECTION, SOLUTION INTRAVENOUS at 11:47

## 2023-12-25 RX ADMIN — CEFTRIAXONE SODIUM 2000 MG: 2 INJECTION, POWDER, FOR SOLUTION INTRAMUSCULAR; INTRAVENOUS at 13:16

## 2023-12-25 RX ADMIN — GADOBENATE DIMEGLUMINE 15 ML: 529 INJECTION, SOLUTION INTRAVENOUS at 18:35

## 2023-12-25 RX ADMIN — METRONIDAZOLE 500 MG: 500 INJECTION, SOLUTION INTRAVENOUS at 14:13

## 2023-12-25 RX ADMIN — CEFEPIME 2000 MG: 2 INJECTION, POWDER, FOR SOLUTION INTRAVENOUS at 14:07

## 2023-12-25 ASSESSMENT — PAIN - FUNCTIONAL ASSESSMENT: PAIN_FUNCTIONAL_ASSESSMENT: 0-10

## 2023-12-25 ASSESSMENT — PAIN DESCRIPTION - ORIENTATION: ORIENTATION: RIGHT;LEFT

## 2023-12-25 ASSESSMENT — PAIN DESCRIPTION - LOCATION: LOCATION: HIP

## 2023-12-25 ASSESSMENT — PAIN SCALES - GENERAL: PAINLEVEL_OUTOF10: 3

## 2023-12-25 NOTE — ED NOTES
Medicated per MAR.    Repositioned in bed for comfort.  Provided with diaper.    Daughter at bedside.    Side rails raised x2. Call bell and bedside table within reach.

## 2023-12-25 NOTE — ED TRIAGE NOTES
Pt came via EMS stretcher from home, c/o bilateral hip and rib pain. Per EMS, pt reports \"not feeling good\". Pt stated he was trying to reach for his phone, bent down and dropped on his knees.    Denies LOC. Denies chest pain, SOB, dizziness or nausea.    Pt has hx of Prostate CA with metastasis.    Noted with resting hand tremors.    Placed on monitor. A&Ox4.    Not taking blood thinner.

## 2023-12-25 NOTE — ED NOTES
MRI screening form filled up by daughter, faxed to MRI.    Pt is resting comfortably on stretcher, not in distress.   Respirations even and unlabored.    Side rails raised x2. Call bell and bedside table within reach.

## 2023-12-25 NOTE — ED NOTES
Pt stated he is receiving hormonal therapy for his prostate CA and his next schedule is on  Dec.29, 2023.    Daughter at bedside.

## 2023-12-25 NOTE — ED NOTES
Pt is asleep at this time, respirations even and unlabored. Side rails x2.    Wife is at the bedside.    Daughter Debbi Norton left, can be reached for emergency/update: [phone]

## 2023-12-25 NOTE — ED NOTES
2 sets of blood cultures obtained and sent to lab including lactic acid and covid/flu swab.    Pt used urinal, urine sample obtained and sent to lab.    Taken to CT by transport.

## 2023-12-26 LAB
ALBUMIN SERPL-MCNC: 2.6 G/DL (ref 3.4–5)
ALBUMIN/GLOB SERPL: 0.8 (ref 0.8–1.7)
ALP SERPL-CCNC: 47 U/L (ref 45–117)
ALT SERPL-CCNC: 16 U/L (ref 16–61)
ANION GAP SERPL CALC-SCNC: 8 MMOL/L (ref 3–18)
ANION GAP SERPL CALC-SCNC: 9 MMOL/L (ref 3–18)
AST SERPL-CCNC: 25 U/L (ref 10–38)
BASOPHILS # BLD: 0 K/UL (ref 0–0.1)
BASOPHILS NFR BLD: 0 % (ref 0–2)
BILIRUB DIRECT SERPL-MCNC: 0.2 MG/DL (ref 0–0.2)
BILIRUB SERPL-MCNC: 0.7 MG/DL (ref 0.2–1)
BUN SERPL-MCNC: 19 MG/DL (ref 7–18)
BUN SERPL-MCNC: 19 MG/DL (ref 7–18)
BUN/CREAT SERPL: 23 (ref 12–20)
BUN/CREAT SERPL: 25 (ref 12–20)
CALCIUM SERPL-MCNC: 7.7 MG/DL (ref 8.5–10.1)
CALCIUM SERPL-MCNC: 7.8 MG/DL (ref 8.5–10.1)
CHLORIDE SERPL-SCNC: 100 MMOL/L (ref 100–111)
CHLORIDE SERPL-SCNC: 101 MMOL/L (ref 100–111)
CHP ED QC CHECK: NORMAL
CO2 SERPL-SCNC: 22 MMOL/L (ref 21–32)
CO2 SERPL-SCNC: 22 MMOL/L (ref 21–32)
CREAT SERPL-MCNC: 0.77 MG/DL (ref 0.6–1.3)
CREAT SERPL-MCNC: 0.84 MG/DL (ref 0.6–1.3)
DIFFERENTIAL METHOD BLD: ABNORMAL
EOSINOPHIL # BLD: 0 K/UL (ref 0–0.4)
EOSINOPHIL NFR BLD: 0 % (ref 0–5)
ERYTHROCYTE [DISTWIDTH] IN BLOOD BY AUTOMATED COUNT: 11.9 % (ref 11.6–14.5)
ERYTHROCYTE [DISTWIDTH] IN BLOOD BY AUTOMATED COUNT: 11.9 % (ref 11.6–14.5)
GLOBULIN SER CALC-MCNC: 3.2 G/DL (ref 2–4)
GLUCOSE SERPL-MCNC: 104 MG/DL (ref 74–99)
GLUCOSE SERPL-MCNC: 108 MG/DL (ref 74–99)
HCT VFR BLD AUTO: 29.7 % (ref 36–48)
HCT VFR BLD AUTO: 31.6 % (ref 36–48)
HGB BLD-MCNC: 10.5 G/DL (ref 13–16)
HGB BLD-MCNC: 11.1 G/DL (ref 13–16)
IMM GRANULOCYTES # BLD AUTO: 0.1 K/UL (ref 0–0.04)
IMM GRANULOCYTES NFR BLD AUTO: 1 % (ref 0–0.5)
LACTATE SERPL-SCNC: 1.3 MMOL/L (ref 0.4–2)
LACTATE SERPL-SCNC: 1.7 MMOL/L (ref 0.4–2)
LYMPHOCYTES # BLD: 1 K/UL (ref 0.9–3.6)
LYMPHOCYTES NFR BLD: 6 % (ref 21–52)
MCH RBC QN AUTO: 32.3 PG (ref 24–34)
MCH RBC QN AUTO: 32.4 PG (ref 24–34)
MCHC RBC AUTO-ENTMCNC: 35.1 G/DL (ref 31–37)
MCHC RBC AUTO-ENTMCNC: 35.4 G/DL (ref 31–37)
MCV RBC AUTO: 91.7 FL (ref 78–100)
MCV RBC AUTO: 91.9 FL (ref 78–100)
MONOCYTES # BLD: 1.1 K/UL (ref 0.05–1.2)
MONOCYTES NFR BLD: 6 % (ref 3–10)
NEUTS SEG # BLD: 14.7 K/UL (ref 1.8–8)
NEUTS SEG NFR BLD: 87 % (ref 40–73)
NRBC # BLD: 0 K/UL (ref 0–0.01)
NRBC # BLD: 0 K/UL (ref 0–0.01)
NRBC BLD-RTO: 0 PER 100 WBC
NRBC BLD-RTO: 0 PER 100 WBC
PLATELET # BLD AUTO: 141 K/UL (ref 135–420)
PLATELET # BLD AUTO: 155 K/UL (ref 135–420)
PMV BLD AUTO: 10.1 FL (ref 9.2–11.8)
PMV BLD AUTO: 9.5 FL (ref 9.2–11.8)
POTASSIUM SERPL-SCNC: 3.6 MMOL/L (ref 3.5–5.5)
POTASSIUM SERPL-SCNC: 4 MMOL/L (ref 3.5–5.5)
PROT SERPL-MCNC: 5.8 G/DL (ref 6.4–8.2)
RBC # BLD AUTO: 3.24 M/UL (ref 4.35–5.65)
RBC # BLD AUTO: 3.44 M/UL (ref 4.35–5.65)
SODIUM SERPL-SCNC: 130 MMOL/L (ref 136–145)
SODIUM SERPL-SCNC: 132 MMOL/L (ref 136–145)
TROPONIN I SERPL HS-MCNC: 13 NG/L (ref 0–78)
WBC # BLD AUTO: 15.9 K/UL (ref 4.6–13.2)
WBC # BLD AUTO: 16.9 K/UL (ref 4.6–13.2)

## 2023-12-26 PROCEDURE — 82248 BILIRUBIN DIRECT: CPT

## 2023-12-26 PROCEDURE — 6360000002 HC RX W HCPCS: Performed by: EMERGENCY MEDICINE

## 2023-12-26 PROCEDURE — 2580000003 HC RX 258: Performed by: EMERGENCY MEDICINE

## 2023-12-26 PROCEDURE — 85025 COMPLETE CBC W/AUTO DIFF WBC: CPT

## 2023-12-26 PROCEDURE — 80053 COMPREHEN METABOLIC PANEL: CPT

## 2023-12-26 PROCEDURE — 83605 ASSAY OF LACTIC ACID: CPT

## 2023-12-26 PROCEDURE — 84484 ASSAY OF TROPONIN QUANT: CPT

## 2023-12-26 PROCEDURE — 96375 TX/PRO/DX INJ NEW DRUG ADDON: CPT

## 2023-12-26 PROCEDURE — 93005 ELECTROCARDIOGRAM TRACING: CPT | Performed by: EMERGENCY MEDICINE

## 2023-12-26 PROCEDURE — 94761 N-INVAS EAR/PLS OXIMETRY MLT: CPT

## 2023-12-26 PROCEDURE — 84443 ASSAY THYROID STIM HORMONE: CPT

## 2023-12-26 PROCEDURE — 85027 COMPLETE CBC AUTOMATED: CPT

## 2023-12-26 PROCEDURE — 96365 THER/PROPH/DIAG IV INF INIT: CPT

## 2023-12-26 PROCEDURE — 96366 THER/PROPH/DIAG IV INF ADDON: CPT

## 2023-12-26 PROCEDURE — 99222 1ST HOSP IP/OBS MODERATE 55: CPT | Performed by: EMERGENCY MEDICINE

## 2023-12-26 PROCEDURE — 2500000003 HC RX 250 WO HCPCS: Performed by: EMERGENCY MEDICINE

## 2023-12-26 RX ORDER — PANTOPRAZOLE SODIUM 40 MG/1
40 TABLET, DELAYED RELEASE ORAL EVERY 24 HOURS
Status: DISCONTINUED | OUTPATIENT
Start: 2023-12-26 | End: 2024-01-03 | Stop reason: HOSPADM

## 2023-12-26 RX ORDER — MORPHINE SULFATE 2 MG/ML
2 INJECTION, SOLUTION INTRAMUSCULAR; INTRAVENOUS
Status: COMPLETED | OUTPATIENT
Start: 2023-12-26 | End: 2023-12-26

## 2023-12-26 RX ORDER — SODIUM CHLORIDE 9 MG/ML
INJECTION, SOLUTION INTRAVENOUS CONTINUOUS
Status: DISCONTINUED | OUTPATIENT
Start: 2023-12-26 | End: 2023-12-27

## 2023-12-26 RX ORDER — DIGOXIN 0.25 MG/ML
250 INJECTION INTRAMUSCULAR; INTRAVENOUS
Status: COMPLETED | OUTPATIENT
Start: 2023-12-26 | End: 2023-12-26

## 2023-12-26 RX ORDER — DILTIAZEM HYDROCHLORIDE 5 MG/ML
10 INJECTION INTRAVENOUS ONCE
Status: COMPLETED | OUTPATIENT
Start: 2023-12-26 | End: 2023-12-26

## 2023-12-26 RX ORDER — 0.9 % SODIUM CHLORIDE 0.9 %
1000 INTRAVENOUS SOLUTION INTRAVENOUS ONCE
Status: COMPLETED | OUTPATIENT
Start: 2023-12-26 | End: 2023-12-26

## 2023-12-26 RX ORDER — ASPIRIN 81 MG/1
162 TABLET, CHEWABLE ORAL EVERY 24 HOURS
Status: DISCONTINUED | OUTPATIENT
Start: 2023-12-26 | End: 2023-12-27

## 2023-12-26 RX ORDER — METRONIDAZOLE 500 MG/100ML
500 INJECTION, SOLUTION INTRAVENOUS EVERY 8 HOURS
Status: DISCONTINUED | OUTPATIENT
Start: 2023-12-26 | End: 2023-12-28

## 2023-12-26 RX ADMIN — MORPHINE SULFATE 2 MG: 2 INJECTION, SOLUTION INTRAMUSCULAR; INTRAVENOUS at 16:29

## 2023-12-26 RX ADMIN — METRONIDAZOLE 500 MG: 500 INJECTION, SOLUTION INTRAVENOUS at 22:22

## 2023-12-26 RX ADMIN — METRONIDAZOLE 500 MG: 500 INJECTION, SOLUTION INTRAVENOUS at 14:37

## 2023-12-26 RX ADMIN — CEFEPIME 2000 MG: 2 INJECTION, POWDER, FOR SOLUTION INTRAVENOUS at 15:40

## 2023-12-26 RX ADMIN — DIGOXIN 250 MCG: 0.25 INJECTION INTRAMUSCULAR; INTRAVENOUS at 22:19

## 2023-12-26 RX ADMIN — SODIUM CHLORIDE 5 MG/HR: 900 INJECTION, SOLUTION INTRAVENOUS at 19:55

## 2023-12-26 RX ADMIN — DILTIAZEM HYDROCHLORIDE 10 MG: 5 INJECTION INTRAVENOUS at 19:51

## 2023-12-26 RX ADMIN — CEFEPIME 2000 MG: 2 INJECTION, POWDER, FOR SOLUTION INTRAVENOUS at 06:41

## 2023-12-26 RX ADMIN — METRONIDAZOLE 500 MG: 500 INJECTION, SOLUTION INTRAVENOUS at 06:43

## 2023-12-26 RX ADMIN — SODIUM CHLORIDE 1000 ML: 9 INJECTION, SOLUTION INTRAVENOUS at 19:00

## 2023-12-26 RX ADMIN — SODIUM CHLORIDE: 9 INJECTION, SOLUTION INTRAVENOUS at 07:09

## 2023-12-26 NOTE — ED NOTES
Daughter continues to be at bedside and aware of patient being transferred to [facility]. Patient is resting. Even rise and fall of chest noted. Patient easily aroused. Patient denies pain at this time.

## 2023-12-26 NOTE — ED NOTES
Received report from MARIA T Wilder. Patient awaiting room at [facility] for ERCP

## 2023-12-26 NOTE — ED PROVIDER NOTES
the patient's presentation including the CT and labs.  Findings consistent with acute cholecystitis with some common bile duct stones but no hepatic enzyme elevation and normal lipase.  He recommends I speak with GI.  Patient might be appropriate for admission here with antibiotics and to be sent out for ERCP with eventual cholecystectomy.  Broadening my antibiotic coverage [SH]   1337 Changing antibiotics to cefepime and Flagyl for the sepsis antibiotic protocol. [SH]   1436 Spoke with the hospitalist after discussion with Dr. Pineda of GI.  She would prefer that the patient be transferred for ERCP if possible.  I am working to coordinate that. [SH]   1512 Update from the transfer center, [facility] cannot take the patient due to bed availability.  [facility] will not consider the patient until MRCP has been performed.  I have asked to engage the [facility] [SH]   1624 Updated Dr. Barreto regarding no beds at [facility] or [facility].  Aware [facility] until MRCP has been obtained.  That is ordered [SH]   1855 Sh -> cb acute cholecystitis and CBD stones on CT scan with leukocytosis antibiotics given.  Surgery GI and hospitalist aware, pending MRCP.  Sepsis orders are in and fluids are in. [CB]   1914 Dr Smith saw and evaluated the patient.  I saw and evaluated the patient.  Reviewed labs.  Patient will need ERCP at some point but not tonight and not emergently he is hemodynamically stable, eventual cholecystectomy.  I understand from signout that Dr. Pineda is aware as well and comfortable with this plan. [CB]   2005 Discussed with Dr. Pineda gastroenterology, reviewed findings on the MRI of large stones in the CBD.  He feels the patient should be transferred. [CB]   2031 Transfer center is reaching out now that we have results of MRCP [CB]   2054 Transfer center called, [place] is declined due to no capacity.  [place] does not have 
answer questions.    I am the Primary Clinician of Record.    FINAL IMPRESSION      1. Choledocholithiasis    2. Acute cholecystitis    3. Septicemia (HCC)    4. Atrial fibrillation with rapid ventricular response (HCC)        DISPOSITION/PLAN   DISPOSITION Decision To Transfer 12/26/2023 09:00:35 AM      PATIENT REFERRED TO:  No follow-up provider specified.    DISCHARGE MEDICATIONS:  Current Discharge Medication List          DISCONTINUED MEDICATIONS:  Current Discharge Medication List                 (Please note that portions of this note were completed with a voice recognition program.  Efforts were made to edit the dictations but occasionally words aremis-transcribed.)    Miguel Angel Kidd MD (electronically signed)          Miguel Angel Kidd MD  12/27/23 2795

## 2023-12-26 NOTE — ED NOTES
Daughter left, son at bedside. Patient continues to be NPO and resting. Patient denies pain at this time. Patient easily aroused. Even rise and fall of chest noted.

## 2023-12-27 ENCOUNTER — ANESTHESIA (OUTPATIENT)
Facility: HOSPITAL | Age: 73
End: 2023-12-27
Payer: MEDICARE

## 2023-12-27 ENCOUNTER — ANESTHESIA EVENT (OUTPATIENT)
Facility: HOSPITAL | Age: 73
End: 2023-12-27
Payer: MEDICARE

## 2023-12-27 ENCOUNTER — APPOINTMENT (OUTPATIENT)
Facility: HOSPITAL | Age: 73
DRG: 853 | End: 2023-12-27
Payer: MEDICARE

## 2023-12-27 PROBLEM — I48.91 ATRIAL FIBRILLATION WITH RAPID VENTRICULAR RESPONSE (HCC): Status: ACTIVE | Noted: 2023-12-27

## 2023-12-27 PROBLEM — K80.50 CHOLEDOCHOLITHIASIS: Status: ACTIVE | Noted: 2023-12-27

## 2023-12-27 PROBLEM — K81.0 ACUTE CHOLECYSTITIS: Status: ACTIVE | Noted: 2023-12-27

## 2023-12-27 PROBLEM — Z90.49 HISTORY OF LAPAROSCOPIC CHOLECYSTECTOMY: Status: ACTIVE | Noted: 2023-12-27

## 2023-12-27 LAB
ACCESSION NUMBER, LLC1M: NORMAL
ACINETOBACTER CALCOAC BAUMANNII COMPLEX BY PCR: NOT DETECTED
ALBUMIN SERPL-MCNC: 2.1 G/DL (ref 3.4–5)
ALBUMIN SERPL-MCNC: 2.2 G/DL (ref 3.4–5)
ALBUMIN/GLOB SERPL: 0.7 (ref 0.8–1.7)
ALBUMIN/GLOB SERPL: 0.9 (ref 0.8–1.7)
ALP SERPL-CCNC: 47 U/L (ref 45–117)
ALP SERPL-CCNC: 49 U/L (ref 45–117)
ALT SERPL-CCNC: 12 U/L (ref 16–61)
ALT SERPL-CCNC: 13 U/L (ref 16–61)
ANION GAP SERPL CALC-SCNC: 3 MMOL/L (ref 3–18)
ANION GAP SERPL CALC-SCNC: 6 MMOL/L (ref 3–18)
AST SERPL-CCNC: 23 U/L (ref 10–38)
AST SERPL-CCNC: 30 U/L (ref 10–38)
B FRAGILIS DNA BLD POS QL NAA+NON-PROBE: NOT DETECTED
BASOPHILS # BLD: 0 K/UL (ref 0–0.1)
BASOPHILS # BLD: 0 K/UL (ref 0–0.1)
BASOPHILS NFR BLD: 0 % (ref 0–2)
BASOPHILS NFR BLD: 0 % (ref 0–2)
BILIRUB DIRECT SERPL-MCNC: 0.2 MG/DL (ref 0–0.2)
BILIRUB SERPL-MCNC: 0.4 MG/DL (ref 0.2–1)
BILIRUB SERPL-MCNC: 0.5 MG/DL (ref 0.2–1)
BIOFIRE TEST COMMENT: NORMAL
BUN SERPL-MCNC: 18 MG/DL (ref 7–18)
BUN SERPL-MCNC: 18 MG/DL (ref 7–18)
BUN/CREAT SERPL: 24 (ref 12–20)
BUN/CREAT SERPL: 25 (ref 12–20)
C ALBICANS DNA BLD POS QL NAA+NON-PROBE: NOT DETECTED
C AURIS DNA BLD POS QL NAA+NON-PROBE: NOT DETECTED
C GATTII+NEOFOR DNA BLD POS QL NAA+N-PRB: NOT DETECTED
C GLABRATA DNA BLD POS QL NAA+NON-PROBE: NOT DETECTED
C KRUSEI DNA BLD POS QL NAA+NON-PROBE: NOT DETECTED
C PARAP DNA BLD POS QL NAA+NON-PROBE: NOT DETECTED
C TROPICLS DNA BLD POS QL NAA+NON-PROBE: NOT DETECTED
CALCIUM SERPL-MCNC: 7.2 MG/DL (ref 8.5–10.1)
CALCIUM SERPL-MCNC: 7.2 MG/DL (ref 8.5–10.1)
CHLORIDE SERPL-SCNC: 103 MMOL/L (ref 100–111)
CHLORIDE SERPL-SCNC: 107 MMOL/L (ref 100–111)
CO2 SERPL-SCNC: 22 MMOL/L (ref 21–32)
CO2 SERPL-SCNC: 25 MMOL/L (ref 21–32)
CREAT SERPL-MCNC: 0.73 MG/DL (ref 0.6–1.3)
CREAT SERPL-MCNC: 0.76 MG/DL (ref 0.6–1.3)
DIFFERENTIAL METHOD BLD: ABNORMAL
DIFFERENTIAL METHOD BLD: ABNORMAL
E CLOAC COMP DNA BLD POS NAA+NON-PROBE: NOT DETECTED
E COLI DNA BLD POS QL NAA+NON-PROBE: NOT DETECTED
E FAECALIS DNA BLD POS QL NAA+NON-PROBE: NOT DETECTED
E FAECIUM DNA BLD POS QL NAA+NON-PROBE: NOT DETECTED
EKG ATRIAL RATE: 227 BPM
EKG ATRIAL RATE: 264 BPM
EKG DIAGNOSIS: NORMAL
EKG DIAGNOSIS: NORMAL
EKG P AXIS: 133 DEGREES
EKG P-R INTERVAL: 108 MS
EKG Q-T INTERVAL: 172 MS
EKG Q-T INTERVAL: 364 MS
EKG QRS DURATION: 78 MS
EKG QRS DURATION: 82 MS
EKG QTC CALCULATION (BAZETT): 284 MS
EKG QTC CALCULATION (BAZETT): 561 MS
EKG R AXIS: 137 DEGREES
EKG R AXIS: 144 DEGREES
EKG T AXIS: 180 DEGREES
EKG T AXIS: 235 DEGREES
EKG VENTRICULAR RATE: 143 BPM
EKG VENTRICULAR RATE: 164 BPM
ENTEROBACTERALES DNA BLD POS NAA+N-PRB: NOT DETECTED
EOSINOPHIL # BLD: 0 K/UL (ref 0–0.4)
EOSINOPHIL # BLD: 0 K/UL (ref 0–0.4)
EOSINOPHIL NFR BLD: 0 % (ref 0–5)
EOSINOPHIL NFR BLD: 0 % (ref 0–5)
ERYTHROCYTE [DISTWIDTH] IN BLOOD BY AUTOMATED COUNT: 12.1 % (ref 11.6–14.5)
ERYTHROCYTE [DISTWIDTH] IN BLOOD BY AUTOMATED COUNT: 12.2 % (ref 11.6–14.5)
GLOBULIN SER CALC-MCNC: 2.4 G/DL (ref 2–4)
GLOBULIN SER CALC-MCNC: 3.2 G/DL (ref 2–4)
GLUCOSE SERPL-MCNC: 107 MG/DL (ref 74–99)
GLUCOSE SERPL-MCNC: 118 MG/DL (ref 74–99)
GP B STREP DNA BLD POS QL NAA+NON-PROBE: NOT DETECTED
HAEM INFLU DNA BLD POS QL NAA+NON-PROBE: NOT DETECTED
HCT VFR BLD AUTO: 28.9 % (ref 36–48)
HCT VFR BLD AUTO: 29.9 % (ref 36–48)
HGB BLD-MCNC: 10.2 G/DL (ref 13–16)
HGB BLD-MCNC: 10.5 G/DL (ref 13–16)
IMM GRANULOCYTES # BLD AUTO: 0.1 K/UL (ref 0–0.04)
IMM GRANULOCYTES # BLD AUTO: 0.1 K/UL (ref 0–0.04)
IMM GRANULOCYTES NFR BLD AUTO: 1 % (ref 0–0.5)
IMM GRANULOCYTES NFR BLD AUTO: 1 % (ref 0–0.5)
K OXYTOCA DNA BLD POS QL NAA+NON-PROBE: NOT DETECTED
KLEBSIELLA SP DNA BLD POS QL NAA+NON-PRB: NOT DETECTED
KLEBSIELLA SP DNA BLD POS QL NAA+NON-PRB: NOT DETECTED
L MONOCYTOG DNA BLD POS QL NAA+NON-PROBE: NOT DETECTED
LYMPHOCYTES # BLD: 0.8 K/UL (ref 0.9–3.6)
LYMPHOCYTES # BLD: 0.9 K/UL (ref 0.9–3.6)
LYMPHOCYTES NFR BLD: 6 % (ref 21–52)
LYMPHOCYTES NFR BLD: 7 % (ref 21–52)
MAGNESIUM SERPL-MCNC: 1.9 MG/DL (ref 1.6–2.6)
MCH RBC QN AUTO: 32.4 PG (ref 24–34)
MCH RBC QN AUTO: 32.6 PG (ref 24–34)
MCHC RBC AUTO-ENTMCNC: 35.1 G/DL (ref 31–37)
MCHC RBC AUTO-ENTMCNC: 35.3 G/DL (ref 31–37)
MCV RBC AUTO: 92.3 FL (ref 78–100)
MCV RBC AUTO: 92.3 FL (ref 78–100)
MONOCYTES # BLD: 0.9 K/UL (ref 0.05–1.2)
MONOCYTES # BLD: 1.1 K/UL (ref 0.05–1.2)
MONOCYTES NFR BLD: 7 % (ref 3–10)
MONOCYTES NFR BLD: 8 % (ref 3–10)
N MEN DNA BLD POS QL NAA+NON-PROBE: NOT DETECTED
NEUTS SEG # BLD: 11.4 K/UL (ref 1.8–8)
NEUTS SEG # BLD: 11.9 K/UL (ref 1.8–8)
NEUTS SEG NFR BLD: 85 % (ref 40–73)
NEUTS SEG NFR BLD: 85 % (ref 40–73)
NRBC # BLD: 0 K/UL (ref 0–0.01)
NRBC # BLD: 0 K/UL (ref 0–0.01)
NRBC BLD-RTO: 0 PER 100 WBC
NRBC BLD-RTO: 0 PER 100 WBC
P AERUGINOSA DNA BLD POS NAA+NON-PROBE: NOT DETECTED
PLATELET # BLD AUTO: 130 K/UL (ref 135–420)
PLATELET # BLD AUTO: 142 K/UL (ref 135–420)
PMV BLD AUTO: 9.3 FL (ref 9.2–11.8)
PMV BLD AUTO: 9.6 FL (ref 9.2–11.8)
POTASSIUM SERPL-SCNC: 3.3 MMOL/L (ref 3.5–5.5)
POTASSIUM SERPL-SCNC: 3.5 MMOL/L (ref 3.5–5.5)
PROT SERPL-MCNC: 4.6 G/DL (ref 6.4–8.2)
PROT SERPL-MCNC: 5.3 G/DL (ref 6.4–8.2)
PROTEUS SP DNA BLD POS QL NAA+NON-PROBE: NOT DETECTED
RBC # BLD AUTO: 3.13 M/UL (ref 4.35–5.65)
RBC # BLD AUTO: 3.24 M/UL (ref 4.35–5.65)
RESISTANT GENE TARGETS: NORMAL
S AUREUS DNA BLD POS QL NAA+NON-PROBE: NOT DETECTED
S AUREUS+CONS DNA BLD POS NAA+NON-PROBE: NOT DETECTED
S EPIDERMIDIS DNA BLD POS QL NAA+NON-PRB: NOT DETECTED
S LUGDUNENSIS DNA BLD POS QL NAA+NON-PRB: NOT DETECTED
S MALTOPHILIA DNA BLD POS QL NAA+NON-PRB: NOT DETECTED
S MARCESCENS DNA BLD POS NAA+NON-PROBE: NOT DETECTED
S PNEUM DNA BLD POS QL NAA+NON-PROBE: NOT DETECTED
S PYO DNA BLD POS QL NAA+NON-PROBE: NOT DETECTED
SALMONELLA DNA BLD POS QL NAA+NON-PROBE: NOT DETECTED
SODIUM SERPL-SCNC: 132 MMOL/L (ref 136–145)
SODIUM SERPL-SCNC: 134 MMOL/L (ref 136–145)
STREPTOCOCCUS DNA BLD POS NAA+NON-PROBE: NOT DETECTED
TSH SERPL DL<=0.05 MIU/L-ACNC: 2.01 UIU/ML (ref 0.36–3.74)
WBC # BLD AUTO: 13.3 K/UL (ref 4.6–13.2)
WBC # BLD AUTO: 13.9 K/UL (ref 4.6–13.2)

## 2023-12-27 PROCEDURE — 2500000003 HC RX 250 WO HCPCS: Performed by: NURSE ANESTHETIST, CERTIFIED REGISTERED

## 2023-12-27 PROCEDURE — 7100000001 HC PACU RECOVERY - ADDTL 15 MIN: Performed by: SURGERY

## 2023-12-27 PROCEDURE — 2580000003 HC RX 258: Performed by: ANESTHESIOLOGY

## 2023-12-27 PROCEDURE — 99232 SBSQ HOSP IP/OBS MODERATE 35: CPT | Performed by: SURGERY

## 2023-12-27 PROCEDURE — 6370000000 HC RX 637 (ALT 250 FOR IP): Performed by: INTERNAL MEDICINE

## 2023-12-27 PROCEDURE — 2580000003 HC RX 258: Performed by: SURGERY

## 2023-12-27 PROCEDURE — 6360000002 HC RX W HCPCS: Performed by: ANESTHESIOLOGY

## 2023-12-27 PROCEDURE — 6360000002 HC RX W HCPCS: Performed by: SURGERY

## 2023-12-27 PROCEDURE — 3600000012 HC SURGERY LEVEL 2 ADDTL 15MIN: Performed by: SURGERY

## 2023-12-27 PROCEDURE — 74300 X-RAY BILE DUCTS/PANCREAS: CPT

## 2023-12-27 PROCEDURE — 93010 ELECTROCARDIOGRAM REPORT: CPT | Performed by: INTERNAL MEDICINE

## 2023-12-27 PROCEDURE — 47563 LAPARO CHOLECYSTECTOMY/GRAPH: CPT | Performed by: SURGERY

## 2023-12-27 PROCEDURE — 85025 COMPLETE CBC W/AUTO DIFF WBC: CPT

## 2023-12-27 PROCEDURE — 88304 TISSUE EXAM BY PATHOLOGIST: CPT

## 2023-12-27 PROCEDURE — 96366 THER/PROPH/DIAG IV INF ADDON: CPT

## 2023-12-27 PROCEDURE — 1100000000 HC RM PRIVATE

## 2023-12-27 PROCEDURE — 6370000000 HC RX 637 (ALT 250 FOR IP): Performed by: EMERGENCY MEDICINE

## 2023-12-27 PROCEDURE — 6360000002 HC RX W HCPCS: Performed by: EMERGENCY MEDICINE

## 2023-12-27 PROCEDURE — 80053 COMPREHEN METABOLIC PANEL: CPT

## 2023-12-27 PROCEDURE — 2500000003 HC RX 250 WO HCPCS: Performed by: SURGERY

## 2023-12-27 PROCEDURE — 7100000000 HC PACU RECOVERY - FIRST 15 MIN: Performed by: SURGERY

## 2023-12-27 PROCEDURE — 6360000002 HC RX W HCPCS: Performed by: NURSE ANESTHETIST, CERTIFIED REGISTERED

## 2023-12-27 PROCEDURE — 2580000003 HC RX 258: Performed by: EMERGENCY MEDICINE

## 2023-12-27 PROCEDURE — 3600000002 HC SURGERY LEVEL 2 BASE: Performed by: SURGERY

## 2023-12-27 PROCEDURE — 83735 ASSAY OF MAGNESIUM: CPT

## 2023-12-27 PROCEDURE — A4217 STERILE WATER/SALINE, 500 ML: HCPCS | Performed by: SURGERY

## 2023-12-27 PROCEDURE — BF502Z0 OTHER IMAGING OF BILE DUCTS USING FLUORESCING AGENT, INTRAOPERATIVE: ICD-10-PCS | Performed by: SURGERY

## 2023-12-27 PROCEDURE — 82248 BILIRUBIN DIRECT: CPT

## 2023-12-27 PROCEDURE — 0FT44ZZ RESECTION OF GALLBLADDER, PERCUTANEOUS ENDOSCOPIC APPROACH: ICD-10-PCS | Performed by: SURGERY

## 2023-12-27 PROCEDURE — 3700000001 HC ADD 15 MINUTES (ANESTHESIA): Performed by: SURGERY

## 2023-12-27 PROCEDURE — 96365 THER/PROPH/DIAG IV INF INIT: CPT

## 2023-12-27 PROCEDURE — 6370000000 HC RX 637 (ALT 250 FOR IP)

## 2023-12-27 PROCEDURE — 99232 SBSQ HOSP IP/OBS MODERATE 35: CPT | Performed by: INTERNAL MEDICINE

## 2023-12-27 PROCEDURE — 3700000000 HC ANESTHESIA ATTENDED CARE: Performed by: SURGERY

## 2023-12-27 PROCEDURE — 6360000004 HC RX CONTRAST MEDICATION: Performed by: SURGERY

## 2023-12-27 PROCEDURE — 2709999900 HC NON-CHARGEABLE SUPPLY: Performed by: SURGERY

## 2023-12-27 DEVICE — CLIP INT XL YEL POLYMER HEM-O-LOK WECK: Type: IMPLANTABLE DEVICE | Site: ABDOMEN | Status: FUNCTIONAL

## 2023-12-27 RX ORDER — ONDANSETRON 2 MG/ML
INJECTION INTRAMUSCULAR; INTRAVENOUS PRN
Status: DISCONTINUED | OUTPATIENT
Start: 2023-12-27 | End: 2023-12-27 | Stop reason: SDUPTHER

## 2023-12-27 RX ORDER — SUCCINYLCHOLINE/SOD CL,ISO/PF 100 MG/5ML
SYRINGE (ML) INTRAVENOUS PRN
Status: DISCONTINUED | OUTPATIENT
Start: 2023-12-27 | End: 2023-12-27 | Stop reason: SDUPTHER

## 2023-12-27 RX ORDER — SODIUM CHLORIDE 0.9 % (FLUSH) 0.9 %
5-40 SYRINGE (ML) INJECTION EVERY 12 HOURS SCHEDULED
Status: DISCONTINUED | OUTPATIENT
Start: 2023-12-27 | End: 2023-12-27 | Stop reason: HOSPADM

## 2023-12-27 RX ORDER — FAMOTIDINE 20 MG/1
TABLET, FILM COATED ORAL
Status: COMPLETED
Start: 2023-12-27 | End: 2023-12-27

## 2023-12-27 RX ORDER — ROCURONIUM BROMIDE 10 MG/ML
INJECTION, SOLUTION INTRAVENOUS PRN
Status: DISCONTINUED | OUTPATIENT
Start: 2023-12-27 | End: 2023-12-27 | Stop reason: SDUPTHER

## 2023-12-27 RX ORDER — FENTANYL CITRATE 50 UG/ML
INJECTION, SOLUTION INTRAMUSCULAR; INTRAVENOUS PRN
Status: DISCONTINUED | OUTPATIENT
Start: 2023-12-27 | End: 2023-12-27 | Stop reason: SDUPTHER

## 2023-12-27 RX ORDER — SODIUM CHLORIDE 9 MG/ML
INJECTION, SOLUTION INTRAVENOUS PRN
Status: DISCONTINUED | OUTPATIENT
Start: 2023-12-27 | End: 2023-12-27 | Stop reason: HOSPADM

## 2023-12-27 RX ORDER — ONDANSETRON 2 MG/ML
4 INJECTION INTRAMUSCULAR; INTRAVENOUS EVERY 6 HOURS PRN
Status: DISCONTINUED | OUTPATIENT
Start: 2023-12-27 | End: 2024-01-03 | Stop reason: HOSPADM

## 2023-12-27 RX ORDER — POTASSIUM CHLORIDE 7.45 MG/ML
10 INJECTION INTRAVENOUS
Status: DISPENSED | OUTPATIENT
Start: 2023-12-27 | End: 2023-12-27

## 2023-12-27 RX ORDER — EPHEDRINE SULFATE/0.9% NACL/PF 50 MG/5 ML
SYRINGE (ML) INTRAVENOUS PRN
Status: DISCONTINUED | OUTPATIENT
Start: 2023-12-27 | End: 2023-12-27 | Stop reason: SDUPTHER

## 2023-12-27 RX ORDER — GLYCOPYRROLATE 0.2 MG/ML
INJECTION INTRAMUSCULAR; INTRAVENOUS PRN
Status: DISCONTINUED | OUTPATIENT
Start: 2023-12-27 | End: 2023-12-27 | Stop reason: SDUPTHER

## 2023-12-27 RX ORDER — SODIUM CHLORIDE, SODIUM LACTATE, POTASSIUM CHLORIDE, CALCIUM CHLORIDE 600; 310; 30; 20 MG/100ML; MG/100ML; MG/100ML; MG/100ML
INJECTION, SOLUTION INTRAVENOUS CONTINUOUS
Status: DISCONTINUED | OUTPATIENT
Start: 2023-12-27 | End: 2023-12-28

## 2023-12-27 RX ORDER — MORPHINE SULFATE 2 MG/ML
2 INJECTION, SOLUTION INTRAMUSCULAR; INTRAVENOUS EVERY 4 HOURS PRN
Status: DISCONTINUED | OUTPATIENT
Start: 2023-12-27 | End: 2024-01-03 | Stop reason: HOSPADM

## 2023-12-27 RX ORDER — FENTANYL CITRATE 50 UG/ML
50 INJECTION, SOLUTION INTRAMUSCULAR; INTRAVENOUS EVERY 5 MIN PRN
Status: DISCONTINUED | OUTPATIENT
Start: 2023-12-27 | End: 2023-12-27 | Stop reason: HOSPADM

## 2023-12-27 RX ORDER — IOPAMIDOL 408 MG/ML
INJECTION, SOLUTION INTRATHECAL PRN
Status: DISCONTINUED | OUTPATIENT
Start: 2023-12-27 | End: 2023-12-27 | Stop reason: HOSPADM

## 2023-12-27 RX ORDER — HEPARIN SODIUM 5000 [USP'U]/ML
5000 INJECTION, SOLUTION INTRAVENOUS; SUBCUTANEOUS EVERY 8 HOURS SCHEDULED
Status: DISCONTINUED | OUTPATIENT
Start: 2023-12-27 | End: 2023-12-30

## 2023-12-27 RX ORDER — NEOSTIGMINE METHYLSULFATE 1 MG/ML
INJECTION, SOLUTION INTRAVENOUS PRN
Status: DISCONTINUED | OUTPATIENT
Start: 2023-12-27 | End: 2023-12-27 | Stop reason: SDUPTHER

## 2023-12-27 RX ORDER — FAMOTIDINE 20 MG/1
20 TABLET, FILM COATED ORAL ONCE
Status: COMPLETED | OUTPATIENT
Start: 2023-12-27 | End: 2023-12-27

## 2023-12-27 RX ORDER — LIDOCAINE HYDROCHLORIDE 20 MG/ML
INJECTION, SOLUTION EPIDURAL; INFILTRATION; INTRACAUDAL; PERINEURAL PRN
Status: DISCONTINUED | OUTPATIENT
Start: 2023-12-27 | End: 2023-12-27 | Stop reason: SDUPTHER

## 2023-12-27 RX ORDER — HYDROCODONE BITARTRATE AND ACETAMINOPHEN 5; 325 MG/1; MG/1
1 TABLET ORAL EVERY 6 HOURS PRN
Status: DISCONTINUED | OUTPATIENT
Start: 2023-12-27 | End: 2024-01-03 | Stop reason: HOSPADM

## 2023-12-27 RX ORDER — PHENYLEPHRINE HCL IN 0.9% NACL 1 MG/10 ML
SYRINGE (ML) INTRAVENOUS PRN
Status: DISCONTINUED | OUTPATIENT
Start: 2023-12-27 | End: 2023-12-27 | Stop reason: SDUPTHER

## 2023-12-27 RX ORDER — SODIUM CHLORIDE 0.9 % (FLUSH) 0.9 %
5-40 SYRINGE (ML) INJECTION PRN
Status: DISCONTINUED | OUTPATIENT
Start: 2023-12-27 | End: 2023-12-27 | Stop reason: HOSPADM

## 2023-12-27 RX ORDER — PROPOFOL 10 MG/ML
INJECTION, EMULSION INTRAVENOUS PRN
Status: DISCONTINUED | OUTPATIENT
Start: 2023-12-27 | End: 2023-12-27 | Stop reason: SDUPTHER

## 2023-12-27 RX ORDER — BUPIVACAINE HYDROCHLORIDE AND EPINEPHRINE 2.5; 5 MG/ML; UG/ML
INJECTION, SOLUTION EPIDURAL; INFILTRATION; INTRACAUDAL; PERINEURAL PRN
Status: DISCONTINUED | OUTPATIENT
Start: 2023-12-27 | End: 2023-12-27 | Stop reason: HOSPADM

## 2023-12-27 RX ADMIN — ASPIRIN 162 MG: 81 TABLET, CHEWABLE ORAL at 00:14

## 2023-12-27 RX ADMIN — Medication 3 MG: at 15:44

## 2023-12-27 RX ADMIN — CEFEPIME 2000 MG: 2 INJECTION, POWDER, FOR SOLUTION INTRAVENOUS at 00:09

## 2023-12-27 RX ADMIN — HEPARIN SODIUM 5000 UNITS: 5000 INJECTION INTRAVENOUS; SUBCUTANEOUS at 21:49

## 2023-12-27 RX ADMIN — CEFEPIME 2000 MG: 2 INJECTION, POWDER, FOR SOLUTION INTRAVENOUS at 21:53

## 2023-12-27 RX ADMIN — ROCURONIUM BROMIDE 5 MG: 10 INJECTION, SOLUTION INTRAVENOUS at 14:39

## 2023-12-27 RX ADMIN — FENTANYL CITRATE 50 MCG: 50 INJECTION INTRAMUSCULAR; INTRAVENOUS at 17:38

## 2023-12-27 RX ADMIN — METRONIDAZOLE 500 MG: 500 INJECTION, SOLUTION INTRAVENOUS at 06:37

## 2023-12-27 RX ADMIN — FENTANYL CITRATE 50 MCG: 50 INJECTION INTRAMUSCULAR; INTRAVENOUS at 14:39

## 2023-12-27 RX ADMIN — LIDOCAINE HYDROCHLORIDE 60 MG: 20 INJECTION, SOLUTION EPIDURAL; INFILTRATION; INTRACAUDAL; PERINEURAL at 14:39

## 2023-12-27 RX ADMIN — FAMOTIDINE 20 MG: 20 TABLET, FILM COATED ORAL at 13:47

## 2023-12-27 RX ADMIN — DILTIAZEM HYDROCHLORIDE 30 MG: 30 TABLET ORAL at 21:52

## 2023-12-27 RX ADMIN — Medication 5 MG: at 14:58

## 2023-12-27 RX ADMIN — ONDANSETRON 4 MG: 2 INJECTION INTRAMUSCULAR; INTRAVENOUS at 15:40

## 2023-12-27 RX ADMIN — SODIUM CHLORIDE, POTASSIUM CHLORIDE, SODIUM LACTATE AND CALCIUM CHLORIDE: 600; 310; 30; 20 INJECTION, SOLUTION INTRAVENOUS at 13:49

## 2023-12-27 RX ADMIN — HEPARIN SODIUM 5000 UNITS: 5000 INJECTION INTRAVENOUS; SUBCUTANEOUS at 21:54

## 2023-12-27 RX ADMIN — POTASSIUM CHLORIDE 10 MEQ: 7.46 INJECTION, SOLUTION INTRAVENOUS at 11:43

## 2023-12-27 RX ADMIN — Medication 50 MCG: at 14:55

## 2023-12-27 RX ADMIN — ROCURONIUM BROMIDE 30 MG: 10 INJECTION, SOLUTION INTRAVENOUS at 14:45

## 2023-12-27 RX ADMIN — FENTANYL CITRATE 50 MCG: 50 INJECTION INTRAMUSCULAR; INTRAVENOUS at 15:46

## 2023-12-27 RX ADMIN — METRONIDAZOLE 500 MG: 500 INJECTION, SOLUTION INTRAVENOUS at 14:12

## 2023-12-27 RX ADMIN — Medication 100 MCG: at 15:18

## 2023-12-27 RX ADMIN — CEFEPIME 2000 MG: 2 INJECTION, POWDER, FOR SOLUTION INTRAVENOUS at 08:48

## 2023-12-27 RX ADMIN — PANTOPRAZOLE SODIUM 40 MG: 40 TABLET, DELAYED RELEASE ORAL at 00:14

## 2023-12-27 RX ADMIN — GLYCOPYRROLATE 0.2 MG: 0.4 INJECTION INTRAMUSCULAR; INTRAVENOUS at 14:49

## 2023-12-27 RX ADMIN — PROPOFOL 100 MG: 10 INJECTION, EMULSION INTRAVENOUS at 14:39

## 2023-12-27 RX ADMIN — GLYCOPYRROLATE 0.6 MG: 0.4 INJECTION INTRAMUSCULAR; INTRAVENOUS at 15:44

## 2023-12-27 RX ADMIN — Medication 100 MG: at 14:39

## 2023-12-27 RX ADMIN — METRONIDAZOLE 500 MG: 500 INJECTION, SOLUTION INTRAVENOUS at 21:50

## 2023-12-27 RX ADMIN — Medication 50 MCG: at 14:58

## 2023-12-27 RX ADMIN — Medication 100 MCG: at 14:40

## 2023-12-27 ASSESSMENT — PAIN SCALES - GENERAL
PAINLEVEL_OUTOF10: 6
PAINLEVEL_OUTOF10: 0

## 2023-12-27 ASSESSMENT — PAIN DESCRIPTION - LOCATION: LOCATION: ABDOMEN

## 2023-12-27 ASSESSMENT — PAIN DESCRIPTION - DESCRIPTORS: DESCRIPTORS: ACHING

## 2023-12-27 ASSESSMENT — PAIN - FUNCTIONAL ASSESSMENT: PAIN_FUNCTIONAL_ASSESSMENT: 0-10

## 2023-12-27 NOTE — PERIOP NOTE
Patient /Family /Designee has been informed that [facility] is not responsible for patient belongings per policy and the signed [facility] Patient Agreement document.  Personal items should be sent home or checked in with security.  Patient /Family /Designee selected the following action:                            [x]  Send personal items home with a family member,daughter-yelena                                                 []  Check in personal items with security, excluding clothing                            []  Maintain personal items at the bedside, against recommendation                                 by Kash Akhtar [facility]                                   ** If patient /family /designee chooses to maintain personal items at the bedside,                                      Complete the patient belongings inventory in the EMR.

## 2023-12-27 NOTE — FLOWSHEET NOTE
Patient has been ready for discharge from PACU since 1758. Patient left PACU at 1857 - 59 minute delay due to insufficient staffing on floor.

## 2023-12-27 NOTE — ANESTHESIA POSTPROCEDURE EVALUATION
Department of Anesthesiology  Postprocedure Note    Patient: Mercedes Patten  MRN: [id number]  YOB: 1950  Date of evaluation: 12/27/2023    Procedure Summary       Date: 12/27/23 Room / Location: [facility] MAIN 03 / [facility] MAIN OR    Anesthesia Start: 4618 Anesthesia Stop: 0878    Procedure: CHOLECYSTECTOMY LAPAROSCOPIC WITH INTRAOPERATIVE CHOLANGIOGRAM (Abdomen) Diagnosis:       Acute cholecystitis      (Acute cholecystitis [K81.0])    Surgeons: Giulia Cerda DO Responsible Provider: Loki Jacobson MD    Anesthesia Type: general ASA Status: 3            Anesthesia Type: No value filed. Oneil Phase I: Oneil Score: 10    Oneil Phase II:      Anesthesia Post Evaluation    Patient location during evaluation: bedside  Patient participation: complete - patient participated  Level of consciousness: awake  Pain score: 0  Airway patency: patent  Nausea & Vomiting: no nausea  Cardiovascular status: hemodynamically stable  Respiratory status: acceptable  Hydration status: euvolemic  Pain management: adequate        No notable events documented.

## 2023-12-27 NOTE — PERIOP NOTE
SBAR given to Ileana, RN re Op Nurse for continuing of care.    Normal vision: sees adequately in most situations; can see medication labels, newsprint

## 2023-12-27 NOTE — ED NOTES
Assumed care of patient. Patient noted lying in bed awake. Even rise and fall of chest noted. Patient is calm and cooperative. Incontinent care provided. Clean brief, chux, gown, and primofit applied. Patient repositioned in bed. Patient offloaded with pillows to the right side.

## 2023-12-27 NOTE — ED NOTES
Daughter called to check on patient. Daughter notified of GI coming to see patient and discussing options for removing gallstones. Daughter also informed that MD states if procedure is not able to be completed, patient will be sent to a nearby facility to have an ERCP.

## 2023-12-27 NOTE — ED NOTES
Patient Diltiazem completed in transport to Pre-Op. MD would like for patient to be on continuous Diltiazem. Patient daughter present and walking with patient and transport to Pre-op waiting room.

## 2023-12-27 NOTE — OP NOTE
Laparoscopic Cholecystectomy with intraoperative cholangiogram     Patient Name: Jay Dangelo III     SURGERY DATE: 23     : 1950     AGE: 73 y.o.     Anesthesiologist: Anesthesiologist: Bev Castaneda MD  CRNA: Katie Mcgowan APRN - CRNA     Surgeon: Saray Fabian DO    Anesthesia: General     Surgical assist: Circulator: Cristiane Enrique RN  Surgical Assistant: Shilpi Cordero  Radiology Technologist: Aaron Cuevas Circulator: Max Newman RN  Relief Scrub: Alejandro Ruggiero  Scrub Person First: Osmin Paez Surgical Assistant: Arlene Taylor    PreOp DX: Acute cholecystitis [K81.0]     PostOp DX: hydrops of gallbladder, choledocholithiasis    Procedure: Laparoscopic cholecystectomy with intraoperative cholangiogram    Procedure Details: After informed consent was obtained the patient was taken to the operating room and placed in the supine position.  General anesthesia was administered by the anesthetist to titrate to effect. The abdomen was prepped and draped in the usual sterile fashion and a timeout procedure was performed.  Next using 11 blade scalpel a 5 mm incision was made superior to the umbilicus.  A Veress needle was used to establish pneumoperitoneum and a 5 mm trocar was inserted.  The laparoscope was inserted.  Next under direct visualization 3 additional trochars were placed, one 12 mm in the epigastric region and 2 5 mm along the right costal margin. The gallbladder was severely distended. Needle decompression was performed and 60cc of clear fluid was aspirated in order to facilitate retraction. The gallbladder was then retracted over the liver and out laterally after omental adhesions were taken down bluntly.  A [place] dissector was used to dissect out the cystic duct and artery which were clearly visualized entering the gallbladder. Two clips were placed proximally and one distally on the artery. A small accessory branch was also noted that was

## 2023-12-27 NOTE — ED NOTES
GI in with patient and discussed treatment options with patient in regards to moving gallstones at this facility. Patient verbalizes agreement to having treatment done at this facility. MD informs patient that someone from the GI team will have consents signed for the procedure to remove gallstones later today with his daughter.

## 2023-12-27 NOTE — ANESTHESIA PRE PROCEDURE
comment: Patient went into A fib last night     Neuro/Psych:   Negative Neuro/Psych ROS              GI/Hepatic/Renal: Neg GI/Hepatic/Renal ROS            Endo/Other:    (+) hypothyroidism::., malignancy/cancer (Metastatic Prostate cancer). Abdominal:             Vascular: negative vascular ROS. Other Findings:       Anesthesia Plan      general     ASA 3       Induction: intravenous. Anesthetic plan and risks discussed with patient. Plan discussed with CRNA.                 Padmini Murdock MD   12/27/2023

## 2023-12-27 NOTE — PERIOP NOTE
TRANSFER - IN REPORT:    Verbal report received from MARIA T Wilder  on Edward C Antolin III  being received from ER bed 13 for ordered procedure      Report consisted of patient's Situation, Background, Assessment and   Recommendations(SBAR).     Information from the following report(s) Nurse Handoff Report was reviewed with the receiving nurse.    Opportunity for questions and clarification was provided.      Assessment completed upon patient's arrival to unit and care assumed.

## 2023-12-28 ENCOUNTER — APPOINTMENT (OUTPATIENT)
Facility: HOSPITAL | Age: 73
DRG: 853 | End: 2023-12-28
Attending: INTERNAL MEDICINE
Payer: MEDICARE

## 2023-12-28 PROBLEM — Z86.79 HISTORY OF HYPERTENSION: Status: ACTIVE | Noted: 2023-12-28

## 2023-12-28 PROBLEM — I21.4 NSTEMI (NON-ST ELEVATED MYOCARDIAL INFARCTION) (HCC): Status: ACTIVE | Noted: 2023-12-28

## 2023-12-28 LAB
ALBUMIN SERPL-MCNC: 2 G/DL (ref 3.4–5)
ALBUMIN/GLOB SERPL: 0.7 (ref 0.8–1.7)
ALP SERPL-CCNC: 50 U/L (ref 45–117)
ALT SERPL-CCNC: 24 U/L (ref 16–61)
ANION GAP SERPL CALC-SCNC: 4 MMOL/L (ref 3–18)
AST SERPL-CCNC: 58 U/L (ref 10–38)
BASOPHILS # BLD: 0 K/UL (ref 0–0.1)
BASOPHILS NFR BLD: 0 % (ref 0–2)
BILIRUB DIRECT SERPL-MCNC: 0.2 MG/DL (ref 0–0.2)
BILIRUB SERPL-MCNC: 0.7 MG/DL (ref 0.2–1)
BUN SERPL-MCNC: 19 MG/DL (ref 7–18)
BUN/CREAT SERPL: 22 (ref 12–20)
CALCIUM SERPL-MCNC: 7.6 MG/DL (ref 8.5–10.1)
CHLORIDE SERPL-SCNC: 103 MMOL/L (ref 100–111)
CO2 SERPL-SCNC: 26 MMOL/L (ref 21–32)
CREAT SERPL-MCNC: 0.87 MG/DL (ref 0.6–1.3)
DIFFERENTIAL METHOD BLD: ABNORMAL
EOSINOPHIL # BLD: 0 K/UL (ref 0–0.4)
EOSINOPHIL NFR BLD: 0 % (ref 0–5)
ERYTHROCYTE [DISTWIDTH] IN BLOOD BY AUTOMATED COUNT: 11.9 % (ref 11.6–14.5)
GLOBULIN SER CALC-MCNC: 3 G/DL (ref 2–4)
GLUCOSE SERPL-MCNC: 129 MG/DL (ref 74–99)
HCT VFR BLD AUTO: 29 % (ref 36–48)
HGB BLD-MCNC: 10.2 G/DL (ref 13–16)
IMM GRANULOCYTES # BLD AUTO: 0 K/UL (ref 0–0.04)
IMM GRANULOCYTES NFR BLD AUTO: 0 % (ref 0–0.5)
LYMPHOCYTES # BLD: 1 K/UL (ref 0.9–3.6)
LYMPHOCYTES NFR BLD: 10 % (ref 21–52)
MCH RBC QN AUTO: 32.3 PG (ref 24–34)
MCHC RBC AUTO-ENTMCNC: 35.2 G/DL (ref 31–37)
MCV RBC AUTO: 91.8 FL (ref 78–100)
MONOCYTES # BLD: 1.1 K/UL (ref 0.05–1.2)
MONOCYTES NFR BLD: 11 % (ref 3–10)
NEUTS SEG # BLD: 8.1 K/UL (ref 1.8–8)
NEUTS SEG NFR BLD: 78 % (ref 40–73)
NRBC # BLD: 0 K/UL (ref 0–0.01)
NRBC BLD-RTO: 0 PER 100 WBC
PLATELET # BLD AUTO: 159 K/UL (ref 135–420)
PMV BLD AUTO: 10 FL (ref 9.2–11.8)
POTASSIUM SERPL-SCNC: 3.4 MMOL/L (ref 3.5–5.5)
PROT SERPL-MCNC: 5 G/DL (ref 6.4–8.2)
RBC # BLD AUTO: 3.16 M/UL (ref 4.35–5.65)
SODIUM SERPL-SCNC: 133 MMOL/L (ref 136–145)
WBC # BLD AUTO: 10.3 K/UL (ref 4.6–13.2)

## 2023-12-28 PROCEDURE — 36415 COLL VENOUS BLD VENIPUNCTURE: CPT

## 2023-12-28 PROCEDURE — 6370000000 HC RX 637 (ALT 250 FOR IP): Performed by: SURGERY

## 2023-12-28 PROCEDURE — 6360000002 HC RX W HCPCS: Performed by: SURGERY

## 2023-12-28 PROCEDURE — 99223 1ST HOSP IP/OBS HIGH 75: CPT | Performed by: INTERNAL MEDICINE

## 2023-12-28 PROCEDURE — 6360000002 HC RX W HCPCS: Performed by: EMERGENCY MEDICINE

## 2023-12-28 PROCEDURE — 6370000000 HC RX 637 (ALT 250 FOR IP): Performed by: PHYSICIAN ASSISTANT

## 2023-12-28 PROCEDURE — 1100000000 HC RM PRIVATE

## 2023-12-28 PROCEDURE — 2500000003 HC RX 250 WO HCPCS: Performed by: PHYSICIAN ASSISTANT

## 2023-12-28 PROCEDURE — 2580000003 HC RX 258: Performed by: EMERGENCY MEDICINE

## 2023-12-28 PROCEDURE — 82248 BILIRUBIN DIRECT: CPT

## 2023-12-28 PROCEDURE — 6370000000 HC RX 637 (ALT 250 FOR IP): Performed by: INTERNAL MEDICINE

## 2023-12-28 PROCEDURE — 93306 TTE W/DOPPLER COMPLETE: CPT

## 2023-12-28 PROCEDURE — 85025 COMPLETE CBC W/AUTO DIFF WBC: CPT

## 2023-12-28 PROCEDURE — 80053 COMPREHEN METABOLIC PANEL: CPT

## 2023-12-28 PROCEDURE — 6360000002 HC RX W HCPCS: Performed by: PHYSICIAN ASSISTANT

## 2023-12-28 PROCEDURE — 94761 N-INVAS EAR/PLS OXIMETRY MLT: CPT

## 2023-12-28 RX ORDER — DIGOXIN 0.25 MG/ML
250 INJECTION INTRAMUSCULAR; INTRAVENOUS ONCE
Status: COMPLETED | OUTPATIENT
Start: 2023-12-28 | End: 2023-12-28

## 2023-12-28 RX ORDER — LEVOFLOXACIN 750 MG/1
750 TABLET, FILM COATED ORAL DAILY
Status: COMPLETED | OUTPATIENT
Start: 2023-12-29 | End: 2023-12-30

## 2023-12-28 RX ORDER — POTASSIUM CHLORIDE 20 MEQ/1
40 TABLET, EXTENDED RELEASE ORAL ONCE
Status: COMPLETED | OUTPATIENT
Start: 2023-12-28 | End: 2023-12-28

## 2023-12-28 RX ORDER — METOPROLOL TARTRATE 1 MG/ML
2.5 INJECTION, SOLUTION INTRAVENOUS EVERY 6 HOURS PRN
Status: DISCONTINUED | OUTPATIENT
Start: 2023-12-28 | End: 2024-01-03 | Stop reason: HOSPADM

## 2023-12-28 RX ORDER — METRONIDAZOLE 500 MG/1
500 TABLET ORAL EVERY 8 HOURS SCHEDULED
Status: COMPLETED | OUTPATIENT
Start: 2023-12-28 | End: 2023-12-30

## 2023-12-28 RX ORDER — LACTOBACILLUS RHAMNOSUS GG 10B CELL
1 CAPSULE ORAL 2 TIMES DAILY
Status: DISCONTINUED | OUTPATIENT
Start: 2023-12-28 | End: 2024-01-03 | Stop reason: HOSPADM

## 2023-12-28 RX ADMIN — CEFEPIME 2000 MG: 2 INJECTION, POWDER, FOR SOLUTION INTRAVENOUS at 05:04

## 2023-12-28 RX ADMIN — APIXABAN 5 MG: 5 TABLET, FILM COATED ORAL at 11:19

## 2023-12-28 RX ADMIN — DILTIAZEM HYDROCHLORIDE 30 MG: 30 TABLET ORAL at 05:05

## 2023-12-28 RX ADMIN — DILTIAZEM HYDROCHLORIDE 30 MG: 30 TABLET ORAL at 17:05

## 2023-12-28 RX ADMIN — DILTIAZEM HYDROCHLORIDE 30 MG: 30 TABLET ORAL at 11:07

## 2023-12-28 RX ADMIN — CEFEPIME 2000 MG: 2 INJECTION, POWDER, FOR SOLUTION INTRAVENOUS at 17:05

## 2023-12-28 RX ADMIN — METOPROLOL TARTRATE 2.5 MG: 5 INJECTION INTRAVENOUS at 15:57

## 2023-12-28 RX ADMIN — METRONIDAZOLE 500 MG: 500 INJECTION, SOLUTION INTRAVENOUS at 14:26

## 2023-12-28 RX ADMIN — APIXABAN 5 MG: 5 TABLET, FILM COATED ORAL at 21:01

## 2023-12-28 RX ADMIN — HYDROCODONE BITARTRATE AND ACETAMINOPHEN 1 TABLET: 5; 325 TABLET ORAL at 02:11

## 2023-12-28 RX ADMIN — HEPARIN SODIUM 5000 UNITS: 5000 INJECTION INTRAVENOUS; SUBCUTANEOUS at 14:25

## 2023-12-28 RX ADMIN — CEFEPIME 2000 MG: 2 INJECTION, POWDER, FOR SOLUTION INTRAVENOUS at 09:10

## 2023-12-28 RX ADMIN — HYDROCODONE BITARTRATE AND ACETAMINOPHEN 1 TABLET: 5; 325 TABLET ORAL at 21:01

## 2023-12-28 RX ADMIN — HEPARIN SODIUM 5000 UNITS: 5000 INJECTION INTRAVENOUS; SUBCUTANEOUS at 21:00

## 2023-12-28 RX ADMIN — DIGOXIN 250 MCG: 0.25 INJECTION INTRAMUSCULAR; INTRAVENOUS at 10:13

## 2023-12-28 RX ADMIN — HYDROCODONE BITARTRATE AND ACETAMINOPHEN 1 TABLET: 5; 325 TABLET ORAL at 11:07

## 2023-12-28 RX ADMIN — HEPARIN SODIUM 5000 UNITS: 5000 INJECTION INTRAVENOUS; SUBCUTANEOUS at 05:05

## 2023-12-28 RX ADMIN — METRONIDAZOLE 500 MG: 500 INJECTION, SOLUTION INTRAVENOUS at 06:15

## 2023-12-28 RX ADMIN — METRONIDAZOLE 500 MG: 500 INJECTION, SOLUTION INTRAVENOUS at 21:01

## 2023-12-28 RX ADMIN — POTASSIUM CHLORIDE 40 MEQ: 1500 TABLET, EXTENDED RELEASE ORAL at 09:10

## 2023-12-28 ASSESSMENT — PAIN DESCRIPTION - LOCATION
LOCATION: BACK
LOCATION: ABDOMEN

## 2023-12-28 ASSESSMENT — PAIN SCALES - GENERAL
PAINLEVEL_OUTOF10: 8
PAINLEVEL_OUTOF10: 7
PAINLEVEL_OUTOF10: 1
PAINLEVEL_OUTOF10: 6
PAINLEVEL_OUTOF10: 1
PAINLEVEL_OUTOF10: 1
PAINLEVEL_OUTOF10: 2
PAINLEVEL_OUTOF10: 7

## 2023-12-28 ASSESSMENT — PAIN DESCRIPTION - DESCRIPTORS: DESCRIPTORS: ACHING;DISCOMFORT

## 2023-12-28 ASSESSMENT — PAIN DESCRIPTION - ORIENTATION: ORIENTATION: POSTERIOR;RIGHT

## 2023-12-28 ASSESSMENT — PAIN - FUNCTIONAL ASSESSMENT: PAIN_FUNCTIONAL_ASSESSMENT: ACTIVITIES ARE NOT PREVENTED

## 2023-12-28 NOTE — CONSULTS
[email or web address]  [phone]    GASTROENTEROLOGY CONSULT      Impression:   1. Cholecysitis with Choledocholithiasis - noted on MRCP 12/25  -- no hepatic enzyme elevation and normal lipase   --attempts made to transfer to facilities that provide ERCP but no beds available  --seen by surgery and plan for cholecystectomy with interoperative cholangiogram 2pm today   --currently on Cefepime and Flagyl  2. Hx metastatic prostate cancer  3. HTN  4. Anemia  --H/H on admission 11.8/33.5, currently 10.2/28.9      Plan:     1. Awaiting surgery and if unable to clear stones from CBD with intraoperative cholangiogram then will need ERCP which will need to be coordinated with outside facility  2. Monitor LFTs  3. Monitor H/H and transfuse as per protocol  4. Medical management as per primary team      Chief Complaint: nausea/vomiting, weakness      HPI:  Jay Dangelo III is a 73 y.o. male who I am being asked to see in consultation for an opinion regarding nausea, vomiting, generalized weakness for the past few days with new onset RUQ pain, CT 12/25 noted cholecystitis with choledocholithiasis confirmed on MRCP. Transfer attempts to outside facility that can provide ERCP have been unsuccessful. Patient has been seen by general surgery and current plan for cholecystectomy with intraoperative cholangiogram today and if unable to clear stones then will require ERCP. Nausea and vomiting have improved since admission, no hematemesis, melena, or hematochezia.     PMH:   Past Medical History:   Diagnosis Date    Arthritis     Cancer (HCC)     Elevated PSA     Hypertension     Hypothyroid     Personal history of prostate cancer     Pituitary tumor     Renal insufficiency     S/P rotator cuff repair 10/15/2015    Sleep apnea     patient states resolved after surgery       PSH:   Past Surgical History:   Procedure Laterality Date    ANESTH,SURGERY OF SHOULDER      HEENT      surgery for sleep apnea    HERNIA REPAIR      x2    
disease      Signed By: Malika Kinney PA-C     December 28, 2023       
(L) 136 - 145 mmol/L    Potassium 4.0 3.5 - 5.5 mmol/L    Chloride 101 100 - 111 mmol/L    CO2 22 21 - 32 mmol/L    Anion Gap 9 3.0 - 18 mmol/L    Glucose 108 (H) 74 - 99 mg/dL    BUN 19 (H) 7.0 - 18 MG/DL    Creatinine 0.77 0.6 - 1.3 MG/DL    Bun/Cre Ratio 25 (H) 12 - 20      Est, Glom Filt Rate >60 >60 ml/min/1.73m2    Calcium 7.7 (L) 8.5 - 10.1 MG/DL   Lactic Acid    Collection Time: 12/26/23  6:41 PM   Result Value Ref Range    Lactic Acid, Plasma 1.3 0.4 - 2.0 MMOL/L   Lactic Acid    Collection Time: 12/26/23  9:36 PM   Result Value Ref Range    Lactic Acid, Plasma 1.7 0.4 - 2.0 MMOL/L       Recent Labs     12/25/23  0955 12/26/23  0929 12/26/23  1841   WBC 14.4* 15.9* 16.9*   HGB 11.8* 10.5* 11.1*   HCT 33.5* 29.7* 31.6*    141 155     Recent Labs     12/25/23  0955 12/26/23  0929 12/26/23  1841   * 130* 132*   K 3.8 3.6 4.0   CL 94* 100 101   CO2 27 22 22   BUN 18 19* 19*   ALT 16 16  --           Procedures/imaging: see electronic medical records for all procedures/Xrays and details which were not copied into this note but were reviewed prior to creation of Plan    Impression  Acute cholecystitis with choledocholithiasis  New onset atrial fibrillation with RVR  History of prostate cancer  History of hypothyroidism  ___________________________________________________  Recommendation  Patient is currently receiving IV cefepime 2 g every 8 hours and IV metronidazole 500 mg every 8 hours.  I recommend to continue current regimen of antibiotics.  Monitor labs daily  Monitor lactic acid  ED has started the patient on diltiazem infusion.  Monitor blood pressure.  Continue IV fluids as per ED. Lactate remains WNL  Given low normal blood pressures I discussed with the ED physician and he ordered a one-time dose of digoxin.  I have recommended to the ED physician to trend cardiac biomarkers.  Check a TSH  Surgery is continuing to follow the patient.  If it appears that patient is starting to worsen urgent

## 2023-12-28 NOTE — DISCHARGE INSTRUCTIONS
Post Operative Discharge Instructions    No driving for 24 hours after surgery and off of prescription pain medication.    Avoid activities that bump or cause jarring movements at the surgical site for 10 days.    No lifting more than 10-15 pounds for 6 weeks after surgery or until cleared for activity at your follow up.    Walking is encouraged after surgery.    Stairs are ok to climb.      DIET:    Diet as tolerated. Start with liquids then advance your diet based on how you fell.    No alcoholic beverages for 24 hours after surgery or while on antibiotics or pain mdications.    Drink plenty of water.        MEDICATIONS:    Use daily stool softners (over the counter such as Colace or Senekot) while on pain medications.     Resume pre-operative medications. If you are on any blood thinners it is ok to resume    Use prescriptions given or Tylenol, Ibuprofen as needed for pain.    Do not use more than 4000mg of Tylenol (acetaminophen) per day. Be aware this may be  in your prescription medication as well.    Be aware narcotic prescriptions are tightly controlled in the [facility]. If requiring more than one refill, a follow up appointment will be required.      WOUND CARE:      You have skin glue on your incisions, you may shower in 24 hours and pat dry. Glue will fall off on it's own. Empty and record your drain output as instructed- this will be removed in the office at follow up.    Do not tub bathe, swim, or soak incisions until cleared to do so at your follow up.    Ice bag to the affected area; 20 minutes on and 20 minutes off if desired.      FOLLOW UP CARE:    You should have an appointment scheduled within 14 days after surgery. If this is not yet scheduled, call the office.  Any forms that you need filled out regarding your medical care can be brought to the office at follow up appointment of faxed to: [phone]        CALL DOCTOR IF:  Temperature is over 101 degrees, a slight fever can be normal 24-48

## 2023-12-28 NOTE — H&P
Hospitalist Admission History and Physical    NAME:  Jay Dangelo III   :   1950   MRN:   [id number]     PCP:  Valorie Kemp MD  Date/Time:  2023 10:34 PM  Subjective:   CHIEF COMPLAINT:  weakness    Following is part of the history obtained from consultation note done on  by my colleague.    HISTORY OF PRESENT ILLNESS:     This is a 73-year-old male who presented to [facility] emergency department yesterday with complaints of generalized weakness.  Patient gave history of poor p.o. intake and nausea and vomiting over the last few days.  Patient was noted to have acute cholecystitis and choledocholithiasis.  ED contacted the GI team and the surgical team and the hospitalist team yesterday and it was felt that patient needs an ERCP which this facility is not able to provide.  Apparently the ED has made attempts to transfer the patient to facilities that can provide the ERCP but none of the local facilities have available beds.      The ED physician stated that he has expanded the search and has reached out to facilities in [place].  Patient was started on cefepime and Flagyl yesterday pending transfer.  In the meantime today patient was noted to have tachycardia and was noted to have new onset atrial fibrillation.  I was contacted by the ED physician as a consultation.      INTERIM NOTE:  Due to bed unavailability at outside facilities w/ ERCP capability, pt was evaluated by general surgeon and GI specialist and underwent lap kim w/ IOC and we have been asked to admit patient. Per surgeon, pt has cont'd presence of stones in CBD post surgically that can be taken out w/ ERCP in the near future as an outpt.         Past Medical History:   Diagnosis Date    Arthritis     Cancer (HCC)     Elevated PSA     Hypertension     Hypothyroid     Personal history of prostate cancer     Pituitary tumor     Renal insufficiency     S/P rotator cuff repair 10/15/2015    Sleep apnea     patient states

## 2023-12-29 LAB
BASOPHILS # BLD: 0 K/UL (ref 0–0.1)
BASOPHILS NFR BLD: 0 % (ref 0–2)
DIFFERENTIAL METHOD BLD: ABNORMAL
ECHO AO ROOT DIAM: 2.6 CM
ECHO AO ROOT INDEX: 1.28 CM/M2
ECHO AO SINUS VALSALVA DIAM: 3.2 CM
ECHO AO SINUS VALSALVA INDEX: 1.58 CM/M2
ECHO AV AREA PEAK VELOCITY: 1.5 CM2
ECHO AV AREA VTI: 1.7 CM2
ECHO AV AREA/BSA PEAK VELOCITY: 0.7 CM2/M2
ECHO AV AREA/BSA VTI: 0.8 CM2/M2
ECHO AV MEAN GRADIENT: 8 MMHG
ECHO AV MEAN VELOCITY: 1.3 M/S
ECHO AV PEAK GRADIENT: 16 MMHG
ECHO AV PEAK VELOCITY: 2 M/S
ECHO AV VELOCITY RATIO: 0.45
ECHO AV VTI: 27.7 CM
ECHO BSA: 2.03 M2
ECHO EST RA PRESSURE: 8 MMHG
ECHO LA DIAMETER INDEX: 1.87 CM/M2
ECHO LA DIAMETER: 3.8 CM
ECHO LA TO AORTIC ROOT RATIO: 1.46
ECHO LA VOL A-L A2C: 32 ML (ref 18–58)
ECHO LA VOL A-L A4C: 30 ML (ref 18–58)
ECHO LA VOL BP: 30 ML (ref 18–58)
ECHO LA VOL MOD A2C: 31 ML (ref 18–58)
ECHO LA VOL MOD A4C: 28 ML (ref 18–58)
ECHO LA VOL/BSA BIPLANE: 15 ML/M2 (ref 16–34)
ECHO LA VOLUME AREA LENGTH: 32 ML
ECHO LA VOLUME INDEX A-L A2C: 16 ML/M2 (ref 16–34)
ECHO LA VOLUME INDEX A-L A4C: 15 ML/M2 (ref 16–34)
ECHO LA VOLUME INDEX AREA LENGTH: 16 ML/M2 (ref 16–34)
ECHO LA VOLUME INDEX MOD A2C: 15 ML/M2 (ref 16–34)
ECHO LA VOLUME INDEX MOD A4C: 14 ML/M2 (ref 16–34)
ECHO LV E' LATERAL VELOCITY: 18 CM/S
ECHO LV E' SEPTAL VELOCITY: 9 CM/S
ECHO LV FRACTIONAL SHORTENING: 33 % (ref 28–44)
ECHO LV INTERNAL DIMENSION DIASTOLE INDEX: 1.48 CM/M2
ECHO LV INTERNAL DIMENSION DIASTOLIC: 3 CM (ref 4.2–5.9)
ECHO LV INTERNAL DIMENSION SYSTOLIC INDEX: 0.99 CM/M2
ECHO LV INTERNAL DIMENSION SYSTOLIC: 2 CM
ECHO LV IVSD: 1.4 CM (ref 0.6–1)
ECHO LV MASS 2D: 140.5 G (ref 88–224)
ECHO LV MASS INDEX 2D: 69.2 G/M2 (ref 49–115)
ECHO LV POSTERIOR WALL DIASTOLIC: 1.4 CM (ref 0.6–1)
ECHO LV RELATIVE WALL THICKNESS RATIO: 0.93
ECHO LVOT AREA: 3.1 CM2
ECHO LVOT AV VTI INDEX: 0.54
ECHO LVOT DIAM: 2 CM
ECHO LVOT MEAN GRADIENT: 2 MMHG
ECHO LVOT PEAK GRADIENT: 3 MMHG
ECHO LVOT PEAK VELOCITY: 0.9 M/S
ECHO LVOT STROKE VOLUME INDEX: 23.2 ML/M2
ECHO LVOT SV: 47.1 ML
ECHO LVOT VTI: 15 CM
ECHO MV A VELOCITY: 0.63 M/S
ECHO MV E DECELERATION TIME (DT): 153.7 MS
ECHO MV E VELOCITY: 0.43 M/S
ECHO MV E/A RATIO: 0.68
ECHO MV E/E' LATERAL: 2.39
ECHO MV E/E' RATIO (AVERAGED): 3.58
ECHO PULMONARY ARTERY END DIASTOLIC PRESSURE: 9 MMHG
ECHO PV MAX VELOCITY: 1.2 M/S
ECHO PV PEAK GRADIENT: 6 MMHG
ECHO PV REGURGITANT MAX VELOCITY: 1.5 M/S
ECHO RIGHT VENTRICULAR SYSTOLIC PRESSURE (RVSP): 44 MMHG
ECHO RV BASAL DIMENSION: 3.1 CM
ECHO RV MID DIMENSION: 2.6 CM
ECHO RV TAPSE: 1.7 CM (ref 1.7–?)
ECHO RVOT PEAK GRADIENT: 3 MMHG
ECHO RVOT PEAK VELOCITY: 0.9 M/S
ECHO TV REGURGITANT MAX VELOCITY: 3.02 M/S
ECHO TV REGURGITANT PEAK GRADIENT: 37 MMHG
EOSINOPHIL # BLD: 0.2 K/UL (ref 0–0.4)
EOSINOPHIL NFR BLD: 2 % (ref 0–5)
ERYTHROCYTE [DISTWIDTH] IN BLOOD BY AUTOMATED COUNT: 12.1 % (ref 11.6–14.5)
HCT VFR BLD AUTO: 29 % (ref 36–48)
HGB BLD-MCNC: 10 G/DL (ref 13–16)
IMM GRANULOCYTES # BLD AUTO: 0.1 K/UL (ref 0–0.04)
IMM GRANULOCYTES NFR BLD AUTO: 1 % (ref 0–0.5)
LYMPHOCYTES # BLD: 1.2 K/UL (ref 0.9–3.6)
LYMPHOCYTES NFR BLD: 14 % (ref 21–52)
MCH RBC QN AUTO: 31.9 PG (ref 24–34)
MCHC RBC AUTO-ENTMCNC: 34.5 G/DL (ref 31–37)
MCV RBC AUTO: 92.7 FL (ref 78–100)
MONOCYTES # BLD: 0.9 K/UL (ref 0.05–1.2)
MONOCYTES NFR BLD: 11 % (ref 3–10)
NEUTS SEG # BLD: 6.1 K/UL (ref 1.8–8)
NEUTS SEG NFR BLD: 72 % (ref 40–73)
NRBC # BLD: 0 K/UL (ref 0–0.01)
NRBC BLD-RTO: 0 PER 100 WBC
PLATELET # BLD AUTO: 162 K/UL (ref 135–420)
PMV BLD AUTO: 9.8 FL (ref 9.2–11.8)
RBC # BLD AUTO: 3.13 M/UL (ref 4.35–5.65)
WBC # BLD AUTO: 8.5 K/UL (ref 4.6–13.2)

## 2023-12-29 PROCEDURE — 93306 TTE W/DOPPLER COMPLETE: CPT | Performed by: INTERNAL MEDICINE

## 2023-12-29 PROCEDURE — 6370000000 HC RX 637 (ALT 250 FOR IP): Performed by: EMERGENCY MEDICINE

## 2023-12-29 PROCEDURE — 6370000000 HC RX 637 (ALT 250 FOR IP): Performed by: SURGERY

## 2023-12-29 PROCEDURE — 6370000000 HC RX 637 (ALT 250 FOR IP): Performed by: INTERNAL MEDICINE

## 2023-12-29 PROCEDURE — 94761 N-INVAS EAR/PLS OXIMETRY MLT: CPT

## 2023-12-29 PROCEDURE — 97165 OT EVAL LOW COMPLEX 30 MIN: CPT

## 2023-12-29 PROCEDURE — 6370000000 HC RX 637 (ALT 250 FOR IP): Performed by: PHYSICIAN ASSISTANT

## 2023-12-29 PROCEDURE — 6360000002 HC RX W HCPCS: Performed by: SURGERY

## 2023-12-29 PROCEDURE — 1100000000 HC RM PRIVATE

## 2023-12-29 PROCEDURE — 99232 SBSQ HOSP IP/OBS MODERATE 35: CPT | Performed by: INTERNAL MEDICINE

## 2023-12-29 PROCEDURE — 36415 COLL VENOUS BLD VENIPUNCTURE: CPT

## 2023-12-29 PROCEDURE — 97162 PT EVAL MOD COMPLEX 30 MIN: CPT

## 2023-12-29 PROCEDURE — 97530 THERAPEUTIC ACTIVITIES: CPT

## 2023-12-29 PROCEDURE — 2500000003 HC RX 250 WO HCPCS: Performed by: PHYSICIAN ASSISTANT

## 2023-12-29 PROCEDURE — 85025 COMPLETE CBC W/AUTO DIFF WBC: CPT

## 2023-12-29 RX ORDER — DILTIAZEM HYDROCHLORIDE 240 MG/1
240 CAPSULE, COATED, EXTENDED RELEASE ORAL DAILY
Status: DISCONTINUED | OUTPATIENT
Start: 2023-12-29 | End: 2023-12-31

## 2023-12-29 RX ORDER — DILTIAZEM HYDROCHLORIDE 240 MG/1
240 CAPSULE, COATED, EXTENDED RELEASE ORAL DAILY
Status: DISCONTINUED | OUTPATIENT
Start: 2023-12-29 | End: 2023-12-29

## 2023-12-29 RX ORDER — DILTIAZEM HYDROCHLORIDE 180 MG/1
180 CAPSULE, COATED, EXTENDED RELEASE ORAL DAILY
Status: DISCONTINUED | OUTPATIENT
Start: 2023-12-29 | End: 2023-12-29

## 2023-12-29 RX ADMIN — DILTIAZEM HYDROCHLORIDE 60 MG: 30 TABLET ORAL at 00:57

## 2023-12-29 RX ADMIN — HYDROCODONE BITARTRATE AND ACETAMINOPHEN 1 TABLET: 5; 325 TABLET ORAL at 11:58

## 2023-12-29 RX ADMIN — METRONIDAZOLE 500 MG: 500 TABLET ORAL at 00:58

## 2023-12-29 RX ADMIN — DILTIAZEM HYDROCHLORIDE 60 MG: 30 TABLET ORAL at 05:05

## 2023-12-29 RX ADMIN — APIXABAN 5 MG: 5 TABLET, FILM COATED ORAL at 22:43

## 2023-12-29 RX ADMIN — Medication 1 CAPSULE: at 00:58

## 2023-12-29 RX ADMIN — METRONIDAZOLE 500 MG: 500 TABLET ORAL at 13:33

## 2023-12-29 RX ADMIN — APIXABAN 5 MG: 5 TABLET, FILM COATED ORAL at 09:13

## 2023-12-29 RX ADMIN — METRONIDAZOLE 500 MG: 500 TABLET ORAL at 05:20

## 2023-12-29 RX ADMIN — HEPARIN SODIUM 5000 UNITS: 5000 INJECTION INTRAVENOUS; SUBCUTANEOUS at 13:32

## 2023-12-29 RX ADMIN — HYDROCODONE BITARTRATE AND ACETAMINOPHEN 1 TABLET: 5; 325 TABLET ORAL at 17:17

## 2023-12-29 RX ADMIN — DILTIAZEM HYDROCHLORIDE 240 MG: 240 CAPSULE, EXTENDED RELEASE ORAL at 09:12

## 2023-12-29 RX ADMIN — HEPARIN SODIUM 5000 UNITS: 5000 INJECTION INTRAVENOUS; SUBCUTANEOUS at 22:43

## 2023-12-29 RX ADMIN — Medication 1 CAPSULE: at 09:13

## 2023-12-29 RX ADMIN — LEVOFLOXACIN 750 MG: 750 TABLET, FILM COATED ORAL at 09:13

## 2023-12-29 RX ADMIN — MORPHINE SULFATE 2 MG: 2 INJECTION, SOLUTION INTRAMUSCULAR; INTRAVENOUS at 22:43

## 2023-12-29 RX ADMIN — Medication 1 CAPSULE: at 22:42

## 2023-12-29 RX ADMIN — METRONIDAZOLE 500 MG: 500 TABLET ORAL at 22:43

## 2023-12-29 RX ADMIN — HEPARIN SODIUM 5000 UNITS: 5000 INJECTION INTRAVENOUS; SUBCUTANEOUS at 05:04

## 2023-12-29 RX ADMIN — PANTOPRAZOLE SODIUM 40 MG: 40 TABLET, DELAYED RELEASE ORAL at 00:58

## 2023-12-29 RX ADMIN — METOPROLOL TARTRATE 2.5 MG: 5 INJECTION INTRAVENOUS at 05:05

## 2023-12-29 RX ADMIN — HYDROCODONE BITARTRATE AND ACETAMINOPHEN 1 TABLET: 5; 325 TABLET ORAL at 03:02

## 2023-12-29 ASSESSMENT — PAIN SCALES - GENERAL
PAINLEVEL_OUTOF10: 8
PAINLEVEL_OUTOF10: 4
PAINLEVEL_OUTOF10: 8
PAINLEVEL_OUTOF10: 9
PAINLEVEL_OUTOF10: 9

## 2023-12-29 ASSESSMENT — PAIN DESCRIPTION - LOCATION
LOCATION: HIP

## 2023-12-29 ASSESSMENT — PAIN DESCRIPTION - DESCRIPTORS
DESCRIPTORS: ACHING;THROBBING
DESCRIPTORS: ACHING

## 2023-12-29 ASSESSMENT — PAIN DESCRIPTION - ORIENTATION
ORIENTATION: RIGHT;LEFT
ORIENTATION: RIGHT;LEFT

## 2023-12-30 PROCEDURE — 6370000000 HC RX 637 (ALT 250 FOR IP): Performed by: SURGERY

## 2023-12-30 PROCEDURE — 6360000002 HC RX W HCPCS: Performed by: SURGERY

## 2023-12-30 PROCEDURE — 6370000000 HC RX 637 (ALT 250 FOR IP): Performed by: EMERGENCY MEDICINE

## 2023-12-30 PROCEDURE — 6370000000 HC RX 637 (ALT 250 FOR IP): Performed by: PHYSICIAN ASSISTANT

## 2023-12-30 PROCEDURE — 6370000000 HC RX 637 (ALT 250 FOR IP): Performed by: FAMILY MEDICINE

## 2023-12-30 PROCEDURE — 99232 SBSQ HOSP IP/OBS MODERATE 35: CPT | Performed by: FAMILY MEDICINE

## 2023-12-30 PROCEDURE — 6370000000 HC RX 637 (ALT 250 FOR IP): Performed by: INTERNAL MEDICINE

## 2023-12-30 PROCEDURE — 1100000000 HC RM PRIVATE

## 2023-12-30 RX ORDER — MECOBALAMIN 5000 MCG
5 TABLET,DISINTEGRATING ORAL NIGHTLY
Status: DISCONTINUED | OUTPATIENT
Start: 2023-12-30 | End: 2024-01-03 | Stop reason: HOSPADM

## 2023-12-30 RX ADMIN — PANTOPRAZOLE SODIUM 40 MG: 40 TABLET, DELAYED RELEASE ORAL at 06:38

## 2023-12-30 RX ADMIN — APIXABAN 5 MG: 5 TABLET, FILM COATED ORAL at 09:56

## 2023-12-30 RX ADMIN — ONDANSETRON 4 MG: 2 INJECTION INTRAMUSCULAR; INTRAVENOUS at 10:05

## 2023-12-30 RX ADMIN — DILTIAZEM HYDROCHLORIDE 240 MG: 240 CAPSULE, EXTENDED RELEASE ORAL at 09:56

## 2023-12-30 RX ADMIN — LEVOFLOXACIN 750 MG: 750 TABLET, FILM COATED ORAL at 09:56

## 2023-12-30 RX ADMIN — Medication 1 CAPSULE: at 21:18

## 2023-12-30 RX ADMIN — HYDROCODONE BITARTRATE AND ACETAMINOPHEN 1 TABLET: 5; 325 TABLET ORAL at 09:56

## 2023-12-30 RX ADMIN — METRONIDAZOLE 500 MG: 500 TABLET ORAL at 14:15

## 2023-12-30 RX ADMIN — Medication 5 MG: at 21:18

## 2023-12-30 RX ADMIN — HEPARIN SODIUM 5000 UNITS: 5000 INJECTION INTRAVENOUS; SUBCUTANEOUS at 06:37

## 2023-12-30 RX ADMIN — PANTOPRAZOLE SODIUM 40 MG: 40 TABLET, DELAYED RELEASE ORAL at 23:30

## 2023-12-30 RX ADMIN — METRONIDAZOLE 500 MG: 500 TABLET ORAL at 06:37

## 2023-12-30 RX ADMIN — Medication 1 CAPSULE: at 09:55

## 2023-12-30 RX ADMIN — APIXABAN 5 MG: 5 TABLET, FILM COATED ORAL at 21:18

## 2023-12-30 RX ADMIN — MORPHINE SULFATE 2 MG: 2 INJECTION, SOLUTION INTRAMUSCULAR; INTRAVENOUS at 22:18

## 2023-12-30 ASSESSMENT — PAIN DESCRIPTION - LOCATION
LOCATION: NECK
LOCATION: HIP
LOCATION: HIP

## 2023-12-30 ASSESSMENT — PAIN SCALES - GENERAL
PAINLEVEL_OUTOF10: 3
PAINLEVEL_OUTOF10: 0
PAINLEVEL_OUTOF10: 3
PAINLEVEL_OUTOF10: 0
PAINLEVEL_OUTOF10: 3
PAINLEVEL_OUTOF10: 10
PAINLEVEL_OUTOF10: 4
PAINLEVEL_OUTOF10: 9

## 2023-12-30 ASSESSMENT — PAIN DESCRIPTION - DIRECTION: RADIATING_TOWARDS: LEG

## 2023-12-30 ASSESSMENT — PAIN DESCRIPTION - ORIENTATION
ORIENTATION: RIGHT
ORIENTATION: RIGHT

## 2023-12-30 ASSESSMENT — PAIN DESCRIPTION - DESCRIPTORS
DESCRIPTORS: ACHING
DESCRIPTORS: SHARP

## 2023-12-30 ASSESSMENT — PAIN - FUNCTIONAL ASSESSMENT: PAIN_FUNCTIONAL_ASSESSMENT: PREVENTS OR INTERFERES SOME ACTIVE ACTIVITIES AND ADLS

## 2023-12-30 ASSESSMENT — PAIN DESCRIPTION - PAIN TYPE: TYPE: ACUTE PAIN

## 2023-12-31 LAB
ANION GAP SERPL CALC-SCNC: 6 MMOL/L (ref 3–18)
BACTERIA SPEC CULT: ABNORMAL
BACTERIA SPEC CULT: NORMAL
BUN SERPL-MCNC: 8 MG/DL (ref 7–18)
BUN/CREAT SERPL: 12 (ref 12–20)
CALCIUM SERPL-MCNC: 7.7 MG/DL (ref 8.5–10.1)
CHLORIDE SERPL-SCNC: 105 MMOL/L (ref 100–111)
CO2 SERPL-SCNC: 28 MMOL/L (ref 21–32)
CREAT SERPL-MCNC: 0.68 MG/DL (ref 0.6–1.3)
GLUCOSE SERPL-MCNC: 104 MG/DL (ref 74–99)
GRAM STN SPEC: ABNORMAL
GRAM STN SPEC: ABNORMAL
POTASSIUM SERPL-SCNC: 3.4 MMOL/L (ref 3.5–5.5)
SERVICE CMNT-IMP: ABNORMAL
SERVICE CMNT-IMP: NORMAL
SODIUM SERPL-SCNC: 139 MMOL/L (ref 136–145)

## 2023-12-31 PROCEDURE — 99232 SBSQ HOSP IP/OBS MODERATE 35: CPT | Performed by: FAMILY MEDICINE

## 2023-12-31 PROCEDURE — 6370000000 HC RX 637 (ALT 250 FOR IP): Performed by: PHYSICIAN ASSISTANT

## 2023-12-31 PROCEDURE — 36415 COLL VENOUS BLD VENIPUNCTURE: CPT

## 2023-12-31 PROCEDURE — 6370000000 HC RX 637 (ALT 250 FOR IP): Performed by: EMERGENCY MEDICINE

## 2023-12-31 PROCEDURE — 1100000000 HC RM PRIVATE

## 2023-12-31 PROCEDURE — 6370000000 HC RX 637 (ALT 250 FOR IP): Performed by: INTERNAL MEDICINE

## 2023-12-31 PROCEDURE — 6360000002 HC RX W HCPCS: Performed by: INTERNAL MEDICINE

## 2023-12-31 PROCEDURE — 2500000003 HC RX 250 WO HCPCS: Performed by: PHYSICIAN ASSISTANT

## 2023-12-31 PROCEDURE — 6370000000 HC RX 637 (ALT 250 FOR IP): Performed by: NURSE PRACTITIONER

## 2023-12-31 PROCEDURE — 80048 BASIC METABOLIC PNL TOTAL CA: CPT

## 2023-12-31 PROCEDURE — 2500000003 HC RX 250 WO HCPCS: Performed by: FAMILY MEDICINE

## 2023-12-31 PROCEDURE — 6370000000 HC RX 637 (ALT 250 FOR IP): Performed by: FAMILY MEDICINE

## 2023-12-31 PROCEDURE — 6370000000 HC RX 637 (ALT 250 FOR IP): Performed by: SURGERY

## 2023-12-31 PROCEDURE — 99232 SBSQ HOSP IP/OBS MODERATE 35: CPT | Performed by: INTERNAL MEDICINE

## 2023-12-31 RX ORDER — DILTIAZEM HYDROCHLORIDE 240 MG/1
240 CAPSULE, COATED, EXTENDED RELEASE ORAL DAILY
Status: DISCONTINUED | OUTPATIENT
Start: 2023-12-31 | End: 2024-01-03 | Stop reason: HOSPADM

## 2023-12-31 RX ORDER — DIGOXIN 0.25 MG/ML
250 INJECTION INTRAMUSCULAR; INTRAVENOUS ONCE
Status: DISCONTINUED | OUTPATIENT
Start: 2023-12-31 | End: 2023-12-31

## 2023-12-31 RX ORDER — DIGOXIN 0.25 MG/ML
125 INJECTION INTRAMUSCULAR; INTRAVENOUS ONCE
Status: COMPLETED | OUTPATIENT
Start: 2023-12-31 | End: 2023-12-31

## 2023-12-31 RX ORDER — METOPROLOL TARTRATE 1 MG/ML
5 INJECTION, SOLUTION INTRAVENOUS ONCE
Status: COMPLETED | OUTPATIENT
Start: 2023-12-31 | End: 2023-12-31

## 2023-12-31 RX ORDER — LEVOTHYROXINE SODIUM 0.05 MG/1
50 TABLET ORAL DAILY
Status: DISCONTINUED | OUTPATIENT
Start: 2023-12-31 | End: 2024-01-03 | Stop reason: HOSPADM

## 2023-12-31 RX ADMIN — Medication 5 MG: at 22:00

## 2023-12-31 RX ADMIN — PANTOPRAZOLE SODIUM 40 MG: 40 TABLET, DELAYED RELEASE ORAL at 22:00

## 2023-12-31 RX ADMIN — METOPROLOL TARTRATE 5 MG: 5 INJECTION INTRAVENOUS at 09:26

## 2023-12-31 RX ADMIN — APIXABAN 5 MG: 5 TABLET, FILM COATED ORAL at 22:00

## 2023-12-31 RX ADMIN — LEVOTHYROXINE SODIUM 50 MCG: 0.05 TABLET ORAL at 09:26

## 2023-12-31 RX ADMIN — DIGOXIN 125 MCG: 0.25 INJECTION INTRAMUSCULAR; INTRAVENOUS at 03:30

## 2023-12-31 RX ADMIN — APIXABAN 5 MG: 5 TABLET, FILM COATED ORAL at 09:27

## 2023-12-31 RX ADMIN — HYDROCODONE BITARTRATE AND ACETAMINOPHEN 1 TABLET: 5; 325 TABLET ORAL at 09:25

## 2023-12-31 RX ADMIN — METOPROLOL TARTRATE 2.5 MG: 5 INJECTION INTRAVENOUS at 02:29

## 2023-12-31 RX ADMIN — DILTIAZEM HYDROCHLORIDE 240 MG: 240 CAPSULE, EXTENDED RELEASE ORAL at 05:25

## 2023-12-31 RX ADMIN — Medication 1 CAPSULE: at 09:26

## 2023-12-31 RX ADMIN — Medication 1 CAPSULE: at 22:00

## 2023-12-31 ASSESSMENT — PAIN DESCRIPTION - ORIENTATION: ORIENTATION: RIGHT

## 2023-12-31 ASSESSMENT — PAIN SCALES - GENERAL
PAINLEVEL_OUTOF10: 0
PAINLEVEL_OUTOF10: 0
PAINLEVEL_OUTOF10: 9
PAINLEVEL_OUTOF10: 0

## 2023-12-31 ASSESSMENT — PAIN DESCRIPTION - DESCRIPTORS: DESCRIPTORS: SHARP

## 2023-12-31 ASSESSMENT — PAIN DESCRIPTION - LOCATION: LOCATION: HIP;LEG

## 2023-12-31 NOTE — SIGNIFICANT EVENT
Was called by nursing w/ reports of afib rvr w/ HR in 130s despite PRN metoprolol. SBP 130s. Will give dose of dig IV. Monitor closely.

## 2024-01-01 PROCEDURE — 6370000000 HC RX 637 (ALT 250 FOR IP): Performed by: EMERGENCY MEDICINE

## 2024-01-01 PROCEDURE — 6370000000 HC RX 637 (ALT 250 FOR IP): Performed by: INTERNAL MEDICINE

## 2024-01-01 PROCEDURE — 99232 SBSQ HOSP IP/OBS MODERATE 35: CPT | Performed by: INTERNAL MEDICINE

## 2024-01-01 PROCEDURE — 2500000003 HC RX 250 WO HCPCS: Performed by: PHYSICIAN ASSISTANT

## 2024-01-01 PROCEDURE — 6370000000 HC RX 637 (ALT 250 FOR IP): Performed by: PHYSICIAN ASSISTANT

## 2024-01-01 PROCEDURE — 94761 N-INVAS EAR/PLS OXIMETRY MLT: CPT

## 2024-01-01 PROCEDURE — 1100000000 HC RM PRIVATE

## 2024-01-01 PROCEDURE — 6370000000 HC RX 637 (ALT 250 FOR IP): Performed by: FAMILY MEDICINE

## 2024-01-01 PROCEDURE — 6370000000 HC RX 637 (ALT 250 FOR IP): Performed by: NURSE PRACTITIONER

## 2024-01-01 RX ADMIN — METOPROLOL TARTRATE 25 MG: 25 TABLET, FILM COATED ORAL at 11:43

## 2024-01-01 RX ADMIN — Medication 1 CAPSULE: at 22:23

## 2024-01-01 RX ADMIN — APIXABAN 5 MG: 5 TABLET, FILM COATED ORAL at 08:59

## 2024-01-01 RX ADMIN — METOPROLOL TARTRATE 25 MG: 25 TABLET, FILM COATED ORAL at 22:23

## 2024-01-01 RX ADMIN — Medication 5 MG: at 22:23

## 2024-01-01 RX ADMIN — LEVOTHYROXINE SODIUM 50 MCG: 0.05 TABLET ORAL at 06:51

## 2024-01-01 RX ADMIN — DILTIAZEM HYDROCHLORIDE 240 MG: 240 CAPSULE, EXTENDED RELEASE ORAL at 08:59

## 2024-01-01 RX ADMIN — PANTOPRAZOLE SODIUM 40 MG: 40 TABLET, DELAYED RELEASE ORAL at 22:23

## 2024-01-01 RX ADMIN — Medication 1 CAPSULE: at 08:59

## 2024-01-01 RX ADMIN — APIXABAN 5 MG: 5 TABLET, FILM COATED ORAL at 22:23

## 2024-01-01 ASSESSMENT — PAIN SCALES - GENERAL
PAINLEVEL_OUTOF10: 0

## 2024-01-02 PROCEDURE — 1100000000 HC RM PRIVATE

## 2024-01-02 PROCEDURE — 6370000000 HC RX 637 (ALT 250 FOR IP): Performed by: FAMILY MEDICINE

## 2024-01-02 PROCEDURE — 6370000000 HC RX 637 (ALT 250 FOR IP): Performed by: INTERNAL MEDICINE

## 2024-01-02 PROCEDURE — 6370000000 HC RX 637 (ALT 250 FOR IP): Performed by: SURGERY

## 2024-01-02 PROCEDURE — 97535 SELF CARE MNGMENT TRAINING: CPT

## 2024-01-02 PROCEDURE — 97530 THERAPEUTIC ACTIVITIES: CPT

## 2024-01-02 PROCEDURE — 94761 N-INVAS EAR/PLS OXIMETRY MLT: CPT

## 2024-01-02 PROCEDURE — 6370000000 HC RX 637 (ALT 250 FOR IP): Performed by: PHYSICIAN ASSISTANT

## 2024-01-02 PROCEDURE — 99232 SBSQ HOSP IP/OBS MODERATE 35: CPT | Performed by: INTERNAL MEDICINE

## 2024-01-02 PROCEDURE — 6370000000 HC RX 637 (ALT 250 FOR IP): Performed by: EMERGENCY MEDICINE

## 2024-01-02 PROCEDURE — 6370000000 HC RX 637 (ALT 250 FOR IP): Performed by: NURSE PRACTITIONER

## 2024-01-02 RX ADMIN — Medication 1 CAPSULE: at 08:47

## 2024-01-02 RX ADMIN — METOPROLOL TARTRATE 25 MG: 25 TABLET, FILM COATED ORAL at 21:42

## 2024-01-02 RX ADMIN — APIXABAN 5 MG: 5 TABLET, FILM COATED ORAL at 08:47

## 2024-01-02 RX ADMIN — Medication 5 MG: at 21:42

## 2024-01-02 RX ADMIN — Medication 1 CAPSULE: at 21:42

## 2024-01-02 RX ADMIN — LEVOTHYROXINE SODIUM 50 MCG: 0.05 TABLET ORAL at 06:18

## 2024-01-02 RX ADMIN — DILTIAZEM HYDROCHLORIDE 240 MG: 240 CAPSULE, EXTENDED RELEASE ORAL at 08:47

## 2024-01-02 RX ADMIN — APIXABAN 5 MG: 5 TABLET, FILM COATED ORAL at 21:42

## 2024-01-02 RX ADMIN — PANTOPRAZOLE SODIUM 40 MG: 40 TABLET, DELAYED RELEASE ORAL at 23:36

## 2024-01-02 RX ADMIN — METOPROLOL TARTRATE 25 MG: 25 TABLET, FILM COATED ORAL at 08:47

## 2024-01-02 RX ADMIN — HYDROCODONE BITARTRATE AND ACETAMINOPHEN 1 TABLET: 5; 325 TABLET ORAL at 08:47

## 2024-01-02 ASSESSMENT — PAIN DESCRIPTION - LOCATION: LOCATION: LEG

## 2024-01-02 ASSESSMENT — PAIN SCALES - GENERAL: PAINLEVEL_OUTOF10: 10

## 2024-01-03 VITALS
RESPIRATION RATE: 18 BRPM | OXYGEN SATURATION: 97 % | DIASTOLIC BLOOD PRESSURE: 82 MMHG | TEMPERATURE: 97 F | WEIGHT: 180.5 LBS | HEIGHT: 72 IN | SYSTOLIC BLOOD PRESSURE: 125 MMHG | HEART RATE: 80 BPM | BODY MASS INDEX: 24.45 KG/M2

## 2024-01-03 PROCEDURE — 6370000000 HC RX 637 (ALT 250 FOR IP): Performed by: NURSE PRACTITIONER

## 2024-01-03 PROCEDURE — 6370000000 HC RX 637 (ALT 250 FOR IP): Performed by: PHYSICIAN ASSISTANT

## 2024-01-03 PROCEDURE — 97535 SELF CARE MNGMENT TRAINING: CPT

## 2024-01-03 PROCEDURE — 6370000000 HC RX 637 (ALT 250 FOR IP): Performed by: FAMILY MEDICINE

## 2024-01-03 PROCEDURE — 6370000000 HC RX 637 (ALT 250 FOR IP): Performed by: INTERNAL MEDICINE

## 2024-01-03 RX ORDER — HYDROCODONE BITARTRATE AND ACETAMINOPHEN 5; 325 MG/1; MG/1
1 TABLET ORAL EVERY 6 HOURS PRN
Qty: 15 TABLET | Refills: 0 | Status: SHIPPED | OUTPATIENT
Start: 2024-01-03 | End: 2024-01-13

## 2024-01-03 RX ORDER — DILTIAZEM HYDROCHLORIDE 240 MG/1
240 CAPSULE, COATED, EXTENDED RELEASE ORAL DAILY
Qty: 30 CAPSULE | Refills: 3 | Status: SHIPPED | DISCHARGE
Start: 2024-01-04

## 2024-01-03 RX ORDER — LACTOBACILLUS RHAMNOSUS GG 10B CELL
1 CAPSULE ORAL 2 TIMES DAILY
Status: SHIPPED | DISCHARGE
Start: 2024-01-03 | End: 2024-02-02

## 2024-01-03 RX ADMIN — APIXABAN 5 MG: 5 TABLET, FILM COATED ORAL at 08:55

## 2024-01-03 RX ADMIN — Medication 1 CAPSULE: at 08:56

## 2024-01-03 RX ADMIN — LEVOTHYROXINE SODIUM 50 MCG: 0.05 TABLET ORAL at 05:55

## 2024-01-03 RX ADMIN — DILTIAZEM HYDROCHLORIDE 240 MG: 240 CAPSULE, EXTENDED RELEASE ORAL at 08:55

## 2024-01-03 NOTE — PLAN OF CARE
Problem: Occupational Therapy - Adult  Goal: By Discharge: Performs self-care activities at highest level of function for planned discharge setting.  See evaluation for individualized goals.  Description: Occupational Therapy Goals:  Initiated 12/29/2023 to be met within 7-10 days.    1.  Patient will perform bed mobility with supervision/set-up.   2.  Patient will perform functional task while sitting EOB with Fair+ balance for > 5 min with supervision/set-up.  3.  Patient will perform upper body dressing with supervision/set-up.  4.  Patient will perform toilet transfers with minimal assistance/contact guard assist.  5.  Patient will perform all aspects of toileting with minimal assistance/contact guard assist.  6.  Patient will participate in upper extremity therapeutic exercise/activities with supervision/set-up for 8 minutes to improve endurance and UB strength needed for ADLs    7.  Patient will perform self-feeding with supervision, using AE prn.    PLOF: Pt lives with family, reports being Mod Ind for most ADLs, son recently started assisting with LB bathing and dressing, pt doesn't use AD for functional mobility usually, has AD at home.  Outcome: Progressing   OCCUPATIONAL THERAPY EVALUATION    Patient: Jay Dangelo III (73 y.o. male)  Date: 12/29/2023  Primary Diagnosis: Acute cholecystitis [K81.0]  Choledocholithiasis [K80.50]  Septicemia (HCC) [A41.9]  Atrial fibrillation with rapid ventricular response (HCC) [I48.91]  History of laparoscopic cholecystectomy [Z90.49]  Procedure(s) (LRB):  CHOLECYSTECTOMY LAPAROSCOPIC WITH INTRAOPERATIVE CHOLANGIOGRAM (N/A) 2 Days Post-Op   Precautions: General Precautions, Fall Risk    ASSESSMENT :    Pt cleared to participate in OT evaluation by RN. Upon entering room, pt received in bed, alert, and agreeable to OT eval/treatment, supportive family present. Pt declining to maneuver to EOB for ADLs and assessment, agreeable to participate at bed level. Pt terrence 
  Problem: Occupational Therapy - Adult  Goal: By Discharge: Performs self-care activities at highest level of function for planned discharge setting.  See evaluation for individualized goals.  Description: Occupational Therapy Goals:  Initiated 12/29/2023 to be met within 7-10 days.    1.  Patient will perform bed mobility with supervision/set-up.   2.  Patient will perform functional task while sitting EOB with Fair+ balance for > 5 min with supervision/set-up.  3.  Patient will perform upper body dressing with supervision/set-up.  4.  Patient will perform toilet transfers with minimal assistance/contact guard assist.  5.  Patient will perform all aspects of toileting with minimal assistance/contact guard assist.  6.  Patient will participate in upper extremity therapeutic exercise/activities with supervision/set-up for 8 minutes to improve endurance and UB strength needed for ADLs    7.  Patient will perform self-feeding with supervision, using AE prn.    PLOF: Pt lives with family, reports being Mod Ind for most ADLs, son recently started assisting with LB bathing and dressing, pt doesn't use AD for functional mobility usually, has AD at home.  Outcome: Progressing   OCCUPATIONAL THERAPY TREATMENT    Patient: Jay Dangelo III (73 y.o. male)  Date: 1/3/2024  Diagnosis: Acute cholecystitis [K81.0]  Choledocholithiasis [K80.50]  Septicemia (HCC) [A41.9]  Atrial fibrillation with rapid ventricular response (HCC) [I48.91]  History of laparoscopic cholecystectomy [Z90.49] History of laparoscopic cholecystectomy  Procedure(s) (LRB):  CHOLECYSTECTOMY LAPAROSCOPIC WITH INTRAOPERATIVE CHOLANGIOGRAM (N/A) 7 Days Post-Op  Precautions: General Precautions, Fall Risk,  ,  ,  ,  ,  ,  ,      Chart, occupational therapy assessment, plan of care, and goals were reviewed.  ASSESSMENT:  Bed mobility: Mod A supine <-> sit edge of bed, impaired sitting balance initially poor, which improved to fair following scooping to edge of 
  Problem: Pain  Goal: Verbalizes/displays adequate comfort level or baseline comfort level  1/3/2024 1350 by Balwinder Irving RN  Outcome: Completed  1/3/2024 0411 by Jenna Reddy RN  Outcome: Progressing  Flowsheets  Taken 1/3/2024 0400  Verbalizes/displays adequate comfort level or baseline comfort level: Encourage patient to monitor pain and request assistance  Taken 1/2/2024 2343  Verbalizes/displays adequate comfort level or baseline comfort level: Encourage patient to monitor pain and request assistance  Taken 1/2/2024 1917  Verbalizes/displays adequate comfort level or baseline comfort level: Encourage patient to monitor pain and request assistance     Problem: Safety - Adult  Goal: Free from fall injury  1/3/2024 1350 by Balwinder Irving RN  Outcome: Completed  1/3/2024 0411 by Jenna Reddy RN  Outcome: Progressing     Problem: Discharge Planning  Goal: Discharge to home or other facility with appropriate resources  1/3/2024 1350 by Balwinder Irving RN  Outcome: Completed  1/3/2024 0411 by Jenna Reddy RN  Outcome: Progressing     Problem: Skin/Tissue Integrity  Goal: Absence of new skin breakdown  Description: 1.  Monitor for areas of redness and/or skin breakdown  2.  Assess vascular access sites hourly  3.  Every 4-6 hours minimum:  Change oxygen saturation probe site  4.  Every 4-6 hours:  If on nasal continuous positive airway pressure, respiratory therapy assess nares and determine need for appliance change or resting period.  1/3/2024 1350 by Balwinder Irving, RN  Outcome: Completed  1/3/2024 0411 by Jenna Reddy RN  Outcome: Progressing     Problem: ABCDS Injury Assessment  Goal: Absence of physical injury  1/3/2024 1350 by Balwinder Irving RN  Outcome: Completed  1/3/2024 0411 by Jenna Reddy RN  Outcome: Progressing     
  Problem: Pain  Goal: Verbalizes/displays adequate comfort level or baseline comfort level  Outcome: Progressing     Problem: Safety - Adult  Goal: Free from fall injury  Outcome: Progressing     Problem: Discharge Planning  Goal: Discharge to home or other facility with appropriate resources  Outcome: Progressing  Flowsheets (Taken 12/30/2023 0808)  Discharge to home or other facility with appropriate resources: Identify barriers to discharge with patient and caregiver     Problem: Skin/Tissue Integrity  Goal: Absence of new skin breakdown  Description: 1.  Monitor for areas of redness and/or skin breakdown  2.  Assess vascular access sites hourly  3.  Every 4-6 hours minimum:  Change oxygen saturation probe site  4.  Every 4-6 hours:  If on nasal continuous positive airway pressure, respiratory therapy assess nares and determine need for appliance change or resting period.  Outcome: Progressing     Problem: ABCDS Injury Assessment  Goal: Absence of physical injury  Outcome: Progressing     
  Problem: Pain  Goal: Verbalizes/displays adequate comfort level or baseline comfort level  Outcome: Progressing     Problem: Safety - Adult  Goal: Free from fall injury  Outcome: Progressing     Problem: Discharge Planning  Goal: Discharge to home or other facility with appropriate resources  Outcome: Progressing  Flowsheets (Taken 12/30/2023 1928)  Discharge to home or other facility with appropriate resources: Identify barriers to discharge with patient and caregiver     Problem: Skin/Tissue Integrity  Goal: Absence of new skin breakdown  Description: 1.  Monitor for areas of redness and/or skin breakdown  2.  Assess vascular access sites hourly  3.  Every 4-6 hours minimum:  Change oxygen saturation probe site  4.  Every 4-6 hours:  If on nasal continuous positive airway pressure, respiratory therapy assess nares and determine need for appliance change or resting period.  Outcome: Progressing     Problem: ABCDS Injury Assessment  Goal: Absence of physical injury  Outcome: Progressing     
  Problem: Pain  Goal: Verbalizes/displays adequate comfort level or baseline comfort level  Outcome: Progressing     Problem: Safety - Adult  Goal: Free from fall injury  Outcome: Progressing     Problem: Discharge Planning  Goal: Discharge to home or other facility with appropriate resources  Outcome: Progressing  Flowsheets (Taken 12/31/2023 0800)  Discharge to home or other facility with appropriate resources: Identify barriers to discharge with patient and caregiver     
  Problem: Pain  Goal: Verbalizes/displays adequate comfort level or baseline comfort level  Outcome: Progressing  Flowsheets  Taken 1/3/2024 0400  Verbalizes/displays adequate comfort level or baseline comfort level: Encourage patient to monitor pain and request assistance  Taken 1/2/2024 2343  Verbalizes/displays adequate comfort level or baseline comfort level: Encourage patient to monitor pain and request assistance  Taken 1/2/2024 1917  Verbalizes/displays adequate comfort level or baseline comfort level: Encourage patient to monitor pain and request assistance     Problem: Safety - Adult  Goal: Free from fall injury  Outcome: Progressing     Problem: Discharge Planning  Goal: Discharge to home or other facility with appropriate resources  Outcome: Progressing     Problem: Skin/Tissue Integrity  Goal: Absence of new skin breakdown  Description: 1.  Monitor for areas of redness and/or skin breakdown  2.  Assess vascular access sites hourly  3.  Every 4-6 hours minimum:  Change oxygen saturation probe site  4.  Every 4-6 hours:  If on nasal continuous positive airway pressure, respiratory therapy assess nares and determine need for appliance change or resting period.  Outcome: Progressing     Problem: ABCDS Injury Assessment  Goal: Absence of physical injury  Outcome: Progressing     
  Problem: Pain  Goal: Verbalizes/displays adequate comfort level or baseline comfort level  Outcome: Progressing  Flowsheets (Taken 1/1/2024 1535 by Rossi Bunn RN)  Verbalizes/displays adequate comfort level or baseline comfort level:   Encourage patient to monitor pain and request assistance   Assess pain using appropriate pain scale     Problem: Safety - Adult  Goal: Free from fall injury  Outcome: Progressing     Problem: ABCDS Injury Assessment  Goal: Absence of physical injury  Outcome: Progressing     
  Problem: Physical Therapy - Adult  Goal: By Discharge: Performs mobility at highest level of function for planned discharge setting.  See evaluation for individualized goals.  Description: Initiated  12/29/23  to be met within 7-10 days.    1.  Patient will move from supine to sit and sit to supine , scoot up and down, and roll side to side in bed with supervision/set-up.    2.  Patient will transfer from bed to chair and chair to bed with supervision/set-up using the least restrictive device.  3.  Patient will perform sit to stand with supervision/set-up.  4.  Patient will ambulate with supervision/set-up for 75 feet with the least restrictive device.   5.  Patient will ascend/descend 12 stairs with 1 handrail(s) with minimal assistance/contact guard assist.    PLOF: pt lives in a 2  with 1 MICHAEL, has RW/cane/BSC/shower chair/lift recliner chair, son has been staying/living with him to assist with ADLs, uses AD as needed   Outcome: Progressing     PHYSICAL THERAPY TREATMENT    Patient: Jay Dangelo III (73 y.o. male)  Date: 1/2/2024  Diagnosis: Acute cholecystitis [K81.0]  Choledocholithiasis [K80.50]  Septicemia (HCC) [A41.9]  Atrial fibrillation with rapid ventricular response (HCC) [I48.91]  History of laparoscopic cholecystectomy [Z90.49] History of laparoscopic cholecystectomy  Procedure(s) (LRB):  CHOLECYSTECTOMY LAPAROSCOPIC WITH INTRAOPERATIVE CHOLANGIOGRAM (N/A) 6 Days Post-Op  Precautions: General Precautions, Fall Risk,  ,  ,  ,  ,  ,  ,      ASSESSMENT:  Patient resting in bed upon entry and agreeable to OOB mobility. H ec/o 9/10 pain in BLE's though pain received medication this morning. Mod /max A for supine to sit transfer with vc's for log roll. He requires Cg/min A for sitting balance with right lean,  progressing to SBA. X2 sit<>stand trials completed with min A and bed elevated. He takes 3 steps shuffled steps laterally along EOB using RW with min A. Narrow SAGE noted. Patient declines to sit 
Training: Yes  Rolling: Minimum assistance  Supine to Sit: Moderate assistance;Maximum assistance  Sit to Supine: Moderate assistance  Scooting: Minimum assistance (laterally along EOB)   Transfers:  Transfer Training  Sit to Stand: Minimum assistance;Other (comment) (bed elevated)  Stand to Sit: Minimum assistance    Balance:  Balance  Sitting: Impaired  Sitting - Static: Good (unsupported)  Sitting - Dynamic: Fair (occasional)  Standing: Impaired  Standing - Static: Fair  Standing - Dynamic: Fair    ADL Intervention:  Toileting: Maximum assistance    Pain:  Pain level pre-treatment: 0/10   Pain level post-treatment: 0/10  Pain Intervention(s): Rest, Ice, Repositioning   Response to intervention: Nurse notified    Activity Tolerance:    Activity Tolerance: Patient limited by fatigue;Patient limited by endurance  Please refer to the flowsheet for vital signs taken during this treatment.  After treatment:   []  Patient left in no apparent distress sitting up in chair  [x]  Patient left in no apparent distress in bed  [x]  Call bell left within reach  [x]  Nursing notified  []  Caregiver present  []  Bed alarm activated    COMMUNICATION/EDUCATION:   Patient Education  Education Given To: Patient;Family  Education Provided: Role of Therapy;Plan of Care;Home Exercise Program;ADL Adaptive Strategies;Energy Conservation;Fall Prevention Strategies;Equipment;Family Education  Education Method: Demonstration;Verbal;Teach Back  Barriers to Learning: None  Education Outcome: Continued education needed      Thank you for this referral.  Ashly Brower OT  Minutes: 26  
assistance  Sit to Supine: Minimum assistance  Scooting: Maximum assistance;Assist X2 (up in bed)  Transfers:     Transfer Training  Transfer Training: Yes  Sit to Stand: Moderate assistance;Minimum assistance  Stand to Sit: Minimum assistance  Balance:               Balance  Sitting: Impaired  Sitting - Static: Good (unsupported)  Sitting - Dynamic: Fair (occasional)  Standing: Impaired  Standing - Static: Fair  Standing - Dynamic: Fair    Ambulation/Gait Training:    Gait  Overall Level of Assistance: Minimum assistance  Assistive Device: Walker, rolling  Interventions: Safety awareness training;Tactile cues;Verbal cues  Speed/Margie: Pace decreased (< 100 feet/min);Shuffled  Gait Abnormalities: Decreased step clearance;Shuffling gait    Pain:  Pain level pre-treatment: not rated, B hips /10   Pain level post-treatment: \"    \"/10   Pain Intervention(s): Medication (see MAR); Rest, Ice, Repositioning  Response to intervention: Nurse notified     Activity Tolerance:   Activity Tolerance: Patient limited by fatigue;Patient limited by endurance  Please refer to the flowsheet for vital signs taken during this treatment.    After treatment:   []         Patient left in no apparent distress sitting up in chair  [x]         Patient left in no apparent distress in bed  [x]         Call bell left within reach  [x]         Nursing notified  [x]         Caregiver present  []         Bed alarm activated  []         SCDs applied    COMMUNICATION/EDUCATION:   Patient Education  Education Given To: Patient;Family  Education Provided: Role of Therapy;Plan of Care  Education Method: Demonstration;Verbal;Teach Back  Barriers to Learning: None  Education Outcome: Verbalized understanding    Thank you for this referral.  Sadia Boateng, PT  Minutes: 25      Eval Complexity: Decision Making: Medium Complexity

## 2024-01-03 NOTE — DISCHARGE SUMMARY
w/ RVR                                            Patient Active Problem List   Diagnosis    Left knee pain    Sensorineural hearing loss    Erectile dysfunction following urethral surgery    Hyperopia    Prostate cancer (HCC)    S/P left knee arthroscopy    Family hx of prostate cancer    Astigmatism    Localized osteoarthritis of left knee    Presbyopia    S/P rotator cuff repair    Right shoulder pain    Spondylolisthesis at L5-S1 level    Metastatic bone cancer    Chronic bilateral low back pain without sciatica    Cancer, metastatic to bone (HCC)    Muscle tightness    Choledocholithiasis    Atrial fibrillation with rapid ventricular response ([facility])    History of laparoscopic cholecystectomy    Acute cholecystitis    History of hypertension    NSTEMI (non-ST elevated myocardial infarction) ([facility])       Hospital Course by Problem    Per pt's initial hospitalists' evaluation documentation:    This is a 73-year-old male who presented to [facility] emergency department yesterday with complaints of generalized weakness.  Patient gave history of poor p.o. intake and nausea and vomiting over the last few days.  Patient was noted to have acute cholecystitis and choledocholithiasis.  ED contacted the GI team and the surgical team and the hospitalist team yesterday and it was felt that patient needs an ERCP which this facility is not able to provide.  Apparently the ED has made attempts to transfer the patient to facilities that can provide the ERCP but none of the local facilities have available beds.       Due to bed unavailability at outside facilities w/ ERCP capability, pt was evaluated by general surgeon and GI specialist and underwent lap kim w/ IOC and we have been asked to admit patient. Per surgeon, pt has cont'd presence of stones in CBD post surgically that can be taken out w/ ERCP in the near future as an outpt.     His hospital stay was also significant for finding of pt going into atrial fibrillation w/

## 2024-01-03 NOTE — CARE COORDINATION
CM spoke with patient and his son and daughter. [facility] provided, and the list of preferred facilities was shared with CM.    [facility], [facility], [facility], and [facility]. Also is okay with facilities in [place] and [place] except [facility], [place], [facility] and [facility] and rehab.      CM uploaded clinicals to [facility] and [facility] for SNF. And let SNF know patient is medically ready, no Auth needed, and home after discharge from SNF with son.       Dipika Coello, RN  Case Management   
CM placed Transport Envelope with PCS form, and 2 facesheets on front of patient's chart for Medical Transport to transport patient tomorrow to [facility] Rehab.          Marcela Wyatt RN  Case Management [phone]   
CM uploaded PT and OT note to [facility].           Marcela Wyatt RN  Case Management [phone]   
Call made to [facility] [phone], spoke with Marylu, will transport patient at 1:00 pm.  
Case Management Assessment  Initial Evaluation    Date/Time of Evaluation: 12/29/2023 3:47 PM  Assessment Completed by: Dipika Coello    If patient is discharged prior to next notation, then this note serves as note for discharge by case management.    Patient Name: Jay Dangelo III                   YOB: 1950  Diagnosis: Acute cholecystitis [K81.0]  Choledocholithiasis [K80.50]  Septicemia (HCC) [A41.9]  Atrial fibrillation with rapid ventricular response (HCC) [I48.91]  History of laparoscopic cholecystectomy [Z90.49]                   Date / Time: 12/25/2023  9:39 AM    Patient Admission Status: Inpatient   Readmission Risk (Low < 19, Mod (19-27), High > 27): Readmission Risk Score: 17    Current PCP: Valorie Kemp MD  PCP verified by CM? (P) Yes    Chart Reviewed: Yes      History Provided by: (P) Patient, Child/Family  Patient Orientation: (P) Alert and Oriented    Patient Cognition: (P) Alert    Hospitalization in the last 30 days (Readmission):  No    If yes, Readmission Assessment in CM Navigator will be completed.    Advance Directives:      Code Status: Full Code   Patient's Primary Decision Maker is: Legal Next of Kin      Discharge Planning:    Patient lives with: (P) Other (Comment) (son) Type of Home: (P) House  Primary Care Giver: (P) Other (Comment) (Son and daughter)  Patient Support Systems include: (P) Children, Other (Comment) (also spouse)   Current Financial resources: (P) Medicare, Other (Comment) [facility]  Current community resources:    Current services prior to admission: None            Current DME:              Type of Home Care services:  None    ADLS  Prior functional level: (P) Independent in ADLs/IADLs  Current functional level: (P) Assistance with the following:    PT AM-PAC: 15 /24  OT AM-PAC: 14 /24    Family can provide assistance at DC: (P) Yes  Would you like Case Management to discuss the discharge plan with any other family 
Discharge Summary placed in Transport Envelope to go with patient to SNF.       CM uploaded Discharge Summary to [facility] for SNF [facility]RYang Wyatt RN  Case Management [phone]   
Discharge order noted for today. Patient has been accepted to [facility] skilled nursing [facility]. Confirmed with Samson that bed is available today.  Spoke with patient's daughter and she is agreeable to the transition plan today.  Transport to facility has been arranged through [facility] at 1:00 pm time. Patient's discharge summary has been forwarded to skilled nursing facility via [facility], and was placed in Transport Envelope to go with patient to SNF. Bedside RN, Balwinder, has been updated to the transition plan. Discharge information has been updated on the AVS.  Please call report to [phone] .            Marcela Wyatt RN  Case Management [phone]    
Nohelia mederos  know that [facility] does not have any  beds at this time for patient at this time to transfer.             Marcela Wyatt RN  Case Management [phone]   
Nohelia mederos  know that [facility] does not have any beds at this time for patient to transfer.             Marcela Wyatt RN  Case Management [phone]   
Patient has 15 SNF acceptances in [facility].    [facility] spoke with Jenna who stated there are no beds available for today or tomorrow to admit patient. Beds will possible come available next week. CM called [facility] ([facility]) left a message with Polina asking if there are any beds available for patient's admission tomorrow since it shows that the facility accepted the patient in [facility].    CM called patient's daughter and left a voice message informing her of the 15 acceptances and that she will need to choose a facility for patient to be discharged tomorrow.    Dipika Coello RN  Case Management    
Patient has 28 accepting SNF facilities in [facility].       CM called patient's daughter Debbi Norton [phone], CM received voicemail, CM left message that patient is medically ready, has 28 accepting SNF, and CM needs SNF choice.  CM left phone number for a return call.       SELENA Perfect Served Ariana with therapy, and asked if PT can see patient today, for updated PT note needed  for SNF.         CM uploaded clinicals to [facility] for SNF placement.       Patient's daughter called CM back, updated with [facility] and [place] SNF acceptances per request.   Patient's daughter to review SNFs, and call CM back.           Marcela Wyatt, RN  Case Management [phone]         
Patient's daughter Debbi Norton [phone] called CM back, SNF [facility] chosen.         CM called Samson at [facility], CM received voicemail, CM left message to see if they can accept patient today.   CM left phone number for a return call.   CM messaged Samson in [facility], and asked if they can take patient today.           Marcela Wyatt, RN  Case Management [phone]   
Patient's daughter Debbi Norton [phone] called CM, said she spoke with Haydee at [facility], and they do not have patient's information.   CM let patient's daughter that patient is out to [facility] SNF in [facility].       CM called and spoke with Haydee at [facility] [phone], Haydee said she does not have access to [facility], but does have access to [facility].   CM let Haydee know that CM will upload patient to [facility].   Haydee said she does not have a bed available till possibly Friday.       CM sent SNF referral to [facility] SNF in [facility]/[employer].       CM called patient's daughter Debbi Norton [phone], CM received voicemail, CM left message that [facility] will not have a bed possibly till Friday, and CM needs a SNF choice, and patient is medically ready for discharge.       CM spoke with Dr. Barreto and updated that patient has 28 SNF acceptance, and patient's daughter wants a SNF that has not accepted, and no bed available till possibly Friday.   CM to update Dr. Barreto, after patient's daughter calls CM back.           Marclea Wyatt, RN  Case Management [phone]       
Patient's daughter Debbi Norton [phone] called CM asked for list of accepting SNFs to be faxed to patient's daughter to [phone].   Patient's daughter said she does not like the accepting SNF in [facility].         CM put names of 28 accepting SNF on Word Document and faxed to patient's daughter to [phone] per request.           Marcela Wyatt RN  Case Management [phone]   
Polina from [facility] called back said she can take patient tomorrow at 1 or 2 pm if daughter is agreeable. Polina would like a confirmation call at [phone] if patient will be coming to the facility tomorrow.    Dipika Coello RN  Case Management    
Requested Case Management specialist to assist with transportation to:      [facility]        Address is:      [address] [place] [address]         and phone number is:    [phone]      Patient will require BLS transport.   Pt requires Stretcher If stretcher, reason: Cholecystitis, New Onset A Fib with RVR, Anemia, Metastatic Prostate Cancer, Impaired Mobility   Patient is currently requiring oxygen No   Height:  6\"0   Weight: 180 lbs  Pt is on isolation:  No    Is the pt ready now? No  Requested time:  Tomorrow 01/03/2024 @ 10 am  PCS Faxed:  No  Insurance verified on face sheet: Yes  Auth needed for transport: No  CM completed PCS/ Envelope and placed on chart.    
SELENA called [facility] spoke with Sly in Admissions, said they can accept patient in the morning, 10 or 11 am.                 Marcela Wyatt, RN  Case Management [phone]   
SELENA spoke with patient's daughter Debbi Norton [phone], and updated that Medical Transport scheduled for 1 pm today to [facility].   Patient's daughter is agreeable to the discharge plan for today.       SEELNA Perfect Served Dr. Barreto, and updated that Medical Transport scheduled for 1 pm for SNF today, and CM will need Discharge Summary.       SELENA messaged Samson with SNF [facility] in [facility] that Medical Transport scheduled for 1 pm today.   Samson requested [place] insurance card, CM uploaded [place] card in [facility].   SELENA let Samson know that CM will upload Discharge Summary when available.             Marcela Wyatt, RN  Case Management [phone]         
[facility]RYang Wyatt RN  Case Management [phone]   
party.  [] UAI unavailable at discharge will send once processed to SNF provider.  [] UAI unavailable at discharged mailed to patient  No:   [] Private pay and is not financially eligible for Medicaid within the next 180 days.  [] Reside out-of-state.  [] A residents of a state owned/operated facility that is licensed  by Department of Behavioral Health and Developmental Services or [facility]  [] Enrollment in Medicaid hospice services  [] Non [place] citizen  [] Patient /Family declines to have screening completed or provide financial information for screening     Financial Resources:  Medicaid    [] Initiated and application pending   [] Full coverage     Advanced Care Plan:  []Surrogate Decision Maker of Care  []POA  [x]Communicated Code Status  Full Code   Other     Your PCP: Valorie Kemp MD, within 7-10days  2. Dr Fong (GI specialist) in 7-10 days

## 2024-01-03 NOTE — PROGRESS NOTES
Admit Date: 12/25/2023    Assessment    Jay Dangelo III is a 73 y.o. male admitted with acute cholecystitis and choledocholithiasis    Patient Active Problem List   Diagnosis    Left knee pain    Sensorineural hearing loss    Erectile dysfunction following urethral surgery    Hyperopia    Prostate cancer (HCC)    S/P left knee arthroscopy    Family hx of prostate cancer    Astigmatism    Localized osteoarthritis of left knee    Presbyopia    S/P rotator cuff repair    Right shoulder pain    Spondylolisthesis at L5-S1 level    Metastatic bone cancer    Chronic bilateral low back pain without sciatica    Cancer, metastatic to bone (HCC)    Muscle tightness       Plan  -plan for laparoscopic cholecystectomy with intraoperative cholangiogram- the risks and benefits of the procedure were reviewed with the patient including infection, bleeding, need for repeat procedure, injury to surrounding structures, bile leak and bile duct injury -questions were answered    Subjective    Overnight events: Unable to obtain transfer for ERCP    Review of Systems   Constitutional:  Negative for fatigue and fever.   Cardiovascular:  Negative for chest pain and palpitations.   Gastrointestinal:  Positive for abdominal pain.       Objective    Physical Exam:   BP (!) 107/56   Pulse 66   Temp 97.3 °F (36.3 °C) (Oral)   Resp 20   Ht 1.829 m (6')   Wt 80.7 kg (178 lb)   SpO2 97%   BMI 24.14 kg/m²   No intake or output data in the 24 hours ending 12/27/23 1305  Physical Exam  Constitutional:       Appearance: Normal appearance. He is normal weight.   HENT:      Head: Normocephalic.   Eyes:      Extraocular Movements: Extraocular movements intact.      Conjunctiva/sclera: Conjunctivae normal.      Pupils: Pupils are equal, round, and reactive to light.   Cardiovascular:      Rate and Rhythm: Normal rate.   Pulmonary:      Effort: Pulmonary effort is normal.   Abdominal:      General: Abdomen is flat. There is no distension.      
    [email or web address]  [phone]    Gastroenterology follow up-Progress note    Impression:  1. Cholecysitis with Choledocholithiasis   -- noted on MRCP 12/25  -- no hepatic enzyme elevation and normal lipase   --attempts made to transfer to facilities that provide ERCP but no beds available  --cholecystectomy with interoperative cholangiogram 12/27 with several retained stones in CBD - stones do not appear to be obstructing at this time   --currently on Cefepime and Flagyl  2. Hx metastatic prostate cancer  3. HTN  4. Anemia  --H/H on admission 11.8/33.5, currently 10.0/29.0    Plan:  1. Outpatient ERCP after discharge - will coordinate this with Dr. Fong, have already reached out to his office and awaiting reply  2. Monitor LFTs  3. Monitor H/H and transfuse as per protocol  4. Medical management as per primary team  5. Will sign off-Thank you for this consultation and the opportunity to participate in the care of this patient. Please do not hesitate to call with any questions or concerns, or should event occur that may necessitate additional GI evaluation.         Chief Complaint: nausea/vomiting, weakness      Subjective:  Reports abdominal pain at surgical sites, no nausea or vomiting, tolerating clears, no blood in stools    ROS: Denies any fevers, chills, rash.     Eyes: conjunctiva normal, EOM normal   Neck: ROM normal, supple and trachea normal   Cardiovascular: heart normal, normal rate and regular rhythm   Pulmonary/Chest Wall: breath sounds normal and effort normal   Abdominal: appearance normal, bowel sounds normal and soft, non-acute, non-tender, KAMRAN drain scant serosanginous, incisions clean/dry/intact     Patient Active Problem List   Diagnosis    Left knee pain    Sensorineural hearing loss    Erectile dysfunction following urethral surgery    Hyperopia    Prostate cancer (HCC)    S/P left knee arthroscopy    Family hx of prostate cancer    Astigmatism    Localized osteoarthritis of left knee    
    [employer] MD  [phone]    Gastroenterology follow up-Progress note    Impression:  1. Cholecysitis with Choledocholithiasis   -- noted on MRCP 12/25  -- no hepatic enzyme elevation and normal lipase   --attempts made to transfer to facilities that provide ERCP but no beds available  --cholecystectomy with interoperative cholangiogram 12/27 with several retained stones in CBD - stones do not appear to be obstructing at this time   --currently on Cefepime and Flagyl  2. Hx metastatic prostate cancer  3. HTN  4. Anemia  --H/H on admission 11.8/33.5, currently 10.2/29.0    Plan:  1. Outpatient ERCP after discharge - will coordinate this with Dr. Fong  2. Monitor LFTs  3. Monitor H/H and transfuse as per protocol  4. Medical management as per primary team       Chief Complaint: nausea/vomiting, weakness      Subjective:  Reports abdominal pain at surgical sites, no nausea or vomiting, tolerating clears, no blood in stools    ROS: Denies any fevers, chills, rash.     Eyes: conjunctiva normal, EOM normal   Neck: ROM normal, supple and trachea normal   Cardiovascular: heart normal, normal rate and regular rhythm   Pulmonary/Chest Wall: breath sounds normal and effort normal   Abdominal: appearance normal, bowel sounds normal and soft, non-acute, non-tender     Patient Active Problem List   Diagnosis    Left knee pain    Sensorineural hearing loss    Erectile dysfunction following urethral surgery    Hyperopia    Prostate cancer (HCC)    S/P left knee arthroscopy    Family hx of prostate cancer    Astigmatism    Localized osteoarthritis of left knee    Presbyopia    S/P rotator cuff repair    Right shoulder pain    Spondylolisthesis at L5-S1 level    Metastatic bone cancer    Chronic bilateral low back pain without sciatica    Cancer, metastatic to bone (HCC)    Muscle tightness    Choledocholithiasis    Atrial fibrillation with rapid ventricular response (HCC)    History of laparoscopic cholecystectomy    
  Physician Progress Note      PATIENT:               JOSETTE JUDD  CSN #:                  [id number]  :                       1950  ADMIT DATE:       2023 9:39 AM  DISCH DATE:  RESPONDING  PROVIDER #:        Xavi Barreto MD          QUERY TEXT:    Patient admitted with sepsis, noted to have new paroxysmal atrial fibrillation   and is maintained on Eliquis. If possible, please document in progress notes   and discharge summary if you are evaluating and/or treating any of the   following:    The medical record reflects the following:  Risk Factors:  male age >65 years, HTN  Clinical Indicators: 2023, PN, Malika Kinney, PA-C:  \"-New paroxysmal atrial   flutter with RVR, post op. Chadsvasc   2 (age, Hx HTN).  Planning aggressive rate control with AV blocking agents.    Echo this admission, normal LVEF.  Hx low risk NST .\"  Treatment: Eliquis  Thank you,  KEENA Cool RN, CDS  [email or web address]  Options provided:  -- Secondary hypercoagulable state in a patient with atrial fibrillation  -- Other - I will add my own diagnosis  -- Disagree - Not applicable / Not valid  -- Disagree - Clinically unable to determine / Unknown  -- Refer to Clinical Documentation Reviewer    PROVIDER RESPONSE TEXT:    This patient has secondary hypercoagulable state in a patient with atrial   fibrillation.    Query created by: Rachelle Cool on 2024 1:17 PM      Electronically signed by:  Xavi Barreto MD 1/3/2024 12:19 PM          
  Physician Progress Note      PATIENT:               JOSETTE JUDD  CSN #:                  [id number]  :                       1950  ADMIT DATE:       2023 9:39 AM  DISCH DATE:  RESPONDING  PROVIDER #:        Xavi Barreto MD          QUERY TEXT:    Patient admitted with Choledocholithiasis. Documentation reflects \"Septicemia\"   in ED.  If possible, please document in the progress notes and discharge   summary if sepsis. was:    The medical record reflects the following:  Risk Factors: Acute cholecystitis  Clinical Indicators: Per ED \"Septicemia\", WBC 16.9-8.5, HR , RR 14-28  Treatment: IV cefepime, IVF, Serial lab    Thank you,  KEENA Cool RN, CDS  [email or web address]  Options provided:  -- Sepsis confirmed after study  -- SIRS of non-infectious origin due to cholecystitis without acute organ   dysfunction  -- Other - I will add my own diagnosis  -- Disagree - Not applicable / Not valid  -- Disagree - Clinically unable to determine / Unknown  -- Refer to Clinical Documentation Reviewer    PROVIDER RESPONSE TEXT:    Sepsis confirmed after study.    Query created by: Rachelle Cool on 2023 10:41 AM      Electronically signed by:  Xavi Barreto MD 2023 4:07 PM          
Advance Care Planning     Advance Care Planning Inpatient Note  Spiritual Care Department    Today's Date: 12/28/2023  Unit: [facility]    Received request from admission screening.  Upon review of chart and communication with care team, request Health Care Provider's clarification of patient's decision making capacity.. Patient and Friends was/were present in the room during visit.      Health Care Decision Makers:     No healthcare decision makers have been documented.  Click here to complete HealthCare Decision Makers including selection of the Healthcare Decision Maker Relationship (ie \"Primary\")  Summary:  Documented Next of Kin, per patient report      Debbi Norton Child          Primary Phone: [phone] (H)Home Phone: [phone]        Advance Care Planning Documents (Patient Wishes):  None     Assessment:     12/28/23 1536   Encounter Summary   Encounter Overview/Reason  Initial Encounter   Service Provided For: Patient   Referral/Consult From: Rounding   Support System Family members;Children   Last Encounter  12/28/23  (IV-SA-KP)   Complexity of Encounter Moderate   Begin Time 1530   End Time  1537   Total Time Calculated 7 min   Spiritual/Emotional needs   Type Spiritual Support   Advance Care Planning   Type ACP conversation  (legal next of kin)   Assessment/Intervention/Outcome   Assessment Unable to assess   Intervention Active listening   Outcome Encouraged   Plan and Referrals   Plan/Referrals Continue to visit, (comment)       Interventions:  Reviewed but did not complete ACP document    Care Preferences Communicated:   No    Outcomes/Plan:  ACP Discussion: Completed    Electronically signed by JOEL Carcamo on 12/28/2023 at 3:39 PM            
[facility] - [facility]    Admit Date: 12/25/2023  Attending Cardiologist: Dr. Mcgraw     Assessment:     -Acute Cholecystitis, choledocholithiasis, s/p lap kim 12/27/23.   -New pAFL with RVR, post op. Converted to SR. Chadsvasc ~2 (age, Hx HTN)   -Echo this admission, normal LVEF.   -Hx low risk NST 2019   -Hx HTN.   -Hypothyroid, TSH WNL.   -Hx Metastatic Prostate Ca        No Primary cardiologist, consult by Dr. Richardson     Plan:     -POD 2.   -Patient converted to Sinus rhythm this morning, will transition from short to long acting Cardizem.   -Continue Eliquis for stroke prophylaxis.   -Patient to follow up with Dr. Richardson in the office post discharge.       Addendum: Patient converted back to rapid atrial flutter with HR in the 150s but it spontaneously resolved. He is currently in SR with rates in the 70s on tele monitor.     Subjective:     No new complaints.     Objective:      Patient Vitals for the past 8 hrs:   Temp Pulse Resp BP SpO2   12/29/23 0500 -- (!) 155 -- -- --   12/29/23 0437 -- (!) 130 -- -- --   12/29/23 0430 98.4 °F (36.9 °C) 100 19 (!) 144/78 95 %   12/29/23 0300 -- 79 -- -- --   12/29/23 0100 -- 96 -- -- --   12/29/23 0015 98.5 °F (36.9 °C) 88 18 (!) 149/73 94 %         Patient Vitals for the past 96 hrs:   Weight   12/28/23 1016 80.7 kg (178 lb)   12/25/23 0954 80.7 kg (178 lb)       TELE: AFL>SR               Current Facility-Administered Medications   Medication Dose Route Frequency    apixaban (ELIQUIS) tablet 5 mg  5 mg Oral BID    metoprolol (LOPRESSOR) injection 2.5 mg  2.5 mg IntraVENous Q6H PRN    dilTIAZem (CARDIZEM) tablet 60 mg  60 mg Oral 4 times per day    levoFLOXacin (LEVAQUIN) tablet 750 mg  750 mg Oral Daily    metroNIDAZOLE (FLAGYL) tablet 500 mg  500 mg Oral 3 times per day    lactobacillus (CULTURELLE) capsule 1 capsule  1 capsule Oral BID    heparin (porcine) injection 5,000 Units  5,000 Units SubCUTAneous 3 times per day    HYDROcodone-acetaminophen 
[facility] - [facility]  Admit Date: 12/25/2023    Assessment:     -Acute Cholecystitis, choledocholithiasis, s/p lap kim 12/27/23.   -New paroxysmal atrial flutter with RVR, post op. Chadsvasc ~2 (age, Hx HTN).  Planning aggressive rate control with AV blocking agents.  -Echo this admission, normal LVEF.   -Hx low risk NST 2019   -Hx HTN.   -Hypothyroid, TSH WNL.   -Hx Metastatic Prostate Ca      No Primary cardiologist, consult by Dr. Richardson     Plan:       I saw, evaluated, interviewed and examined the patient personally.  Patient seen earlier during the day.  Late entry of note in the chart  Patient with acute cholecystitis status post drain.  Currently asymptomatic from cardiovascular standpoint  No chest pain or shortness of breath  Remained in sinus rhythm.  Hemodynamically stable.  Good diuresis during hospitalization  On Eliquis for stroke prophylaxis  Using Lopressor and Cardizem for rate control.    No further cardiac workup is planned at this time.  Once discharged, he will need to follow-up with cardiology clinic in 3 weeks    Agusto Gaines MD       -Has remained in SR, Continue Lopressor 25 mg BID, Cardizem  mg daily.   -Eliquis for stroke prophylaxis.   -No further recommendations from a CV standpoint. Will sign off and be available as needed.   -Patient to follow up with Dr. Richardson as an outpatient     Subjective:     No new complaints. Denies CP, SOB or dizziness.     Objective:      Patient Vitals for the past 8 hrs:   Temp Pulse Resp BP SpO2   01/03/24 0819 97.8 °F (36.6 °C) 87 18 125/74 96 %   01/03/24 0400 97.9 °F (36.6 °C) 87 18 (!) 147/83 95 %           No data found.                   Intake/Output Summary (Last 24 hours) at 1/3/2024 1106  Last data filed at 1/3/2024 0735  Gross per 24 hour   Intake --   Output 3000 ml   Net -3000 ml         Physical Exam:  General:  alert, appears stated age, and cooperative  Neck:  nontender  Lungs:  clear to auscultation 
[facility] - [facility]  Admit Date: 12/25/2023    Assessment:     -Acute Cholecystitis, choledocholithiasis, s/p lap kim 12/27/23.   -New paroxysmal atrial flutter with RVR, post op. Chadsvasc ~2 (age, Hx HTN).  Planning aggressive rate control with AV blocking agents.  -Echo this admission, normal LVEF.   -Hx low risk NST 2019   -Hx HTN.   -Hypothyroid, TSH WNL.   -Hx Metastatic Prostate Ca      No Primary cardiologist, consult by Dr. Richardson     Plan:     Cardizem increased to 240 mg daily and given digoxin, improved and converted back to sinus.  IV lopressor as needed.  Can restart diltiazem drip along with oral diltiazem drip if rates remain consistently > 120 bpm again.  Continue eliquis for stroke prevention.  Ultimately will consider A.flutter ablation as outpatient once acute illness improved.    Subjective:     A.flutter with RVR noted overnight and this morning.  No dyspnea or chest pain.    Objective:      Patient Vitals for the past 8 hrs:   Temp Pulse Resp BP SpO2   12/31/23 1140 98.1 °F (36.7 °C) 80 17 109/68 98 %   12/31/23 0955 -- -- 18 -- --   12/31/23 0925 -- -- 18 -- --   12/31/23 0800 97.9 °F (36.6 °C) (!) 134 18 122/75 94 %         Patient Vitals for the past 96 hrs:   Weight   12/30/23 0815 81.9 kg (180 lb 8 oz)   12/28/23 1016 80.7 kg (178 lb)                    Intake/Output Summary (Last 24 hours) at 12/31/2023 1348  Last data filed at 12/31/2023 1324  Gross per 24 hour   Intake 720 ml   Output 2720 ml   Net -2000 ml       Physical Exam:  General:  alert, appears stated age, and cooperative  Neck:  nontender  Lungs:  clear to auscultation bilaterally  Heart:  regular rate and rhythm, S1, S2 normal, no murmur, click, rub or gallop  Abdomen:  abdomen is soft without significant tenderness, masses, organomegaly or guarding  Extremities:  extremities normal, atraumatic, no cyanosis or edema    Data Review:   Last Left ventricular Function (EF):    Lab Results   Component Value 
Comprehensive Nutrition Assessment    Type and Reason for Visit:  Initial, RD Nutrition Re-Screen/LOS    Nutrition Recommendations/Plan:   Continue current diet as tolerated.  Continue to monitor tolerance of PO, weight, labs, and plan of care during admission.         Malnutrition Assessment:  Malnutrition Status:  No malnutrition (01/03/24 0853)      Nutrition History and Allergies:   PMHx: HTN, metastatic prostate cancer, anemia. Wt hx: 172 lb (8/11/21), 170 lb (7/19/22), 174 lb (6/23/22), 174 lb (2/9/23), 172 lb (4/5/23), 180 lb (12/30/23), bed scale taken this date by  lb. Pt reports weighing 220 lb prior to COVID but currently weighs ~170 lb. Wt appears overall stable x >1 year per EMR hx vs -20.9% x >3 years per pt report - not signficant. Pt reports eating well at home, no decrease in appetite. NKFA.    Nutrition Assessment:    Admitted for c/o generalized weakness, poor PO intake and n/v over the past few days PTA. Noted to have cholecystitis and choledocholithiasis; s/p lap cholecystectomy with intraoperative cholangiogram 12/27. Only 5 PO documented throughout entire LOS, pt eating 1-75%, last filed 12/31. Visited pt, states eating more in-house then PTA, appetite \"not bad\". Does like not fish. Observed one consumed ensure at bed side. Declining any oral supplements at this time, stating he is being discharged today.    Nutrition Related Findings:    Last BM (including prior to admit): 12/30/23 (per patient)  Pertinent Meds: lactobacillus, melatonin, synthroid, protonix, zofran prn Pertinent Labs: reviewed Wound Type: None       Current Nutrition Intake & Therapies:    Average Meal Intake: %, 51-75%  Average Supplements Intake: % (had ensure on breakfast tray consumed)  ADULT DIET; Regular; no fish    Anthropometric Measures:  Height: 182.9 cm (6')  Ideal Body Weight (IBW): 178 lbs (81 kg)    Admission Body Weight: 81.9 kg (180 lb 8.9 oz) (81.9 kg)  Current Body Weight: 78.9 kg (174 lb) 
Discharge patient in stable condition, discharge summary and instructions given to the medical transporter. IV line removed aseptically.  
General surgery:    Ok to start anticoagulation from surgery standpoint    Saray Fabian, DO  
[facility]  Progress Note    Patient: Jay Dangelo III Age: 73 y.o. : 1950 MR#: [id number] SSN: [id number]      Subjective/24-hour events:     HR jumped [id number] overnight.  Still elevated to same range despite administration of current meds.    Assessment:   Cholecystitis with choledocholithiasis  New onset A-fib with RVR  Hypertension  Hypothyroidism  Anemia  Metastatic prostate cancer    Plan:   Cardiology follow up today given rate control issues.  Have given additional dose of IV lopressor in interim.   Resume home dose of synthroid.  Continue levaquin/flagyl per ID recommendations.  Current plan is for outpatient ERCP - timing per GI.  Rate control and other medical management as ordered.  Plan d/w patient and daughter at bedside.    Case discussed with:  [x]Patient  [x]Family  [x] Nursing  []Case Management  DVT Prophylaxis:  []Lovenox  []Hep SQ  []SCDs  []Coumadin   []On Heparin gtt []PO anticoagulant    Objective:   VS: /75   Pulse (!) 134   Temp 97.9 °F (36.6 °C) (Oral)   Resp 18   Ht 1.829 m (6')   Wt 81.9 kg (180 lb 8 oz)   SpO2 94%   BMI 24.48 kg/m²      Tmax/24hrs: Temp (24hrs), Av °F (36.7 °C), Min:97.6 °F (36.4 °C), Max:98.6 °F (37 °C)    Intake/Output Summary (Last 24 hours) at 2023 0839  Last data filed at 2023 0619  Gross per 24 hour   Intake 480 ml   Output 3025 ml   Net -2545 ml       Gen:  In NAD.  Nontoxic-appearing.  Lungs: Clear, no wheezes  Effort nonlabored.  CV: Irregularly irreg, tachycardic.  Abdomen: Soft, NTTP.  Extremities: Warm, no pitting edema or ischemia.  Neuro:  Awake and alert, moves extremities spontaneously.    Current Facility-Administered Medications   Medication Dose Route Frequency    dilTIAZem (CARDIZEM CD) extended release capsule 240 mg  240 mg Oral Daily    melatonin disintegrating tablet 5 mg  5 mg Oral Nightly    apixaban (ELIQUIS) tablet 5 mg  5 mg Oral BID    metoprolol 
[facility]  Progress Note    Patient: Jay Dangelo III Age: 73 y.o. : 1950 MR#: [id number] SSN: [id number]      Subjective/24-hour events:     Resting comfortably.    Assessment:   Cholecystitis with choledocholithiasis  New onset A-fib with RVR  Hypertension  Hypothyroidism  Anemia  Metastatic prostate cancer    Plan:   Outpatient ERCP per GI.  Continue antibiotics to complete course as ordered.  Transition to Levaquin and Flagyl yesterday.  Continue Cardizem, monitor rates.  SNF at discharge once bed available.  Daughter updated by phone today per patient request.    Case discussed with:  [x]Patient  [x]Family  [x] Nursing  []Case Management  DVT Prophylaxis:  []Lovenox  []Hep SQ  []SCDs  []Coumadin   []On Heparin gtt []PO anticoagulant    Objective:   VS: /86   Pulse 97   Temp 97.2 °F (36.2 °C) (Oral)   Resp 18   Ht 1.829 m (6')   Wt 80.7 kg (178 lb)   SpO2 97%   BMI 24.14 kg/m²      Tmax/24hrs: Temp (24hrs), Av °F (36.7 °C), Min:97.2 °F (36.2 °C), Max:98.8 °F (37.1 °C)    Intake/Output Summary (Last 24 hours) at 2023 0944  Last data filed at 2023 0815  Gross per 24 hour   Intake 440 ml   Output 3400 ml   Net -2960 ml       Gen:  In NAD.  Lungs: Clear, no wheezes  Effort nonlabored.  CV: RRR.  Abdomen: Soft, NTTP.  Extremities: Warm, no pitting edema or ischemia.  Neuro:  Awake and alert, moves extremities spontaneously.    Current Facility-Administered Medications   Medication Dose Route Frequency    dilTIAZem (CARDIZEM CD) extended release capsule 240 mg  240 mg Oral Daily    apixaban (ELIQUIS) tablet 5 mg  5 mg Oral BID    metoprolol (LOPRESSOR) injection 2.5 mg  2.5 mg IntraVENous Q6H PRN    levoFLOXacin (LEVAQUIN) tablet 750 mg  750 mg Oral Daily    metroNIDAZOLE (FLAGYL) tablet 500 mg  500 mg Oral 3 times per day    lactobacillus (CULTURELLE) capsule 1 capsule  1 capsule Oral BID    heparin (porcine) injection 5,000 Units  
[facility]  Progress Note    Patient: Jay Dangelo III Age: 73 y.o. : 1950 MR#: [id number] SSN: [id number]  Date/Time: 2023    Subjective:     Pt seen with son, has no complaints. Was called by nursing w/ reports of rvr this am.  Assessment/Plan:       -Cholecystitis w/ choledocholithiasis : unable to get transferred for ERCP due to lack of bed availability. S/p lap kim w/ IOC on , still has retained stones that per surgeon report are non obstructing, will need outpt ERCP, GI will arrange. On Cefepime and flagyl (x3 days) switched to oral meds (levofloxacin and flagyl) for two more days. No evidence of ongoing infection.   Discussed w/ surgeon    -New onset afib w/ RVR: discussed w/ cardiology, w/ rvr this am. Echo nl EF, no wma . Switched to long acting cardizem    HISTORY OF:  -Prostate cancer  -Hypothyroidism    PLAN:  -Cont levofloxacin and flagyl, to complete 5 day course.  -rate control per cardiology.  -OAC for stroke prevention     Dispo: per PT scoring SNF discharge. Should be ready for dc in next 24 hrs. Spoke w/ daughter and CM.   Additional Notes:      Case discussed with:  [x]Patient  [x]Family  []Nursing  []Case Management  DVT Prophylaxis:  []Lovenox  []Hep SQ  []SCDs  [x]Eliquis   []On Heparin gtt    Objective:   VS: /88   Pulse 88   Temp 98.2 °F (36.8 °C) (Axillary)   Resp 20   Ht 1.829 m (6')   Wt 80.7 kg (178 lb)   SpO2 94%   BMI 24.14 kg/m²    Tmax/24hrs: Temp (24hrs), Av.5 °F (36.9 °C), Min:98.2 °F (36.8 °C), Max:98.8 °F (37.1 °C)    Input/Output:   Intake/Output Summary (Last 24 hours) at 2023 1614  Last data filed at 2023 1546  Gross per 24 hour   Intake 200 ml   Output 1775 ml   Net -1575 ml       General: alert, awake, in NAD  HEENT: NCAT, sclerae anicteric,  mmm, neck supple  COR: rrr, no murmurs  PULM: CTAB  ABD: soft, nt, nd  EXT: no edema  NEURO: follows commands,contractures of hands, speech 
[facility]ist [facility]  Progress Note    Patient: Jay Dangelo III Age: 73 y.o. : 1950 MR#: [id number] SSN: [id number]  Date/Time: 2024    Subjective:     Pt c/o hip pain which is not new. Appeared comfortable.   Assessment/Plan:       -Cholecystitis w/ choledocholithiasis : unable to get transferred for ERCP due to lack of bed availability. S/p lap kim w/ IOC on , still has retained stones that per surgeon report are non obstructing, will need outpt ERCP, GI will arrange. S/p abx. No evidence of ongoing infection.       -New onset afib w/ RVR: Echo nl EF, no wma . Was on IV cardizem gtt switched to long acting cardizem which has been increased due to cont'd RVR, to start metoprolol due to cont'd RVR    HISTORY OF:  -Prostate cancer  -Hypothyroidism    PLAN:  -Monitor off abx  -rate control per cardiology.  -OAC for stroke prevention     Dispo: per PT scoring SNF discharge. Additional Notes:      Case discussed with:  [x]Patient  [x]Family  []Nursing  []Case Management  DVT Prophylaxis:  []Lovenox  []Hep SQ  []SCDs  [x]Eliquis   []On Heparin gtt    Objective:   VS: /78   Pulse 84   Temp 97.2 °F (36.2 °C) (Oral)   Resp 16   Ht 1.829 m (6')   Wt 81.9 kg (180 lb 8 oz)   SpO2 97%   BMI 24.48 kg/m²    Tmax/24hrs: Temp (24hrs), Av.1 °F (36.7 °C), Min:97.2 °F (36.2 °C), Max:98.6 °F (37 °C)    Input/Output:   Intake/Output Summary (Last 24 hours) at 2024 1358  Last data filed at 2024 0634  Gross per 24 hour   Intake 360 ml   Output 1950 ml   Net -1590 ml       General: alert, awake, in NAD  HEENT: NCAT, sclerae anicteric,  mmm, neck supple  COR: rrr, no murmurs  PULM: CTAB  ABD: soft, nt, nd  EXT: no edema  NEURO: follows commands,contractures of hands, speech normal, +ve  tremors  PSYCH: non agitated      Labs:    No results found for this or any previous visit (from the past 24 hour(s)).    Additional Data Reviewed:      Signed By: Mynor Barreto 
[facility] Hospitalist Group  Progress Note    Patient: Jay Dangelo III Age: 73 y.o. : 1950 MR#: [id number] SSN: [id number]  Date/Time: 2024    Subjective:     Pt c/o hip pain which is not new. Appeared comfortable.   Assessment/Plan:       -Cholecystitis w/ choledocholithiasis : unable to get transferred for ERCP due to lack of bed availability. S/p lap kim w/ IOC on , still has retained stones that per surgeon report are non obstructing, will need outpt ERCP, GI will arrange. S/p abx. No evidence of ongoing infection.       -New onset afib w/ RVR: Echo nl EF, no wma . Was on IV cardizem gtt switched to long acting cardizem which has been increased due to cont'd RVR,o started metoprolol due to cont'd RVR. Hr better controlled today    HISTORY OF:  -Prostate cancer  -Hypothyroidism    PLAN:  -Monitor off abx  -rate control per cardiology.  -OAC for stroke prevention     Dispo: per PT scoring SNF discharge. Additional Notes:      Case discussed with:  [x]Patient  [x]Family  []Nursing  []Case Management  DVT Prophylaxis:  []Lovenox  []Hep SQ  []SCDs  [x]Eliquis   []On Heparin gtt    Objective:   VS: BP (!) 145/91   Pulse 82   Temp 98.5 °F (36.9 °C) (Oral)   Resp 16   Ht 1.829 m (6')   Wt 81.9 kg (180 lb 8 oz)   SpO2 98%   BMI 24.48 kg/m²    Tmax/24hrs: Temp (24hrs), Av.1 °F (36.7 °C), Min:97.6 °F (36.4 °C), Max:98.5 °F (36.9 °C)    Input/Output:   Intake/Output Summary (Last 24 hours) at 2024  Last data filed at 2024 1921  Gross per 24 hour   Intake --   Output 3550 ml   Net -3550 ml       General: alert, awake, in NAD  HEENT: NCAT, sclerae anicteric,  mmm, neck supple  COR: rrr, no murmurs  PULM: CTAB  ABD: soft, nt, nd  EXT: no edema  NEURO: follows commands,contractures of hands, speech normal, +ve  tremors  PSYCH: non agitated      Labs:    No results found for this or any previous visit (from the past 24 hour(s)).    Additional Data Reviewed:  
Notified no beds available. Ok for clears and NPO after midnight . Plan for cholecystectomy tomorrow with intraoperative cholangiogram tentative time 2pm unless bed becomes available prior to then. ERCP can follow surgery if unable to clear ducts intraoperative tomorrow. Continue antibiotics     Saray Yeshtokin DO  
POD# 1    Admit Date: 12/25/2023    Assessment    Jay Dangelo III is a 73 y.o. male POD 1 s/p laparoscopic cholecystectomy with intraoperative cholangiogram    Patient Active Problem List   Diagnosis    Left knee pain    Sensorineural hearing loss    Erectile dysfunction following urethral surgery    Hyperopia    Prostate cancer (HCC)    S/P left knee arthroscopy    Family hx of prostate cancer    Astigmatism    Localized osteoarthritis of left knee    Presbyopia    S/P rotator cuff repair    Right shoulder pain    Spondylolisthesis at L5-S1 level    Metastatic bone cancer    Chronic bilateral low back pain without sciatica    Cancer, metastatic to bone (HCC)    Muscle tightness    Choledocholithiasis    Atrial fibrillation with rapid ventricular response (HCC)    History of laparoscopic cholecystectomy    Acute cholecystitis       Plan  -regular diet, hep well IVF  -normal LFTs,drain scant serous  -from surgical standpoint he can be discharged- recommend short term follow up to be established with GI to discuss outpatient ERCP for retained stones due to difficulty with transfer and bed availability. The stones do not appear to be obstructing at this time and he is without pain but will defer to the expertise of GI  -drain will be removed in the office at follow up- he should empty and record drain output daily and bring to follow up  -surgical discharge instructions have been placed on the chart    Subjective    Overnight events: No acute events overnight. Tolerating liquids. No abdominal pain.     Review of Systems   Constitutional:  Negative for fatigue and fever.   Cardiovascular:  Negative for chest pain and palpitations.   Gastrointestinal:  Negative for abdominal pain, constipation, nausea and vomiting.       Objective    Physical Exam:  @TMAX (24)@ /74   Pulse (!) 114   Temp 98.4 °F (36.9 °C) (Oral)   Resp 17   Ht 1.829 m (6')   Wt 80.7 kg (178 lb)   SpO2 90%   BMI 24.14 kg/m² 
Patient in A-Fib with  sustained HR fluctuating between 120 and 145. Patient reports feeling fine with no stated distress or chest pain. PRN metoprolol 2.5 mg administered with no significant change. The hospitalist (Dr Barreto) was notified, and new orders received. Will administer and continue to monitor.    /80   Pulse (!) 145   Temp 98.2 °F (36.8 °C) (Oral)   Resp 18   Ht 1.829 m (6')   Wt 81.9 kg (180 lb 8 oz)   SpO2 94%   BMI 24.48 kg/m²     
Patient persistently in A-Fib with  RVR (-145) despite metoprolol and Digoxin administration. The cardiologist on call ( Dr John Nguyen) was notified. Per MD administer the scheduled 9 am Cardizem now. Will administer and continue to monitor.    /80   Pulse (!) 146   Temp 98.3 °F (36.8 °C) (Oral)   Resp 18   Ht 1.829 m (6')   Wt 81.9 kg (180 lb 8 oz)   SpO2 96%   BMI 24.48 kg/m²     
Perfect served Physician in regards to patient pulse 140-155. \"Just to let you know patient is A&O x3, his pulse is still elevated at 140-155 cardizem given 0912. He is reading A-fib AVR according to tele department.\"  IVR with SVT.   
This RN called to [facility] and [facility] to call report for this patient. Ms. Libia Rush RN received the call, She has given a chance to ask about patient condition and were answered.  
12/31/23  0618      K 3.4*      CO2 28   BUN 8        CBC w/Diff No results for input(s): \"WBC\", \"RBC\", \"HGB\", \"HCT\", \"PLT\" in the last 72 hours.    Invalid input(s): \"GRANS\", \"LYMPH\", \"EOS\"   Critical Labs [unfilled]   Coagulation No results for input(s): \"INR\", \"APTT\" in the last 72 hours.    Invalid input(s): \"PTP\"    Lipid Panel No results found for: \"CHOL\", \"CHOLPOCT\", \"CHOLX\", \"CHLST\", \"CHOLV\", \"495466\", \"HDL\", \"HDLC\", \"LDL\", \"LDLC\", \"357466\", \"VLDLC\", \"VLDL\", \"TGLX\", \"TRIGL\"   BNP No results found for: \"BNP\", \"BNPPOC\"   Liver Enzymes No results for input(s): \"TP\", \"ALB\" in the last 72 hours.    Invalid input(s): \"TBIL\", \"AP\", \"SGOT\", \"GPT\", \"DBIL\"   Thyroid Studies Lab Results   Component Value Date/Time    TSH 3.740 07/27/2020 12:00 AM        Signed By: Malika Kinney PA-C     January 2, 2024        
AMOUNT OF TOOTHPASTE AND DO NOT RINSE        Allergies   Allergen Reactions    Penicillins Other (See Comments)     paralyzation    Sulfa Antibiotics      Other reaction(s): Unknown (comments)       ROS: negative    Vitals:    12/25/23 1800   BP:    Pulse: 93   Resp: 19   Temp:    SpO2: 97%       Physical Exam  Abd soft, mildly tender RUQ, ND    Lab Results   Component Value Date    WBC 14.4 (H) 12/25/2023    HGB 11.8 (L) 12/25/2023    HCT 33.5 (L) 12/25/2023    MCV 90.8 12/25/2023     12/25/2023     Lab Results   Component Value Date/Time     12/25/2023 09:55 AM    K 3.8 12/25/2023 09:55 AM    CL 94 12/25/2023 09:55 AM    CO2 27 12/25/2023 09:55 AM    BUN 18 12/25/2023 09:55 AM    CREATININE 0.89 12/25/2023 09:55 AM    GLUCOSE 108 12/25/2023 09:55 AM    CALCIUM 8.6 12/25/2023 09:55 AM      No results found for: \"MG\"  Lab Results   Component Value Date    CALCIUM 8.6 12/25/2023     CT personally visualized, c/w acute cholecystitis and likely calcified gallstones in CBD.     Assessment / Plan    Acute cholecystitis with choledocholithiasis  MRCP done, results pending  GI consult to clear CBD  May be candidate for cholecystectomy after recovery  Will need to have ERCP at outside facility, can return here after this for consideration of cholecystectomy  Yeshtokin will follow up tomorrow  Abx coverage for cholecystitis for now    The diagnoses and plan were discussed with the patient. All questions answered.  Plan of care agreed to by all concerned.    
Labs [unfilled]   Coagulation No results for input(s): \"INR\", \"APTT\" in the last 72 hours.    Invalid input(s): \"PTP\"    Lipid Panel No results found for: \"CHOL\", \"CHOLPOCT\", \"CHOLX\", \"CHLST\", \"CHOLV\", \"485508\", \"HDL\", \"HDLC\", \"LDL\", \"LDLC\", \"974360\", \"VLDLC\", \"VLDL\", \"TGLX\", \"TRIGL\"   BNP No results found for: \"BNP\", \"BNPPOC\"   Liver Enzymes No results for input(s): \"TP\", \"ALB\" in the last 72 hours.    Invalid input(s): \"TBIL\", \"AP\", \"SGOT\", \"GPT\", \"DBIL\"   Thyroid Studies Lab Results   Component Value Date/Time    TSH 3.740 07/27/2020 12:00 AM        Signed By: Rasheed Mcgraw MD     January 1, 2024        
    Additional Data Reviewed:      Signed By: Mynor Barreto MD     December 28, 2023 10:31 PM     
0.00 - 0.01 K/uL    Neutrophils % 85 (H) 40 - 73 %    Lymphocytes % 6 (L) 21 - 52 %    Monocytes % 8 3 - 10 %    Eosinophils % 0 0 - 5 %    Basophils % 0 0 - 2 %    Immature Granulocytes 1 (H) 0.0 - 0.5 %    Neutrophils Absolute 11.9 (H) 1.8 - 8.0 K/UL    Lymphocytes Absolute 0.8 (L) 0.9 - 3.6 K/UL    Monocytes Absolute 1.1 0.05 - 1.2 K/UL    Eosinophils Absolute 0.0 0.0 - 0.4 K/UL    Basophils Absolute 0.0 0.0 - 0.1 K/UL    Absolute Immature Granulocyte 0.1 (H) 0.00 - 0.04 K/UL    Differential Type AUTOMATED     CBC with Auto Differential    Collection Time: 12/27/23  8:48 AM   Result Value Ref Range    WBC 13.3 (H) 4.6 - 13.2 K/uL    RBC 3.13 (L) 4.35 - 5.65 M/uL    Hemoglobin 10.2 (L) 13.0 - 16.0 g/dL    Hematocrit 28.9 (L) 36.0 - 48.0 %    MCV 92.3 78.0 - 100.0 FL    MCH 32.6 24.0 - 34.0 PG    MCHC 35.3 31.0 - 37.0 g/dL    RDW 12.1 11.6 - 14.5 %    Platelets 142 135 - 420 K/uL    MPV 9.3 9.2 - 11.8 FL    Nucleated RBCs 0.0 0  WBC    nRBC 0.00 0.00 - 0.01 K/uL    Neutrophils % 85 (H) 40 - 73 %    Lymphocytes % 7 (L) 21 - 52 %    Monocytes % 7 3 - 10 %    Eosinophils % 0 0 - 5 %    Basophils % 0 0 - 2 %    Immature Granulocytes 1 (H) 0.0 - 0.5 %    Neutrophils Absolute 11.4 (H) 1.8 - 8.0 K/UL    Lymphocytes Absolute 0.9 0.9 - 3.6 K/UL    Monocytes Absolute 0.9 0.05 - 1.2 K/UL    Eosinophils Absolute 0.0 0.0 - 0.4 K/UL    Basophils Absolute 0.0 0.0 - 0.1 K/UL    Absolute Immature Granulocyte 0.1 (H) 0.00 - 0.04 K/UL    Differential Type AUTOMATED     Comprehensive Metabolic Panel    Collection Time: 12/27/23  8:48 AM   Result Value Ref Range    Sodium 134 (L) 136 - 145 mmol/L    Potassium 3.3 (L) 3.5 - 5.5 mmol/L    Chloride 103 100 - 111 mmol/L    CO2 25 21 - 32 mmol/L    Anion Gap 6 3.0 - 18 mmol/L    Glucose 107 (H) 74 - 99 mg/dL    BUN 18 7.0 - 18 MG/DL    Creatinine 0.76 0.6 - 1.3 MG/DL    Bun/Cre Ratio 24 (H) 12 - 20      Est, Glom Filt Rate >60 >60 ml/min/1.73m2    Calcium 7.2 (L) 8.5 - 10.1 MG/DL

## 2024-01-08 ENCOUNTER — HOME HEALTH ADMISSION (OUTPATIENT)
Age: 74
End: 2024-01-08
Payer: MEDICARE

## 2024-01-09 ENCOUNTER — HOME CARE VISIT (OUTPATIENT)
Age: 74
End: 2024-01-09

## 2024-01-09 VITALS
HEART RATE: 69 BPM | DIASTOLIC BLOOD PRESSURE: 78 MMHG | OXYGEN SATURATION: 99 % | RESPIRATION RATE: 18 BRPM | SYSTOLIC BLOOD PRESSURE: 120 MMHG | TEMPERATURE: 97.2 F

## 2024-01-09 PROCEDURE — G0151 HHCP-SERV OF PT,EA 15 MIN: HCPCS

## 2024-01-09 PROCEDURE — G0299 HHS/HOSPICE OF RN EA 15 MIN: HCPCS

## 2024-01-09 PROCEDURE — 0221000100 HH NO PAY CLAIM PROCEDURE

## 2024-01-09 NOTE — HOME HEALTH
HPI:   Patient fell Dec 24, 2023, was found in his home by his son, and brought to ER.  Patient and CG did not have an explanation for the fall.  Hospital report states:  \"72 yo male with metastatic prostate cancer who was hospitalized at [facility] on 12/25 for sepsis and generalized weakness found due to acute calculous cholecystitis with choledocholithiasis. Due to bed unavailability at outside facilities with ERCP capability, pt was evaluated by general surgery and GI at [facility]. He underwent lap kim w/IOC. Pat had persistent stones in CBD pot-op and there are plans for him to be refer red to a gastroenterologist who can perform ERCP in the near future as outpatient. External biliary drain was left in place at the time of surgery. He's eating, drinking, bowels moving. No fever or bleeding. His hospital stay was complicated by new a-fib with RVR.\"  .  Prior Medical History:   Left knee pain  4/17/2014   Sensorineural hearing loss  8/11/2021    Erectile dysfunction following urethral surgery  4/14/2017    Hyperopia  8/11/2021    Prostate cancer  12/1/2016    S/P left knee arthroscopy  9/25/2018     Astigmatism     Localized osteoarthritis of left knee    Presbyopia  8/11/2021    S/P rotator cuff repair 10/15/2015    Right shoulder pain  4/17/2014    Spondylolisthesis at L5-S1 level  10/27/2022   Metastatic bone cancer 10/27/2022    Chronic bilateral low back pain without sciatica  2/9/2023   Cancer, metastatic to bone  4/5/2023    Muscle tightness  4/5/2023    Choledocholithiasis  12/27/2023   Atrial fibrillation with rapid ventricular response 12/27/2023    History of laparoscopic cholecystectomy 12/27/2023    Acute cholecystitis 12/27/2023    History of hypertension 12/28/2023    NSTEMI (non-ST elevated myocardial infarction) 12/28/2023     Subjective  Patient presents sitting in recliner, c/o pain when sitting upright.  He reports he   Living /Home Situation:   Has grab bars in all bathrooms, a

## 2024-01-12 ENCOUNTER — HOME CARE VISIT (OUTPATIENT)
Age: 74
End: 2024-01-12

## 2024-01-12 VITALS
OXYGEN SATURATION: 98 % | SYSTOLIC BLOOD PRESSURE: 130 MMHG | DIASTOLIC BLOOD PRESSURE: 90 MMHG | HEART RATE: 66 BPM | TEMPERATURE: 98.5 F

## 2024-01-12 PROCEDURE — G0157 HHC PT ASSISTANT EA 15: HCPCS

## 2024-01-12 PROCEDURE — G0299 HHS/HOSPICE OF RN EA 15 MIN: HCPCS

## 2024-01-12 ASSESSMENT — ENCOUNTER SYMPTOMS: PAIN LOCATION - PAIN QUALITY: SCIATICA

## 2024-01-12 NOTE — HOME HEALTH
Patient's Subjective: I had a bad night. I could not sleep. I don't know why.    Falls since last session:   no  Trips to ER since last session? no  Pain/Discomfort ?  See pain page.  New Meds:no  Upcoming MD appointments:  Unknown  .  Caregiver involvement/assistance needed for:  The patient's caregiver assists with Dep for transfers, HEP  .  Home health supplies by type and quantity ordered/delivered this visit include:  none  .  Objective:  See interventions.    Patient response to treatment:  Poor+  tolerance to his exercises, but his sx decreased post nerve glides.     Patient level of understanding of education provided:  - Discussed with the patient the need to either wear shoes/slippers with rubberized soles or place a non moving non slip surfaces at the recliner. His feet were sliding forward when in WB. His son reports that he will find something to use or get him safe foot wear.   -Issued written HEP. Pt able to return demonstrate within his available ROM and strength. Instructed to perform his exercises 2x/day and nerve glides 3x/day with the assist of his son. He is also to perform standing with gentle weight shifting 3x/day to his tolerance. He and his son reported understanding.      Assess of progress towards goals:   Post nerve glides the patient reported his sx decreased from 2/10 to .5/10 indicating the ability to floss the R sciatic nerve and decreasing sx.  Pt was pleased with the progress.     Continued need for the following skills: Strengthening, stability, pre gait activities an gait if he can progress to it. Education for family for safe transfers, HEP, gait when appropriate.    Plan for next visit: Assess understanding of exercises given today. Progress as pt is able to tolerate.    The following discharge was discussed with the patient/caregiver : Estimated DC 2/1/2024 by Linda Adam, PT     NOMNC required? Yes

## 2024-01-12 NOTE — HOME HEALTH
Patient's Subjective: I had a bad night. I could not sleep. He does not know why.    Falls since last session:   no  Trips to ER since last session? no  Pain/Discomfort ?  See pain page.  New Meds:no  Upcoming MD appointments: ***   .  Caregiver involvement/assistance needed for:  The patient's caregiver assists with ***   .  Home health supplies by type and quantity ordered/delivered this visit include:  ***  .  Objective:  See interve ntions.    Patient response to treatment:  ***    Patient level of understanding of education provided:  -  -  -    Assess of progress towards goals: ***    Continued need for the following skills: ***    Plan for next visit: ***    The following discharge was discussed with the patient/caregiver :    NOMNC required?

## 2024-01-14 ENCOUNTER — HOME CARE VISIT (OUTPATIENT)
Age: 74
End: 2024-01-14

## 2024-01-14 VITALS
DIASTOLIC BLOOD PRESSURE: 70 MMHG | SYSTOLIC BLOOD PRESSURE: 126 MMHG | RESPIRATION RATE: 18 BRPM | HEART RATE: 69 BPM | TEMPERATURE: 98.3 F | OXYGEN SATURATION: 98 %

## 2024-01-14 PROCEDURE — G0152 HHCP-SERV OF OT,EA 15 MIN: HCPCS

## 2024-01-16 ENCOUNTER — HOME CARE VISIT (OUTPATIENT)
Age: 74
End: 2024-01-16

## 2024-01-16 VITALS
HEART RATE: 60 BPM | SYSTOLIC BLOOD PRESSURE: 100 MMHG | OXYGEN SATURATION: 98 % | SYSTOLIC BLOOD PRESSURE: 100 MMHG | TEMPERATURE: 97.2 F | TEMPERATURE: 97.9 F | RESPIRATION RATE: 20 BRPM | HEART RATE: 69 BPM | DIASTOLIC BLOOD PRESSURE: 56 MMHG | DIASTOLIC BLOOD PRESSURE: 50 MMHG | RESPIRATION RATE: 16 BRPM

## 2024-01-16 VITALS
OXYGEN SATURATION: 100 % | SYSTOLIC BLOOD PRESSURE: 125 MMHG | DIASTOLIC BLOOD PRESSURE: 80 MMHG | HEART RATE: 84 BPM | RESPIRATION RATE: 17 BRPM | TEMPERATURE: 98.4 F

## 2024-01-16 VITALS
HEART RATE: 84 BPM | RESPIRATION RATE: 16 BRPM | SYSTOLIC BLOOD PRESSURE: 125 MMHG | DIASTOLIC BLOOD PRESSURE: 80 MMHG | TEMPERATURE: 98.4 F | OXYGEN SATURATION: 100 %

## 2024-01-16 PROCEDURE — G0152 HHCP-SERV OF OT,EA 15 MIN: HCPCS

## 2024-01-16 PROCEDURE — G0157 HHC PT ASSISTANT EA 15: HCPCS

## 2024-01-16 ASSESSMENT — ENCOUNTER SYMPTOMS
PAIN LOCATION - PAIN QUALITY: THROBBING
STOOL DESCRIPTION: SOFT
DYSPNEA ACTIVITY LEVEL: AFTER AMBULATING 10 - 20 FT
BOWEL INCONTINENCE: 1
STOOL DESCRIPTION: SOFT
PAIN LOCATION - PAIN QUALITY: DULL ACHE
PAIN LOCATION - PAIN QUALITY: BURNING
PAIN LOCATION - PAIN QUALITY: THROBBING
BOWEL INCONTINENCE: 1

## 2024-01-16 NOTE — HOME HEALTH
Patient's Subjective: Per the patient's son, he got him out of the chair yesterday. He helped him get to the toilet with a couple shuffling streps.     Falls since last session:   no  Trips to ER since last session? no  Pain/Discomfort ? Yes, see pain page.  New Meds: Yes, OT adding it.   Upcoming MD appointments: Video conference 1/2  .  Caregiver involvement/assistance needed for:  The patient's caregiver assists with meals, meds, transportation, chores. transfers, set up for MD appts. ADL's  .  Home health supplies by type and quantity ordered/delivered this visit include:   none  .  Objective:  See interventions.    Patient response to treatment:  Fair tolerance + able to stand x 2.     Patient level of understanding of education provided:  Good for understanding of safe sit/stand transfer, standing and weight shifting, pain relief    Assess of progress towards goals:   Pt able to stand x 2 today > 1 min  He denies any falls.  He reports the sciatic nerve glides are improving the sciatica.     Continued need for the following skills: Strengthening, stability, pre gait and then gait activities to increase his functional Ind and decrease fall risk.     Plan for next visit: if bed has been moved, stand pivot transfers.    The following discharge was discussed with the patient/caregiver : Estimated DC ~ 2/1/2024 by Linda Adam PT     NOMNC required? yes

## 2024-01-16 NOTE — HOME HEALTH
Son present for visit. Son reports patient has a different sense of humor and jokes a lot. PT present during SN visit.   Skilled reason for visit: total body systems assess, s/p surgery disease process and medication management, drain assess and management teaching    Caregiver involvement: son lives with patient, other family assist with adl's and iadl's including errands prn.    Medications reviewed and all medications are available in the home this visit.    The following education was provided regarding medications:  obtain and take all medications as ordered.    MD notified of any discrepancies/look a-like medications/medication interactions: na  Medications are effective at this time.      Home health supplies by type and quantity ordered/delivered this visit include: drain sponges, tape    Patient education provided this visit: role of PT, SN, take medications and follow diet as ordered, increased protein po to assist with healing, paced progressive activity, transfer and ambulation only with assist, R abd vishnu drain management including emptying and measuring drainage at least daily, dressing change procedure, emergency management, who and what to report to RN/ MD, frequent repositioning to decrease pressure areas, paced progressive activities, continue C&DB exercises. Assisted son to perform incontinence care, s1 gluteal fold care and change patient's clothes.    Sharps education provided: na    Patient level of understanding of education provided: patient/ son state partial understanding of material taught    Patient response to procedure performed: receptive to teaching, opportunity for questions provided and all of patient/ son's questions answered to their stated satisfaction, patient tolerated assess, teaching without adverse effects noted.    Agency Progress toward goals: progressing    Patient's Progress towards personal goals: progressing    Home exercise program: take medications and follow diet as

## 2024-01-16 NOTE — HOME HEALTH
medication management, assess vishnu drain    Patient and/or caregiver notified and agrees to changes in the Plan of Care: N/A.     The following discharge planning was discussed with the pt/caregiver: dc when goals met and/ or patient reaches maximum potential, importance of keeping all appointments. no

## 2024-01-16 NOTE — HOME HEALTH
SUBJECTIVE: Pt reports BLE pain on this date. Pt son present throughout occupational therapy session. Pt reports he has had increased anxiety last night about \"everything that is going on and me being a strain on everyone\" Pt reports several loose bowel movements since Sunday.     CAREGIVER INVOLVEMENT/ASSISTANCE NEEDED FOR: Pt son assists with all ADLs, IADLs, and functional mobility    HOME HEALTH SUPPLIES BY TYPE AND QUANTITY ORDERED/DELIVERED THIS VISIT INCLUDE: N/A    OBJECTIVE: See interventions    PATIENT RESPONSE TO TREATMENT: Pt responded fairly to occupational therapy session on this date with pt reporting, \"It felt like a failure to me, but if you guys are saying it was a good day I will agree.\"    PATIENT LEVEL OF UNDERSTANDING OF EDUCATION PROVIDED: Pt and son educated on completing standing tolerance activites several times per day. Pt and son educated on benefits of rearranging furniture to increase accessibility to reach bed. Pt educated on completing BUE strengthening exercises 3 times per day to increase strength needed to complete ADLs and functional mobility.     ASSESSMENT OF PROGRESS TOWARD GOALS: Pt making good progress toward occupational therapy goals on this date with pt increased independence while transfers. Pt with increased standing tolerance on this date with pt able to stand for up to 2 minutes and 45 seconds. Pt continues to require encouragement to engage in treatment sessions. Pt noted to be irritable on this date at times with therapists and son.    CONTINUED NEED FOR THE FOLLOWING SKILLS: Due to reduced functional activity tolerance, BUE weakness, functional mobility, balance, and ADL function, pt would benefit from OT  services in order to maximize safety and independence in home environment.    PLAN FOR NEXT VISIT: ADLs and bed mobility    THE FOLLOWING DISCHARGE PLANNING WAS DISCUSSED WITH THE PATIENT/CAREGIVER: 4 vists remaining with plan to dc to home environment once

## 2024-01-18 ENCOUNTER — HOME CARE VISIT (OUTPATIENT)
Age: 74
End: 2024-01-18

## 2024-01-18 VITALS
RESPIRATION RATE: 16 BRPM | OXYGEN SATURATION: 99 % | TEMPERATURE: 98 F | SYSTOLIC BLOOD PRESSURE: 120 MMHG | DIASTOLIC BLOOD PRESSURE: 60 MMHG | HEART RATE: 69 BPM

## 2024-01-18 PROCEDURE — G0157 HHC PT ASSISTANT EA 15: HCPCS

## 2024-01-18 ASSESSMENT — ENCOUNTER SYMPTOMS: PAIN LOCATION - PAIN QUALITY: CRUNCHING

## 2024-01-18 NOTE — HOME HEALTH
Patient's Subjective:   He reports he had a rough night. He had a leak in with the catheter.     Falls since last session:   no  Trips to ER since last session? no  Pain/Discomfort ?  See pain page.  New Meds: no  Upcoming MD appointments: Video conference 1/2.    Caregiver involvement/assistance needed for:  The patient's caregiver assists with meals, meds, transportation, chores. transfers, set up for MD appts. ADL's.  .  Home health supplies by type and quantity ordered/delivered this visit include:  NO    Objective:  See interventions.    Patient response to treatment: RPE 9/10 post each standing exercise with the need to sit and rest.   He could perform 2 B LE  seated exercises without rest. C/S ROM decreased his neck pain by 50%.    Patient level of understanding of education provided:  - New exercises introduced. He was able to return demonstrate within his available ROM.  - Upgraded HEP with expectations for frequency and compliance/ He reported understanding. His son reported understanding of each new exercise.     Assess of progress towards goals:   Pt able to stand and perform a couple additional exercises today. He is very easily fatigued.   He reports semi compliance with his HEP.  His son is transferring him more since beginning PT. the pt was resistant prior and is now somewhat more willing.   He denies any falls to date    Continued need for the following skills: Strengthening, stability, pre gait and then gait activities to increase his functional Ind and decrease fall risk.      Plan for next visit: if bed has been moved, stand pivot transfers. It was not moved this session.      The following discharge was discussed with the patient/caregiver : Estimated DC ~ 2/1/2024 by Linda Adam, PT   NOMNC required? yes

## 2024-01-20 ENCOUNTER — HOME CARE VISIT (OUTPATIENT)
Age: 74
End: 2024-01-20

## 2024-01-22 ENCOUNTER — HOME CARE VISIT (OUTPATIENT)
Age: 74
End: 2024-01-22
Payer: MEDICARE

## 2024-01-22 VITALS
TEMPERATURE: 97.4 F | HEART RATE: 81 BPM | DIASTOLIC BLOOD PRESSURE: 60 MMHG | SYSTOLIC BLOOD PRESSURE: 115 MMHG | OXYGEN SATURATION: 98 % | RESPIRATION RATE: 16 BRPM

## 2024-01-22 VITALS
DIASTOLIC BLOOD PRESSURE: 87 MMHG | SYSTOLIC BLOOD PRESSURE: 100 MMHG | RESPIRATION RATE: 17 BRPM | HEART RATE: 87 BPM | OXYGEN SATURATION: 98 % | TEMPERATURE: 98.7 F

## 2024-01-22 PROCEDURE — G0158 HHC OT ASSISTANT EA 15: HCPCS

## 2024-01-22 PROCEDURE — G0157 HHC PT ASSISTANT EA 15: HCPCS

## 2024-01-22 ASSESSMENT — ENCOUNTER SYMPTOMS
PAIN LOCATION - PAIN QUALITY: ACHE
PAIN LOCATION - PAIN QUALITY: ACHING

## 2024-01-22 NOTE — HOME HEALTH
Patient's Subjective: He tried to sleep in the bed but it was uncomfortable. His blanket was too heavy and it made his legs ache. He has been doing some of the HEP.    Falls since last session:  no  Trips to ER since last session? no  Pain/Discomfort ?  Yes, see pain page.   New Meds: no  Upcoming MD appointments: BRENDA Friday, Feb 2. 11:00   .  Caregiver involvement/assistance needed for:  The patient's caregiver assists with ***   .  [facility] health supplies by type and quantity ordered/delivered this visit include:  ***  .  Objective:  See interventions.    Patient response to treatment:  ***    Patient level of understanding of education provided:  -Instructed to use the written HEP as he has been going by his memory. Review of the written HEP did show that he had forgotten a couple of his exercises. He will use it with his sons assist from her out.   -Recommended he u se the board on the bed for mattress support as well as supporting the rails. Also recommended he acquire a step for the pt incase he adds back the box springs. Recommended having the step be at least ~ 18x18 to allow full turning on the step without fear of falling off of it. He reported understanding.   -The nausea he feels post each session recently may be due to his increased exertion being noxious and it can cause nausea as well as  taking his meds with only an ensure.   - If he does not flare up from the amb today, try again his standing exercises later in the afternoon. Wait at least 3 hours to do his 2nd set of HEP. He reports understanding.     Assess of progress towards goals:   Pt demonstrated amb for the first time this episode today indicating increased transfer ability and improved stability and LE  strength.   He is semi compliant with HEP.  He and son d robert any falls to date.     Continued need for the following skills: Strengthening, stability, pre gait and then gait activities to increase his functional Ind and decrease fall risk.

## 2024-01-22 NOTE — HOME HEALTH
SUBJECTIVE: Patient right and left and right legs an 6/10. Pt son was present throughout the JONAS session.    .    CAREGIVER INVOLVEMENT/ASSISTANCE NEEDED FOR: The pt family helps with all I/ADLS due to patient's inability to do so.     .    HOME HEALTH SUPPLIES BY TYPE AND QUANTITY ORDERED/DELIVERED THIS VISIT INCLUDE: NONE     .    OBJECTIVE:  See interventions.         .    Patient education provided this visit: Education primary focus on this visit was the use of body mechanics when standing from reclier.    .         Patient level of understanding of education provided: Patient was able to teach-back education provided from CHANDLER,while requiring multiple verbal cuing form CHANDLER for re-enforcement of Proper stance when performing functional standing  balance retraining tasks.          .    RESPONSE TO TREATMENT: Pt able to demonstrate Plan of care addressing functional balance and BUE graded exercises; while requiring breaks for balance and AROM exercises re-training. Pt required an periods of four breaks while demostrating funcitonal tasks. Pt reported fatigue of 7/10 on toward the conclusion the session.  (Please see interventions for more details of goal previously mentioned)     .     ASSESSMENT OF PROGRESS TOWARD GOALS((Please see interventions for more details)): Pt able to to perform balance tasks with Poor  balance with use of FWWfor safety. Pt requires Max verbal Prompting  and encouregment for proper body stance and posture when sitting standing from reclier. Pt able to demo all BUE exercises with use of red thera -bar with SUP for increased BUE strength ans endurance. On this [facility] visit on 01.22.24 pt displayed decreased acitivty tolerance, lack of confidence when static standing, and decrease UB/LB strength, endurance, and ROM. It is recommended that the patient continue to adhere to her HEP address BUE strength and endurance retraining to continue to build strength in her BUE to successfully conduct

## 2024-01-22 NOTE — HOME HEALTH
Patient's Subjective: He tried to sleep in the bed but it was uncomfortable. His blanket was too heavy and it made his legs ache. He has been doing some of the HEP.    Falls since last session:  no  Trips to ER since last session? no  Pain/Discomfort ?  Yes, see pain page.   New Meds: no  Upcoming MD appointments: ERCP Friday, Feb 2. 11:00   .  Caregiver involvement/assistance needed for:  The patient's caregiver assists with meals, meds, transportation, amb, HEP, ADL's  .  Home health supplies by type and quantity ordered/delivered this visit include:  no  .  Objective:  See interventions.    Patient response to treatment:  Fair tolerance to exercises and amb.    Patient level of understanding of education provided:  -Instructed to use the written HEP as he has been going by his memory. Review of the written HEP did show that he had forgotten a couple of his exercises. He will use it with his sons assist from her out.   -Recommended he use the board on the bed for mattress support as well as supporting the rails. Also recommended he acquire a step for the pt incase he adds back the box springs. Recommended having the step be at least ~ 18x18 to allow full turning on the step without fear of falling off of it. He reported understanding.   -The nausea he feels post each session recently may be due to his increased exertion being noxious and it can cause nausea as well as taking his meds with only an ensure.   - If he does not flare up from the amb today, try again his standing exercises later in the afternoon. Wait at least 3 hours to do his 2nd set of HEP. He reports understanding.     Assess of progress towards goals:   Pt demonstrated amb for the first time this episode today indicating increased transfer ability and improved stability and LE  strength.   He is semi compliant with HEP.  He and son deny any falls to date.     Continued need for the following skills: Strengthening, stability, pre gait and then gait

## 2024-01-25 ENCOUNTER — HOME CARE VISIT (OUTPATIENT)
Age: 74
End: 2024-01-25
Payer: MEDICARE

## 2024-01-25 VITALS
SYSTOLIC BLOOD PRESSURE: 110 MMHG | OXYGEN SATURATION: 99 % | TEMPERATURE: 98.7 F | RESPIRATION RATE: 16 BRPM | DIASTOLIC BLOOD PRESSURE: 70 MMHG | HEART RATE: 78 BPM

## 2024-01-25 VITALS
OXYGEN SATURATION: 97 % | SYSTOLIC BLOOD PRESSURE: 120 MMHG | HEART RATE: 87 BPM | TEMPERATURE: 98.7 F | DIASTOLIC BLOOD PRESSURE: 70 MMHG

## 2024-01-25 PROCEDURE — G0158 HHC OT ASSISTANT EA 15: HCPCS

## 2024-01-25 PROCEDURE — G0157 HHC PT ASSISTANT EA 15: HCPCS

## 2024-01-25 ASSESSMENT — ENCOUNTER SYMPTOMS
PAIN LOCATION - PAIN QUALITY: ACHE
PAIN LOCATION - PAIN QUALITY: BURNING

## 2024-01-25 NOTE — HOME HEALTH
Patient's Subjective: He is ok.     Falls since last session: no  Trips to ER since last session? no  Pain/Discomfort ?  See pain page.  New Meds: none  Upcoming MD appointments: ERCP Friday, Feb 2. 11:00 .    Caregiver involvement/assistance needed for:    The patient's caregiver assists with meals, meds, transportation, amb, HEP, ADL's  .  Home health supplies by type and quantity ordered/delivered this visit include: none  .  Objective:  See interventions.    Patient response to treatment:  RPE 8/10 post amb 12 feet.     Patient level of understanding of education provided:  - Safe transfers and gait educated with the patient able to return demonstrate.     Assess of progress towards goals:   Pt able to amb 12 feet with CGA of 2. He is progressing towards his goal of 10 feet, but with supervision.   He denies any falls to date.  He now has his bed where it was recommended to increase ease of amb and transferring to/from it.   He is compliant with his HEP- with assist of his son.     Caregiver involvement/assistance needed for: The patient's caregiver assists with meals, meds, transportation, amb, HEP, ADL's    Continued need for the following skills: Strengthening, stability, pre gait and then gait activities to increase his functional Ind and decrease fall risk.        Plan for next visit: If he has a WC by the next session, vehicle transfer.      The following discharge was discussed with the patient/caregiver : Estimated DC ~ 2/1/2024 by Linda Adam PT   NOMNC required? yes, signed today.

## 2024-01-26 ENCOUNTER — HOME CARE VISIT (OUTPATIENT)
Age: 74
End: 2024-01-26
Payer: MEDICARE

## 2024-01-26 PROCEDURE — G0299 HHS/HOSPICE OF RN EA 15 MIN: HCPCS

## 2024-01-26 NOTE — HOME HEALTH
SUBJECTIVE: The patient was sitting up in the recliner chair upon arrival.    CAREGIVER INVOLVEMENT/ASSISTANCE NEEDED FOR: The patient's son assists with ADL/IADLs as needed and functional transfers.  .  OBJECTIVE:  See interventions.  PATIENT EDUCATION PROVIDED THIS VISIT: Treatment focused on energy conservation stratigies, functional transfers, BUE strengthening, compensatory dressing techniques and static standing balance.    PATIENT RESPONSE TO EDUCATION PROVIDED: The patient/caregiver were instructed in techniques to increase independence with ADLs, such as using adaptive equipment to sabine pants/adult briefs and utilizing energy conservation strategies.  The patient was trained in energy conservation while performing bathing, dressing, and setup such as set clothing out night before, gather items required to perform task in one trip, sit while performing tasks, take rest breaks as needed, perform shower/bathing on days with now other appointments or activities shceduled, etc.; following the training the patient was able to recall one of the strategies to conserve energy.  The patient was trained in safe transfers using appropriate body mechanics and the necessary equipment; following the training the patient performed sit to stand from the lift/chair with CGA and from a kitchen chair without armrests with Min-Mod assist using a RW for support.  The patient was taught B UE strengthening against gravity for shoulder flexion/extension, overhead press, chest press, ab/adduction and elbow flexion/extension to increase independence with functional transfers and bed mobility; following the training the patient  the strengthening exercises with SBA and verbal cues for self-pacing and technique.  The patient peformed static standing for ~25 seconds with the support of the  RW and CGA for safety.    PATIENT RESPONSE TO TREATMENT: The patient tolerated treatment well, maintaining vital signs within acceptable parameters

## 2024-01-29 ENCOUNTER — HOME CARE VISIT (OUTPATIENT)
Age: 74
End: 2024-01-29
Payer: MEDICARE

## 2024-01-29 VITALS
TEMPERATURE: 97.4 F | DIASTOLIC BLOOD PRESSURE: 90 MMHG | SYSTOLIC BLOOD PRESSURE: 119 MMHG | HEART RATE: 68 BPM | OXYGEN SATURATION: 98 %

## 2024-01-29 PROCEDURE — G0158 HHC OT ASSISTANT EA 15: HCPCS

## 2024-01-29 NOTE — HOME HEALTH
SUBJECTIVE: The patient was sitting up in the recliner chair upon arrival.    CAREGIVER INVOLVEMENT/ASSISTANCE NEEDED FOR: The patient's son assists with ADL/IADLs and transfers as needed.  The son was present.  .  OBJECTIVE:  See interventions.  PATIENT EDUCATION PROVIDED THIS VISIT: Treatment focused on functional transfers,bed mobility, standing balance/tolerance, compensatory bathing/dressing techniques to increase independence.    PATIENT RESPONSE TO EDUCATION PROVIDED: The patient and caregiver were trainined in  techniques to include independence with ADLs,such as the use of adaptive equipment to sabine adult briefs/pants and bridging in the bed to pull pants up over hips.  Trained the patient on energy conservation while performing bathing, dressing, and setup such as set clothing out night before, gather items required to perform task in one trip, sit while performing tasks, take rest breaks as needed, perform shower/bathing on days with now other appointments or activities shceduled, etc; following the training the patient was able to recall two of the  energy conserving strategies with minimal verbal cues.  The patient and caregiver were instructed on safe mobility using appropriate body mechanics and AD, following the training the patient is performing supine<->sitting on the edge of the bed and rolling with SBA uisng a small side rail to hold onto.  The patient was  trained in safe transfers uisng  appropriate body mechanics and the necessary equipment, following the training the patient performed sit to stands with CGA and transfer from bed<>recliner chair with CGA using a RW for support.  The patient performed a static standing balance task with the support of a RW, and experience a posterior LOB when attempting to sit down before getting to the chair.  The patient was instructed to always feel the chair or whatever he was transfering to at back of his LEs before reaching back to sit.    PATIENT

## 2024-01-30 ENCOUNTER — HOME CARE VISIT (OUTPATIENT)
Age: 74
End: 2024-01-30
Payer: MEDICARE

## 2024-01-30 VITALS
SYSTOLIC BLOOD PRESSURE: 110 MMHG | HEART RATE: 79 BPM | TEMPERATURE: 98.4 F | DIASTOLIC BLOOD PRESSURE: 60 MMHG | OXYGEN SATURATION: 100 %

## 2024-01-30 PROCEDURE — G0157 HHC PT ASSISTANT EA 15: HCPCS

## 2024-01-30 ASSESSMENT — ENCOUNTER SYMPTOMS: PAIN LOCATION - PAIN QUALITY: TENDER

## 2024-01-30 NOTE — HOME HEALTH
Patient's Subjective: He is constipated.     Falls since last session: none  Trips to ER since last session? no  Pain/Discomfort ?  see pain page  New Meds: no  .  Assessment and Summary of Care:  Patient's current functional status before discharge is as follows  Strength:  Will require reassessed at DC, but improved as evidenced by the ability to now transfer and amb with increased ease since evaluation.  ROM:  WFL's  Bed Mobility:  He requires min A for S/L to sitting EOB.   Transfers: Bed transfer SBA. Sit/stand changes depending on how he feels. Sometimes, he is SBA and others CGA.   Sit/stand EOB and lifted recliner with SBA.  Toilet: CGA to sit, Min/Mod A to stand from toilet. Recommended his son raise it to the highest position to increase ease of the transfer. He raised it during this visit.  Gait: Pt is now able to amb ~ 15 feet x 2, 2 turns, CGA. He demonstrates Fair stability. Slow steady gait. He is exhausted by the time he stops and is then unable to perform any more activity until he rests for ~ an hour.   Stairs: NT  Special Tests: no  Recommendations: Cont with HEP 2-3x/day to his tolerance. Amb every 1-2 hours to his tolerance.    HEP: Standing hip flex, hip ext, hip abd, ham curls, single leg marches and B HR. Sit to rest as needed.    The following discharge was discussed with the patient/caregiver : Estimated DC ~ 2/1/2024 by Linda Adam PT   NOMNC required? yes, already signed

## 2024-02-02 ENCOUNTER — HOME CARE VISIT (OUTPATIENT)
Age: 74
End: 2024-02-02
Payer: MEDICARE

## 2024-02-03 ENCOUNTER — HOME CARE VISIT (OUTPATIENT)
Age: 74
End: 2024-02-03
Payer: MEDICARE

## 2024-02-03 VITALS
DIASTOLIC BLOOD PRESSURE: 65 MMHG | RESPIRATION RATE: 18 BRPM | SYSTOLIC BLOOD PRESSURE: 100 MMHG | HEART RATE: 67 BPM | OXYGEN SATURATION: 97 % | TEMPERATURE: 97.7 F

## 2024-02-03 PROCEDURE — G0299 HHS/HOSPICE OF RN EA 15 MIN: HCPCS

## 2024-02-03 PROCEDURE — G0151 HHCP-SERV OF PT,EA 15 MIN: HCPCS

## 2024-02-03 ASSESSMENT — ENCOUNTER SYMPTOMS: PAIN LOCATION - PAIN QUALITY: ACHING

## 2024-02-03 NOTE — HOME HEALTH
OCCUPATIONAL THERAPY DISCHARGE:  Mr. Dangelo has been seen by skilled home health occupational therapy services to address deficits in ADLs, Activity tolerance, functional mobility, and balance. Pt has reached maximal potential and is agreeable/ready to discharge at this time. ADLs: Pt and caregiver have been educated on adaptive techniques and equipment to increase pt safety and independence with ADL completion. Pt has declined to trial adaptive techniques and equipment and reports he will have his son assists him as needed with ADL completion. Activity Tolerance: Pt has been educated on implementing energy conservation strategies into his daily routine to decrease fatigue and risk of falls. Pt reports implementing sitting to complete tasks, taking rest breaks as needed, and completing sponge bathing on days with no other appointments. Bed Mobility: Pt has been educated and instructed on proper completing of bed mobility and pt has progressed to completing rolling and supine to sit with SBA using bed rail for support. Functional Mobility: Pt has progressed to completing functional transfers from a variety of surfaces with CGA to moderate assistance depending on pt fatigue level. BUE Function: Pt has been provided with BUE strengthening HEP to increase strength needed to complete ADLs and functional mobility. Pt has progressed to completing HEP with supervision and verbal cues for proper pacing. Balance: Pt has progressed to fair- static standing balance while supported with FWW.

## 2024-02-03 NOTE — HOME HEALTH
SUBJECTIVE pain on abdomen 5/10  CAREGIVER ASSISTANCE:daughter and son are able to assist as needed   MEDICATIONS REVIEWED AND UPDATED: no changes in medications at this time  WOUND has intact gauze and tegaderm dressing on R side of abdomen, no drainage noted   ROM wfl  BED MOBILITY supervison   TRANSFERS CGA using RW   GAIT TRAINING CGA using RW   STAIRS CGA   THERAPEUTIC EXERCISES independent   BALANCE Tinetti 20/28  PATIENT/CAREGIVER EDUCATION THIS VISIT: patient provided with skilled PT intervention consisting of graded therapeutic exercises, gait training, balance training, safe transfers training, caregiver education and Home exercise program education. Patient at time of discharge was able to demonstrate supervision  with bed mobility SBA to CGA  transfers and gait with RW inside and outside the house. pt. also noted that he is doing HEP 1x a day.  PLAN DC at this time due to goals are met  DISCHARGE PLANNING DISCUSSED: dc to self and family under MD supervision

## 2024-02-05 VITALS
RESPIRATION RATE: 20 BRPM | OXYGEN SATURATION: 98 % | TEMPERATURE: 98 F | HEART RATE: 71 BPM | SYSTOLIC BLOOD PRESSURE: 108 MMHG | DIASTOLIC BLOOD PRESSURE: 70 MMHG

## 2024-02-05 ASSESSMENT — ENCOUNTER SYMPTOMS
HEMOPTYSIS: 0
STOOL DESCRIPTION: FORMED

## 2024-02-06 NOTE — HOME HEALTH
Patient was being seen due to recent surgery-laprascopic cholecystectomy with KAMRAN drain placement.  KAMRAN drain still in place, draining minimal drainage, patient's caregivers able to independently care for KAMRAN drain.  Patient has upcoming appointment to discuss removal.  Patient no longer requires skilled nursing at this time, discharged from home health services as goals have been met.

## 2024-02-14 ENCOUNTER — OFFICE VISIT (OUTPATIENT)
Age: 74
End: 2024-02-14

## 2024-02-14 VITALS
OXYGEN SATURATION: 99 % | TEMPERATURE: 97.2 F | HEART RATE: 84 BPM | HEIGHT: 72 IN | BODY MASS INDEX: 24.48 KG/M2 | RESPIRATION RATE: 14 BRPM

## 2024-02-14 DIAGNOSIS — Z90.49 S/P LAPAROSCOPIC CHOLECYSTECTOMY: Primary | ICD-10-CM

## 2024-02-14 PROCEDURE — 99024 POSTOP FOLLOW-UP VISIT: CPT | Performed by: SURGERY

## 2024-02-14 NOTE — PROGRESS NOTES
Jay Dangelo III is a 73 y.o. male (: 1950)     Chief Complaint   Patient presents with    Follow-up     Cholecystectomy/ KAMRAN Tube Removal       Medication list and allergies have been reviewed with Jay MARINO Antolin FALGUNI and updated as of today's date.     I have gone over all Medical, Surgical and Social History with Jay Dangelo III and updated/added the information accordingly.      1. Have you been to the ER, urgent care clinic since your last visit?  Hospitalized since your last visit?No    2. Have you seen or consulted any other health care providers outside of the [facility] since your last visit?  Include any pap smears or colon screening. No   
accurate or edited where necessary.     Electronically signed by:    Saray Fabian DO, MPH

## 2024-08-05 ENCOUNTER — HOSPITAL ENCOUNTER (OUTPATIENT)
Facility: HOSPITAL | Age: 74
Setting detail: RECURRING SERIES
Discharge: HOME | End: 2024-08-08
Payer: MEDICARE

## 2024-08-05 PROCEDURE — 97110 THERAPEUTIC EXERCISES: CPT

## 2024-08-05 PROCEDURE — 97161 PT EVAL LOW COMPLEX 20 MIN: CPT

## 2024-08-05 PROCEDURE — 97530 THERAPEUTIC ACTIVITIES: CPT

## 2024-08-05 NOTE — THERAPY EVALUATION
to  walk without a walker\"  Patient Self Reported Health Status: good  Rehabilitation Potential: good    Short Term Goals: To be accomplished in 10 treatments   Pt will  be indep with HEP  for > 10 min daily to improve functional status.   Status at last note/certification HEP issued, 7 min activity tolerance  2.  Pt will demo AROM B knees to 0 degrees in sitting to improve foot clearance with gait as a result of improved hamstring flexilbility.   Status at last note/certification B knees  lacking 25 degrees of AROM seated due to weakness and tight HS  3.  Pt  will  ascend 1 flight of steps with reciprocal gait, one handrail, and SUP with good safety awareness and  foot placement  on steps to  enter and exit home/bedroom with no assist from caregivers.   Status at last note/certification 4 steps  with B HRs, step to and CGA from therapist       Long Term Goals: To be accomplished in 36 treatments   PT will ambulate community distances including curbs with SUP and no LOB to walk to and from his medical appts  without using a WC.  Status at last note/certification 300 feet RW and CGA  2.  Pt will improve TUG score to < 28 sec with LRD to decrease fall  risk  in the community.   Status at last note/certification 44  sec with RW   3.  Pt will improve Tinetti score  to 12/16 to  demo improved overall  mobility.   Status at last note/certification 8/16 at eval  4.  Pt will demo at least 4+/5 BLE strength  in LEs to improve safety with stairs.   Status at last note/certification grossly 3+/5 distally and 4-/5 proximally bilaterally    Frequency / Duration: Patient would benefit from skilled PT 2 times per week for up to 36 treatment sessions as needed.  Goals will be assigned and reassessed every 10 visits/ 30 days per guidelines.    Patient/ Caregiver education and instruction: Diagnosis, prognosis, self care, activity modification, and exercises [x]  Plan of care has been reviewed with PTA      Sidra Pascal, PT

## 2024-08-07 ENCOUNTER — HOSPITAL ENCOUNTER (OUTPATIENT)
Facility: HOSPITAL | Age: 74
Setting detail: RECURRING SERIES
Discharge: HOME | End: 2024-08-10
Payer: MEDICARE

## 2024-08-07 PROCEDURE — 97110 THERAPEUTIC EXERCISES: CPT

## 2024-08-07 PROCEDURE — 97112 NEUROMUSCULAR REEDUCATION: CPT

## 2024-08-07 PROCEDURE — 97530 THERAPEUTIC ACTIVITIES: CPT

## 2024-08-07 NOTE — PROGRESS NOTES
PHYSICAL / OCCUPATIONAL THERAPY - DAILY TREATMENT NOTE    Patient Name: Jay Dangelo III    Date: 2024    : 1950  Insurance: Payor: MEDICARE / Plan: MEDICARE PART A AND B / Product Type: *No Product type* /      Patient  verified Yes     Visit #   Current / Total 2 36   Time   In / Out 405 [id number]   Pain   In / Out Pain all over from arthrisits 5-6 Pt stated pain in back from standing 5-6   Subjective Functional Status/Changes: Pt stated he is feeling ok, his back is hurting today. Pt son accompanied him to apointment     TREATMENT AREA =  Other abnormalities of gait and mobility [R26.89]     OBJECTIVE         Therapeutic Procedures:    Tx Min Billable or 1:1 Min (if diff from Tx Min) Procedure, Rationale, Specifics   17  48224 Therapeutic Exercise (timed):  increase ROM, strength, coordination, balance, and proprioception to improve patient's ability to progress to PLOF and address remaining functional goals. (see flow sheet as applicable)     Details if applicable:  Added standing hip ABD, standing hamstring curls     10  83188 Neuromuscular Re-Education (timed):  improve balance, coordination, kinesthetic sense, posture, core stability and proprioception to improve patient's ability to develop conscious control of individual muscles and awareness of position of extremities in order to progress to PLOF and address remaining functional goals. (see flow sheet as applicable)     Details if applicable:            15  09672 Therapeutic Activity (timed):  use of dynamic activities replicating functional movements to increase ROM, strength, coordination, balance, and proprioception in order to improve patient's ability to progress to PLOF and address remaining functional goals.  (see flow sheet as applicable)     Details if applicable:         Details if applicable:     42  [facility] Totals Reminder: bill using total billable min of TIMED therapeutic procedures (example: do not include dry needle or estim

## 2024-08-12 ENCOUNTER — HOSPITAL ENCOUNTER (OUTPATIENT)
Facility: HOSPITAL | Age: 74
Setting detail: RECURRING SERIES
Discharge: HOME | End: 2024-08-15
Payer: MEDICARE

## 2024-08-12 PROCEDURE — 97112 NEUROMUSCULAR REEDUCATION: CPT

## 2024-08-12 PROCEDURE — 97110 THERAPEUTIC EXERCISES: CPT

## 2024-08-12 NOTE — PROGRESS NOTES
PHYSICAL / OCCUPATIONAL THERAPY - DAILY TREATMENT NOTE    Patient Name: Jay Dangelo III    Date: 2024    : 1950  Insurance: Payor: MEDICARE / Plan: MEDICARE PART A AND B / Product Type: *No Product type* /      Patient  verified Yes     Visit #   Current / Total 3 26   Time   In / Out 1:59 2:38   Pain   In / Out 2/10-neck and lower back.  8/10- knees. 2/10- neck and lower back.   Subjective Functional Status/Changes: I'm feeling pretty stiff and my neck and lower back are bothering me a bit. I was pretty tired after my last visit but things went well.      TREATMENT AREA =  Other abnormalities of gait and mobility [R26.89]     OBJECTIVE         Therapeutic Procedures:    Tx Min Billable or 1:1 Min (if diff from Tx Min) Procedure, Rationale, Specifics   25 25 [id number] Therapeutic Exercise (timed):  increase ROM, strength, coordination, balance, and proprioception to improve patient's ability to progress to PLOF and address remaining functional goals. (see flow sheet as applicable)     Details if applicable:       14 14 61853 Neuromuscular Re-Education (timed):  improve balance, coordination, kinesthetic sense, posture, core stability and proprioception to improve patient's ability to develop conscious control of individual muscles and awareness of position of extremities in order to progress to PLOF and address remaining functional goals. (see flow sheet as applicable)     Details if applicable:                    39 39 [facility] Totals Reminder: bill using total billable min of TIMED therapeutic procedures (example: do not include dry needle or estim unattended, both untimed codes, in totals to left)  8-22 min = 1 unit; 23-37 min = 2 units; 38-52 min = 3 units; 53-67 min = 4 units; 68-82 min = 5 units   Total Total     [x]  Patient Education billed concurrently with other procedures   [x] Review HEP    [] Progressed/Changed HEP, detail:    [] Other detail:       Objective Information/Functional

## 2024-08-14 ENCOUNTER — HOSPITAL ENCOUNTER (OUTPATIENT)
Facility: HOSPITAL | Age: 74
Setting detail: RECURRING SERIES
Discharge: HOME | End: 2024-08-17
Payer: MEDICARE

## 2024-08-14 PROCEDURE — 97530 THERAPEUTIC ACTIVITIES: CPT

## 2024-08-14 PROCEDURE — 97110 THERAPEUTIC EXERCISES: CPT

## 2024-08-19 ENCOUNTER — HOSPITAL ENCOUNTER (OUTPATIENT)
Facility: HOSPITAL | Age: 74
Setting detail: RECURRING SERIES
Discharge: HOME | End: 2024-08-22
Payer: MEDICARE

## 2024-08-19 PROCEDURE — 97110 THERAPEUTIC EXERCISES: CPT

## 2024-08-19 PROCEDURE — 97116 GAIT TRAINING THERAPY: CPT

## 2024-08-19 PROCEDURE — 97530 THERAPEUTIC ACTIVITIES: CPT

## 2024-08-19 NOTE — PROGRESS NOTES
foot placement  on steps to  enter and exit home/bedroom with no assist from caregivers.   Status at last note/certification: 4 steps  with B HRs, step to and CGA from therapist   Current:     Long Term Goals: To be accomplished in 36 treatments   PT will ambulate community distances including curbs with SUP and no LOB to walk to and from his medical appts  without using a WC.  Status at last note/certification 300 feet RW and CGA  2.  Pt will improve TUG score to < 28 sec with LRD to decrease fall  risk  in the community.   Status at last note/certification 44  sec with RW   3.  Pt will improve Tinetti score  to 12/16 to  demo improved overall  mobility.   Status at last note/certification 8/16 at eval  4.  Pt will demo at least 4+/5 BLE strength  in LEs to improve safety with stairs.   Status at last note/certification grossly 3+/5 distally and 4-/5 proximally bilaterally  Current: Goal in progress - pt able to progress resistance/volume of strengthening exercises (08/19/24)    Next PN/ RC due 09/03/24  Auth due (visit number/ date) JONO    PLAN  - Continue Plan of Care  - Upgrade activities as tolerated    Angelica Mensah, PT    8/19/2024    2:48 PM  If an interpreting service was utilized for treatment of this patient, the contents of this document represent the material reviewed with the patient via the .     Future Appointments   Date Time Provider Department Center   8/21/2024  2:00 PM [facility]   8/26/2024  2:00 PM Michael Reynolds, [facility]   8/28/2024  2:00 PM [facility]

## 2024-08-21 ENCOUNTER — HOSPITAL ENCOUNTER (OUTPATIENT)
Facility: HOSPITAL | Age: 74
Setting detail: RECURRING SERIES
Discharge: HOME | End: 2024-08-24
Payer: MEDICARE

## 2024-08-21 PROCEDURE — 97530 THERAPEUTIC ACTIVITIES: CPT

## 2024-08-21 PROCEDURE — 97110 THERAPEUTIC EXERCISES: CPT

## 2024-08-21 NOTE — PROGRESS NOTES
PHYSICAL / OCCUPATIONAL THERAPY - DAILY TREATMENT NOTE    Patient Name: Jay Dangelo III    Date: 2024    : 1950  Insurance: Payor: MEDICARE / Plan: MEDICARE PART A AND B / Product Type: *No Product type* /      Patient  verified Yes     Visit #   Current / Total 6 36   Time   In / Out 2:00 2:53   Pain   In / Out 6 7   Subjective Functional Status/Changes: Pt reports he felt better after leaving the session the other day, but states his right knee is hurting him today.     TREATMENT AREA =  Other abnormalities of gait and mobility [R26.89]     OBJECTIVE         Therapeutic Procedures:    Tx Min Billable or 1:1 Min (if diff from Tx Min) Procedure, Rationale, Specifics   28 28 [id number] Therapeutic Exercise (timed):  increase ROM, strength, coordination, balance, and proprioception to improve patient's ability to progress to PLOF and address remaining functional goals. (see flow sheet as applicable)     Details if applicable:        02424 Therapeutic Activity (timed):  use of dynamic activities replicating functional movements to increase ROM, strength, coordination, balance, and proprioception in order to improve patient's ability to progress to PLOF and address remaining functional goals.  (see flow sheet as applicable)     Details if applicable:  includes reassessment time          Details if applicable:            Details if applicable:     53 53 [facility] Totals Reminder: bill using total billable min of TIMED therapeutic procedures (example: do not include dry needle or estim unattended, both untimed codes, in totals to left)  8-22 min = 1 unit; 23-37 min = 2 units; 38-52 min = 3 units; 53-67 min = 4 units; 68-82 min = 5 units   Total Total     [x]  Patient Education billed concurrently with other procedures   [x] Review HEP    [] Progressed/Changed HEP, detail:    [] Other detail:       Objective Information/Functional Measures/Assessment    Pt reported that his right knee is hurting today. Pt

## 2024-08-26 ENCOUNTER — HOSPITAL ENCOUNTER (OUTPATIENT)
Facility: HOSPITAL | Age: 74
Setting detail: RECURRING SERIES
Discharge: HOME | End: 2024-08-29
Payer: MEDICARE

## 2024-08-26 PROCEDURE — 97530 THERAPEUTIC ACTIVITIES: CPT

## 2024-08-26 PROCEDURE — 97112 NEUROMUSCULAR REEDUCATION: CPT

## 2024-08-26 PROCEDURE — 97110 THERAPEUTIC EXERCISES: CPT

## 2024-08-26 NOTE — PROGRESS NOTES
demo AROM B knees to 0 degrees in sitting to improve foot clearance with gait as a result of improved hamstring flexilbility.   Status at last note/certification: B knees  lacking 25 degrees of AROM seated due to weakness and tight HS  Current: progressing: right knee extension: lacking 16 degrees, left knee extension: lacking 13 degrees. (8/12/2024)  3.  Pt  will  ascend 1 flight of steps with reciprocal gait, one handrail, and SUP with good safety awareness and  foot placement  on steps to  enter and exit home/bedroom with no assist from caregivers.   Status at last note/certification: 4 steps  with B HRs, step to and CGA from therapist   Current: not met: still requires bilateral UE assistance and CGA. (8/26/2024)     Long Term Goals: To be accomplished in 36 treatments   PT will ambulate community distances including curbs with SUP and no LOB to walk to and from his medical appts  without using a WC.  Status at last note/certification 300 feet RW and CGA  2.  Pt will improve TUG score to < 28 sec with LRD to decrease fall  risk  in the community.   Status at last note/certification 44  sec with RW  Current: Progressing - Trial 1 - 53 seconds with FWW, Trial 2 - 33 seconds with FWW but poor eccentric control with return to sit (08/21/24)  3.  Pt will improve Tinetti score  to 12/16 to  demo improved overall  mobility.   Status at last note/certification 8/16 at eval  4.  Pt will demo at least 4+/5 BLE strength  in LEs to improve safety with stairs.   Status at last note/certification grossly 3+/5 distally and 4-/5 proximally bilaterally  Current: Goal in progress - pt able to progress resistance/volume of strengthening exercises (08/19/24). Right DF:4/5 left DF:3-/5, Right hip flexion:4/5 , ER:4/5 , IR:4+/5  Right hip flexion:4-/5 , ER:4/5 , IR: 4/5. (8/26/2024)    Next PN/ RC due 9/03/2024  Auth due (visit number/ date) JONO    PLAN  - Continue Plan of Care    Michael Reynolds, PTA    8/26/2024    1:57 PM  If an

## 2024-08-28 ENCOUNTER — HOSPITAL ENCOUNTER (OUTPATIENT)
Facility: HOSPITAL | Age: 74
Setting detail: RECURRING SERIES
Discharge: HOME | End: 2024-08-31
Payer: MEDICARE

## 2024-08-28 PROCEDURE — 97112 NEUROMUSCULAR REEDUCATION: CPT

## 2024-08-28 PROCEDURE — 97530 THERAPEUTIC ACTIVITIES: CPT

## 2024-08-28 PROCEDURE — 97110 THERAPEUTIC EXERCISES: CPT

## 2024-08-28 NOTE — PROGRESS NOTES
5 units   Total Total     [x]  Patient Education billed concurrently with other procedures   [x] Review HEP    [] Progressed/Changed HEP, detail:    [] Other detail:       Objective Information/Functional Measures/Assessment    Pt has attended skilled physical therapy for 8 visits to address Other abnormalities of gait and mobility [R26.89] and has made slow but steady progress thus far with therapy measures.  Objectively, Pt demonstrates improvements in B knee terminal extension AROM.  Pt's functional gains include ability to dress self easier, ability to move better from place to place in home, and ability to perform sit to stand transfers with a little more ease.  Pt's functional deficits include inability to ambulate without FWW, has not attempted to negotiate 3 steps in home to get to his bathroom.  Pt rates pain as 8/10 at worst and 0/10 at best.  Pt reports a self-reported improvement rating of 50% since start of care.  Pt would continue to benefit from skilled therapy services to further improve functional mobility and strength, standing balance, and gait mechanics for increased independence maneuvering in home and community with decreased falls risk.       Patient will continue to benefit from skilled PT / OT services to modify and progress therapeutic interventions, analyze and address functional mobility deficits, analyze and address ROM deficits, analyze and address strength deficits, analyze and address soft tissue restrictions, analyze and cue for proper movement patterns, and analyze and modify for postural abnormalities to address functional deficits and attain remaining goals.    Progress toward goals / Updated goals:  [x]  See Progress Note/Recertification    Short Term Goals: To be accomplished in 10 treatments   Pt will  be indep with HEP  for > 10 min daily to improve functional status.   Status at last note/certification: HEP issued, 7 min activity tolerance  Current: met - Pt reports  date) JONO    PLAN  - Continue Plan of Care    Maria C  Pato, PT    8/28/2024    2:22 PM  If an interpreting service was utilized for treatment of this patient, the contents of this document represent the material reviewed with the patient via the .     Future Appointments   Date Time Provider Department Center   9/4/2024  2:00 PM Maria C Whyte, PT [facility]   9/6/2024  2:40 PM Julianne Hu, PT [facility]   9/9/2024  2:00 PM [facility]   9/11/2024  2:00 PM Maria C Whyte, PT [facility]   9/16/2024  2:00 PM [facility]   9/18/2024  2:00 PM Maria C Whyte, PT [facility]   9/23/2024  2:00 PM [facility]   9/25/2024  2:00 PM ANSELMO DAO [facility]

## 2024-08-28 NOTE — PROGRESS NOTES
[facility] ([facility]3]   [address], [place] [address]  Phone: [phone] Fax: [phone]  PROGRESS NOTE  Patient Name: Jay Dangelo III : 1950   Treatment/Medical Diagnosis: Other abnormalities of gait and mobility [R26.89]   Referral Source: Edmund Gaines NP-C     Payor: Payor: MEDICARE / Plan: MEDICARE PART A AND B / Product Type: *No Product type* /            Date of Initial Visit: 8 Attended Visits: 1 Missed Visits: 0       CURRENT GOAL STATUS  Short Term Goals: To be accomplished in 10 treatments   Pt will  be indep with HEP  for > 10 min daily to improve functional status.   Status at last note/certification: HEP issued, 7 min activity tolerance  Current: met - Pt reports compliance with HEP (24)   2.  Pt will demo AROM B knees to 0 degrees in sitting to improve foot clearance with gait as a result of improved hamstring flexilbility.   Status at last note/certification: B knees  lacking 25 degrees of AROM seated due to weakness and tight HS  Current: progressing: right knee extension: lacking 16 degrees, left knee extension: lacking 13 degrees. (2024)  3.  Pt  will  ascend 1 flight of steps with reciprocal gait, one handrail, and SUP with good safety awareness and  foot placement  on steps to  enter and exit home/bedroom with no assist from caregivers.   Status at last note/certification: 4 steps  with B HRs, step to and CGA from therapist   Current: not met: still requires bilateral UE assistance and CGA. (2024)     Long Term Goals: To be accomplished in 36 treatments   PT will ambulate community distances including curbs with SUP and no LOB to walk to and from his medical appts  without using a WC.  Status at last note/certification 300 feet RW and CGA  Current: progressing - Pt ambulating short distances with FWW and close SBA to CGA x 1 (24)  2.  Pt will improve TUG score to < 28 sec with LRD to decrease

## 2024-09-04 ENCOUNTER — HOSPITAL ENCOUNTER (OUTPATIENT)
Facility: HOSPITAL | Age: 74
Setting detail: RECURRING SERIES
Discharge: HOME | End: 2024-09-07
Payer: MEDICARE

## 2024-09-04 PROCEDURE — 97110 THERAPEUTIC EXERCISES: CPT

## 2024-09-04 PROCEDURE — 97530 THERAPEUTIC ACTIVITIES: CPT

## 2024-09-04 NOTE — PROGRESS NOTES
tolerance.  Pt given consistent verbal cueing for holding head more erect as Pt tends to look down at floor due to presence of increased thoracic kyphosis.  Seated rest breaks given after every two exercises.  Pt noted mainly muscle fatigue and post exercise soreness at end of session.  Otherwise, Pt able to ambulate out of clinic with use of FWW and SBA of son in no apparent distress.       Patient will continue to benefit from skilled PT / OT services to modify and progress therapeutic interventions, analyze and address functional mobility deficits, analyze and address ROM deficits, analyze and address strength deficits, analyze and address soft tissue restrictions, analyze and cue for proper movement patterns, and analyze and modify for postural abnormalities to address functional deficits and attain remaining goals.    Progress toward goals / Updated goals:  [x]  See Progress Note/Recertification    Short Term Goals: To be accomplished in 10 treatments  1.  Pt will demo AROM B knees to 0 degrees in sitting to improve foot clearance with gait as a result of improved hamstring flexilbility.   Status at last note/certification: progressing: right knee extension: lacking 16 degrees, left knee extension: lacking 13 degrees. (8/12/2024)  Current:   2.  Pt  will  ascend 1 flight of steps with reciprocal gait, one handrail, and SUP with good safety awareness and  foot placement  on steps to  enter and exit home/bedroom with no assist from caregivers.   Status at last note/certification: not met: still requires bilateral UE assistance and CGA. (8/26/2024)  Current: not met - still performs with step to pattern, use of B UE support and CGA of son (9/4/24)     Long Term Goals: To be accomplished in 36 treatments  Pt will ambulate community distances including curbs with SUP and no LOB to walk to and from his medical appts  without using a WC.  Status at last note/certification:  progressing - Pt ambulating short distances

## 2024-09-06 ENCOUNTER — APPOINTMENT (OUTPATIENT)
Facility: HOSPITAL | Age: 74
End: 2024-09-06
Payer: MEDICARE

## 2024-09-09 ENCOUNTER — HOSPITAL ENCOUNTER (OUTPATIENT)
Facility: HOSPITAL | Age: 74
Setting detail: RECURRING SERIES
Discharge: HOME | End: 2024-09-12
Payer: MEDICARE

## 2024-09-09 PROCEDURE — 97110 THERAPEUTIC EXERCISES: CPT

## 2024-09-09 PROCEDURE — 97112 NEUROMUSCULAR REEDUCATION: CPT

## 2024-09-11 ENCOUNTER — HOSPITAL ENCOUNTER (OUTPATIENT)
Facility: HOSPITAL | Age: 74
Setting detail: RECURRING SERIES
Discharge: HOME | End: 2024-09-14
Payer: MEDICARE

## 2024-09-11 PROCEDURE — 97530 THERAPEUTIC ACTIVITIES: CPT

## 2024-09-11 PROCEDURE — 97110 THERAPEUTIC EXERCISES: CPT

## 2024-09-16 ENCOUNTER — HOSPITAL ENCOUNTER (OUTPATIENT)
Facility: HOSPITAL | Age: 74
Setting detail: RECURRING SERIES
Discharge: HOME | End: 2024-09-19
Payer: MEDICARE

## 2024-09-16 PROCEDURE — 97110 THERAPEUTIC EXERCISES: CPT

## 2024-09-16 PROCEDURE — 97530 THERAPEUTIC ACTIVITIES: CPT

## 2024-09-18 ENCOUNTER — HOSPITAL ENCOUNTER (OUTPATIENT)
Facility: HOSPITAL | Age: 74
Setting detail: RECURRING SERIES
Discharge: HOME | End: 2024-09-21
Payer: MEDICARE

## 2024-09-18 PROCEDURE — 97116 GAIT TRAINING THERAPY: CPT

## 2024-09-18 PROCEDURE — 97530 THERAPEUTIC ACTIVITIES: CPT

## 2024-09-23 ENCOUNTER — HOSPITAL ENCOUNTER (OUTPATIENT)
Facility: HOSPITAL | Age: 74
Setting detail: RECURRING SERIES
Discharge: HOME | End: 2024-09-26
Payer: MEDICARE

## 2024-09-23 PROCEDURE — 97110 THERAPEUTIC EXERCISES: CPT

## 2024-09-23 PROCEDURE — 97530 THERAPEUTIC ACTIVITIES: CPT

## 2024-09-23 PROCEDURE — 97112 NEUROMUSCULAR REEDUCATION: CPT

## 2024-09-25 ENCOUNTER — HOSPITAL ENCOUNTER (OUTPATIENT)
Facility: HOSPITAL | Age: 74
Setting detail: RECURRING SERIES
Discharge: HOME | End: 2024-09-28
Payer: MEDICARE

## 2024-09-25 PROCEDURE — 97530 THERAPEUTIC ACTIVITIES: CPT

## 2024-09-25 PROCEDURE — 97110 THERAPEUTIC EXERCISES: CPT

## 2024-09-30 ENCOUNTER — HOSPITAL ENCOUNTER (OUTPATIENT)
Facility: HOSPITAL | Age: 74
Setting detail: RECURRING SERIES
Discharge: HOME | End: 2024-10-03
Payer: MEDICARE

## 2024-09-30 PROCEDURE — 97110 THERAPEUTIC EXERCISES: CPT

## 2024-09-30 PROCEDURE — 97112 NEUROMUSCULAR REEDUCATION: CPT

## 2024-09-30 PROCEDURE — 97530 THERAPEUTIC ACTIVITIES: CPT

## 2024-09-30 NOTE — PROGRESS NOTES
PHYSICAL / OCCUPATIONAL THERAPY - DAILY TREATMENT NOTE    Patient Name: Jay Dangelo III    Date: 2024    : 1950  Insurance: Payor: MEDICARE / Plan: MEDICARE PART A AND B / Product Type: *No Product type* /      Patient  verified Yes     Visit #   Current / Total 1 24   Time   In / Out 2:01 2:39   Pain   In / Out 2 - wrist 2 - wrist   Subjective Functional Status/Changes: \"Im doing okay\"     TREATMENT AREA =  Other abnormalities of gait and mobility [R26.89]     OBJECTIVE         Therapeutic Procedures:    Tx Min Billable or 1:1 Min (if diff from Tx Min) Procedure, Rationale, Specifics   20 20 37196 Therapeutic Exercise (timed):  increase ROM, strength, coordination, balance, and proprioception to improve patient's ability to progress to PLOF and address remaining functional goals. (see flow sheet as applicable)     Details if applicable:       8 8 13723 Neuromuscular Re-Education (timed):  improve balance, coordination, kinesthetic sense, posture, core stability and proprioception to improve patient's ability to develop conscious control of individual muscles and awareness of position of extremities in order to progress to PLOF and address remaining functional goals. (see flow sheet as applicable)     Details if applicable:     10 10 26752 Therapeutic Activity (timed):  use of dynamic activities replicating functional movements to increase ROM, strength, coordination, balance, and proprioception in order to improve patient's ability to progress to PLOF and address remaining functional goals.  (see flow sheet as applicable)     Details if applicable:            Details if applicable:            Details if applicable:     38 38 [facility] Totals Reminder: bill using total billable min of TIMED therapeutic procedures (example: do not include dry needle or estim unattended, both untimed codes, in totals to left)  8-22 min = 1 unit; 23-37 min = 2 units; 38-52 min = 3 units; 53-67 min = 4 units; 68-82 min =

## 2024-10-02 ENCOUNTER — HOSPITAL ENCOUNTER (OUTPATIENT)
Facility: HOSPITAL | Age: 74
Setting detail: RECURRING SERIES
Discharge: HOME | End: 2024-10-05
Payer: MEDICARE

## 2024-10-02 PROCEDURE — 97110 THERAPEUTIC EXERCISES: CPT

## 2024-10-02 PROCEDURE — 97112 NEUROMUSCULAR REEDUCATION: CPT

## 2024-10-02 NOTE — PROGRESS NOTES
PHYSICAL / OCCUPATIONAL THERAPY - DAILY TREATMENT NOTE    Patient Name: Jay Dangelo III    Date: 10/2/2024    : 1950  Insurance: Payor: MEDICARE / Plan: MEDICARE PART A AND B / Product Type: *No Product type* /      Patient  verified Yes     Visit #   Current / Total 2 24   Time   In / Out 2:00 2:43   Pain   In / Out 2 2   Subjective Functional Status/Changes: \"Im good today\"      TREATMENT AREA =  Other abnormalities of gait and mobility [R26.89]     OBJECTIVE         Therapeutic Procedures:    Tx Min Billable or 1:1 Min (if diff from Tx Min) Procedure, Rationale, Specifics   28  12857 Therapeutic Exercise (timed):  increase ROM, strength, coordination, balance, and proprioception to improve patient's ability to progress to PLOF and address remaining functional goals. (see flow sheet as applicable)     Details if applicable:       15 15 84688 Neuromuscular Re-Education (timed):  improve balance, coordination, kinesthetic sense, posture, core stability and proprioception to improve patient's ability to develop conscious control of individual muscles and awareness of position of extremities in order to progress to PLOF and address remaining functional goals. (see flow sheet as applicable)     Details if applicable:            Details if applicable:            Details if applicable:            Details if applicable:     43 43 [facility] Totals Reminder: bill using total billable min of TIMED therapeutic procedures (example: do not include dry needle or estim unattended, both untimed codes, in totals to left)  8-22 min = 1 unit; 23-37 min = 2 units; 38-52 min = 3 units; 53-67 min = 4 units; 68-82 min = 5 units   Total Total     [x]  Patient Education billed concurrently with other procedures   [x] Review HEP    [] Progressed/Changed HEP, detail:    [] Other detail:       Objective Information/Functional Measures/Assessment    Pt reported mild pain in bilateral wrist (L>R) and has a wrist brace on R UE.

## 2024-10-07 ENCOUNTER — HOSPITAL ENCOUNTER (OUTPATIENT)
Facility: HOSPITAL | Age: 74
Setting detail: RECURRING SERIES
Discharge: HOME | End: 2024-10-10
Payer: MEDICARE

## 2024-10-07 PROCEDURE — 97530 THERAPEUTIC ACTIVITIES: CPT

## 2024-10-07 PROCEDURE — 97110 THERAPEUTIC EXERCISES: CPT

## 2024-10-07 NOTE — PROGRESS NOTES
PHYSICAL / OCCUPATIONAL THERAPY - DAILY TREATMENT NOTE    Patient Name: Jay Dangelo III    Date: 10/7/2024    : 1950  Insurance: Payor: MEDICARE / Plan: MEDICARE PART A AND B / Product Type: *No Product type* /      Patient  verified Yes     Visit #   Current / Total 3 24   Time   In / Out 1:59 2:38   Pain   In / Out 2-3 10   Subjective Functional Status/Changes: I just have my regular arthritis pain today. I'm also exhausted.      TREATMENT AREA =  Other abnormalities of gait and mobility [R26.89]     OBJECTIVE         Therapeutic Procedures:    Tx Min Billable or 1:1 Min (if diff from Tx Min) Procedure, Rationale, Specifics   29 29 [id number] Therapeutic Exercise (timed):  increase ROM, strength, coordination, balance, and proprioception to improve patient's ability to progress to PLOF and address remaining functional goals. (see flow sheet as applicable)     Details if applicable:       10 10 42063 Therapeutic Activity (timed):  use of dynamic activities replicating functional movements to increase ROM, strength, coordination, balance, and proprioception in order to improve patient's ability to progress to PLOF and address remaining functional goals.  (see flow sheet as applicable)     Details if applicable:                    39 39 [facility] Totals Reminder: bill using total billable min of TIMED therapeutic procedures (example: do not include dry needle or estim unattended, both untimed codes, in totals to left)  8-22 min = 1 unit; 23-37 min = 2 units; 38-52 min = 3 units; 53-67 min = 4 units; 68-82 min = 5 units   Total Total     [x]  Patient Education billed concurrently with other procedures   [x] Review HEP    [] Progressed/Changed HEP, detail:    [] Other detail:       Objective Information/Functional Measures/Assessment    Pt reports to skilled therapy session with impaired balance and gait abnormalities. Pt noted increased stiffness at the start of session and as a result performed seated

## 2024-10-09 ENCOUNTER — HOSPITAL ENCOUNTER (OUTPATIENT)
Facility: HOSPITAL | Age: 74
Setting detail: RECURRING SERIES
Discharge: HOME | End: 2024-10-12
Payer: MEDICARE

## 2024-10-09 PROCEDURE — 97530 THERAPEUTIC ACTIVITIES: CPT

## 2024-10-09 PROCEDURE — 97116 GAIT TRAINING THERAPY: CPT

## 2024-10-09 PROCEDURE — 97110 THERAPEUTIC EXERCISES: CPT

## 2024-10-09 NOTE — PROGRESS NOTES
PHYSICAL / OCCUPATIONAL THERAPY - DAILY TREATMENT NOTE    Patient Name: Jay Dangelo III    Date: 10/9/2024    : 1950  Insurance: Payor: MEDICARE / Plan: MEDICARE PART A AND B / Product Type: *No Product type* /      Patient  verified Yes     Visit #   Current / Total 4 24   Time   In / Out 2:02 pm 2:50 pm   Pain   In / Out 4/10 right knee 4/10 right knee   Subjective Functional Status/Changes: \"I feel about normal.  My right knee is hurting today.\"     TREATMENT AREA =  Other abnormalities of gait and mobility [R26.89]     OBJECTIVE         Therapeutic Procedures:    Tx Min Billable or 1:1 Min (if diff from Tx Min) Procedure, Rationale, Specifics   25 25 [id number] Therapeutic Exercise (timed):  increase ROM, strength, coordination, balance, and proprioception to improve patient's ability to progress to PLOF and address remaining functional goals. (see flow sheet as applicable)     Details if applicable:       15 15 77922 Therapeutic Activity (timed):  use of dynamic activities replicating functional movements to increase ROM, strength, coordination, balance, and proprioception in order to improve patient's ability to progress to PLOF and address remaining functional goals.  (see flow sheet as applicable)     Details if applicable:     8 8 41247 Gait Training (timed):    169 feet with Rollator (assistive device) over level/unlevel surfaces with close SB level of assist. Cuing for heel to toe gait pattern, increased foot clearance.  To improve safety and dynamic movement with household/community ambulation.  (see flow sheet as applicable)                48 48 [facility] Totals Reminder: bill using total billable min of TIMED therapeutic procedures (example: do not include dry needle or estim unattended, both untimed codes, in totals to left)  8-22 min = 1 unit; 23-37 min = 2 units; 38-52 min = 3 units; 53-67 min = 4 units; 68-82 min = 5 units   Total Total     [x]  Patient Education billed concurrently with other

## 2024-10-14 ENCOUNTER — HOSPITAL ENCOUNTER (OUTPATIENT)
Facility: HOSPITAL | Age: 74
Setting detail: RECURRING SERIES
Discharge: HOME | End: 2024-10-17
Payer: MEDICARE

## 2024-10-14 PROCEDURE — 97110 THERAPEUTIC EXERCISES: CPT

## 2024-10-14 PROCEDURE — 97112 NEUROMUSCULAR REEDUCATION: CPT

## 2024-10-14 PROCEDURE — 97530 THERAPEUTIC ACTIVITIES: CPT

## 2024-10-14 NOTE — PROGRESS NOTES
PHYSICAL / OCCUPATIONAL THERAPY - DAILY TREATMENT NOTE    Patient Name: Jay Dangelo III    Date: 10/14/2024    : 1950  Insurance: Payor: MEDICARE / Plan: MEDICARE PART A AND B / Product Type: *No Product type* /      Patient  verified Yes     Visit #   Current / Total 5 21   Time   In / Out 2:00 2:40   Pain   In / Out 4/10 4/10   Subjective Functional Status/Changes: My knees are bothering me, but that's nothing new.      TREATMENT AREA =  Other abnormalities of gait and mobility [R26.89]     OBJECTIVE         Therapeutic Procedures:    Tx Min Billable or 1:1 Min (if diff from Tx Min) Procedure, Rationale, Specifics   15 15 [id number] Therapeutic Exercise (timed):  increase ROM, strength, coordination, balance, and proprioception to improve patient's ability to progress to PLOF and address remaining functional goals. (see flow sheet as applicable)     Details if applicable:       15 15 38535 Neuromuscular Re-Education (timed):  improve balance, coordination, kinesthetic sense, posture, core stability and proprioception to improve patient's ability to develop conscious control of individual muscles and awareness of position of extremities in order to progress to PLOF and address remaining functional goals. (see flow sheet as applicable)     Details if applicable:     10 10 94289 Therapeutic Activity (timed):  use of dynamic activities replicating functional movements to increase ROM, strength, coordination, balance, and proprioception in order to improve patient's ability to progress to PLOF and address remaining functional goals.  (see flow sheet as applicable)     Details if applicable:               40 40 [facility] Totals Reminder: bill using total billable min of TIMED therapeutic procedures (example: do not include dry needle or estim unattended, both untimed codes, in totals to left)  8-22 min = 1 unit; 23-37 min = 2 units; 38-52 min = 3 units; 53-67 min = 4 units; 68-82 min = 5 units   Total Total     [x]

## 2024-10-16 ENCOUNTER — HOSPITAL ENCOUNTER (OUTPATIENT)
Facility: HOSPITAL | Age: 74
Setting detail: RECURRING SERIES
Discharge: HOME | End: 2024-10-19
Payer: MEDICARE

## 2024-10-16 PROCEDURE — 97110 THERAPEUTIC EXERCISES: CPT

## 2024-10-16 PROCEDURE — 97530 THERAPEUTIC ACTIVITIES: CPT

## 2024-10-16 PROCEDURE — 97112 NEUROMUSCULAR REEDUCATION: CPT

## 2024-10-16 NOTE — PROGRESS NOTES
PHYSICAL / OCCUPATIONAL THERAPY - DAILY TREATMENT NOTE    Patient Name: Jay Dangelo III    Date: 10/16/2024    : 1950  Insurance: Payor: MEDICARE / Plan: MEDICARE PART A AND B / Product Type: *No Product type* /      Patient  verified Yes     Visit #   Current / Total 6 24   Time   In / Out 2:40 3:25   Pain   In / Out 5-7 7   Subjective Functional Status/Changes: I didn't put lidocaine on my knee  before I came in. So it's pretty sore     TREATMENT AREA =  Other abnormalities of gait and mobility [R26.89]     OBJECTIVE      Therapeutic Procedures:    Tx Min Billable or 1:1 Min (if diff from Tx Min) Procedure, Rationale, Specifics   22  [id number] Therapeutic Exercise (timed):  increase ROM, strength, coordination, balance, and proprioception to improve patient's ability to progress to PLOF and address remaining functional goals. (see flow sheet as applicable)     Details if applicable:       15 15 67389 Neuromuscular Re-Education (timed):  improve balance, coordination, kinesthetic sense, posture, core stability and proprioception to improve patient's ability to develop conscious control of individual muscles and awareness of position of extremities in order to progress to PLOF and address remaining functional goals. (see flow sheet as applicable)     Details if applicable:     8 8 47362 Therapeutic Activity (timed):  use of dynamic activities replicating functional movements to increase ROM, strength, coordination, balance, and proprioception in order to improve patient's ability to progress to PLOF and address remaining functional goals.  (see flow sheet as applicable)     Details if applicable:            Details if applicable:            Details if applicable:     45 45 [facility] Totals Reminder: bill using total billable min of TIMED therapeutic procedures (example: do not include dry needle or estim unattended, both untimed codes, in totals to left)  8-22 min = 1 unit; 23-37 min = 2 units; 38-52 min = 3

## 2024-10-21 ENCOUNTER — HOSPITAL ENCOUNTER (OUTPATIENT)
Facility: HOSPITAL | Age: 74
Setting detail: RECURRING SERIES
Discharge: HOME | End: 2024-10-24
Payer: MEDICARE

## 2024-10-21 PROCEDURE — 97110 THERAPEUTIC EXERCISES: CPT

## 2024-10-21 PROCEDURE — 97112 NEUROMUSCULAR REEDUCATION: CPT

## 2024-10-21 PROCEDURE — 97116 GAIT TRAINING THERAPY: CPT

## 2024-10-21 NOTE — PROGRESS NOTES
PHYSICAL / OCCUPATIONAL THERAPY - DAILY TREATMENT NOTE    Patient Name: Jay Dangelo III    Date: 10/21/2024    : 1950  Insurance: Payor: MEDICARE / Plan: MEDICARE PART A AND B / Product Type: *No Product type* /      Patient  verified Yes     Visit #   Current / Total 7 21   Time   In / Out 1:59 2:39   Pain   In / Out 4/10 5/10   Subjective Functional Status/Changes: My wrist neck and knee are all bothering me today.      TREATMENT AREA =  Other abnormalities of gait and mobility [R26.89]     OBJECTIVE         Therapeutic Procedures:    Tx Min Billable or 1:1 Min (if diff from Tx Min) Procedure, Rationale, Specifics   15 15 47687 Gait Training (timed):    240 feet with rollator (assistive device) over flat surfaces with CG/SB level of assist. Cuing for upright posture. .  To improve safety and dynamic movement with household/community ambulation.  (see flow sheet as applicable)     Details if applicable:  3 laps on gym floor with rollator and TUG test: 2 attempts.      10 10 30647 Neuromuscular Re-Education (timed):  improve balance, coordination, kinesthetic sense, posture, core stability and proprioception to improve patient's ability to develop conscious control of individual muscles and awareness of position of extremities in order to progress to PLOF and address remaining functional goals. (see flow sheet as applicable)     Details if applicable:     15 15 59431 Therapeutic Exercise (timed):  increase ROM, strength, coordination, balance, and proprioception to improve patient's ability to progress to PLOF and address remaining functional goals. (see flow sheet as applicable)     Details if applicable:               40 40 [facility] Totals Reminder: bill using total billable min of TIMED therapeutic procedures (example: do not include dry needle or estim unattended, both untimed codes, in totals to left)  8-22 min = 1 unit; 23-37 min = 2 units; 38-52 min = 3 units; 53-67 min = 4 units; 68-82 min = 5

## 2024-10-23 ENCOUNTER — HOSPITAL ENCOUNTER (OUTPATIENT)
Facility: HOSPITAL | Age: 74
Setting detail: RECURRING SERIES
Discharge: HOME | End: 2024-10-26
Payer: MEDICARE

## 2024-10-23 PROCEDURE — 97110 THERAPEUTIC EXERCISES: CPT

## 2024-10-23 PROCEDURE — 97530 THERAPEUTIC ACTIVITIES: CPT

## 2024-10-23 NOTE — PROGRESS NOTES
Right DF:4/5 left DF:3-/5, Right hip flexion:4+/5 , ER:5/5 , IR:5/5  (9/25/2024)  Current: MET: Right DF:5/5 left DF:4+/5, Right hip flexion:4+/5 (all tested in supine) (10/7/2024)     Next PN/ RC due 11/21/2024  Auth due (visit number/ date) JONO    PLAN  - Continue Plan of Care  - Upgrade activities as tolerated    Alicia Gamble, PT    10/23/2024    2:04 PM  If an interpreting service was utilized for treatment of this patient, the contents of this document represent the material reviewed with the patient via the .     Future Appointments   Date Time Provider Department Center   10/28/2024  2:00 PM Michael Reynolds PTA [facility]   10/30/2024  2:40 PM [facility]         
Period: 10/23/2024 - 01/21/2024    Alicia Gamble, PT       10/23/2024       2:05 PM      ___ I have read the above report and request that my patient continue as recommended.   ___ I have read the above report and request that my patient continue therapy with the following changes/special instructions: ________________________________________________   ___ I have read the above report and request that my patient be discharged from therapy.     Physician's Signature:_________________________   DATE:_________   TIME:________                           Gaines, Meet, NP-C    ** Signature, Date and Time must be completed for valid certification **  Please sign and return to [facility] or you may fax the signed copy to [phone].  Thank you.

## 2024-10-28 ENCOUNTER — HOSPITAL ENCOUNTER (OUTPATIENT)
Facility: HOSPITAL | Age: 74
Setting detail: RECURRING SERIES
Discharge: HOME | End: 2024-10-31
Payer: MEDICARE

## 2024-10-28 PROCEDURE — 97110 THERAPEUTIC EXERCISES: CPT

## 2024-10-28 PROCEDURE — 97112 NEUROMUSCULAR REEDUCATION: CPT

## 2024-10-28 NOTE — PROGRESS NOTES
dynamic standing balance. Session started with seated cervical retractions and scapula squeezes to improve posture with ambulation. Pt unable to achieve lift with seated triceps push ups but was still able to engage muscles effectively following verbal cues. Intermittent sitting rest breaks taken during standing exercises due to increased systemic fatigue. Pt tolerated the addition of standing hamstring curls to improve functional LE strength.     Patient will continue to benefit from skilled PT / OT services to modify and progress therapeutic interventions, analyze and address functional mobility deficits, analyze and address ROM deficits, analyze and address strength deficits, analyze and address soft tissue restrictions, analyze and cue for proper movement patterns, and analyze and modify for postural abnormalities to address functional deficits and attain remaining goals.    Progress toward goals / Updated goals:  []  See Progress Note/Recertification    Goals for this certification period include and are to be achieved in   24  treatments:     1.  Pt will improve TUG score to < 28 sec with LRD to decrease fall  risk  in the community.   Status at last note/certification:Trial 1: 31 seconds, trial 2 : 25 seconds both with rollator. Pt able to get up and down independently.  (10/21/2024)     2. Pt will improve Tinetti score  to 12/16 to  demo improved overall  mobility.   Status at last note/certification: 11/16 score (10/23/2024)     3. Pt will demonstrate 2x sit to stand with safe eccentric control when lowering, no posterior thigh compensation during standing, and no need for second party stabilization of chair to demonstrate improve strength and transfer abilities.    Status at last note/certification: NEW GOAL - pt requires stabilization of chair, significant bilateral UE support, posterior thigh compensation, and poor eccentric control when performing STS.      4. Pt will report ability to walk household

## 2024-10-30 ENCOUNTER — APPOINTMENT (OUTPATIENT)
Facility: HOSPITAL | Age: 74
End: 2024-10-30
Payer: MEDICARE

## 2024-11-04 ENCOUNTER — HOSPITAL ENCOUNTER (OUTPATIENT)
Facility: HOSPITAL | Age: 74
Setting detail: RECURRING SERIES
Discharge: HOME | End: 2024-11-07
Payer: MEDICARE

## 2024-11-04 PROCEDURE — 97530 THERAPEUTIC ACTIVITIES: CPT

## 2024-11-04 PROCEDURE — 97110 THERAPEUTIC EXERCISES: CPT

## 2024-11-04 NOTE — PROGRESS NOTES
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 (see flow sheet as applicable)     Details if applicable:       10 10 32266 Therapeutic Activity (timed):  use of dynamic activities replicating functional movements to increase ROM, strength, coordination, balance, and proprioception in order to improve patient's ability to progress to PLOF and address remaining functional goals.  (see flow sheet as applicable)     Details if applicable:            Details if applicable:            Details if applicable:            Details if applicable:     38 38 [facility] Totals Reminder: bill using total billable min of TIMED therapeutic procedures (example: do not include dry needle or estim unattended, both untimed codes, in totals to left)  8-22 min = 1 unit; 23-37 min = 2 units; 38-52 min = 3 units; 53-67 min = 4 units; 68-82 min = 5 units   Total Total     [x]  Patient Education billed concurrently with other procedures   [x] Review HEP    [] Progressed/Changed HEP, detail:    [] Other detail:       Objective Information/Functional Measures/Assessment    Pt reported severe pain in right knee prior to today's session - all session was performed seated

## 2024-11-06 ENCOUNTER — HOSPITAL ENCOUNTER (OUTPATIENT)
Facility: HOSPITAL | Age: 74
Setting detail: RECURRING SERIES
Discharge: HOME | End: 2024-11-09
Payer: MEDICARE

## 2024-11-06 PROCEDURE — 97530 THERAPEUTIC ACTIVITIES: CPT

## 2024-11-06 PROCEDURE — 97110 THERAPEUTIC EXERCISES: CPT

## 2024-11-06 NOTE — PROGRESS NOTES
PHYSICAL / OCCUPATIONAL THERAPY - DAILY TREATMENT NOTE    Patient Name: Jay Dangelo III    Date: 2024    : 1950  Insurance: Payor: MEDICARE / Plan: MEDICARE PART A AND B / Product Type: *No Product type* /      Patient  verified Yes     Visit #   Current / Total 3 24   Time   In / Out 2:43 3:21   Pain   In / Out 5 - right knee 5 - knee   Subjective Functional Status/Changes: \"Im tired but want to do everything\"     TREATMENT AREA =  Other abnormalities of gait and mobility [R26.89]     OBJECTIVE         Therapeutic Procedures:    Tx Min Billable or 1:1 Min (if diff from Tx Min) Procedure, Rationale, Specifics   24  44273 Therapeutic Exercise (timed):  increase ROM, strength, coordination, balance, and proprioception to improve patient's ability to progress to PLOF and address remaining functional goals. (see flow sheet as applicable)     Details if applicable:        48993 Therapeutic Activity (timed):  use of dynamic activities replicating functional movements to increase ROM, strength, coordination, balance, and proprioception in order to improve patient's ability to progress to PLOF and address remaining functional goals.  (see flow sheet as applicable)     Details if applicable:            Details if applicable:            Details if applicable:            Details if applicable:     38 38 [facility] Totals Reminder: bill using total billable min of TIMED therapeutic procedures (example: do not include dry needle or estim unattended, both untimed codes, in totals to left)  8-22 min = 1 unit; 23-37 min = 2 units; 38-52 min = 3 units; 53-67 min = 4 units; 68-82 min = 5 units   Total Total     [x]  Patient Education billed concurrently with other procedures   [x] Review HEP    [] Progressed/Changed HEP, detail:    [] Other detail:       Objective Information/Functional Measures/Assessment    Pt reported moderate pain in right that worsened with all exercises/activities during today's session.

## 2024-11-11 ENCOUNTER — APPOINTMENT (OUTPATIENT)
Facility: HOSPITAL | Age: 74
End: 2024-11-11
Payer: MEDICARE

## 2024-11-13 ENCOUNTER — APPOINTMENT (OUTPATIENT)
Facility: HOSPITAL | Age: 74
End: 2024-11-13
Payer: MEDICARE

## 2024-11-18 ENCOUNTER — HOSPITAL ENCOUNTER (OUTPATIENT)
Facility: HOSPITAL | Age: 74
Setting detail: RECURRING SERIES
Discharge: HOME | End: 2024-11-21
Payer: MEDICARE

## 2024-11-18 PROCEDURE — 97112 NEUROMUSCULAR REEDUCATION: CPT

## 2024-11-18 PROCEDURE — 97530 THERAPEUTIC ACTIVITIES: CPT

## 2024-11-18 PROCEDURE — 97110 THERAPEUTIC EXERCISES: CPT

## 2024-11-18 NOTE — PROGRESS NOTES
PHYSICAL / OCCUPATIONAL THERAPY - DAILY TREATMENT NOTE    Patient Name: Jay Dangelo III    Date: 2024    : 1950  Insurance: Payor: MEDICARE / Plan: MEDICARE PART A AND B / Product Type: *No Product type* /      Patient  verified Yes     Visit #   Current / Total 4 24   Time   In / Out 1:58 2:39   Pain   In / Out 0/10 0/10   Subjective Functional Status/Changes: MY daughter was helping me with my ingrown toe nails before coming here. So I had to rush and get dressed to come over here and I was exhausted when all was said and done.      TREATMENT AREA =  Other abnormalities of gait and mobility [R26.89]     OBJECTIVE         Therapeutic Procedures:    Tx Min Billable or 1:1 Min (if diff from Tx Min) Procedure, Rationale, Specifics   16 16 58133 Therapeutic Exercise (timed):  increase ROM, strength, coordination, balance, and proprioception to improve patient's ability to progress to PLOF and address remaining functional goals. (see flow sheet as applicable)     Details if applicable:       15 15 88894 Neuromuscular Re-Education (timed):  improve balance, coordination, kinesthetic sense, posture, core stability and proprioception to improve patient's ability to develop conscious control of individual muscles and awareness of position of extremities in order to progress to PLOF and address remaining functional goals. (see flow sheet as applicable)     Details if applicable:     10 10 50687 Therapeutic Activity (timed):  use of dynamic activities replicating functional movements to increase ROM, strength, coordination, balance, and proprioception in order to improve patient's ability to progress to PLOF and address remaining functional goals.  (see flow sheet as applicable)     Details if applicable:               41 41 [facility] Totals Reminder: bill using total billable min of TIMED therapeutic procedures (example: do not include dry needle or estim unattended, both untimed codes, in totals to

## 2024-11-20 ENCOUNTER — HOSPITAL ENCOUNTER (OUTPATIENT)
Facility: HOSPITAL | Age: 74
Setting detail: RECURRING SERIES
Discharge: HOME | End: 2024-11-23
Payer: MEDICARE

## 2024-11-20 PROCEDURE — 97110 THERAPEUTIC EXERCISES: CPT

## 2024-11-20 PROCEDURE — 97530 THERAPEUTIC ACTIVITIES: CPT

## 2024-11-20 NOTE — PROGRESS NOTES
PHYSICAL / OCCUPATIONAL THERAPY - DAILY TREATMENT NOTE    Patient Name: Jay Dangelo III    Date: 2024    : 1950  Insurance: Payor: MEDICARE / Plan: MEDICARE PART A AND B / Product Type: *No Product type* /      Patient  verified Yes     Visit #   Current / Total 5 24   Time   In / Out 2:05 2:40   Pain   In / Out 5 - tired 4   Subjective Functional Status/Changes: Pt reports that he's tired today and having some increased neck pain.     TREATMENT AREA =  Other abnormalities of gait and mobility [R26.89]     OBJECTIVE         Therapeutic Procedures:    Tx Min Billable or 1:1 Min (if diff from Tx Min) Procedure, Rationale, Specifics   16 16 44349 Therapeutic Exercise (timed):  increase ROM, strength, coordination, balance, and proprioception to improve patient's ability to progress to PLOF and address remaining functional goals. (see flow sheet as applicable)     Details if applicable:        58468 Therapeutic Activity (timed):  use of dynamic activities replicating functional movements to increase ROM, strength, coordination, balance, and proprioception in order to improve patient's ability to progress to PLOF and address remaining functional goals.  (see flow sheet as applicable)     Details if applicable:  includes reassessment time          Details if applicable:            Details if applicable:            Details if applicable:     35 35 [facility] Totals Reminder: bill using total billable min of TIMED therapeutic procedures (example: do not include dry needle or estim unattended, both untimed codes, in totals to left)  8-22 min = 1 unit; 23-37 min = 2 units; 38-52 min = 3 units; 53-67 min = 4 units; 68-82 min = 5 units   Total Total     [x]  Patient Education billed concurrently with other procedures   [x] Review HEP    [] Progressed/Changed HEP, detail:    [] Other detail:       Objective Information/Functional Measures/Assessment    Pt reported increased fatigue and neck pain today.

## 2024-11-20 NOTE — PROGRESS NOTES
[facility]   [address], [place] [address]  Phone: [phone] Fax: [phone]  PROGRESS NOTE  Patient Name: Jay Dangelo III : 1950   Treatment/Medical Diagnosis: Other abnormalities of gait and mobility [R26.89]   Referral Source: Edmund Gaines NP-C     Payor: Payor: MEDICARE / Plan: MEDICARE PART A AND B / Product Type: *No Product type* /            Date of Initial Visit: 24 Attended Visits: 28 Missed Visits: 2       CURRENT GOAL STATUS  Goals for this certification period include and are to be achieved in   24  treatments:   1.  Pt will improve TUG score to < 28 sec with LRD to decrease fall  risk  in the community.   Status at last note/certification:Trial 1: 31 seconds, trial 2 : 25 seconds both with rollator. Pt able to get up and down independently.  (10/21/2024)   Current: regressing: Trial 1: 41 seconds with Rolator. Trial 2: 36 seconds, with Rolator. (2024)      2. Pt will improve Tinetti score  to 12/16 to  demo improved overall  mobility.   Status at last note/certification: 11/16 score (10/23/2024)  Current: Regressing - 8/16 score, pt denied any functional losses (24)     3. Pt will demonstrate 2x sit to stand with safe eccentric control when lowering, no posterior thigh compensation during standing, and no need for second party stabilization of chair to demonstrate improve strength and transfer abilities.    Status at last note/certification: NEW GOAL - pt requires stabilization of chair, significant bilateral UE support, posterior thigh compensation, and poor eccentric control when performing STS.   Current: Progressing - 5x with significant bilateral UE support, reduced posterior thigh compensation, fair eccentric control (24)     4. Pt will report ability to walk household distances with supervision level assistance without AD to improve ambulation confidence and quality of life.

## 2024-11-25 ENCOUNTER — HOSPITAL ENCOUNTER (OUTPATIENT)
Facility: HOSPITAL | Age: 74
Setting detail: RECURRING SERIES
Discharge: HOME | End: 2024-11-28
Payer: MEDICARE

## 2024-11-25 PROCEDURE — 97112 NEUROMUSCULAR REEDUCATION: CPT

## 2024-11-25 PROCEDURE — 97530 THERAPEUTIC ACTIVITIES: CPT

## 2024-11-25 PROCEDURE — 97110 THERAPEUTIC EXERCISES: CPT

## 2024-11-25 NOTE — PROGRESS NOTES
PHYSICAL / OCCUPATIONAL THERAPY - DAILY TREATMENT NOTE    Patient Name: Jay Dangelo III    Date: 2024    : 1950  Insurance: Payor: MEDICARE / Plan: MEDICARE PART A AND B / Product Type: *No Product type* /      Patient  verified Yes     Visit #   Current / Total 1 19   Time   In / Out 2:04 2:43   Pain   In / Out 6 6   Subjective Functional Status/Changes: Pt reports that he took some Motrin before today's session to head off any knee pain.     TREATMENT AREA =  Other abnormalities of gait and mobility [R26.89]     OBJECTIVE         Therapeutic Procedures:    Tx Min Billable or 1:1 Min (if diff from Tx Min) Procedure, Rationale, Specifics   6 16 99930 Therapeutic Exercise (timed):  increase ROM, strength, coordination, balance, and proprioception to improve patient's ability to progress to PLOF and address remaining functional goals. (see flow sheet as applicable)     Details if applicable:        73281 Neuromuscular Re-Education (timed):  improve balance, coordination, kinesthetic sense, posture, core stability and proprioception to improve patient's ability to develop conscious control of individual muscles and awareness of position of extremities in order to progress to PLOF and address remaining functional goals. (see flow sheet as applicable)     Details if applicable:      05376 Therapeutic Activity (timed):  use of dynamic activities replicating functional movements to increase ROM, strength, coordination, balance, and proprioception in order to improve patient's ability to progress to PLOF and address remaining functional goals.  (see flow sheet as applicable)     Details if applicable:            Details if applicable:            Details if applicable:     39 39 [facility] Totals Reminder: bill using total billable min of TIMED therapeutic procedures (example: do not include dry needle or estim unattended, both untimed codes, in totals to left)  8-22 min = 1 unit; 23-37 min = 2

## 2024-12-02 ENCOUNTER — HOSPITAL ENCOUNTER (OUTPATIENT)
Facility: HOSPITAL | Age: 74
Setting detail: RECURRING SERIES
Discharge: HOME | End: 2024-12-05
Payer: MEDICARE

## 2024-12-02 PROCEDURE — 97112 NEUROMUSCULAR REEDUCATION: CPT

## 2024-12-02 PROCEDURE — 97110 THERAPEUTIC EXERCISES: CPT

## 2024-12-02 NOTE — PROGRESS NOTES
standing/ walking tolerance. Due to increases in fatigue patient was unable  to perform more than 3 repetitions of sit to stands. Towel placed behind patient's upper back during scapula squeezes and seated thoracic extensions to improve posture. Verbal cues were provided during towel snag extensions to promote proper techinque and improve functional cervical Rom. Session tolerated well.     Patient will continue to benefit from skilled PT / OT services to modify and progress therapeutic interventions, analyze and address functional mobility deficits, analyze and address ROM deficits, analyze and address strength deficits, analyze and address soft tissue restrictions, analyze and cue for proper movement patterns, and analyze and modify for postural abnormalities to address functional deficits and attain remaining goals.    Progress toward goals / Updated goals:  []  See Progress Note/Recertification    Continued Long Term Goals to be accomplished in 19 treatments:  1.  Pt will improve TUG score to < 28 sec with LRD to decrease fall  risk  in the community.   Status at last note/certification: regressing: Trial 1: 41 seconds with Rolator. Trial 2: 36 seconds, with Rolator. (11/18/2024)   Current:      2. Pt will improve Tinetti score  to 12/16 to  demo improved overall  mobility.   Status at last note/certification: Regressing - 8/16 score, pt denied any functional losses (11/20/24)  Current:      3. Pt will demonstrate 2x sit to stand with safe eccentric control when lowering, no posterior thigh compensation during standing, and no need for second party stabilization of chair to demonstrate improve strength and transfer abilities.    Status at last note/certification: Progressing - 5x with significant bilateral UE support, reduced posterior thigh compensation, fair eccentric control (11/20/24)  Current: Goal in progress - pt demonstrated improved mechanics with sit to stand transfer, continued popliteal stabilization 
PAST MEDICAL HISTORY:  Diabetes mellitus     Hypertension     No pertinent past medical history

## 2024-12-09 ENCOUNTER — HOSPITAL ENCOUNTER (OUTPATIENT)
Facility: HOSPITAL | Age: 74
Setting detail: RECURRING SERIES
End: 2024-12-09
Payer: MEDICARE

## 2024-12-16 ENCOUNTER — APPOINTMENT (OUTPATIENT)
Facility: HOSPITAL | Age: 74
End: 2024-12-16
Payer: MEDICARE

## 2024-12-23 ENCOUNTER — APPOINTMENT (OUTPATIENT)
Facility: HOSPITAL | Age: 74
End: 2024-12-23
Payer: MEDICARE

## 2024-12-30 ENCOUNTER — APPOINTMENT (OUTPATIENT)
Facility: HOSPITAL | Age: 74
End: 2024-12-30
Payer: MEDICARE

## 2025-03-26 ENCOUNTER — HOSPITAL ENCOUNTER (OUTPATIENT)
Facility: HOSPITAL | Age: 75
Setting detail: RECURRING SERIES
Discharge: HOME | End: 2025-03-29
Payer: MEDICARE

## 2025-03-26 ENCOUNTER — APPOINTMENT (OUTPATIENT)
Facility: HOSPITAL | Age: 75
End: 2025-03-26
Payer: MEDICARE

## 2025-03-26 PROCEDURE — 97162 PT EVAL MOD COMPLEX 30 MIN: CPT

## 2025-03-26 PROCEDURE — 97535 SELF CARE MNGMENT TRAINING: CPT

## 2025-03-26 NOTE — PROGRESS NOTES
[facility]  [address] [facility] [facility], [place] [address] Phone: [phone] Fax [phone]  Plan of Care / Statement of Necessity for Physical Therapy Services     Patient Name: Jay Dangelo III : 1950   Treatment   Diagnosis: R26.89  Abnormalities of gait and mobility, R26.81  Unsteadiness on feet, R27.8  Other lack of coordination, and M62.81  GENERAL MUSCLE WEAKNESS Medical Diagnosis: Parkinson's disease without dyskinesia, without mention of fluctuations (HCC) [G20.A1]  Muscle weakness (generalized) [M62.81]   Onset Date: 2025 - referral Payor Source: Payor: MEDICARE / Plan: MEDICARE PART A AND B / Product Type: *No Product type* /    Referral Source: Dulce Brasher APRN - NP Start of Care (SOC): 3/26/2025   Prior Hospitalization: See medical history Provider #: [id number]   Prior Level of Function: dependent on son for ADL/self care/transfers/safety; lives with son in a multi level home with 1 step to enter and and 13 to access second floor    Comorbidities: stage 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 Pt reports that he has a lift chair at home that he sits in most of the day. Presents to clinic  with son who  is very attentive and helpful with pt's mobility needs. Pt ambulating with a Rolator and  to become more independent with ADL/self care.  Presents  with significant tremors in BUEs, coordination deficits in UEs and LEs, LLE weaker than RLE, impaired gait with minimal heel strike and toe off, delayed balance reactions and safety awareness. Pt would benefit from skilled PT to address above deficits to improve pt's functional status and ability to return to PLOF with decreased pain and improved

## 2025-03-26 NOTE — PROGRESS NOTES
PT DAILY TREATMENT NOTE/NEURO EVAL     Patient Name: Jay Dangelo III    Date: 3/26/2025    : 1950  Insurance: Payor: MEDICARE / Plan: MEDICARE PART A AND B / Product Type: *No Product type* /      Patient  verified yes     Visit #   Current / Total 1 24   Time   In / Out 4:00 4:33   Pain   In / Out 5 5   Subjective Functional Status/Changes: See Subjective Summary     Treatment Area: Parkinson's disease without dyskinesia, without mention of fluctuations (HCC)    SUBJECTIVE    Subjective functional status/changes:     Chief Complaint: General weakness/deconditioning   History/Mechanism of Injury: pt has stage 4 metastatic prostate cancer with mets to spine and L shoulder and Parkinson's Disease   Current Symptoms/Deficits: difficulty with all ADL/self care/transfers, poor quality of life  Pain-  Current: 5/10     Worst: 9/10   Best: 1/10  Previous Treatment/Compliance: previous episode of PT  Mobility Devices: Rolator  PMHx/Surgical Hx: stage 4 prostate cancer, parkinson's disease    Diagnostic Tests: [] Lab work [] X-rays    [] CT [] MRI     [] Other:  Results:    Work Hx: Retired   Living Situation: lives with son in a multi level home with 1 step to enter and and 13 to access second floor   Household Modifications: power recliner, bed side commode, urinals  Hobbies: comedy and family time   PLOF: dependent on son for ADL/self care/transfers/safety  Pt Goals: \"go up three steps to the bathroom\"     OBJECTIVE/EXAMINATION      20 min [x]Eval     - untimed        Therapeutic Procedures:  Tx Min Billable or 1:1 Min (if diff from Tx Min) Procedure, Rationale, Specifics   13 13 [id number] Self Care/Home Management (timed):  improve patient knowledge and understanding of positioning, home safety, activity modification, and PT expectations and prognosis  to improve patient's ability to progress to PLOF and address remaining functional goals.  (see flow sheet as applicable)     Details if applicable:

## 2025-04-01 ENCOUNTER — HOSPITAL ENCOUNTER (OUTPATIENT)
Facility: HOSPITAL | Age: 75
Setting detail: RECURRING SERIES
Discharge: HOME | End: 2025-04-04
Payer: MEDICARE

## 2025-04-01 PROCEDURE — 97113 AQUATIC THERAPY/EXERCISES: CPT

## 2025-04-03 ENCOUNTER — APPOINTMENT (OUTPATIENT)
Facility: HOSPITAL | Age: 75
End: 2025-04-03
Payer: MEDICARE

## 2025-04-08 ENCOUNTER — APPOINTMENT (OUTPATIENT)
Facility: HOSPITAL | Age: 75
End: 2025-04-08
Payer: MEDICARE

## 2025-04-10 ENCOUNTER — HOSPITAL ENCOUNTER (OUTPATIENT)
Facility: HOSPITAL | Age: 75
Setting detail: RECURRING SERIES
Discharge: HOME | End: 2025-04-13
Payer: MEDICARE

## 2025-04-10 PROCEDURE — 97113 AQUATIC THERAPY/EXERCISES: CPT

## 2025-04-15 ENCOUNTER — APPOINTMENT (OUTPATIENT)
Facility: HOSPITAL | Age: 75
End: 2025-04-15
Payer: MEDICARE

## 2025-04-17 ENCOUNTER — HOSPITAL ENCOUNTER (OUTPATIENT)
Facility: HOSPITAL | Age: 75
Setting detail: RECURRING SERIES
Discharge: HOME | End: 2025-04-20
Payer: MEDICARE

## 2025-04-17 PROCEDURE — 97113 AQUATIC THERAPY/EXERCISES: CPT

## 2025-04-22 ENCOUNTER — HOSPITAL ENCOUNTER (OUTPATIENT)
Facility: HOSPITAL | Age: 75
Setting detail: RECURRING SERIES
Discharge: HOME | End: 2025-04-25
Payer: MEDICARE

## 2025-04-22 PROCEDURE — 97113 AQUATIC THERAPY/EXERCISES: CPT

## 2025-04-22 PROCEDURE — 97530 THERAPEUTIC ACTIVITIES: CPT

## 2025-04-24 ENCOUNTER — HOSPITAL ENCOUNTER (OUTPATIENT)
Facility: HOSPITAL | Age: 75
Setting detail: RECURRING SERIES
Discharge: HOME | End: 2025-04-27
Payer: MEDICARE

## 2025-04-24 PROCEDURE — 97530 THERAPEUTIC ACTIVITIES: CPT

## 2025-04-24 PROCEDURE — 97113 AQUATIC THERAPY/EXERCISES: CPT

## 2025-04-24 NOTE — PROGRESS NOTES
[facility] AT [facility]   [address], [place] [address]  Phone: [phone] Fax: [phone]  PROGRESS NOTE  Patient Name: Jay Dangelo III : 1950   Treatment/Medical Diagnosis: Parkinson's disease without dyskinesia, without mention of fluctuations (HCC)   Referral Source: Dulce Brasher APRN - NP     Payor: Payor: MEDICARE / Plan: MEDICARE PART A AND B / Product Type: *No Product type* /            Date of Initial Visit: 3/26/2025 Attended Visits: 6 Missed Visits: 2       CURRENT GOAL STATUS  1. Pt to report walking in the home for 3 minutes in the home 2x a day with Rolator and son providing CSV to demonstrate improved activity tolerance and endurance required for ADL.   Status at last note/certification: pt sits in lift chair/recliner most of time - exhausted after basic tasks such as taking a shower    progressing: duration varies, but at most 5 minutes. Walks several times daily. (2025)  Long Term Goals: To be accomplished in 16 weeks  1. Pt to perform 1x STS from 21in plinth, utilizing BUE support and no compensations from posterior thighs to demonstrate improved functional strength/ability required for the home/community.   Status at last note/certification: pt able to STS from 25in plinth without posterior knee compensations - any lower pt compensated, however required bilateral UE support   Progressin x from rollator in pool area.  Using bilateral UE assist  (2025)  2. Pt to ascend/descend 3 standard steps utilizing 1 UE support on left hand rail, performing safely with any gair pattern, and only requiring supervision level of assistance to demonstrate improved independence in the home.   Status at last note/certification: reciprocal with 1 UE support ascending, step-to with 1 UE support descending; both requiring CSV for safety   Assessed with pool entry/exit: reciprocal pattern with handrail support,bilateral.

## 2025-04-29 ENCOUNTER — HOSPITAL ENCOUNTER (OUTPATIENT)
Facility: HOSPITAL | Age: 75
Setting detail: RECURRING SERIES
Discharge: HOME | End: 2025-05-02
Payer: MEDICARE

## 2025-04-29 PROCEDURE — 97113 AQUATIC THERAPY/EXERCISES: CPT

## 2025-05-06 ENCOUNTER — HOSPITAL ENCOUNTER (OUTPATIENT)
Facility: HOSPITAL | Age: 75
Setting detail: RECURRING SERIES
Discharge: HOME | End: 2025-05-09
Payer: MEDICARE

## 2025-05-06 PROCEDURE — 97113 AQUATIC THERAPY/EXERCISES: CPT

## 2025-05-08 ENCOUNTER — HOSPITAL ENCOUNTER (OUTPATIENT)
Facility: HOSPITAL | Age: 75
Setting detail: RECURRING SERIES
Discharge: HOME | End: 2025-05-11
Payer: MEDICARE

## 2025-05-08 PROCEDURE — 97113 AQUATIC THERAPY/EXERCISES: CPT

## 2025-05-13 ENCOUNTER — HOSPITAL ENCOUNTER (OUTPATIENT)
Facility: HOSPITAL | Age: 75
Setting detail: RECURRING SERIES
Discharge: HOME | End: 2025-05-16
Payer: MEDICARE

## 2025-05-13 PROCEDURE — 97113 AQUATIC THERAPY/EXERCISES: CPT

## 2025-05-15 ENCOUNTER — APPOINTMENT (OUTPATIENT)
Facility: HOSPITAL | Age: 75
End: 2025-05-15
Payer: MEDICARE

## 2025-05-20 ENCOUNTER — HOSPITAL ENCOUNTER (OUTPATIENT)
Facility: HOSPITAL | Age: 75
Setting detail: RECURRING SERIES
Discharge: HOME | End: 2025-05-23
Payer: MEDICARE

## 2025-05-20 PROCEDURE — 97530 THERAPEUTIC ACTIVITIES: CPT

## 2025-05-22 ENCOUNTER — HOSPITAL ENCOUNTER (OUTPATIENT)
Facility: HOSPITAL | Age: 75
Setting detail: RECURRING SERIES
Discharge: HOME | End: 2025-05-25
Payer: MEDICARE

## 2025-05-22 PROCEDURE — 97530 THERAPEUTIC ACTIVITIES: CPT

## 2025-05-22 PROCEDURE — 97113 AQUATIC THERAPY/EXERCISES: CPT

## 2025-05-22 NOTE — PROGRESS NOTES
[facility] AT [facility]   [address], [place] [address]  Phone: [phone] Fax [phone]  DISCHARGE SUMMARY  Patient Name: Jay Dangelo III : 1950   Treatment/Medical Diagnosis: Parkinson's disease without dyskinesia, without mention of fluctuations (HCC)   Referral Source: Dulce Brasher APRN - NP     Date of Initial Visit: 3/26/2025 Attended Visits: 12 Missed Visits: 2     CURRENT GOAL STATUS  1. Pt to report walking in the home for 3 minutes in the home 2x a day with Rolator and son providing CSV to demonstrate improved activity tolerance and endurance required for ADL.   Status at last note/certification: pt sits in lift chair/recliner most of time - exhausted after basic tasks such as taking a shower    progressing: duration varies, but at most 5 minutes. Walks at least 3 x times daily. (2025)  Long Term Goals: To be accomplished in 16 weeks  1. Pt to perform 1x STS from 21in plinth, utilizing BUE support and no compensations from posterior thighs to demonstrate improved functional strength/ability required for the home/community.   Status at last note/certification: pt able to STS from 25in plinth without posterior knee compensations - any lower pt compensated, however required bilateral UE support   Progressing: performed from 21.5 inch seat in pool area.  Using 1 UE assist and without popliteal stabilization  (2025)  2. Pt to ascend/descend 3 standard steps utilizing 1 UE support on left hand rail, performing safely with any gair pattern, and only requiring supervision level of assistance to demonstrate improved independence in the home.   Status at last note/certification: reciprocal with 1 UE support ascending, step-to with 1 UE support descending; both requiring CSV for safety   Assessed with pool entry/exit: reciprocal pattern with handrail support,bilateral. However decreased pull from UE assist  (2025)  3.

## 2025-05-22 NOTE — PROGRESS NOTES
Physical Therapy Discharge Instructions      [facility]  [address] [place] [address] [phone] fax      Patient: Jay Dangelo III  : 1950      Continue Home Exercise Program 1-2 times per day for 4 weeks, then decrease to 3 times per week      Continue with    [] Ice  as needed  times per day     [] Heat           Follow up with MD:     [] Upon completion of therapy     [x] As needed      Recommendations:     []   Return to activity with home program    [x]   Return to activity with the following modifications:       [x]Post Rehab Program    []Join Independent aquatic program     []Return to/join local gym        Additional Comments:       Diane Strauss, PTA 2025 12:48 PM    If an interpreting service was utilized for treatment of this patient, the contents of this document represent the material reviewed with the patient via the .

## 2025-05-27 ENCOUNTER — APPOINTMENT (OUTPATIENT)
Facility: HOSPITAL | Age: 75
End: 2025-05-27
Payer: MEDICARE

## 2025-05-29 ENCOUNTER — APPOINTMENT (OUTPATIENT)
Facility: HOSPITAL | Age: 75
End: 2025-05-29
Payer: MEDICARE

## (undated) DEVICE — SCISSORS ENDOSCP DIA5MM CRV MPLR CAUT W/ RATCH HNDL

## (undated) DEVICE — SOLUTION IV 1000ML 0.9% SOD CHL

## (undated) DEVICE — CATHETER URETH 16FR BLLN 5CC SIL ALLY W/ SIL HYDRGEL 2 W F

## (undated) DEVICE — ELECTRODE ES 36CM LAP FLAT L HK COAT DISP CLEANCOAT

## (undated) DEVICE — DRAIN SURG W10XL20CM SIL SMOOTH FLAT 3/4 PERF DBL WRP

## (undated) DEVICE — REM POLYHESIVE ADULT PATIENT RETURN ELECTRODE: Brand: VALLEYLAB

## (undated) DEVICE — INTENDED FOR TISSUE SEPARATION, AND OTHER PROCEDURES THAT REQUIRE A SHARP SURGICAL BLADE TO PUNCTURE OR CUT.: Brand: BARD-PARKER SAFETY BLADES SIZE 10, STERILE

## (undated) DEVICE — SYR LR LCK 1ML GRAD NSAF 30ML --

## (undated) DEVICE — KIT OR TURNOVER

## (undated) DEVICE — PAD COOL W10.9XL11.3IN UNIV REG HOSE WRP ON THER CLD

## (undated) DEVICE — T5 HOOD WITH PEEL AWAY FACE SHIELD

## (undated) DEVICE — STER SINGLE BASIN SET W/BOWLS: Brand: CARDINAL HEALTH

## (undated) DEVICE — SUTURE VCRL SZ 2 L27IN ABSRB VLT L65MM TP-1 1/2 CIR J649G

## (undated) DEVICE — Device

## (undated) DEVICE — 2, DISPOSABLE SUCTION/IRRIGATOR WITH DISPOSABLE TIP: Brand: STRYKEFLOW

## (undated) DEVICE — FLEX ADVANTAGE 3000CC: Brand: FLEX ADVANTAGE

## (undated) DEVICE — SOFT SILICONE HYDROCELLULAR SACRUM DRESSING WITH LOCK AWAY LAYER: Brand: ALLEVYN LIFE SACRUM (LARGE) PACK OF 10

## (undated) DEVICE — ELECTRODE PT RET AD L9FT HI MOIST COND ADH HYDRGEL CORDED

## (undated) DEVICE — GUIDE SURG M MOD PH3 SET FOR PART KNEE ARTHROPLASTY SIGN

## (undated) DEVICE — SOLUTION IRRIG 3000ML 0.9% SOD CHL FLX CONT 0797208] ICU MEDICAL INC]

## (undated) DEVICE — INTENDED FOR TISSUE SEPARATION, AND OTHER PROCEDURES THAT REQUIRE A SHARP SURGICAL BLADE TO PUNCTURE OR CUT.: Brand: BARD-PARKER SAFETY BLADES SIZE 11, STERILE

## (undated) DEVICE — ELASTIC BANDAGE 6": Brand: CARDINAL HEALTH

## (undated) DEVICE — GUIDEWIRE: Brand: AMPLATZ SUPER STIFF™

## (undated) DEVICE — KENDALL SCD EXPRESS SLEEVES, KNEE LENGTH, MEDIUM: Brand: KENDALL SCD

## (undated) DEVICE — 2108 SERIES SAGITTAL BLADE (12.5 X 0.64 X 73.8MM)

## (undated) DEVICE — GLOVE SURG SZ 65 L12IN FNGR THK79MIL GRN LTX FREE

## (undated) DEVICE — CUFF BLD PRSS AD SZ 11 FIT 25-34CM SFT 2 TB W/ M SCR CONN

## (undated) DEVICE — CATHETER FOL 3 W 22 FR 30 CC URETH SPEC COUVELAIRE BARDX IC

## (undated) DEVICE — TUBING IRRIG L77IN DIA0.241IN L BOR FOR CYSTO W/ NVENT

## (undated) DEVICE — DRAPE TWL SURG 16X26IN BLU ORB04] ALLCARE INC]

## (undated) DEVICE — URINARY LEG BAG COMBINATION PACK: Brand: HOLLISTER

## (undated) DEVICE — SUTURE VCRL SZ 2-0 L18IN ABSRB VLT CT-1 L36MM 1/2 CIR J739D

## (undated) DEVICE — TROCAR: Brand: KII® OPTICAL ACCESS SYSTEM

## (undated) DEVICE — SHEET,DRAPE,70X100,STERILE: Brand: MEDLINE

## (undated) DEVICE — PACK SURG BSHR TOT KNEE LF

## (undated) DEVICE — NEEDLE INSUFFLATION 14 GAX120 MM SURGINEEDLE

## (undated) DEVICE — CATH URETH FOL 3W MED 22FRX30 --

## (undated) DEVICE — (D)SYR 10ML 1/5ML GRAD NSAF -- PKGING CHANGE USE ITEM 338027

## (undated) DEVICE — HANDPIECE SET WITH HIGH FLOW TIP AND SUCTION TUBE: Brand: INTERPULSE

## (undated) DEVICE — BLADE ASSEMB CLP HAIR FINE --

## (undated) DEVICE — TRAY CATH SIL 16FR 10 CA STATLOK

## (undated) DEVICE — GLOVE SURG SZ 6 CRM LTX FREE POLYISOPRENE POLYMER BEAD ANTI

## (undated) DEVICE — SOLUTION IRRIGATION H2O 0797305] ICU MEDICAL INC]

## (undated) DEVICE — 3M™ BAIR PAWS FLEX™ WARMING GOWN, STANDARD, 20 PER CASE 81003: Brand: BAIR PAWS™

## (undated) DEVICE — CATH URETH INTMIT ROB 14FR FUN -- USE ITEM 179521

## (undated) DEVICE — LIQUIBAND RAPID ADHESIVE 36/CS 0.8ML: Brand: MEDLINE

## (undated) DEVICE — APPLICATOR MEDICATED 26 CC SOLUTION HI LT ORNG CHLORAPREP

## (undated) DEVICE — 2108 SERIES SAGITTAL BLADE (24.8 X 1.24 X 80.1MM)

## (undated) DEVICE — SUTURE ABSORBABLE BRAIDED 2-0 CT-1 27 IN UD VICRYL J259H

## (undated) DEVICE — NEEDLE SPNL 22GA L3.5IN BLK HUB S STL REG WALL FIT STYL W/

## (undated) DEVICE — GOWN,REINFORCED,POLY,AURORA,XXLARGE,STR: Brand: MEDLINE

## (undated) DEVICE — BLADE TNGE REG 6IN WOOD STRL -- CONVERT TO ITEM 153408

## (undated) DEVICE — 3M™ WARMING BLANKET, UPPER BODY, 10 PER CASE, 42268: Brand: BAIR HUGGER™

## (undated) DEVICE — SUTURE MCRYL SZ 4-0 L27IN ABSRB UD L24MM PS-1 3/8 CIR PRIM Y935H

## (undated) DEVICE — KIT CLN UP BON SECOURS MARYV

## (undated) DEVICE — GAUZE SPONGES,16 PLY: Brand: CURITY

## (undated) DEVICE — (D)LUB GEL SURG SURGLUB 2OZ -- DISC BY MFR USE ITEM 292507

## (undated) DEVICE — TROCAR: Brand: KII® SLEEVE

## (undated) DEVICE — BAG DRAINAGE CUST DISP

## (undated) DEVICE — TISSUE RETRIEVAL SYSTEM: Brand: INZII RETRIEVAL SYSTEM

## (undated) DEVICE — WATERPROOF, BACTERIA PROOF DRESSING WITH ABSORBENT SEE THROUGH PAD: Brand: OPSITE POST-OP VISIBLE 25X10CM CTN 20

## (undated) DEVICE — DRAPE TOWEL: Brand: CONVERTORS

## (undated) DEVICE — SUTURE SZ 0 27IN 5/8 CIR UR-6  TAPER PT VIOLET ABSRB VICRYL J603H

## (undated) DEVICE — SOLUTION IRRIG 1000ML H2O STRL BLT

## (undated) DEVICE — HEX-LOCKING BLADE ELECTRODE: Brand: EDGE

## (undated) DEVICE — Z DISCONTINUED BY MEDLINE USE 2711682 TRAY SKIN PREP DRY W/ PREM GLV

## (undated) DEVICE — UNIT THER SEMI CLS LOOP RECIRC SYS W/ UNIV WRP ON PD ICEMAN

## (undated) DEVICE — TRAY PREP DRY W/ PREM GLV 2 APPL 6 SPNG 2 UNDPD 1 OVERWRAP

## (undated) DEVICE — Z DUP USE 2565107 PACK SURG PROC LEG CYSTO T-DRAPE REINF TBL CVR HND TWL

## (undated) DEVICE — APPLIER CLP M L L11.4IN DIA10MM ENDOSCP ROT MULT FOR LIG

## (undated) DEVICE — SLIM BODY SKIN STAPLER: Brand: APPOSE ULC

## (undated) DEVICE — CATHETER F BLLN 5CC 16FR 2 W HYDRGEL COAT LESS TRAUM LUB

## (undated) DEVICE — LAPAROSCOPIC TROCAR SLEEVE/SINGLE USE: Brand: KII® OPTICAL ACCESS SYSTEM

## (undated) DEVICE — (D)PREP SKN CHLRAPRP APPL 26ML -- CONVERT TO ITEM 371833

## (undated) DEVICE — Device: Brand: STABLECUT® OXFORD™

## (undated) DEVICE — Y-TYPE TUR/BLADDER IRRIGATION SET, REGULATING CLAMP

## (undated) DEVICE — MEDI-VAC NON-CONDUCTIVE SUCTION TUBING: Brand: CARDINAL HEALTH

## (undated) DEVICE — 3M™ DURAPORE™ SURGICAL TAPE 2 INCHES X 10YARDS (5.0CM X 9.1M) 6ROLLS/CARTON 10CARTONS/CASE 1538-2: Brand: 3M™ DURAPORE™

## (undated) DEVICE — SYRINGE MED 10ML LUERLOCK TIP W/O SFTY DISP